# Patient Record
Sex: MALE | Race: WHITE | NOT HISPANIC OR LATINO | Employment: OTHER | URBAN - METROPOLITAN AREA
[De-identification: names, ages, dates, MRNs, and addresses within clinical notes are randomized per-mention and may not be internally consistent; named-entity substitution may affect disease eponyms.]

---

## 2017-07-05 ENCOUNTER — ALLSCRIPTS OFFICE VISIT (OUTPATIENT)
Dept: OTHER | Facility: OTHER | Age: 67
End: 2017-07-05

## 2017-07-10 ENCOUNTER — LAB CONVERSION - ENCOUNTER (OUTPATIENT)
Dept: OTHER | Facility: OTHER | Age: 67
End: 2017-07-10

## 2017-07-10 LAB — LYME IGG/IGM AB (HISTORICAL): <0.9 INDEX

## 2017-07-11 ENCOUNTER — GENERIC CONVERSION - ENCOUNTER (OUTPATIENT)
Dept: OTHER | Facility: OTHER | Age: 67
End: 2017-07-11

## 2018-01-12 NOTE — RESULT NOTES
Verified Results  (Q) LYME DISEASE AB, TOTAL W/REFL WB (IGG, IGM) [id number] 10:14AM Gina Lanier   REPORT COMMENT:  FASTING:NO     Test Name Result Flag Reference   LYME AB SCREEN <0 90 index     Index                Interpretation                     -----                --------------                     < 0 90               Negative                     0  90-1 09            Equivocal                     > 1 09               Positive      As recommended by the Food and Drug Administration   (FDA), all samples with positive or equivocal   results in a Borrelia burgdorferi antibody screen  will be tested using a blot method  Positive or   equivocal screening test results should not be   interpreted as truly positive until verified as such   using a supplemental assay (e g , B  burgdorferi blot)  The screening test and/or blot for B  burgdorferi   antibodies may be falsely negative in early stages  of Lyme disease, including the period when erythema   migrans is apparent

## 2018-01-13 VITALS
HEIGHT: 71 IN | HEART RATE: 70 BPM | DIASTOLIC BLOOD PRESSURE: 96 MMHG | SYSTOLIC BLOOD PRESSURE: 164 MMHG | BODY MASS INDEX: 34.44 KG/M2 | WEIGHT: 246 LBS | OXYGEN SATURATION: 96 %

## 2018-01-13 NOTE — RESULT NOTES
Message   Recorded as Task   Date: 07/11/2017 07:59 AM, Created By: Monica Nicolas   Task Name: Call Patient with results   Assigned To:  Monica Nicolas   Regarding Patient: Wing Mae, Status: Active   CommentThayne Art - 11 Jul 2017 7:59 AM     Patient Phone: [phone]    Lyme neg   Waqas J Carlosia - 11 Jul 2017 9:06 AM     TASK EDITED  Msg left C# of results        Signatures   Electronically signed by : Keri Cruz, ; Jul 11 2017  9:07AM EST                       (Author)

## 2018-04-05 ENCOUNTER — TELEPHONE (OUTPATIENT)
Dept: SCHEDULING | Facility: CLINIC | Age: 68
End: 2018-04-05

## 2018-04-05 RX ORDER — ATORVASTATIN CALCIUM 20 MG/1
20 TABLET, FILM COATED ORAL DAILY
Qty: 90 TABLET | Refills: 1 | Status: SHIPPED | OUTPATIENT
Start: 2018-04-05 | End: 2019-01-11 | Stop reason: SDUPTHER

## 2018-04-05 NOTE — TELEPHONE ENCOUNTER
Dr. García Pt   Pt would like to know if Dr. García can fill lipitor. Pt is in the middle of switching PCP. Please contact pt.

## 2018-07-24 RX ORDER — DOFETILIDE 0.5 MG/1
500 CAPSULE ORAL 2 TIMES DAILY
COMMUNITY
Start: 2018-01-25 | End: 2018-07-26 | Stop reason: SDUPTHER

## 2018-07-24 RX ORDER — METOPROLOL TARTRATE 25 MG/1
25 TABLET, FILM COATED ORAL EVERY 6 HOURS PRN
COMMUNITY
Start: 2017-08-10 | End: 2020-08-17 | Stop reason: SDUPTHER

## 2018-07-24 RX ORDER — LISINOPRIL 2.5 MG/1
2.5 TABLET ORAL DAILY
COMMUNITY
Start: 2018-01-25 | End: 2018-07-26

## 2018-07-24 RX ORDER — ASPIRIN 81 MG/1
81 TABLET ORAL DAILY
COMMUNITY
Start: 2015-05-08 | End: 2023-03-21

## 2018-07-24 RX ORDER — CHOLECALCIFEROL (VITAMIN D3) 50 MCG
TABLET ORAL DAILY
COMMUNITY
Start: 2015-05-08 | End: 2021-07-08

## 2018-07-25 PROBLEM — Z79.01 CHRONIC ANTICOAGULATION: Status: ACTIVE | Noted: 2018-07-25

## 2018-07-25 PROBLEM — I48.91 A-FIB (CMS/HCC): Status: ACTIVE | Noted: 2018-07-25

## 2018-07-25 ASSESSMENT — ENCOUNTER SYMPTOMS
CONSTITUTIONAL NEGATIVE: 1
NEUROLOGICAL NEGATIVE: 1
RESPIRATORY NEGATIVE: 1
ENDOCRINE NEGATIVE: 1
HEMATOLOGIC/LYMPHATIC NEGATIVE: 1
CARDIOVASCULAR NEGATIVE: 1
MUSCULOSKELETAL NEGATIVE: 1
PSYCHIATRIC NEGATIVE: 1
GASTROINTESTINAL NEGATIVE: 1
EYES NEGATIVE: 1

## 2018-07-25 NOTE — PROGRESS NOTES
Electrophysiology  Outpatient Progress Note       Reason for visit:   Chief Complaint   Patient presents with   • Atrial Fibrillation       HPI   Humberto Chung is a 67 y.o. male who is seen today for paroxysmal atrial fibrillation. His symptoms with the arrhythmia have included palpitations and ightheadedness. The patient is currently on Aspirin Low Dose 81 mg tablet,delay, Eliquis 5 mg tablet.  For rhythm control, he is on the following: Tikosyn 500 mcg capsule. He is currently in sinus rhythm. He has had three brief episodes of afib since last seen in July 2017. These last around 24 hours. He has taken Eliquis with these episodes. Overall, his AF burden has improved.     He has had no chest discomfort suggestive of ischemia.The patient denies orthopnea, PND, GTZ, or edema. Mr. CHUNG has not had palpitations, syncope or near syncope. He denies claudication. There is no discoloration or ulceration of the lower extremities. He has had no TIA or stroke-like symptoms. The patient has no symptoms attributable to valvular heart disease.     Past Medical History:   Diagnosis Date   • A-fib (CMS/HCC) (HCC)    • A-fib (CMS/HCC) (HCC) 7/25/2018   • Chronic anticoagulation 7/25/2018   • Lipid disorder      Past Surgical History:   Procedure Laterality Date   • ABLATION OF DYSRHYTHMIC FOCUS     • CARDIAC CATHETERIZATION         Social History   Substance Use Topics   • Smoking status: Current Some Day Smoker   • Smokeless tobacco: Never Used   • Alcohol use Yes     History reviewed. No pertinent family history.    ALLERGY:  Aspirin and Penicillins    Current Outpatient Prescriptions   Medication Sig Dispense Refill   • apixaban (ELIQUIS) 5 mg tablet Take 5 mg by mouth See admin instr. Use if you go into a-fib     • aspirin (ASPIRIN LOW DOSE) 81 mg enteric coated tablet Take 81 mg by mouth daily.     • atorvastatin (LIPITOR) 20 mg tablet Take 1 tablet (20 mg total) by mouth daily. 90 tablet 1   • cholecalciferol, vitamin  D3, (VITAMIN D3) 2,000 unit tablet Take by mouth daily.     • dofetilide (TIKOSYN) 500 mcg capsule Take 1 capsule (500 mcg total) by mouth 2 (two) times a day. 180 capsule 3   • lisinopril (PRINIVIL) 2.5 mg tablet Take 2 tablets (5 mg total) by mouth daily.     • metoprolol tartrate (LOPRESSOR) 25 mg tablet Take 25 mg by mouth every 6 (six) hours as needed.       No current facility-administered medications for this visit.      Review of Systems   Constitution: Negative.   HENT: Negative.    Eyes: Negative.    Cardiovascular: Negative.    Respiratory: Negative.    Endocrine: Negative.    Hematologic/Lymphatic: Negative.    Skin: Negative.    Musculoskeletal: Negative.    Gastrointestinal: Negative.    Genitourinary: Negative.    Neurological: Negative.    Psychiatric/Behavioral: Negative.      Objective   Vitals:    07/26/18 1136   BP: 140/86   Pulse: 83   SpO2: 96%       Physical Exam   Constitutional: He is oriented to person, place, and time. He appears well-developed and well-nourished.   HENT:   Head: Normocephalic.   Eyes: Conjunctivae and EOM are normal. Pupils are equal, round, and reactive to light.   Neck: Normal range of motion.   Cardiovascular: Normal rate, regular rhythm and normal heart sounds.  Exam reveals no gallop and no friction rub.    No murmur heard.  Pulmonary/Chest: Effort normal and breath sounds normal.   Abdominal: Soft. Bowel sounds are normal.   Musculoskeletal: Normal range of motion. He exhibits no edema.   Neurological: He is alert and oriented to person, place, and time.   Skin: Skin is warm and dry.   Psychiatric: He has a normal mood and affect. His behavior is normal. Judgment and thought content normal.       Lab Results   Component Value Date    WBC 5.5 02/12/2018    HGB 15.4 02/12/2018     02/12/2018    CHOL 143 02/12/2018    TRIG 45 02/12/2018    HDL 46 02/12/2018    ALT 25 02/12/2018    AST 22 02/12/2018     02/12/2018    K 4.5 02/12/2018    CREATININE 1.01  02/12/2018     Cardiovascular Procedures:  CATH LAB:  Cath (Normal Coronaries) - 2008  ELECTROPHYSIOLOGY:  EPS (PV ablation afib during study) - 12/11/2009  Holter (apc one 6 beat cst no aifb) - 5/13/2015  STRESS TESTS:  MPI (EF 0.60 (60%), lateral wal defect) - 6/9/2008  OTHER:  Other (Admitted for Tikosyn loading) - 6/10/2015     ECG   Sinus  Rhythm   Poor R wave progression      Assessment   Problem List Items Addressed This Visit        Circulatory    A-fib (CMS/HCC) ([facility]) - Primary     The patient is maintaining sinus rhythm.  He is on chronic medical therapy.  He is stable on Tikosyn and his electrocardiogram shows no evidence of prolonged QT or QTc.  I have made no changes to the present regimen         Relevant Medications    aspirin (ASPIRIN LOW DOSE) 81 mg enteric coated tablet    apixaban (ELIQUIS) 5 mg tablet    metoprolol tartrate (LOPRESSOR) 25 mg tablet    lisinopril (PRINIVIL) 2.5 mg tablet    dofetilide (TIKOSYN) 500 mcg capsule    Other Relevant Orders    ECG 12 lead (Completed)       Other    Chronic anticoagulation     He is anticoagulated on Eliquis with no adverse effects and no signs of outward bleeding.                    Sincere García MD  7/30/2018

## 2018-07-26 ENCOUNTER — OFFICE VISIT (OUTPATIENT)
Dept: CARDIOLOGY | Facility: CLINIC | Age: 68
End: 2018-07-26
Attending: INTERNAL MEDICINE
Payer: MEDICARE

## 2018-07-26 VITALS
HEART RATE: 83 BPM | SYSTOLIC BLOOD PRESSURE: 140 MMHG | BODY MASS INDEX: 34.44 KG/M2 | OXYGEN SATURATION: 96 % | HEIGHT: 71 IN | WEIGHT: 246 LBS | DIASTOLIC BLOOD PRESSURE: 86 MMHG

## 2018-07-26 DIAGNOSIS — I48.0 PAROXYSMAL ATRIAL FIBRILLATION (CMS/HCC): Primary | ICD-10-CM

## 2018-07-26 DIAGNOSIS — Z79.01 CHRONIC ANTICOAGULATION: ICD-10-CM

## 2018-07-26 PROCEDURE — 99214 OFFICE O/P EST MOD 30 MIN: CPT | Performed by: INTERNAL MEDICINE

## 2018-07-26 PROCEDURE — 93000 ELECTROCARDIOGRAM COMPLETE: CPT | Performed by: INTERNAL MEDICINE

## 2018-07-26 RX ORDER — LISINOPRIL 2.5 MG/1
5 TABLET ORAL DAILY
Start: 2018-07-26 | End: 2018-09-05 | Stop reason: SDUPTHER

## 2018-07-26 RX ORDER — DOFETILIDE 0.5 MG/1
500 CAPSULE ORAL 2 TIMES DAILY
Qty: 180 CAPSULE | Refills: 3 | Status: SHIPPED | OUTPATIENT
Start: 2018-07-26 | End: 2019-07-25 | Stop reason: SDUPTHER

## 2018-07-26 NOTE — ASSESSMENT & PLAN NOTE
The patient is maintaining sinus rhythm.  He is on chronic medical therapy.  He is stable on Tikosyn and his electrocardiogram shows no evidence of prolonged QT or QTc.  I have made no changes to the present regimen

## 2018-07-26 NOTE — LETTER
July 30, 2018     Balbir Tellez MD  [address] [address]    Patient: Humberto Costello   YOB: 1950   Date of Visit: 7/26/2018       Dear Dr. Tellez:    Thank you for referring Humberto Costello to me for evaluation. Below are my notes for this consultation.    If you have questions, please do not hesitate to call me. I look forward to following your patient along with you.         Sincerely,        Sincere García MD        CC: No Recipients  Sincere García MD  7/30/2018  9:26 AM  Signed       Electrophysiology  Outpatient Progress Note       Reason for visit:   Chief Complaint   Patient presents with   • Atrial Fibrillation       HPI   Humberto Costello is a 67 y.o. male who is seen today for paroxysmal atrial fibrillation. His symptoms with the arrhythmia have included palpitations and ightheadedness. The patient is currently on Aspirin Low Dose 81 mg tablet,delay, Eliquis 5 mg tablet.  For rhythm control, he is on the following: Tikosyn 500 mcg capsule. He is currently in sinus rhythm. He has had three brief episodes of afib since last seen in July 2017. These last around 24 hours. He has taken Eliquis with these episodes. Overall, his AF burden has improved.     He has had no chest discomfort suggestive of ischemia.The patient denies orthopnea, PND, GTZ, or edema. Mr. COSTELLO has not had palpitations, syncope or near syncope. He denies claudication. There is no discoloration or ulceration of the lower extremities. He has had no TIA or stroke-like symptoms. The patient has no symptoms attributable to valvular heart disease.     Past Medical History:   Diagnosis Date   • A-fib (CMS/HCC) (HCC)    • A-fib (CMS/HCC) (HCC) 7/25/2018   • Chronic anticoagulation 7/25/2018   • Lipid disorder      Past Surgical History:   Procedure Laterality Date   • ABLATION OF DYSRHYTHMIC FOCUS     • CARDIAC CATHETERIZATION         Social History   Substance Use Topics   • Smoking status:  Current Some Day Smoker   • Smokeless tobacco: Never Used   • Alcohol use Yes     History reviewed. No pertinent family history.    ALLERGY:  Aspirin and Penicillins    Current Outpatient Prescriptions   Medication Sig Dispense Refill   • apixaban (ELIQUIS) 5 mg tablet Take 5 mg by mouth See admin instr. Use if you go into a-fib     • aspirin (ASPIRIN LOW DOSE) 81 mg enteric coated tablet Take 81 mg by mouth daily.     • atorvastatin (LIPITOR) 20 mg tablet Take 1 tablet (20 mg total) by mouth daily. 90 tablet 1   • cholecalciferol, vitamin D3, (VITAMIN D3) 2,000 unit tablet Take by mouth daily.     • dofetilide (TIKOSYN) 500 mcg capsule Take 1 capsule (500 mcg total) by mouth 2 (two) times a day. 180 capsule 3   • lisinopril (PRINIVIL) 2.5 mg tablet Take 2 tablets (5 mg total) by mouth daily.     • metoprolol tartrate (LOPRESSOR) 25 mg tablet Take 25 mg by mouth every 6 (six) hours as needed.       No current facility-administered medications for this visit.      Review of Systems   Constitution: Negative.   HENT: Negative.    Eyes: Negative.    Cardiovascular: Negative.    Respiratory: Negative.    Endocrine: Negative.    Hematologic/Lymphatic: Negative.    Skin: Negative.    Musculoskeletal: Negative.    Gastrointestinal: Negative.    Genitourinary: Negative.    Neurological: Negative.    Psychiatric/Behavioral: Negative.      Objective   Vitals:    07/26/18 1136   BP: 140/86   Pulse: 83   SpO2: 96%       Physical Exam   Constitutional: He is oriented to person, place, and time. He appears well-developed and well-nourished.   HENT:   Head: Normocephalic.   Eyes: Conjunctivae and EOM are normal. Pupils are equal, round, and reactive to light.   Neck: Normal range of motion.   Cardiovascular: Normal rate, regular rhythm and normal heart sounds.  Exam reveals no gallop and no friction rub.    No murmur heard.  Pulmonary/Chest: Effort normal and breath sounds normal.   Abdominal: Soft. Bowel sounds are normal.    Musculoskeletal: Normal range of motion. He exhibits no edema.   Neurological: He is alert and oriented to person, place, and time.   Skin: Skin is warm and dry.   Psychiatric: He has a normal mood and affect. His behavior is normal. Judgment and thought content normal.       Lab Results   Component Value Date    WBC 5.5 02/12/2018    HGB 15.4 02/12/2018     02/12/2018    CHOL 143 02/12/2018    TRIG 45 02/12/2018    HDL 46 02/12/2018    ALT 25 02/12/2018    AST 22 02/12/2018     02/12/2018    K 4.5 02/12/2018    CREATININE 1.01 02/12/2018     Cardiovascular Procedures:  CATH LAB:  Cath (Normal Coronaries) - 2008  ELECTROPHYSIOLOGY:  EPS (PV ablation afib during study) - 12/11/2009  Holter (apc one 6 beat cst no aifb) - 5/13/2015  STRESS TESTS:  MPI (EF 0.60 (60%), lateral wal defect) - 6/9/2008  OTHER:  Other (Admitted for Tikosyn loading) - 6/10/2015     ECG   Sinus  Rhythm   Poor R wave progression      Assessment   Problem List Items Addressed This Visit        Circulatory    A-fib (CMS/HCC) (HCC) - Primary     The patient is maintaining sinus rhythm.  He is on chronic medical therapy.  He is stable on Tikosyn and his electrocardiogram shows no evidence of prolonged QT or QTc.  I have made no changes to the present regimen         Relevant Medications    aspirin (ASPIRIN LOW DOSE) 81 mg enteric coated tablet    apixaban (ELIQUIS) 5 mg tablet    metoprolol tartrate (LOPRESSOR) 25 mg tablet    lisinopril (PRINIVIL) 2.5 mg tablet    dofetilide (TIKOSYN) 500 mcg capsule    Other Relevant Orders    ECG 12 lead (Completed)       Other    Chronic anticoagulation     He is anticoagulated on Eliquis with no adverse effects and no signs of outward bleeding.                    Sincere García MD  7/30/2018

## 2018-08-29 ENCOUNTER — OFFICE VISIT (OUTPATIENT)
Dept: FAMILY MEDICINE CLINIC | Facility: CLINIC | Age: 68
End: 2018-08-29
Payer: MEDICARE

## 2018-08-29 VITALS
HEIGHT: 71 IN | DIASTOLIC BLOOD PRESSURE: 78 MMHG | HEART RATE: 78 BPM | BODY MASS INDEX: 33.88 KG/M2 | SYSTOLIC BLOOD PRESSURE: 122 MMHG | OXYGEN SATURATION: 96 % | WEIGHT: 242 LBS

## 2018-08-29 DIAGNOSIS — L25.9 CONTACT DERMATITIS, UNSPECIFIED CONTACT DERMATITIS TYPE, UNSPECIFIED TRIGGER: Primary | ICD-10-CM

## 2018-08-29 PROCEDURE — 99213 OFFICE O/P EST LOW 20 MIN: CPT | Performed by: FAMILY MEDICINE

## 2018-08-29 RX ORDER — DOFETILIDE 0.5 MG/1
CAPSULE ORAL
COMMUNITY
Start: 2017-07-05

## 2018-08-29 RX ORDER — PREDNISONE 20 MG/1
TABLET ORAL
Qty: 15 TABLET | Refills: 1 | Status: SHIPPED | OUTPATIENT
Start: 2018-08-29

## 2018-08-29 RX ORDER — ASPIRIN 81 MG/1
TABLET ORAL
COMMUNITY
Start: 2017-07-05

## 2018-08-29 RX ORDER — LISINOPRIL 5 MG/1
5 TABLET ORAL DAILY
COMMUNITY
End: 2019-03-11 | Stop reason: ALTCHOICE

## 2018-08-29 RX ORDER — ATORVASTATIN CALCIUM 20 MG/1
1 TABLET, FILM COATED ORAL DAILY
COMMUNITY
Start: 2017-07-05

## 2018-08-29 NOTE — PROGRESS NOTES
[address]         NAME: Walker Gomez is a 78 y o  male  : 1950    MRN: [id number]  DATE: 2018  TIME: 11:32 AM    Assessment and Plan   Contact dermatitis, unspecified contact dermatitis type, unspecified trigger [L25 9]  1  Contact dermatitis, unspecified contact dermatitis type, unspecified trigger  predniSONE 20 mg tablet         Patient Instructions     Patient Instructions   See meds          Chief Complaint     Chief Complaint   Patient presents with    Poison Ivy     1 day         History of Present Illness       C/o contact derm---poison ivy        Review of Systems   Review of Systems   Respiratory: Negative  Cardiovascular: Negative  Skin:        See HPI         Current Medications       Current Outpatient Prescriptions:     aspirin (ASPIR-LOW) 81 mg EC tablet, Take by mouth, Disp: , Rfl:     atorvastatin (LIPITOR) 20 mg tablet, Take 1 tablet by mouth daily, Disp: , Rfl:     dofetilide (TIKOSYN) 500 mcg capsule, Take by mouth, Disp: , Rfl:     lisinopril (ZESTRIL) 5 mg tablet, Take 5 mg by mouth daily, Disp: , Rfl:     predniSONE 20 mg tablet, TAKE 1 TABLET BID X 5 DAYS THEN TAKE 1 TABLET QD FOR 5 DAYS, Disp: 15 tablet, Rfl: 1    Current Allergies     Allergies as of 2018 - never reviewed   Allergen Reaction Noted    Penicillins  2017            The following portions of the patient's history were reviewed and updated as appropriate: allergies, current medications, past family history, past medical history, past social history, past surgical history and problem list      Past Medical History:   Diagnosis Date    Hypertension        History reviewed  No pertinent surgical history      Family History   Problem Relation Age of Onset    No Known Problems Mother     No Known Problems Father     Stroke Maternal Grandmother     Stroke Paternal Grandmother     Substance Abuse Son     Alcohol abuse Son     Mental illness Neg Hx Medications have been verified          Objective   /78   Pulse 78   Ht 5' 11" (1 803 m)   Wt 110 kg (242 lb)   SpO2 96%   BMI 33 75 kg/m²        Physical Exam     Physical Exam   Skin:   Lesions c/w poison ivy

## 2018-09-07 RX ORDER — LISINOPRIL 2.5 MG/1
5 TABLET ORAL DAILY
Qty: 180 TABLET | Refills: 1 | Status: SHIPPED | OUTPATIENT
Start: 2018-09-07 | End: 2019-01-24 | Stop reason: SDUPTHER

## 2018-09-20 ENCOUNTER — TELEPHONE (OUTPATIENT)
Dept: SCHEDULING | Facility: CLINIC | Age: 68
End: 2018-09-20

## 2018-09-20 ENCOUNTER — OFFICE VISIT (OUTPATIENT)
Dept: FAMILY MEDICINE CLINIC | Facility: CLINIC | Age: 68
End: 2018-09-20
Payer: MEDICARE

## 2018-09-20 VITALS
BODY MASS INDEX: 34.16 KG/M2 | WEIGHT: 244 LBS | DIASTOLIC BLOOD PRESSURE: 78 MMHG | SYSTOLIC BLOOD PRESSURE: 132 MMHG | HEIGHT: 71 IN | OXYGEN SATURATION: 96 % | HEART RATE: 64 BPM

## 2018-09-20 DIAGNOSIS — L03.115 CELLULITIS OF RIGHT LOWER EXTREMITY: Primary | ICD-10-CM

## 2018-09-20 PROCEDURE — 99213 OFFICE O/P EST LOW 20 MIN: CPT | Performed by: FAMILY MEDICINE

## 2018-09-20 RX ORDER — CEPHALEXIN 500 MG/1
500 CAPSULE ORAL EVERY 8 HOURS SCHEDULED
Qty: 30 CAPSULE | Refills: 0 | Status: SHIPPED | OUTPATIENT
Start: 2018-09-20 | End: 2018-09-30

## 2018-09-20 NOTE — PROGRESS NOTES
[address] [facility]        NAME: Marlys Means is a 78 y o  male  : 1950    MRN: [id number]  DATE: 2018  TIME: 10:11 AM    Assessment and Plan   Cellulitis of right lower extremity [L03 115]  1  Cellulitis of right lower extremity  cephalexin (KEFLEX) 500 mg capsule         Patient Instructions     Patient Instructions   See meds          Chief Complaint     Chief Complaint   Patient presents with    right leg infection         History of Present Illness       R leg cellulitis        Review of Systems   Review of Systems   Constitutional: Negative  Respiratory: Negative  Cardiovascular: Negative  Skin:        See HPI         Current Medications       Current Outpatient Prescriptions:     aspirin (ASPIR-LOW) 81 mg EC tablet, Take by mouth, Disp: , Rfl:     atorvastatin (LIPITOR) 20 mg tablet, Take 1 tablet by mouth daily, Disp: , Rfl:     dofetilide (TIKOSYN) 500 mcg capsule, Take by mouth, Disp: , Rfl:     lisinopril (ZESTRIL) 5 mg tablet, Take 5 mg by mouth daily, Disp: , Rfl:     cephalexin (KEFLEX) 500 mg capsule, Take 1 capsule (500 mg total) by mouth every 8 (eight) hours for 10 days, Disp: 30 capsule, Rfl: 0    predniSONE 20 mg tablet, TAKE 1 TABLET BID X 5 DAYS THEN TAKE 1 TABLET QD FOR 5 DAYS (Patient not taking: Reported on 2018 ), Disp: 15 tablet, Rfl: 1    Current Allergies     Allergies as of 2018 - Reviewed 2018   Allergen Reaction Noted    Penicillins  2017            The following portions of the patient's history were reviewed and updated as appropriate: allergies, current medications, past family history, past medical history, past social history, past surgical history and problem list      Past Medical History:   Diagnosis Date    Hypertension        No past surgical history on file      Family History   Problem Relation Age of Onset    No Known Problems Mother     No Known Problems Father     Stroke Maternal Grandmother     Stroke Paternal Grandmother     Substance Abuse Son     Alcohol abuse Son     Mental illness Neg Hx          Medications have been verified  Objective   /78   Pulse 64   Ht 5' 11" (1 803 m)   Wt 111 kg (244 lb)   SpO2 96%   BMI 34 03 kg/m²        Physical Exam     Physical Exam   Cardiovascular: Normal heart sounds      Pulmonary/Chest: Breath sounds normal    Skin:   R leg c/w cellulitis

## 2018-09-20 NOTE — TELEPHONE ENCOUNTER
Left message to return my call.  I need the name of the antibiotic in order to assess for any interaction with dofetilide.

## 2018-09-20 NOTE — TELEPHONE ENCOUNTER
Dr García pt call to confirm antibiotic  for infection, ok to take Tikosyn. Please call pt to discuss. P# [phone]

## 2019-01-11 RX ORDER — ATORVASTATIN CALCIUM 20 MG/1
20 TABLET, FILM COATED ORAL DAILY
Qty: 90 TABLET | Refills: 1 | Status: SHIPPED | OUTPATIENT
Start: 2019-01-11 | End: 2019-07-25 | Stop reason: SDUPTHER

## 2019-01-22 ASSESSMENT — ENCOUNTER SYMPTOMS
CONSTITUTIONAL NEGATIVE: 1
CARDIOVASCULAR NEGATIVE: 1
PSYCHIATRIC NEGATIVE: 1
EYES NEGATIVE: 1
MUSCULOSKELETAL NEGATIVE: 1
GASTROINTESTINAL NEGATIVE: 1
NEUROLOGICAL NEGATIVE: 1
RESPIRATORY NEGATIVE: 1
ENDOCRINE NEGATIVE: 1
HEMATOLOGIC/LYMPHATIC NEGATIVE: 1

## 2019-01-22 NOTE — PROGRESS NOTES
Electrophysiology  Outpatient Progress Note       Reason for visit:   Chief Complaint   Patient presents with   • Follow-up       HPI   Humberto Chung is a 68 y.o. male who is seen today for paroxysmal atrial fibrillation.  He reports episodes every week or two lasting longer than previous, but still only a few hours. His symptoms with the arrhythmia have included palpitations and lightheadedness. The patient is currently on  Tikosyn 500 mcg BID. He is currently in sinus rhythm. Overall, his AF burden has increased.      He has had no chest discomfort suggestive of ischemia.The patient denies orthopnea, PND, GTZ, or edema. Mr. CHUNG has not had palpitations, syncope or near syncope. He denies claudication. There is no discoloration or ulceration of the lower extremities. He has had no TIA or stroke-like symptoms. The patient has no symptoms attributable to valvular heart disease.     Past Medical History:   Diagnosis Date   • A-fib (CMS/HCC) (HCC)    • A-fib (CMS/HCC) (HCC) 7/25/2018   • Chronic anticoagulation 7/25/2018   • Lipid disorder      Past Surgical History:   Procedure Laterality Date   • ABLATION OF DYSRHYTHMIC FOCUS     • CARDIAC CATHETERIZATION         Social History   Substance Use Topics   • Smoking status: Current Some Day Smoker   • Smokeless tobacco: Never Used   • Alcohol use Yes     No family history on file.    ALLERGY:  Aspirin and Penicillins    Current Outpatient Prescriptions   Medication Sig Dispense Refill   • apixaban (ELIQUIS) 5 mg tablet Take 5 mg by mouth See admin instr. Use if you go into a-fib     • aspirin (ASPIRIN LOW DOSE) 81 mg enteric coated tablet Take 81 mg by mouth daily.     • atorvastatin (LIPITOR) 20 mg tablet Take 1 tablet (20 mg total) by mouth daily. 90 tablet 1   • cholecalciferol, vitamin D3, (VITAMIN D3) 2,000 unit tablet Take by mouth daily.     • dofetilide (TIKOSYN) 500 mcg capsule Take 1 capsule (500 mcg total) by mouth 2 (two) times a day. 180 capsule 3    • lisinopril (PRINIVIL) 2.5 mg tablet Take 2 tablets (5 mg total) by mouth daily. 180 tablet 3   • metoprolol tartrate (LOPRESSOR) 25 mg tablet Take 25 mg by mouth every 6 (six) hours as needed.       No current facility-administered medications for this visit.      Review of Systems   Constitution: Negative.   HENT: Negative.    Eyes: Negative.    Cardiovascular: Negative.    Respiratory: Negative.    Endocrine: Negative.    Hematologic/Lymphatic: Negative.    Skin: Negative.    Musculoskeletal: Negative.    Gastrointestinal: Negative.    Genitourinary: Negative.    Neurological: Negative.    Psychiatric/Behavioral: Negative.      Objective   Vitals:    01/24/19 1044   BP: 140/88   Pulse: 79   Resp: 18   SpO2: 96%       Physical Exam   Constitutional: He is oriented to person, place, and time. He appears well-developed and well-nourished.   HENT:   Head: Normocephalic.   Eyes: Conjunctivae and EOM are normal. Pupils are equal, round, and reactive to light.   Neck: Normal range of motion.   Cardiovascular: Normal rate, regular rhythm and normal heart sounds.  Exam reveals no gallop and no friction rub.    No murmur heard.  Pulmonary/Chest: Effort normal and breath sounds normal.   Abdominal: Soft. Bowel sounds are normal.   Musculoskeletal: Normal range of motion. He exhibits no edema.   Neurological: He is alert and oriented to person, place, and time.   Skin: Skin is warm and dry.   Psychiatric: He has a normal mood and affect. His behavior is normal. Judgment and thought content normal.       Lab Results   Component Value Date    WBC 5.5 02/12/2018    HGB 15.4 02/12/2018     02/12/2018    CHOL 143 02/12/2018    TRIG 45 02/12/2018    HDL 46 02/12/2018    ALT 25 02/12/2018    AST 22 02/12/2018     02/12/2018    K 4.5 02/12/2018    CREATININE 1.01 02/12/2018     Cardiovascular Procedures:    CATH LAB:  Cath (Normal Coronaries) - 2008    ELECTROPHYSIOLOGY:  EPS (PV ablation afib during study) -  12/11/2009  Holter (apc one 6 beat cst no aifb) - 5/13/2015    STRESS TESTS:  MPI (EF 0.60 (60%), lateral wal defect) - 6/9/2008    Other (Admitted for Tikosyn loading) - 6/10/2015     ECG Sinus  Rhythm   WITHIN NORMAL LIMITS    Assessment   Problem List Items Addressed This Visit        Circulatory    A-fib (CMS/HCC) (HCC) - Primary     He has had increasing episodes recently.  He uses Eliquis on an as needed basis, and is reluctant to take this daily. As his symptoms have increased a bit we did discuss catheter ablation and he will consider this for better control of his arrhythmias.  He will contact us if his burden warrants such before his next visit.         Relevant Medications    lisinopril (PRINIVIL) 2.5 mg tablet    Other Relevant Orders    ECG 12 LEAD-OFFICE PERFORMED (Completed)       Other    Chronic anticoagulation     He is a CHADS-Vasc 1 (age; on lisinopril for renal purposes). Discussed perhaps taking Eliquis daily, but he remains reluctant.                     Sincere García MD  2/3/2019

## 2019-01-24 ENCOUNTER — OFFICE VISIT (OUTPATIENT)
Dept: CARDIOLOGY | Facility: CLINIC | Age: 69
End: 2019-01-24
Payer: MEDICARE

## 2019-01-24 VITALS
HEIGHT: 71 IN | DIASTOLIC BLOOD PRESSURE: 88 MMHG | SYSTOLIC BLOOD PRESSURE: 140 MMHG | OXYGEN SATURATION: 96 % | RESPIRATION RATE: 18 BRPM | BODY MASS INDEX: 34.3 KG/M2 | HEART RATE: 79 BPM | WEIGHT: 245 LBS

## 2019-01-24 DIAGNOSIS — I48.91 ATRIAL FIBRILLATION, UNSPECIFIED TYPE (CMS/HCC): Primary | ICD-10-CM

## 2019-01-24 DIAGNOSIS — Z79.01 CHRONIC ANTICOAGULATION: ICD-10-CM

## 2019-01-24 PROCEDURE — 99214 OFFICE O/P EST MOD 30 MIN: CPT | Performed by: INTERNAL MEDICINE

## 2019-01-24 PROCEDURE — 93000 ELECTROCARDIOGRAM COMPLETE: CPT | Performed by: INTERNAL MEDICINE

## 2019-01-24 RX ORDER — LISINOPRIL 2.5 MG/1
5 TABLET ORAL DAILY
Qty: 180 TABLET | Refills: 3 | Status: SHIPPED | OUTPATIENT
Start: 2019-01-24 | End: 2019-07-25 | Stop reason: SDUPTHER

## 2019-01-24 ASSESSMENT — PAIN SCALES - GENERAL: PAINLEVEL: 0-NO PAIN

## 2019-01-24 NOTE — LETTER
February 3, 2019     Balbir Tellez MD  [address] [address]    Patient: Humberto Costello   YOB: 1950   Date of Visit: 1/24/2019       Dear Dr. Tellez:    Thank you for referring Humberto Costello to me for evaluation. Below are my notes for this consultation.    If you have questions, please do not hesitate to call me. I look forward to following your patient along with you.         Sincerely,        Sincere García MD        CC: No Recipients  Sincere García MD  2/3/2019  8:04 AM  Signed       Electrophysiology  Outpatient Progress Note       Reason for visit:   Chief Complaint   Patient presents with   • Follow-up       HPI   Humberto Costello is a 68 y.o. male who is seen today for paroxysmal atrial fibrillation.  He reports episodes every week or two lasting longer than previous, but still only a few hours. His symptoms with the arrhythmia have included palpitations and lightheadedness. The patient is currently on  Tikosyn 500 mcg BID. He is currently in sinus rhythm. Overall, his AF burden has increased.      He has had no chest discomfort suggestive of ischemia.The patient denies orthopnea, PND, GTZ, or edema. Mr. COSTELLO has not had palpitations, syncope or near syncope. He denies claudication. There is no discoloration or ulceration of the lower extremities. He has had no TIA or stroke-like symptoms. The patient has no symptoms attributable to valvular heart disease.     Past Medical History:   Diagnosis Date   • A-fib (CMS/HCC) (HCC)    • A-fib (CMS/HCC) (HCC) 7/25/2018   • Chronic anticoagulation 7/25/2018   • Lipid disorder      Past Surgical History:   Procedure Laterality Date   • ABLATION OF DYSRHYTHMIC FOCUS     • CARDIAC CATHETERIZATION         Social History   Substance Use Topics   • Smoking status: Current Some Day Smoker   • Smokeless tobacco: Never Used   • Alcohol use Yes     No family history on file.    ALLERGY:  Aspirin and Penicillins    Current  Outpatient Prescriptions   Medication Sig Dispense Refill   • apixaban (ELIQUIS) 5 mg tablet Take 5 mg by mouth See admin instr. Use if you go into a-fib     • aspirin (ASPIRIN LOW DOSE) 81 mg enteric coated tablet Take 81 mg by mouth daily.     • atorvastatin (LIPITOR) 20 mg tablet Take 1 tablet (20 mg total) by mouth daily. 90 tablet 1   • cholecalciferol, vitamin D3, (VITAMIN D3) 2,000 unit tablet Take by mouth daily.     • dofetilide (TIKOSYN) 500 mcg capsule Take 1 capsule (500 mcg total) by mouth 2 (two) times a day. 180 capsule 3   • lisinopril (PRINIVIL) 2.5 mg tablet Take 2 tablets (5 mg total) by mouth daily. 180 tablet 3   • metoprolol tartrate (LOPRESSOR) 25 mg tablet Take 25 mg by mouth every 6 (six) hours as needed.       No current facility-administered medications for this visit.      Review of Systems   Constitution: Negative.   HENT: Negative.    Eyes: Negative.    Cardiovascular: Negative.    Respiratory: Negative.    Endocrine: Negative.    Hematologic/Lymphatic: Negative.    Skin: Negative.    Musculoskeletal: Negative.    Gastrointestinal: Negative.    Genitourinary: Negative.    Neurological: Negative.    Psychiatric/Behavioral: Negative.      Objective   Vitals:    01/24/19 1044   BP: 140/88   Pulse: 79   Resp: 18   SpO2: 96%       Physical Exam   Constitutional: He is oriented to person, place, and time. He appears well-developed and well-nourished.   HENT:   Head: Normocephalic.   Eyes: Conjunctivae and EOM are normal. Pupils are equal, round, and reactive to light.   Neck: Normal range of motion.   Cardiovascular: Normal rate, regular rhythm and normal heart sounds.  Exam reveals no gallop and no friction rub.    No murmur heard.  Pulmonary/Chest: Effort normal and breath sounds normal.   Abdominal: Soft. Bowel sounds are normal.   Musculoskeletal: Normal range of motion. He exhibits no edema.   Neurological: He is alert and oriented to person, place, and time.   Skin: Skin is warm and  dry.   Psychiatric: He has a normal mood and affect. His behavior is normal. Judgment and thought content normal.       Lab Results   Component Value Date    WBC 5.5 02/12/2018    HGB 15.4 02/12/2018     02/12/2018    CHOL 143 02/12/2018    TRIG 45 02/12/2018    HDL 46 02/12/2018    ALT 25 02/12/2018    AST 22 02/12/2018     02/12/2018    K 4.5 02/12/2018    CREATININE 1.01 02/12/2018     Cardiovascular Procedures:    CATH LAB:  Cath (Normal Coronaries) - 2008    ELECTROPHYSIOLOGY:  EPS (PV ablation afib during study) - 12/11/2009  Holter (apc one 6 beat cst no aifb) - 5/13/2015    STRESS TESTS:  MPI (EF 0.60 (60%), lateral wal defect) - 6/9/2008    Other (Admitted for Tikosyn loading) - 6/10/2015     ECG Sinus  Rhythm   WITHIN NORMAL LIMITS    Assessment   Problem List Items Addressed This Visit        Circulatory    A-fib (CMS/HCC) (HCC) - Primary     He has had increasing episodes recently.  He uses Eliquis on an as needed basis, and is reluctant to take this daily. As his symptoms have increased a bit we did discuss catheter ablation and he will consider this for better control of his arrhythmias.  He will contact us if his burden warrants such before his next visit.         Relevant Medications    lisinopril (PRINIVIL) 2.5 mg tablet    Other Relevant Orders    ECG 12 LEAD-OFFICE PERFORMED (Completed)       Other    Chronic anticoagulation     He is a CHADS-Vasc 1 (age; on lisinopril for renal purposes). Discussed perhaps taking Eliquis daily, but he remains reluctant.                     Sincere García MD  2/3/2019

## 2019-02-26 DIAGNOSIS — Z12.5 SCREENING PSA (PROSTATE SPECIFIC ANTIGEN): ICD-10-CM

## 2019-02-26 DIAGNOSIS — Z00.00 WELLNESS EXAMINATION: Primary | ICD-10-CM

## 2019-03-06 LAB
ALBUMIN SERPL-MCNC: 4.3 G/DL (ref 3.6–5.1)
ALBUMIN/GLOB SERPL: 1.9 (CALC) (ref 1–2.5)
ALP SERPL-CCNC: 56 U/L (ref 40–115)
ALT SERPL-CCNC: 23 U/L (ref 9–46)
AST SERPL-CCNC: 22 U/L (ref 10–35)
BILIRUB SERPL-MCNC: 0.8 MG/DL (ref 0.2–1.2)
BUN SERPL-MCNC: 19 MG/DL (ref 7–25)
BUN/CREAT SERPL: NORMAL (CALC) (ref 6–22)
CALCIUM SERPL-MCNC: 9.2 MG/DL (ref 8.6–10.3)
CHLORIDE SERPL-SCNC: 105 MMOL/L (ref 98–110)
CHOLEST SERPL-MCNC: 145 MG/DL
CHOLEST/HDLC SERPL: 3.5 (CALC)
CO2 SERPL-SCNC: 27 MMOL/L (ref 20–32)
CREAT SERPL-MCNC: 0.91 MG/DL (ref 0.7–1.25)
GLOBULIN SER CALC-MCNC: 2.3 G/DL (CALC) (ref 1.9–3.7)
GLUCOSE SERPL-MCNC: 99 MG/DL (ref 65–99)
HDLC SERPL-MCNC: 41 MG/DL
LDLC SERPL CALC-MCNC: 90 MG/DL (CALC)
NONHDLC SERPL-MCNC: 104 MG/DL (CALC)
POTASSIUM SERPL-SCNC: 4.4 MMOL/L (ref 3.5–5.3)
PROT SERPL-MCNC: 6.6 G/DL (ref 6.1–8.1)
PSA SERPL-MCNC: 2.2 NG/ML
SL AMB EGFR AFRICAN AMERICAN: 101 ML/MIN/1.73M2
SL AMB EGFR NON AFRICAN AMERICAN: 87 ML/MIN/1.73M2
SODIUM SERPL-SCNC: 140 MMOL/L (ref 135–146)
TRIGL SERPL-MCNC: 59 MG/DL

## 2019-03-11 ENCOUNTER — OFFICE VISIT (OUTPATIENT)
Dept: FAMILY MEDICINE CLINIC | Facility: CLINIC | Age: 69
End: 2019-03-11
Payer: MEDICARE

## 2019-03-11 VITALS
BODY MASS INDEX: 33.6 KG/M2 | OXYGEN SATURATION: 97 % | SYSTOLIC BLOOD PRESSURE: 120 MMHG | WEIGHT: 240 LBS | DIASTOLIC BLOOD PRESSURE: 82 MMHG | HEART RATE: 68 BPM | HEIGHT: 71 IN | RESPIRATION RATE: 14 BRPM

## 2019-03-11 DIAGNOSIS — I48.91 ATRIAL FIBRILLATION, UNSPECIFIED TYPE (HCC): ICD-10-CM

## 2019-03-11 DIAGNOSIS — Z00.00 WELLNESS EXAMINATION: ICD-10-CM

## 2019-03-11 DIAGNOSIS — I10 ESSENTIAL HYPERTENSION: Primary | ICD-10-CM

## 2019-03-11 PROCEDURE — 99213 OFFICE O/P EST LOW 20 MIN: CPT | Performed by: FAMILY MEDICINE

## 2019-03-11 PROCEDURE — G0439 PPPS, SUBSEQ VISIT: HCPCS | Performed by: FAMILY MEDICINE

## 2019-03-11 RX ORDER — LISINOPRIL 2.5 MG/1
2.5 TABLET ORAL DAILY
COMMUNITY
Start: 2019-01-11

## 2019-03-11 NOTE — PROGRESS NOTES
[facility]        NAME: Rohan Casey is a 80 y o  male  : 1950    MRN: [id number]  DATE: 2019  TIME: 8:32 AM    Assessment and Plan   Essential hypertension [I10]  1  Essential hypertension     2  Atrial fibrillation, unspecified type ([facility] [address] )     3  Wellness examination           Patient Instructions     Patient Instructions   F/u cardiol for eval for ablation          Chief Complaint     Chief Complaint   Patient presents with    Follow-up     mm - review labs    Medicare Wellness Visit     annual wellness         History of Present Illness       F/u for assessed med cond---goes into Afib Q 2wks or so---has appt cardiol      Review of Systems   Review of Systems   Constitutional: Negative for fatigue, fever and unexpected weight change  HENT: Negative for congestion, sinus pain and sore throat  Eyes: Negative for visual disturbance  Respiratory: Negative for shortness of breath and wheezing  Cardiovascular: Negative for chest pain and palpitations  Gastrointestinal: Negative for abdominal pain, nausea and vomiting  Musculoskeletal: Negative  Negative for arthralgias and myalgias  Neurological: Negative for syncope, weakness and numbness  Psychiatric/Behavioral: Negative  Negative for confusion, dysphoric mood and suicidal ideas           Current Medications       Current Outpatient Medications:     aspirin (ASPIR-LOW) 81 mg EC tablet, Take by mouth, Disp: , Rfl:     atorvastatin (LIPITOR) 20 mg tablet, Take 1 tablet by mouth daily, Disp: , Rfl:     dofetilide (TIKOSYN) 500 mcg capsule, Take by mouth, Disp: , Rfl:     lisinopril (ZESTRIL) 2 5 mg tablet, Take 2 5 mg by mouth daily, Disp: , Rfl:     predniSONE 20 mg tablet, TAKE 1 TABLET BID X 5 DAYS THEN TAKE 1 TABLET QD FOR 5 DAYS (Patient not taking: Reported on 2018 ), Disp: 15 tablet, Rfl: 1    Current Allergies     Allergies as of 2019 - Reviewed 2019   Allergen Reaction Noted    Penicillins  07/05/2017            The following portions of the patient's history were reviewed and updated as appropriate: allergies, current medications, past family history, past medical history, past social history, past surgical history and problem list      Past Medical History:   Diagnosis Date    Hypertension        No past surgical history on file  Family History   Problem Relation Age of Onset    No Known Problems Mother     No Known Problems Father     Stroke Maternal Grandmother     Stroke Paternal Grandmother     Substance Abuse Son     Alcohol abuse Son     Mental illness Neg Hx          Medications have been verified  Objective   /82 (BP Location: Right arm, Patient Position: Sitting, Cuff Size: Large)   Pulse 68   Resp 14   Ht 5' 11" (1 803 m)   Wt 109 kg (240 lb)   SpO2 97%   BMI 33 47 kg/m²        Physical Exam     Physical Exam   Constitutional: He is oriented to person, place, and time  Vital signs are normal  He appears well-developed and well-nourished  HENT:   Right Ear: Ear canal normal  Tympanic membrane is not injected  Left Ear: Ear canal normal  Tympanic membrane is not injected  Nose: Nose normal    Mouth/Throat: Oropharynx is clear and moist    Eyes: Pupils are equal, round, and reactive to light  Conjunctivae and EOM are normal  Right eye exhibits no discharge  Left eye exhibits no discharge  Neck: Normal range of motion  Neck supple  No thyromegaly present  Cardiovascular: Normal rate, regular rhythm and normal heart sounds  No murmur heard  Pulmonary/Chest: Effort normal and breath sounds normal  No respiratory distress  He has no wheezes  Abdominal: Soft  Bowel sounds are normal  He exhibits no distension  There is no tenderness  Musculoskeletal: Normal range of motion  Lymphadenopathy:     He has no cervical adenopathy  Neurological: He is alert and oriented to person, place, and time   He has normal strength and normal reflexes  He is not disoriented  No sensory deficit  Gait normal    Skin: Skin is warm and dry  Psychiatric: He has a normal mood and affect   His speech is normal and behavior is normal  Judgment and thought content normal  Cognition and memory are normal

## 2019-03-11 NOTE — PROGRESS NOTES
Assessment and Plan:    Problem List Items Addressed This Visit     None      Visit Diagnoses     Essential hypertension    -  Primary    Relevant Medications    lisinopril (ZESTRIL) 2 5 mg tablet    Atrial fibrillation, unspecified type [facility])        Wellness examination            Health Maintenance Due   Topic Date Due    Hepatitis C Screening  1950    Medicare Annual Wellness Visit (AWV)  1950    CRC Screening: Colonoscopy  1950    BMI: Followup Plan  11/30/1969    DTaP,Tdap,and Td Vaccines (1 - Tdap) 11/30/1972    Fall Risk  11/30/2016    Pneumococcal PPSV23/PCV13 65+ Years / Low and Medium Risk (1 of 2 - PCV13) 11/30/2016    INFLUENZA VACCINE  07/01/2018         HPI:  Nena Borges is a 80 y o  male here for his Initial Wellness Visit  There is no problem list on file for this patient  Past Medical History:   Diagnosis Date    Hypertension      No past surgical history on file    Family History   Problem Relation Age of Onset    No Known Problems Mother     No Known Problems Father     Stroke Maternal Grandmother     Stroke Paternal Grandmother     Substance Abuse Son     Alcohol abuse Son     Mental illness Neg Hx      Social History     Tobacco Use   Smoking Status Current Some Day Smoker   Smokeless Tobacco Never Used     Social History     Substance and Sexual Activity   Alcohol Use Yes    Alcohol/week: 3 6 oz    Types: 6 Cans of beer per week      Social History     Substance and Sexual Activity   Drug Use No       Current Outpatient Medications   Medication Sig Dispense Refill    aspirin (ASPIR-LOW) 81 mg EC tablet Take by mouth      atorvastatin (LIPITOR) 20 mg tablet Take 1 tablet by mouth daily      dofetilide (TIKOSYN) 500 mcg capsule Take by mouth      lisinopril (ZESTRIL) 2 5 mg tablet Take 2 5 mg by mouth daily      predniSONE 20 mg tablet TAKE 1 TABLET BID X 5 DAYS THEN TAKE 1 TABLET QD FOR 5 DAYS (Patient not taking: Reported on 9/20/2018 ) 15 tablet 1 No current facility-administered medications for this visit  Allergies   Allergen Reactions    Penicillins        There is no immunization history on file for this patient  Patient Care Team:  Maverick Godinez MD as PCP - General    Medicare Screening Tests and Risk Assessments:  Edi Oro is here for his Initial Wellness visit  Health Risk Assessment:  Patient rates overall health as good  Patient feels that their physical health rating is Slightly worse  Eyesight was rated as Same  Hearing was rated as Same  Patient feels that their emotional and mental health rating is Same  Pain experienced by patient in the last 7 days has been Some  Patient's pain rating has been 9/10  Emotional/Mental Health:  Patient has been feeling nervous/anxious  PHQ-9 Depression Screening:    Frequency of the following problems over the past two weeks:      1  Little interest or pleasure in doing things: 0 - not at all      2  Feeling down, depressed, or hopeless: 0 - not at all  PHQ-2 Score: 0          Broken Bones/Falls: Fall Risk Assessment:    In the past year, patient has experienced: No history of falling in past year          Bladder/Bowel:  Patient has not leaked urine accidently in the last six months  Patient reports no loss of bowel control  Immunizations:  Patient has not had a flu vaccination within the last year  Patient has not received a pneumonia shot  Patient has not received a shingles shot  Patient has not received tetanus/diphtheria shot  Home Safety:  Patient does not have trouble with stairs inside or outside of their home  Patient currently reports that there are no safety hazards present in home, working smoke alarms, no working carbon monoxide detectors        Preventative Screenings:   prostate cancer screen performed, colon cancer screen completed, cholesterol screen completed, glaucoma eye exam completed,     Nutrition:  Current diet: Regular with servings of the following:    Medications:  Patient is not currently taking any over-the-counter supplements  Patient is able to manage medications  Activities of Daily Living:  Can get out of bed by his or her self, able to dress self, able to make own meals, able to do own shopping, able to bathe self, can do own laundry/housekeeping, can manage own money, pay bills and track expenses    Previous Hospitalizations:  Hospitalization or ED visit in past 12 months  Number of hospitalizations within the last year: 1-2        Advanced Directives:  Patient has decided on a power of   Patient has spoken to designated power of   Patient has completed advanced directive  Preventative Screening/Counseling:      Cardiovascular:      General: Screening Current          Diabetes:      General: Screening Current          Colorectal Cancer:      General: Screening Current          Prostate Cancer:      General: Screening Current          Osteoporosis:      General: Screening Not Indicated          AAA:      General: Screening Not Indicated          Glaucoma:      General: Screening Not Indicated          HIV:      General: Screening Not Indicated          Hepatitis C:      General: Screening Not Indicated        Advanced Directives:   Patient has living will for healthcare, does not have durable POA for healthcare, patient has an advanced directive  Information on ACP and/or AD not provided  No 5 wishes given  End of life assessment reviewed with patient  Provider agrees with end of life decisions        Immunizations:  Patient reviewed and up to date

## 2019-03-19 ENCOUNTER — TELEPHONE (OUTPATIENT)
Dept: SCHEDULING | Facility: CLINIC | Age: 69
End: 2019-03-19

## 2019-03-19 NOTE — TELEPHONE ENCOUNTER
Pt calling to schedule f/u appt. Per pt, he needs another ablation. Please contact pt to discuss.     Pt can be reached at [phone]    Pt currently has 7 month f/u appt scheduled for 7/25/19.

## 2019-03-23 ENCOUNTER — APPOINTMENT (EMERGENCY)
Dept: RADIOLOGY | Facility: HOSPITAL | Age: 69
End: 2019-03-23
Payer: MEDICARE

## 2019-03-23 ENCOUNTER — HOSPITAL ENCOUNTER (EMERGENCY)
Facility: HOSPITAL | Age: 69
End: 2019-03-23
Attending: EMERGENCY MEDICINE | Admitting: EMERGENCY MEDICINE
Payer: MEDICARE

## 2019-03-23 ENCOUNTER — HOSPITAL ENCOUNTER (EMERGENCY)
Facility: HOSPITAL | Age: 69
Discharge: HOME | End: 2019-03-23
Admitting: PLASTIC SURGERY
Payer: MEDICARE

## 2019-03-23 VITALS
HEART RATE: 67 BPM | SYSTOLIC BLOOD PRESSURE: 136 MMHG | TEMPERATURE: 96.9 F | DIASTOLIC BLOOD PRESSURE: 82 MMHG | RESPIRATION RATE: 20 BRPM | WEIGHT: 238 LBS | BODY MASS INDEX: 33.32 KG/M2 | HEIGHT: 71 IN | OXYGEN SATURATION: 100 %

## 2019-03-23 VITALS
TEMPERATURE: 98.4 F | HEART RATE: 75 BPM | OXYGEN SATURATION: 95 % | SYSTOLIC BLOOD PRESSURE: 142 MMHG | RESPIRATION RATE: 18 BRPM | DIASTOLIC BLOOD PRESSURE: 81 MMHG

## 2019-03-23 DIAGNOSIS — S61.215A LACERATION OF LEFT RING FINGER: ICD-10-CM

## 2019-03-23 DIAGNOSIS — S61.213A LACERATION OF LEFT MIDDLE FINGER WITH TENDON INVOLVEMENT: ICD-10-CM

## 2019-03-23 DIAGNOSIS — S62.621B: Primary | ICD-10-CM

## 2019-03-23 PROCEDURE — 96375 TX/PRO/DX INJ NEW DRUG ADDON: CPT

## 2019-03-23 PROCEDURE — 73130 X-RAY EXAM OF HAND: CPT

## 2019-03-23 PROCEDURE — 96365 THER/PROPH/DIAG IV INF INIT: CPT

## 2019-03-23 PROCEDURE — 96361 HYDRATE IV INFUSION ADD-ON: CPT

## 2019-03-23 PROCEDURE — 99284 EMERGENCY DEPT VISIT MOD MDM: CPT

## 2019-03-23 PROCEDURE — 90715 TDAP VACCINE 7 YRS/> IM: CPT | Performed by: PLASTIC SURGERY

## 2019-03-23 PROCEDURE — 90471 IMMUNIZATION ADMIN: CPT

## 2019-03-23 RX ORDER — CEFAZOLIN SODIUM 2 G/50ML
2000 SOLUTION INTRAVENOUS ONCE
Status: COMPLETED | OUTPATIENT
Start: 2019-03-23 | End: 2019-03-23

## 2019-03-23 RX ORDER — SODIUM CHLORIDE 9 MG/ML
125 INJECTION, SOLUTION INTRAVENOUS CONTINUOUS
Status: DISCONTINUED | OUTPATIENT
Start: 2019-03-23 | End: 2019-03-23 | Stop reason: HOSPADM

## 2019-03-23 RX ORDER — LIDOCAINE HYDROCHLORIDE 10 MG/ML
10 INJECTION, SOLUTION EPIDURAL; INFILTRATION; INTRACAUDAL; PERINEURAL ONCE
Status: COMPLETED | OUTPATIENT
Start: 2019-03-23 | End: 2019-03-23

## 2019-03-23 RX ORDER — MORPHINE SULFATE 4 MG/ML
4 INJECTION, SOLUTION INTRAMUSCULAR; INTRAVENOUS ONCE
Status: COMPLETED | OUTPATIENT
Start: 2019-03-23 | End: 2019-03-23

## 2019-03-23 RX ORDER — HYDROCODONE BITARTRATE AND ACETAMINOPHEN 5; 325 MG/1; MG/1
1 TABLET ORAL ONCE
Status: COMPLETED | OUTPATIENT
Start: 2019-03-23 | End: 2019-03-23

## 2019-03-23 RX ADMIN — LIDOCAINE HYDROCHLORIDE 10 ML: 10 INJECTION, SOLUTION EPIDURAL; INFILTRATION; INTRACAUDAL; PERINEURAL at 20:55

## 2019-03-23 RX ADMIN — HYDROCODONE BITARTRATE AND ACETAMINOPHEN 1 TABLET: 5; 325 TABLET ORAL at 15:22

## 2019-03-23 RX ADMIN — TETANUS TOXOID, REDUCED DIPHTHERIA TOXOID AND ACELLULAR PERTUSSIS VACCINE, ADSORBED 0.5 ML: 5; 2.5; 8; 8; 2.5 SUSPENSION INTRAMUSCULAR at 20:55

## 2019-03-23 RX ADMIN — CEFAZOLIN SODIUM 2000 MG: 2 SOLUTION INTRAVENOUS at 15:46

## 2019-03-23 RX ADMIN — MORPHINE SULFATE 4 MG: 4 INJECTION INTRAVENOUS at 16:51

## 2019-03-23 RX ADMIN — SODIUM CHLORIDE 125 ML/HR: 0.9 INJECTION, SOLUTION INTRAVENOUS at 16:15
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
## 2019-03-23 NOTE — ED NOTES
MARTY Qureshi at bedside explaining to patient and patient wife about transfer to [address] [place] [address] Silvio, RN  03/23/19 0715
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
## 2019-03-23 NOTE — ED NOTES
Patient wife at bedside asking PA if they is a hand surgeon at our hospital, the PA responded stating "Not at this hospital but there is one on call " Patient wife rolled her eyes and patient scoffed        Denice Coulter RN  03/23/19 7387

## 2019-03-23 NOTE — ED NOTES
Report given to [facility]  Patient leaving ALS w/ IV fluids running at 125ml/hr        Khurram De La O, JEREMIAS  03/23/19 6416

## 2019-03-23 NOTE — ED NOTES
Per [address]  @ [id number] to [address] [facility]     Yoel Davenport RN  03/23/19 6619

## 2019-03-23 NOTE — ED NOTES
Patient requesting to use bathroom, I gave patient bedside urinal  Patient arguing stating, "its not that big I don't have to brag I just need one hand " Patient reminded he has one hand that can not be used, and IV fluids running   Patient verbalized understanding and agreed to bedside urinal       Conor Pizarro RN  03/23/19 9178

## 2019-03-23 NOTE — ED NOTES
When patient was asked by PA if his tetanus was up to date, he states, "I'ts been within 10 years im good " PA asking patient if his tetanus was within five years, pt continues, "I don't need it, its fine"        Tenisha Regan, RN  03/23/19 2688

## 2019-03-23 NOTE — ED NOTES
Per PACS- [employer] will be here for pickup at [address] [place] [address], [address] [place] [address]  03/23/19 7948

## 2019-03-23 NOTE — ED NOTES
While explaining to patient that we needed to remove his shirt and put him in a gown Patient became irritated and angry stating, "I don't understand why I need my shirt off this is ridiculous" I explained to patient that in order to evaluate him, we would need to get him changed into a hospital gown  Patient agreed  I then explained we needed to start an IV, patient became even more angry, " I don't need an IV, what do I need that for  I explained to patient that his antibiotics are ordered IV, patient then agreed to change and allowed me to insert IV        Marla De Oliveira RN  03/23/19 9517

## 2019-03-24 NOTE — CONSULTS
Consultation - Plastic Surgery   Ugo Randle 80 y o  male MRN: [id number]  Unit/Bed#: ED 24 Encounter: [id number]      Assessment/Plan      Assessment:  Compound comminuted fracture left index finger PIP joint with soft tissue loss and soft tissue and tendon loss left middle finger from a table saw in  left hand dominant male  Plan:  Because a significant injury to the PIP joint with loss of bone tendon and nerve on the ulnar side and extensive soft tissue injury patient was advise amputation of the MP level but since the finger is still attached by 1 vessel it still viable and patient wants to think about it and make a decision in the near future; patient also needs a skin graft to the left middle finger    History of Present Illness   Physician Requesting Consult: No att  providers found  Reason for Consult / Principal Problem:  Saw injury left hand  Hx and PE limited by:   HPI: Ugo Randle is a 80y o  year old male who presents with mangled finger left index and middle with the bone joint and soft tissue loss in the index finger and soft tissue loss in the middle finger    Consults    Review of Systems    Historical Information   Past Medical History:   Diagnosis Date    [facility]fib [facility] [address] [facility]     Hyperlipidemia     Hypertension     Pyloric stenosis      Past Surgical History:   Procedure Laterality Date    AV NODE ABLATION      SHOULDER SURGERY       Social History   Social History     Substance and Sexual Activity   Alcohol Use Yes    Alcohol/week: 3 6 oz    Types: 6 Cans of beer per week     Social History     Substance and Sexual Activity   Drug Use No     Social History     Tobacco Use   Smoking Status Former Smoker    Types: Pipe   Smokeless Tobacco Never Used     Family History: non-contributory    Meds/Allergies   all current active meds have been reviewed    Allergies   Allergen Reactions    Penicillins        Objective   No intake or output data in the 24 hours ending 03/23/19 2111    Invasive Devices          None          Physical Exam under digital block the wound was thoroughly irrigated there was significant loss of skin bone soft tissue tendon and digital nerve on the ulnar side of the index finger with extensive soft tissue loss; there is also has skin missing over the middle finger with some laceration to the extensor tendon which was repaired with 4-0 Prolene but there is not enough skin to cover the tendon at this time; the left ring finger has a small laceration over the PIP joint but he has full function of the tendons; patient is left arm dominant    Lab Results:   No results found for: WBC, HGB, HCT, MCV, PLT   No results found for: TISSUECULT, WOUNDCULT  Imaging Studies:  X-ray was reviewed and shows comminuted fracture involving the PIP joint left index finger with loss of bone and joint and only soft tissue injury on the left middle finger  EKG, Pathology, and Other Studies:   No results found for: FINALDX  VTE Prophylaxis: Reason for no pharmacologic prophylaxis outpatient    Code Status: No Order  Advance Directive and Living Will:      Power of :    POLST:      Counseling / Coordination of Care  Total floor / unit time spent today 30 minutes  Greater than 50% of total time was spent with the patient and / or family counseling and / or coordination of care   A description of the counseling / coordination of care:

## 2019-03-27 ENCOUNTER — ANESTHESIA (OUTPATIENT)
Dept: PERIOP | Facility: HOSPITAL | Age: 69
End: 2019-03-27
Payer: MEDICARE

## 2019-03-27 ENCOUNTER — ANESTHESIA EVENT (OUTPATIENT)
Dept: PERIOP | Facility: HOSPITAL | Age: 69
End: 2019-03-27
Payer: MEDICARE

## 2019-03-27 ENCOUNTER — HOSPITAL ENCOUNTER (OUTPATIENT)
Facility: HOSPITAL | Age: 69
Setting detail: OUTPATIENT SURGERY
Discharge: HOME | End: 2019-03-27
Attending: PLASTIC SURGERY | Admitting: PLASTIC SURGERY
Payer: MEDICARE

## 2019-03-27 VITALS
SYSTOLIC BLOOD PRESSURE: 111 MMHG | WEIGHT: 238 LBS | OXYGEN SATURATION: 93 % | DIASTOLIC BLOOD PRESSURE: 65 MMHG | HEIGHT: 71 IN | HEART RATE: 85 BPM | TEMPERATURE: 97 F | BODY MASS INDEX: 33.32 KG/M2 | RESPIRATION RATE: 18 BRPM

## 2019-03-27 DIAGNOSIS — S62.90XB: ICD-10-CM

## 2019-03-27 LAB
ATRIAL RATE: 74 BPM
P AXIS: 75 DEGREES
PR INTERVAL: 176 MS
QRS AXIS: 15 DEGREES
QRSD INTERVAL: 86 MS
QT INTERVAL: 426 MS
QTC INTERVAL: 463 MS
T WAVE AXIS: 31 DEGREES
VENTRICULAR RATE: 71 BPM

## 2019-03-27 PROCEDURE — 88311 DECALCIFY TISSUE: CPT | Performed by: PATHOLOGY

## 2019-03-27 PROCEDURE — 93005 ELECTROCARDIOGRAM TRACING: CPT

## 2019-03-27 PROCEDURE — 88302 TISSUE EXAM BY PATHOLOGIST: CPT | Performed by: PATHOLOGY

## 2019-03-27 PROCEDURE — 93010 ELECTROCARDIOGRAM REPORT: CPT | Performed by: INTERNAL MEDICINE

## 2019-03-27 RX ORDER — DEXMEDETOMIDINE HYDROCHLORIDE 100 UG/ML
INJECTION, SOLUTION INTRAVENOUS AS NEEDED
Status: DISCONTINUED | OUTPATIENT
Start: 2019-03-27 | End: 2019-03-27 | Stop reason: SURG

## 2019-03-27 RX ORDER — METOCLOPRAMIDE HYDROCHLORIDE 5 MG/ML
10 INJECTION INTRAMUSCULAR; INTRAVENOUS ONCE AS NEEDED
Status: DISCONTINUED | OUTPATIENT
Start: 2019-03-27 | End: 2019-03-27 | Stop reason: HOSPADM

## 2019-03-27 RX ORDER — ONDANSETRON 2 MG/ML
4 INJECTION INTRAMUSCULAR; INTRAVENOUS ONCE AS NEEDED
Status: DISCONTINUED | OUTPATIENT
Start: 2019-03-27 | End: 2019-03-27 | Stop reason: HOSPADM

## 2019-03-27 RX ORDER — MAGNESIUM HYDROXIDE 1200 MG/15ML
LIQUID ORAL AS NEEDED
Status: DISCONTINUED | OUTPATIENT
Start: 2019-03-27 | End: 2019-03-27 | Stop reason: HOSPADM

## 2019-03-27 RX ORDER — FENTANYL CITRATE 50 UG/ML
INJECTION, SOLUTION INTRAMUSCULAR; INTRAVENOUS AS NEEDED
Status: DISCONTINUED | OUTPATIENT
Start: 2019-03-27 | End: 2019-03-27 | Stop reason: SURG

## 2019-03-27 RX ORDER — ONDANSETRON 2 MG/ML
INJECTION INTRAMUSCULAR; INTRAVENOUS AS NEEDED
Status: DISCONTINUED | OUTPATIENT
Start: 2019-03-27 | End: 2019-03-27 | Stop reason: SURG

## 2019-03-27 RX ORDER — ROPIVACAINE HYDROCHLORIDE 5 MG/ML
INJECTION, SOLUTION EPIDURAL; INFILTRATION; PERINEURAL
Status: COMPLETED | OUTPATIENT
Start: 2019-03-27 | End: 2019-03-27

## 2019-03-27 RX ORDER — EPHEDRINE SULFATE 50 MG/ML
INJECTION INTRAVENOUS AS NEEDED
Status: DISCONTINUED | OUTPATIENT
Start: 2019-03-27 | End: 2019-03-27 | Stop reason: SURG

## 2019-03-27 RX ORDER — MIDAZOLAM HYDROCHLORIDE 1 MG/ML
INJECTION INTRAMUSCULAR; INTRAVENOUS AS NEEDED
Status: DISCONTINUED | OUTPATIENT
Start: 2019-03-27 | End: 2019-03-27 | Stop reason: SURG

## 2019-03-27 RX ORDER — SODIUM CHLORIDE 9 MG/ML
INJECTION, SOLUTION INTRAVENOUS CONTINUOUS PRN
Status: DISCONTINUED | OUTPATIENT
Start: 2019-03-27 | End: 2019-03-27 | Stop reason: SURG

## 2019-03-27 RX ORDER — COVID-19 ANTIGEN TEST
220 KIT MISCELLANEOUS
COMMUNITY

## 2019-03-27 RX ORDER — CEFAZOLIN SODIUM 2 G/50ML
2000 SOLUTION INTRAVENOUS ONCE
Status: COMPLETED | OUTPATIENT
Start: 2019-03-27 | End: 2019-03-27

## 2019-03-27 RX ORDER — FENTANYL CITRATE/PF 50 MCG/ML
50 SYRINGE (ML) INJECTION
Status: DISCONTINUED | OUTPATIENT
Start: 2019-03-27 | End: 2019-03-27 | Stop reason: HOSPADM

## 2019-03-27 RX ADMIN — ROPIVACAINE HYDROCHLORIDE 30 ML: 5 INJECTION, SOLUTION EPIDURAL; INFILTRATION; PERINEURAL at 14:40

## 2019-03-27 RX ADMIN — EPHEDRINE SULFATE 15 MG: 50 INJECTION, SOLUTION INTRAVENOUS at 15:12

## 2019-03-27 RX ADMIN — SODIUM CHLORIDE: 0.9 INJECTION, SOLUTION INTRAVENOUS at 14:13

## 2019-03-27 RX ADMIN — FENTANYL CITRATE 100 MCG: 50 INJECTION INTRAMUSCULAR; INTRAVENOUS at 14:38

## 2019-03-27 RX ADMIN — ONDANSETRON 4 MG: 2 INJECTION INTRAMUSCULAR; INTRAVENOUS at 15:13

## 2019-03-27 RX ADMIN — DEXMEDETOMIDINE HYDROCHLORIDE 20 MCG: 100 INJECTION, SOLUTION INTRAVENOUS at 14:43

## 2019-03-27 RX ADMIN — CEFAZOLIN SODIUM 2000 MG: 2 SOLUTION INTRAVENOUS at 15:04

## 2019-03-27 RX ADMIN — MIDAZOLAM HYDROCHLORIDE 2 MG: 1 INJECTION, SOLUTION INTRAMUSCULAR; INTRAVENOUS at 14:38

## 2019-03-27 NOTE — ANESTHESIA PROCEDURE NOTES
Peripheral Block    Patient location during procedure: holding area  Start time: 3/27/2019 2:43 PM  Reason for block: at surgeon's request and post-op pain management  Staffing  Anesthesiologist: Arabella Dawn MD  Performed: anesthesiologist   Preanesthetic Checklist  Completed: patient identified, site marked, surgical consent, pre-op evaluation, timeout performed, IV checked, risks and benefits discussed and monitors and equipment checked  Peripheral Block  Patient position: supine  Prep: ChloraPrep  Patient monitoring: continuous pulse ox and frequent blood pressure checks  Block type: supraclavicular  Laterality: left  Injection technique: single-shot  Procedures: ultrasound guided  Ultrasound permanent image savedropivacaine (NAROPIN) 0 5 % perineural infiltration, 30 mL  Needle  Needle type: Stimuplex   Needle gauge: 22 G  Needle length: 5 cm  Needle localization: ultrasound guidance  Test dose: negative  Assessment  Injection assessment: incremental injection and local visualized surrounding nerve on ultrasound  Paresthesia pain: none  Heart rate change: no  Slow fractionated injection: yes  Post-procedure:  site cleaned  patient tolerated the procedure well with no immediate complications

## 2019-03-27 NOTE — ANESTHESIA POSTPROCEDURE EVALUATION
Post-Op Assessment Note    CV Status:  Stable  Pain Score: 0    Pain management: adequate     Mental Status:  Alert and awake   Hydration Status:  Euvolemic   PONV Controlled:  Controlled   Airway Patency:  Patent   Post Op Vitals Reviewed: Yes      Staff: CRNA           /66 (03/27/19 1623)    Temp 97 6 °F (36 4 °C) (03/27/19 1623)    Pulse 90 (03/27/19 1623)   Resp 17 (03/27/19 1623)    SpO2 95 % (03/27/19 1623)

## 2019-03-27 NOTE — ANESTHESIA PREPROCEDURE EVALUATION
Review of Systems/Medical History  Patient summary reviewed  Chart reviewed  No history of anesthetic complications     Cardiovascular  Hyperlipidemia, Hypertension , Dysrhythmias (ablation) , atrial fibrillation,    Pulmonary  Negative pulmonary ROS        GI/Hepatic  Negative GI/hepatic ROS               Endo/Other     GYN       Hematology  Negative hematology ROS      Musculoskeletal  Negative musculoskeletal ROS        Neurology   Psychology           Physical Exam    Airway    Mallampati score: II  TM Distance: >3 FB  Neck ROM: full     Dental       Cardiovascular      Pulmonary      Other Findings        Anesthesia Plan  ASA Score- 2     Anesthesia Type- general and regional with ASA Monitors  Additional Monitors:   Airway Plan: LMA  Comment: Supraclavicular Block for post op pain per surgeon request        Plan Factors-    Induction- intravenous  Postoperative Plan-     Informed Consent- Anesthetic plan and risks discussed with patient  I personally reviewed this patient with the CRNA  Discussed and agreed on the Anesthesia Plan with the CRNA  Zeyad Manzo

## 2019-04-09 ENCOUNTER — EVALUATION (OUTPATIENT)
Dept: OCCUPATIONAL THERAPY | Facility: CLINIC | Age: 69
End: 2019-04-09
Payer: MEDICARE

## 2019-04-09 DIAGNOSIS — S68.111S COMPLETE TRAUMATIC METACARPOPHALANGEAL AMPUTATION OF LEFT INDEX FINGER, SEQUELA (HCC): Primary | ICD-10-CM

## 2019-04-09 PROCEDURE — 97165 OT EVAL LOW COMPLEX 30 MIN: CPT | Performed by: OCCUPATIONAL THERAPIST

## 2019-04-09 PROCEDURE — 97140 MANUAL THERAPY 1/> REGIONS: CPT | Performed by: OCCUPATIONAL THERAPIST

## 2019-04-09 PROCEDURE — 97110 THERAPEUTIC EXERCISES: CPT | Performed by: OCCUPATIONAL THERAPIST

## 2019-04-11 ENCOUNTER — OFFICE VISIT (OUTPATIENT)
Dept: OCCUPATIONAL THERAPY | Facility: CLINIC | Age: 69
End: 2019-04-11
Payer: MEDICARE

## 2019-04-11 DIAGNOSIS — S68.111S COMPLETE TRAUMATIC METACARPOPHALANGEAL AMPUTATION OF LEFT INDEX FINGER, SEQUELA (HCC): Primary | ICD-10-CM

## 2019-04-11 PROCEDURE — 97110 THERAPEUTIC EXERCISES: CPT | Performed by: OCCUPATIONAL THERAPIST

## 2019-04-11 PROCEDURE — 97140 MANUAL THERAPY 1/> REGIONS: CPT | Performed by: OCCUPATIONAL THERAPIST

## 2019-04-16 ENCOUNTER — OFFICE VISIT (OUTPATIENT)
Dept: OCCUPATIONAL THERAPY | Facility: CLINIC | Age: 69
End: 2019-04-16
Payer: MEDICARE

## 2019-04-16 DIAGNOSIS — S68.111S COMPLETE TRAUMATIC METACARPOPHALANGEAL AMPUTATION OF LEFT INDEX FINGER, SEQUELA (HCC): Primary | ICD-10-CM

## 2019-04-16 PROCEDURE — 97140 MANUAL THERAPY 1/> REGIONS: CPT | Performed by: OCCUPATIONAL THERAPIST

## 2019-04-16 PROCEDURE — 97110 THERAPEUTIC EXERCISES: CPT | Performed by: OCCUPATIONAL THERAPIST

## 2019-04-18 ENCOUNTER — OFFICE VISIT (OUTPATIENT)
Dept: OCCUPATIONAL THERAPY | Facility: CLINIC | Age: 69
End: 2019-04-18
Payer: MEDICARE

## 2019-04-18 DIAGNOSIS — S68.111S COMPLETE TRAUMATIC METACARPOPHALANGEAL AMPUTATION OF LEFT INDEX FINGER, SEQUELA (HCC): Primary | ICD-10-CM

## 2019-04-18 PROCEDURE — 97110 THERAPEUTIC EXERCISES: CPT | Performed by: OCCUPATIONAL THERAPIST

## 2019-04-18 PROCEDURE — 97140 MANUAL THERAPY 1/> REGIONS: CPT | Performed by: OCCUPATIONAL THERAPIST

## 2019-04-23 ENCOUNTER — OFFICE VISIT (OUTPATIENT)
Dept: OCCUPATIONAL THERAPY | Facility: CLINIC | Age: 69
End: 2019-04-23
Payer: MEDICARE

## 2019-04-23 DIAGNOSIS — S68.111S COMPLETE TRAUMATIC METACARPOPHALANGEAL AMPUTATION OF LEFT INDEX FINGER, SEQUELA (HCC): Primary | ICD-10-CM

## 2019-04-23 PROCEDURE — 97140 MANUAL THERAPY 1/> REGIONS: CPT | Performed by: OCCUPATIONAL THERAPIST

## 2019-04-23 PROCEDURE — 97110 THERAPEUTIC EXERCISES: CPT | Performed by: OCCUPATIONAL THERAPIST

## 2019-04-25 ENCOUNTER — OFFICE VISIT (OUTPATIENT)
Dept: OCCUPATIONAL THERAPY | Facility: CLINIC | Age: 69
End: 2019-04-25
Payer: MEDICARE

## 2019-04-25 DIAGNOSIS — S68.111S COMPLETE TRAUMATIC METACARPOPHALANGEAL AMPUTATION OF LEFT INDEX FINGER, SEQUELA (HCC): Primary | ICD-10-CM

## 2019-04-25 PROCEDURE — 97140 MANUAL THERAPY 1/> REGIONS: CPT | Performed by: OCCUPATIONAL THERAPIST

## 2019-04-25 PROCEDURE — 97110 THERAPEUTIC EXERCISES: CPT | Performed by: OCCUPATIONAL THERAPIST

## 2019-04-30 ENCOUNTER — OFFICE VISIT (OUTPATIENT)
Dept: OCCUPATIONAL THERAPY | Facility: CLINIC | Age: 69
End: 2019-04-30
Payer: MEDICARE

## 2019-04-30 DIAGNOSIS — S68.111S COMPLETE TRAUMATIC METACARPOPHALANGEAL AMPUTATION OF LEFT INDEX FINGER, SEQUELA (HCC): Primary | ICD-10-CM

## 2019-04-30 PROCEDURE — 97110 THERAPEUTIC EXERCISES: CPT | Performed by: OCCUPATIONAL THERAPIST

## 2019-04-30 PROCEDURE — 97140 MANUAL THERAPY 1/> REGIONS: CPT | Performed by: OCCUPATIONAL THERAPIST

## 2019-05-02 ENCOUNTER — OFFICE VISIT (OUTPATIENT)
Dept: OCCUPATIONAL THERAPY | Facility: CLINIC | Age: 69
End: 2019-05-02
Payer: MEDICARE

## 2019-05-02 DIAGNOSIS — S68.111S COMPLETE TRAUMATIC METACARPOPHALANGEAL AMPUTATION OF LEFT INDEX FINGER, SEQUELA (HCC): Primary | ICD-10-CM

## 2019-05-02 PROCEDURE — 97110 THERAPEUTIC EXERCISES: CPT | Performed by: OCCUPATIONAL THERAPIST

## 2019-05-02 PROCEDURE — 97140 MANUAL THERAPY 1/> REGIONS: CPT | Performed by: OCCUPATIONAL THERAPIST

## 2019-05-07 ENCOUNTER — OFFICE VISIT (OUTPATIENT)
Dept: OCCUPATIONAL THERAPY | Facility: CLINIC | Age: 69
End: 2019-05-07
Payer: MEDICARE

## 2019-05-07 DIAGNOSIS — S68.111S COMPLETE TRAUMATIC METACARPOPHALANGEAL AMPUTATION OF LEFT INDEX FINGER, SEQUELA (HCC): Primary | ICD-10-CM

## 2019-05-07 PROCEDURE — 97140 MANUAL THERAPY 1/> REGIONS: CPT | Performed by: OCCUPATIONAL THERAPIST

## 2019-05-07 PROCEDURE — 97110 THERAPEUTIC EXERCISES: CPT | Performed by: OCCUPATIONAL THERAPIST

## 2019-05-09 ENCOUNTER — OFFICE VISIT (OUTPATIENT)
Dept: OCCUPATIONAL THERAPY | Facility: CLINIC | Age: 69
End: 2019-05-09
Payer: MEDICARE

## 2019-05-09 DIAGNOSIS — S68.111S COMPLETE TRAUMATIC METACARPOPHALANGEAL AMPUTATION OF LEFT INDEX FINGER, SEQUELA (HCC): Primary | ICD-10-CM

## 2019-05-09 PROCEDURE — 97110 THERAPEUTIC EXERCISES: CPT | Performed by: OCCUPATIONAL THERAPIST

## 2019-05-09 PROCEDURE — 97140 MANUAL THERAPY 1/> REGIONS: CPT | Performed by: OCCUPATIONAL THERAPIST

## 2019-05-14 ENCOUNTER — OFFICE VISIT (OUTPATIENT)
Dept: OCCUPATIONAL THERAPY | Facility: CLINIC | Age: 69
End: 2019-05-14
Payer: MEDICARE

## 2019-05-14 DIAGNOSIS — S68.111S COMPLETE TRAUMATIC METACARPOPHALANGEAL AMPUTATION OF LEFT INDEX FINGER, SEQUELA (HCC): Primary | ICD-10-CM

## 2019-05-14 PROCEDURE — 97110 THERAPEUTIC EXERCISES: CPT | Performed by: OCCUPATIONAL THERAPIST

## 2019-05-14 PROCEDURE — 97140 MANUAL THERAPY 1/> REGIONS: CPT | Performed by: OCCUPATIONAL THERAPIST

## 2019-05-14 PROCEDURE — 97022 WHIRLPOOL THERAPY: CPT | Performed by: OCCUPATIONAL THERAPIST

## 2019-05-16 ENCOUNTER — OFFICE VISIT (OUTPATIENT)
Dept: OCCUPATIONAL THERAPY | Facility: CLINIC | Age: 69
End: 2019-05-16
Payer: MEDICARE

## 2019-05-16 DIAGNOSIS — S68.111S COMPLETE TRAUMATIC METACARPOPHALANGEAL AMPUTATION OF LEFT INDEX FINGER, SEQUELA (HCC): Primary | ICD-10-CM

## 2019-05-16 PROCEDURE — 97022 WHIRLPOOL THERAPY: CPT | Performed by: OCCUPATIONAL THERAPIST

## 2019-05-16 PROCEDURE — 97140 MANUAL THERAPY 1/> REGIONS: CPT | Performed by: OCCUPATIONAL THERAPIST

## 2019-05-16 PROCEDURE — 97110 THERAPEUTIC EXERCISES: CPT | Performed by: OCCUPATIONAL THERAPIST

## 2019-05-21 ENCOUNTER — OFFICE VISIT (OUTPATIENT)
Dept: OCCUPATIONAL THERAPY | Facility: CLINIC | Age: 69
End: 2019-05-21
Payer: MEDICARE

## 2019-05-21 DIAGNOSIS — S68.111S COMPLETE TRAUMATIC METACARPOPHALANGEAL AMPUTATION OF LEFT INDEX FINGER, SEQUELA (HCC): Primary | ICD-10-CM

## 2019-05-21 PROCEDURE — 97022 WHIRLPOOL THERAPY: CPT | Performed by: OCCUPATIONAL THERAPIST

## 2019-05-21 PROCEDURE — 97140 MANUAL THERAPY 1/> REGIONS: CPT | Performed by: OCCUPATIONAL THERAPIST

## 2019-05-21 PROCEDURE — 97110 THERAPEUTIC EXERCISES: CPT | Performed by: OCCUPATIONAL THERAPIST

## 2019-05-21 PROCEDURE — 97035 APP MDLTY 1+ULTRASOUND EA 15: CPT | Performed by: OCCUPATIONAL THERAPIST

## 2019-05-22 ENCOUNTER — OFFICE VISIT (OUTPATIENT)
Dept: OCCUPATIONAL THERAPY | Facility: CLINIC | Age: 69
End: 2019-05-22
Payer: MEDICARE

## 2019-05-22 DIAGNOSIS — S68.111S COMPLETE TRAUMATIC METACARPOPHALANGEAL AMPUTATION OF LEFT INDEX FINGER, SEQUELA (HCC): Primary | ICD-10-CM

## 2019-05-22 PROCEDURE — 97022 WHIRLPOOL THERAPY: CPT | Performed by: OCCUPATIONAL THERAPIST

## 2019-05-22 PROCEDURE — 97110 THERAPEUTIC EXERCISES: CPT | Performed by: OCCUPATIONAL THERAPIST

## 2019-05-22 PROCEDURE — 97140 MANUAL THERAPY 1/> REGIONS: CPT | Performed by: OCCUPATIONAL THERAPIST

## 2019-05-23 ENCOUNTER — APPOINTMENT (OUTPATIENT)
Dept: OCCUPATIONAL THERAPY | Facility: CLINIC | Age: 69
End: 2019-05-23
Payer: MEDICARE

## 2019-05-28 ENCOUNTER — OFFICE VISIT (OUTPATIENT)
Dept: OCCUPATIONAL THERAPY | Facility: CLINIC | Age: 69
End: 2019-05-28
Payer: MEDICARE

## 2019-05-28 DIAGNOSIS — S68.111S COMPLETE TRAUMATIC METACARPOPHALANGEAL AMPUTATION OF LEFT INDEX FINGER, SEQUELA (HCC): Primary | ICD-10-CM

## 2019-05-28 PROCEDURE — 97022 WHIRLPOOL THERAPY: CPT | Performed by: OCCUPATIONAL THERAPIST

## 2019-05-28 PROCEDURE — 97110 THERAPEUTIC EXERCISES: CPT | Performed by: OCCUPATIONAL THERAPIST

## 2019-05-28 PROCEDURE — 97140 MANUAL THERAPY 1/> REGIONS: CPT | Performed by: OCCUPATIONAL THERAPIST

## 2019-05-30 ENCOUNTER — OFFICE VISIT (OUTPATIENT)
Dept: OCCUPATIONAL THERAPY | Facility: CLINIC | Age: 69
End: 2019-05-30
Payer: MEDICARE

## 2019-05-30 DIAGNOSIS — S68.111S COMPLETE TRAUMATIC METACARPOPHALANGEAL AMPUTATION OF LEFT INDEX FINGER, SEQUELA (HCC): Primary | ICD-10-CM

## 2019-05-30 PROCEDURE — 97140 MANUAL THERAPY 1/> REGIONS: CPT | Performed by: OCCUPATIONAL THERAPIST

## 2019-05-30 PROCEDURE — 97110 THERAPEUTIC EXERCISES: CPT | Performed by: OCCUPATIONAL THERAPIST

## 2019-05-30 PROCEDURE — 97022 WHIRLPOOL THERAPY: CPT | Performed by: OCCUPATIONAL THERAPIST

## 2019-06-04 ENCOUNTER — OFFICE VISIT (OUTPATIENT)
Dept: OCCUPATIONAL THERAPY | Facility: CLINIC | Age: 69
End: 2019-06-04
Payer: MEDICARE

## 2019-06-04 DIAGNOSIS — S68.111S COMPLETE TRAUMATIC METACARPOPHALANGEAL AMPUTATION OF LEFT INDEX FINGER, SEQUELA (HCC): Primary | ICD-10-CM

## 2019-06-04 PROCEDURE — 97022 WHIRLPOOL THERAPY: CPT | Performed by: OCCUPATIONAL THERAPIST

## 2019-06-04 PROCEDURE — 97140 MANUAL THERAPY 1/> REGIONS: CPT | Performed by: OCCUPATIONAL THERAPIST

## 2019-06-04 PROCEDURE — 97110 THERAPEUTIC EXERCISES: CPT | Performed by: OCCUPATIONAL THERAPIST

## 2019-06-06 ENCOUNTER — OFFICE VISIT (OUTPATIENT)
Dept: OCCUPATIONAL THERAPY | Facility: CLINIC | Age: 69
End: 2019-06-06
Payer: MEDICARE

## 2019-06-06 DIAGNOSIS — S68.111S COMPLETE TRAUMATIC METACARPOPHALANGEAL AMPUTATION OF LEFT INDEX FINGER, SEQUELA (HCC): Primary | ICD-10-CM

## 2019-06-06 PROCEDURE — 97140 MANUAL THERAPY 1/> REGIONS: CPT | Performed by: OCCUPATIONAL THERAPIST

## 2019-06-06 PROCEDURE — 97110 THERAPEUTIC EXERCISES: CPT | Performed by: OCCUPATIONAL THERAPIST

## 2019-06-06 PROCEDURE — 97022 WHIRLPOOL THERAPY: CPT | Performed by: OCCUPATIONAL THERAPIST

## 2019-06-10 ENCOUNTER — OFFICE VISIT (OUTPATIENT)
Dept: OCCUPATIONAL THERAPY | Facility: CLINIC | Age: 69
End: 2019-06-10
Payer: MEDICARE

## 2019-06-10 DIAGNOSIS — S68.111S COMPLETE TRAUMATIC METACARPOPHALANGEAL AMPUTATION OF LEFT INDEX FINGER, SEQUELA (HCC): Primary | ICD-10-CM

## 2019-06-10 PROCEDURE — 97110 THERAPEUTIC EXERCISES: CPT | Performed by: OCCUPATIONAL THERAPIST

## 2019-06-10 PROCEDURE — 97112 NEUROMUSCULAR REEDUCATION: CPT | Performed by: OCCUPATIONAL THERAPIST

## 2019-06-12 ENCOUNTER — OFFICE VISIT (OUTPATIENT)
Dept: OCCUPATIONAL THERAPY | Facility: CLINIC | Age: 69
End: 2019-06-12
Payer: MEDICARE

## 2019-06-12 ENCOUNTER — TRANSCRIBE ORDERS (OUTPATIENT)
Dept: PHYSICAL THERAPY | Facility: CLINIC | Age: 69
End: 2019-06-12

## 2019-06-12 DIAGNOSIS — S68.111S COMPLETE TRAUMATIC METACARPOPHALANGEAL AMPUTATION OF LEFT INDEX FINGER, SEQUELA (HCC): Primary | ICD-10-CM

## 2019-06-12 PROCEDURE — 97022 WHIRLPOOL THERAPY: CPT | Performed by: OCCUPATIONAL THERAPIST

## 2019-06-12 PROCEDURE — 97140 MANUAL THERAPY 1/> REGIONS: CPT | Performed by: OCCUPATIONAL THERAPIST

## 2019-06-12 PROCEDURE — 97110 THERAPEUTIC EXERCISES: CPT | Performed by: OCCUPATIONAL THERAPIST

## 2019-06-17 ENCOUNTER — APPOINTMENT (OUTPATIENT)
Dept: OCCUPATIONAL THERAPY | Facility: CLINIC | Age: 69
End: 2019-06-17
Payer: MEDICARE

## 2019-06-19 ENCOUNTER — APPOINTMENT (OUTPATIENT)
Dept: OCCUPATIONAL THERAPY | Facility: CLINIC | Age: 69
End: 2019-06-19
Payer: MEDICARE

## 2019-07-25 ENCOUNTER — OFFICE VISIT (OUTPATIENT)
Dept: CARDIOLOGY | Facility: CLINIC | Age: 69
End: 2019-07-25
Payer: MEDICARE

## 2019-07-25 VITALS
BODY MASS INDEX: 35 KG/M2 | OXYGEN SATURATION: 97 % | HEIGHT: 71 IN | WEIGHT: 250 LBS | HEART RATE: 59 BPM | SYSTOLIC BLOOD PRESSURE: 140 MMHG | DIASTOLIC BLOOD PRESSURE: 84 MMHG

## 2019-07-25 DIAGNOSIS — Z51.81 VISIT FOR MONITORING TIKOSYN THERAPY: ICD-10-CM

## 2019-07-25 DIAGNOSIS — I48.0 PAROXYSMAL ATRIAL FIBRILLATION (CMS/HCC): Primary | ICD-10-CM

## 2019-07-25 DIAGNOSIS — Z79.899 VISIT FOR MONITORING TIKOSYN THERAPY: ICD-10-CM

## 2019-07-25 DIAGNOSIS — Z79.01 CHRONIC ANTICOAGULATION: ICD-10-CM

## 2019-07-25 DIAGNOSIS — E78.2 MIXED HYPERLIPIDEMIA: ICD-10-CM

## 2019-07-25 PROCEDURE — 93000 ELECTROCARDIOGRAM COMPLETE: CPT | Performed by: INTERNAL MEDICINE

## 2019-07-25 PROCEDURE — 99214 OFFICE O/P EST MOD 30 MIN: CPT | Performed by: INTERNAL MEDICINE

## 2019-07-25 RX ORDER — LISINOPRIL 2.5 MG/1
2.5 TABLET ORAL 2 TIMES DAILY
Qty: 180 TABLET | Refills: 3 | Status: SHIPPED | OUTPATIENT
Start: 2019-07-25 | End: 2020-05-28

## 2019-07-25 RX ORDER — ATORVASTATIN CALCIUM 20 MG/1
20 TABLET, FILM COATED ORAL DAILY
Qty: 90 TABLET | Refills: 3 | Status: SHIPPED | OUTPATIENT
Start: 2019-07-25 | End: 2020-08-06 | Stop reason: SDUPTHER

## 2019-07-25 RX ORDER — DOFETILIDE 0.5 MG/1
500 CAPSULE ORAL 2 TIMES DAILY
Qty: 180 CAPSULE | Refills: 3 | Status: SHIPPED | OUTPATIENT
Start: 2019-07-25 | End: 2020-08-06 | Stop reason: SDUPTHER

## 2019-07-25 ASSESSMENT — ENCOUNTER SYMPTOMS
HEMATOLOGIC/LYMPHATIC NEGATIVE: 1
GASTROINTESTINAL NEGATIVE: 1
ENDOCRINE NEGATIVE: 1
CARDIOVASCULAR NEGATIVE: 1
NEUROLOGICAL NEGATIVE: 1
PSYCHIATRIC NEGATIVE: 1
RESPIRATORY NEGATIVE: 1
EYES NEGATIVE: 1
MUSCULOSKELETAL NEGATIVE: 1
CONSTITUTIONAL NEGATIVE: 1

## 2019-07-25 NOTE — ASSESSMENT & PLAN NOTE
Tikosyn 500 mcg twice daily.  His electrocardiogram in the office today is stable with no evidence of prolonged QT or QTc.

## 2019-07-25 NOTE — ASSESSMENT & PLAN NOTE
Unfortunately he had a accident in his workshop and lost finger.  Since that time much of his activity has been curtailed, likely the reason for only two episodes of atrial fibrillation.  Now that he is starting to resume most activities he knows to call the office should he have an increase in his atrial fibrillation burden.  He remains reluctant to take Eliquis daily but is aware to use it on a as needed basis for episodes lasting greater than 24 hours.

## 2019-07-25 NOTE — PROGRESS NOTES
Electrophysiology  Outpatient Progress Note       Reason for visit:   No chief complaint on file.      HPI   Humberto Chung is a 68 y.o. male who is seen today for paroxysmal atrial fibrillation. The patient is currently on  Tikosyn 500 mcg BID. He is currently in sinus rhythm. At his previous appointment we had discussed repeat catheter ablation as he was having increasing episodes of atrial fibrillation.  He actually called in March to move up his appointment to discuss undergoing catheter ablation.  In April however, he suffered an accident in his wood shop which also resulted in the loss of the finger.  He is recovered well from this, but interestingly has had only a rare episode of A. fib over the past few months.    He has had no chest discomfort suggestive of ischemia.The patient denies orthopnea, PND, GTZ, or edema. Mr. CHUNG has not had syncope or near syncope. He denies claudication. There is no discoloration or ulceration of the lower extremities. He has had no TIA or stroke-like symptoms. The patient has no symptoms attributable to valvular heart disease.     Past Medical History:   Diagnosis Date   • A-fib (CMS/HCC) (HCC)    • A-fib (CMS/HCC) (HCC) 7/25/2018   • Chronic anticoagulation 7/25/2018   • Lipid disorder    • Visit for monitoring Tikosyn therapy 7/25/2019     Past Surgical History:   Procedure Laterality Date   • ABLATION OF DYSRHYTHMIC FOCUS     • CARDIAC CATHETERIZATION         Social History   Substance Use Topics   • Smoking status: Current Some Day Smoker   • Smokeless tobacco: Never Used   • Alcohol use Yes     Family History   Problem Relation Age of Onset   • Adopted: Yes   • Family history unknown: Yes       ALLERGY:  Aspirin and Penicillins    Current Outpatient Prescriptions   Medication Sig Dispense Refill   • apixaban (ELIQUIS) 5 mg tablet Take 5 mg by mouth See admin instr. Use if you go into a-fib     • aspirin (ASPIRIN LOW DOSE) 81 mg enteric coated tablet Take 81 mg by  mouth daily.     • atorvastatin (LIPITOR) 20 mg tablet Take 1 tablet (20 mg total) by mouth daily. 90 tablet 3   • dofetilide (TIKOSYN) 500 mcg capsule Take 1 capsule (500 mcg total) by mouth 2 (two) times a day. 180 capsule 3   • lisinopril (PRINIVIL) 2.5 mg tablet Take 1 tablet (2.5 mg total) by mouth 2 (two) times a day. 180 tablet 3   • metoprolol tartrate (LOPRESSOR) 25 mg tablet Take 25 mg by mouth every 6 (six) hours as needed.     • cholecalciferol, vitamin D3, (VITAMIN D3) 2,000 unit tablet Take by mouth daily.       No current facility-administered medications for this visit.      Review of Systems   Constitution: Negative.   HENT: Negative.    Eyes: Negative.    Cardiovascular: Negative.    Respiratory: Negative.    Endocrine: Negative.    Hematologic/Lymphatic: Negative.    Skin: Negative.    Musculoskeletal: Negative.    Gastrointestinal: Negative.    Genitourinary: Negative.    Neurological: Negative.    Psychiatric/Behavioral: Negative.      Objective   Vitals:    07/25/19 1138   BP: 140/84   Pulse: (!) 59   SpO2: 97%       Physical Exam   Constitutional: He is oriented to person, place, and time. He appears well-developed and well-nourished.   HENT:   Head: Normocephalic.   Eyes: Pupils are equal, round, and reactive to light. Conjunctivae and EOM are normal.   Neck: Normal range of motion.   Cardiovascular: Normal rate, regular rhythm and normal heart sounds.  Exam reveals no gallop and no friction rub.    No murmur heard.  Pulmonary/Chest: Effort normal and breath sounds normal.   Abdominal: Soft. Bowel sounds are normal.   Musculoskeletal: Normal range of motion. He exhibits no edema.   Status post left index finger amputation.   Neurological: He is alert and oriented to person, place, and time.   Skin: Skin is warm and dry.   Psychiatric: He has a normal mood and affect. His behavior is normal. Judgment and thought content normal.       Lab Results   Component Value Date    WBC 5.5 02/12/2018     HGB 15.4 02/12/2018     02/12/2018    CHOL 143 02/12/2018    TRIG 45 02/12/2018    HDL 46 02/12/2018    ALT 25 02/12/2018    AST 22 02/12/2018     02/12/2018    K 4.5 02/12/2018    CREATININE 1.01 02/12/2018     Cardiovascular Procedures:    CATH LAB:  Cath (Normal Coronaries) - 2008    ELECTROPHYSIOLOGY:  EPS (PV ablation afib during study) - 12/11/2009  Holter (apc one 6 beat cst no aifb) - 5/13/2015    STRESS TESTS:  MPI (EF 0.60 (60%), lateral wal defect) - 6/9/2008    Other (Admitted for Tikosyn loading) - 6/10/2015     ECG Sinus  Rhythm   WITHIN NORMAL LIMITS    Assessment   Problem List Items Addressed This Visit        Circulatory    A-fib (CMS/HCC) (HCC) - Primary      Unfortunately he had a accident in his workshop and lost finger.  Since that time much of his activity has been curtailed, likely the reason for only two episodes of atrial fibrillation.  Now that he is starting to resume most activities he knows to call the office should he have an increase in his atrial fibrillation burden.  He remains reluctant to take Eliquis daily but is aware to use it on a as needed basis for episodes lasting greater than 24 hours.         Relevant Medications    dofetilide (TIKOSYN) 500 mcg capsule    lisinopril (PRINIVIL) 2.5 mg tablet    atorvastatin (LIPITOR) 20 mg tablet    Other Relevant Orders    ECG 12 lead (Completed)       Other    Chronic anticoagulation     He is a CHADS-Vasc 1 (age; on lisinopril for renal purposes). Discussed perhaps taking Eliquis daily, but he remains reluctant.          Visit for monitoring Tikosyn therapy     Tikosyn 500 mcg twice daily.  His electrocardiogram in the office today is stable with no evidence of prolonged QT or QTc.           Other Visit Diagnoses     Mixed hyperlipidemia        Relevant Medications    atorvastatin (LIPITOR) 20 mg tablet    Other Relevant Orders    Comprehensive metabolic panel    Lipid panel               Sincere García MD  7/25/2019

## 2019-07-25 NOTE — LETTER
July 25, 2019     Balbir Tellez MD  [address] [address]    Patient: Humberto Costello  YOB: 1950  Date of Visit: 7/25/2019      Dear Dr. Tellez:    Thank you for referring Humberto Costello to me for evaluation. Below are my notes for this consultation.    If you have questions, please do not hesitate to call me. I look forward to following your patient along with you.         Sincerely,        Sincere García MD        CC: No Recipients  Sincere García MD  7/25/2019  2:14 PM  Signed       Electrophysiology  Outpatient Progress Note       Reason for visit:   No chief complaint on file.      HPI   Humberto Costello is a 68 y.o. male who is seen today for paroxysmal atrial fibrillation. The patient is currently on  Tikosyn 500 mcg BID. He is currently in sinus rhythm. At his previous appointment we had discussed repeat catheter ablation as he was having increasing episodes of atrial fibrillation.  He actually called in March to move up his appointment to discuss undergoing catheter ablation.  In April however, he suffered an accident in his wood shop which also resulted in the loss of the finger.  He is recovered well from this, but interestingly has had only a rare episode of A. fib over the past few months.    He has had no chest discomfort suggestive of ischemia.The patient denies orthopnea, PND, GTZ, or edema. Mr. COSTELLO has not had syncope or near syncope. He denies claudication. There is no discoloration or ulceration of the lower extremities. He has had no TIA or stroke-like symptoms. The patient has no symptoms attributable to valvular heart disease.     Past Medical History:   Diagnosis Date   • A-fib (CMS/HCC) (HCC)    • A-fib (CMS/HCC) (HCC) 7/25/2018   • Chronic anticoagulation 7/25/2018   • Lipid disorder    • Visit for monitoring Tikosyn therapy 7/25/2019     Past Surgical History:   Procedure Laterality Date   • ABLATION OF DYSRHYTHMIC FOCUS     • CARDIAC  CATHETERIZATION         Social History   Substance Use Topics   • Smoking status: Current Some Day Smoker   • Smokeless tobacco: Never Used   • Alcohol use Yes     Family History   Problem Relation Age of Onset   • Adopted: Yes   • Family history unknown: Yes       ALLERGY:  Aspirin and Penicillins    Current Outpatient Prescriptions   Medication Sig Dispense Refill   • apixaban (ELIQUIS) 5 mg tablet Take 5 mg by mouth See admin instr. Use if you go into a-fib     • aspirin (ASPIRIN LOW DOSE) 81 mg enteric coated tablet Take 81 mg by mouth daily.     • atorvastatin (LIPITOR) 20 mg tablet Take 1 tablet (20 mg total) by mouth daily. 90 tablet 3   • dofetilide (TIKOSYN) 500 mcg capsule Take 1 capsule (500 mcg total) by mouth 2 (two) times a day. 180 capsule 3   • lisinopril (PRINIVIL) 2.5 mg tablet Take 1 tablet (2.5 mg total) by mouth 2 (two) times a day. 180 tablet 3   • metoprolol tartrate (LOPRESSOR) 25 mg tablet Take 25 mg by mouth every 6 (six) hours as needed.     • cholecalciferol, vitamin D3, (VITAMIN D3) 2,000 unit tablet Take by mouth daily.       No current facility-administered medications for this visit.      Review of Systems   Constitution: Negative.   HENT: Negative.    Eyes: Negative.    Cardiovascular: Negative.    Respiratory: Negative.    Endocrine: Negative.    Hematologic/Lymphatic: Negative.    Skin: Negative.    Musculoskeletal: Negative.    Gastrointestinal: Negative.    Genitourinary: Negative.    Neurological: Negative.    Psychiatric/Behavioral: Negative.      Objective   Vitals:    07/25/19 1138   BP: 140/84   Pulse: (!) 59   SpO2: 97%       Physical Exam   Constitutional: He is oriented to person, place, and time. He appears well-developed and well-nourished.   HENT:   Head: Normocephalic.   Eyes: Pupils are equal, round, and reactive to light. Conjunctivae and EOM are normal.   Neck: Normal range of motion.   Cardiovascular: Normal rate, regular rhythm and normal heart sounds.  Exam  reveals no gallop and no friction rub.    No murmur heard.  Pulmonary/Chest: Effort normal and breath sounds normal.   Abdominal: Soft. Bowel sounds are normal.   Musculoskeletal: Normal range of motion. He exhibits no edema.   Status post left index finger amputation.   Neurological: He is alert and oriented to person, place, and time.   Skin: Skin is warm and dry.   Psychiatric: He has a normal mood and affect. His behavior is normal. Judgment and thought content normal.       Lab Results   Component Value Date    WBC 5.5 02/12/2018    HGB 15.4 02/12/2018     02/12/2018    CHOL 143 02/12/2018    TRIG 45 02/12/2018    HDL 46 02/12/2018    ALT 25 02/12/2018    AST 22 02/12/2018     02/12/2018    K 4.5 02/12/2018    CREATININE 1.01 02/12/2018     Cardiovascular Procedures:    CATH LAB:  Cath (Normal Coronaries) - 2008    ELECTROPHYSIOLOGY:  EPS (PV ablation afib during study) - 12/11/2009  Holter (apc one 6 beat cst no aifb) - 5/13/2015    STRESS TESTS:  MPI (EF 0.60 (60%), lateral wal defect) - 6/9/2008    Other (Admitted for Tikosyn loading) - 6/10/2015     ECG Sinus  Rhythm   WITHIN NORMAL LIMITS    Assessment   Problem List Items Addressed This Visit        Circulatory    A-fib (CMS/HCC) ([facility]) - Primary      Unfortunately he had a accident in his workshop and lost finger.  Since that time much of his activity has been curtailed, likely the reason for only two episodes of atrial fibrillation.  Now that he is starting to resume most activities he knows to call the office should he have an increase in his atrial fibrillation burden.  He remains reluctant to take Eliquis daily but is aware to use it on a as needed basis for episodes lasting greater than 24 hours.         Relevant Medications    dofetilide (TIKOSYN) 500 mcg capsule    lisinopril (PRINIVIL) 2.5 mg tablet    atorvastatin (LIPITOR) 20 mg tablet    Other Relevant Orders    ECG 12 lead (Completed)       Other    Chronic anticoagulation     He  is a CHADS-Vasc 1 (age; on lisinopril for renal purposes). Discussed perhaps taking Eliquis daily, but he remains reluctant.          Visit for monitoring Tikosyn therapy     Tikosyn 500 mcg twice daily.  His electrocardiogram in the office today is stable with no evidence of prolonged QT or QTc.           Other Visit Diagnoses     Mixed hyperlipidemia        Relevant Medications    atorvastatin (LIPITOR) 20 mg tablet    Other Relevant Orders    Comprehensive metabolic panel    Lipid panel               Sincere García MD  7/25/2019

## 2019-07-25 NOTE — ASSESSMENT & PLAN NOTE
He is a CHADS-Vasc 1 (age; on lisinopril for renal purposes). Discussed perhaps taking Eliquis daily, but he remains reluctant.

## 2019-08-07 LAB
ALBUMIN SERPL-MCNC: 4.4 G/DL (ref 3.6–5.1)
ALBUMIN/GLOB SERPL: 2.1 (CALC) (ref 1–2.5)
ALP SERPL-CCNC: 57 U/L (ref 40–115)
ALT SERPL-CCNC: 25 U/L (ref 9–46)
AST SERPL-CCNC: 21 U/L (ref 10–35)
BILIRUB SERPL-MCNC: 0.6 MG/DL (ref 0.2–1.2)
BUN SERPL-MCNC: 23 MG/DL (ref 7–25)
BUN/CREAT SERPL: NORMAL (CALC) (ref 6–22)
CALCIUM SERPL-MCNC: 9.2 MG/DL (ref 8.6–10.3)
CHLORIDE SERPL-SCNC: 105 MMOL/L (ref 98–110)
CHOLEST SERPL-MCNC: 142 MG/DL
CHOLEST/HDLC SERPL: 3.4 (CALC)
CO2 SERPL-SCNC: 27 MMOL/L (ref 20–32)
CREAT SERPL-MCNC: 0.99 MG/DL (ref 0.7–1.25)
GLOBULIN SER CALC-MCNC: 2.1 G/DL (CALC) (ref 1.9–3.7)
GLUCOSE SERPL-MCNC: 94 MG/DL (ref 65–99)
HDLC SERPL-MCNC: 42 MG/DL
LDLC SERPL CALC-MCNC: 85 MG/DL (CALC)
NONHDLC SERPL-MCNC: 100 MG/DL (CALC)
POTASSIUM SERPL-SCNC: 4.3 MMOL/L (ref 3.5–5.3)
PROT SERPL-MCNC: 6.5 G/DL (ref 6.1–8.1)
QUEST EGFR NON-AFR. AMERICAN: 78 ML/MIN/1.73M2
SODIUM SERPL-SCNC: 139 MMOL/L (ref 135–146)
TRIGL SERPL-MCNC: 60 MG/DL

## 2019-12-31 ENCOUNTER — TELEPHONE (OUTPATIENT)
Dept: SCHEDULING | Facility: CLINIC | Age: 69
End: 2019-12-31

## 2019-12-31 NOTE — TELEPHONE ENCOUNTER
Pt has upcoming appt with Dr. García on 1/23, but now will be getting shoulder surgery on 1/22    Surgery: R shoulder  Surgeon: Elfego Lee  Surgery date: 1/22/2020  Tel:[phone]  Fax: [phone]    Pt wants to know if Dr. García will clear pt based off of last ov 7/19     Pt can be reached at [phone]

## 2020-01-13 ENCOUNTER — TELEPHONE (OUTPATIENT)
Dept: SCHEDULING | Facility: CLINIC | Age: 70
End: 2020-01-13

## 2020-01-13 NOTE — TELEPHONE ENCOUNTER
Pt states Dr grissom never rec CCV letter. Dr Grissom also requesting an EKG be faxed.  CV letter was e faxed, please send EKG.  Pt can be reached at [phone]        Dr Earl          [phone]          [phone]

## 2020-01-14 ENCOUNTER — TELEPHONE (OUTPATIENT)
Dept: SCHEDULING | Facility: CLINIC | Age: 70
End: 2020-01-14

## 2020-01-17 NOTE — TELEPHONE ENCOUNTER
Maria Teresa RN requesting call back in regards to previous messages. Maria Teresa wants to discuss Hx of A-fib and the type of A-fib patient was having during episode.     Maria Teresa can be reached at [phone]

## 2020-01-17 NOTE — TELEPHONE ENCOUNTER
Maria Teresa, physician's assistant states she received CC letter, but letter was not signed, and pt has hx of A-fib, and may need telemetry, but physician's care services does not offer that at their facility.    Maria Teresa requesting a call back to discuss further in depth, and she will be faxing over forms that need to get filled out.    Maria Teresa can be reached at [phone]

## 2020-03-16 ENCOUNTER — TRANSCRIBE ORDERS (OUTPATIENT)
Dept: PHYSICAL THERAPY | Facility: CLINIC | Age: 70
End: 2020-03-16

## 2020-03-16 ENCOUNTER — EVALUATION (OUTPATIENT)
Dept: PHYSICAL THERAPY | Facility: CLINIC | Age: 70
End: 2020-03-16
Payer: MEDICARE

## 2020-03-16 DIAGNOSIS — Z96.611 HISTORY OF ARTHROPLASTY OF RIGHT SHOULDER: Primary | ICD-10-CM

## 2020-03-16 PROCEDURE — 97161 PT EVAL LOW COMPLEX 20 MIN: CPT | Performed by: PHYSICAL THERAPIST

## 2020-03-16 NOTE — LETTER
2020    Radha Steiner MD  [address] [facility] [address]    Patient: Rohan Mark   YOB: 1950   Date of Visit: 3/16/2020     Encounter Diagnosis     ICD-10-CM    1  History of arthroplasty of right shoulder Z96 611        Dear Dr Armaan Hickman:    Thank you for your recent referral of Rohan Mark  Please review the attached evaluation summary from Chepe's recent visit  Please verify that you agree with the plan of care by signing the attached order  If you have any questions or concerns, please do not hesitate to call  I sincerely appreciate the opportunity to share in the care of one of your patients and hope to have another opportunity to work with you in the near future  Sincerely,    Haile Flores, PT      Referring Provider:      I certify that I have read the below Plan of Care and certify the need for these services furnished under this plan of treatment while under my care  Radha Steiner MD  [address] [facility] [address]  VIA Facsimile: [phone]          PT Evaluation     Today's date: 3/16/2020  Patient name: Rohan Mark  : 1950  MRN: [id number]  Referring provider: Laisha Mann MD  Dx:   Encounter Diagnosis     ICD-10-CM    1  History of arthroplasty of right shoulder Z96 611                   Assessment  Assessment details: Pt is a 77year old male presenting to outpatient Physical Therapy s/p R total shoulder arthroplasty on 2020  Pt presents with moderately to significantly decreased AROM, PROM, strength, and overall decreased functional mobility  These physical deficits are preventing the patient from participating in usual activities such as performing prolonged 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  Impairments: abnormal or restricted ROM, activity intolerance, impaired physical strength, lacks appropriate home exercise program, pain with function and poor posture   Barriers to therapy: None  Understanding of Dx/Px/POC: excellent  Goals  ST  The patient will be fully compliant with HEP within two weeks    2  Pt will report a decrease in pain by at least two points on VAS within two to three weeks  3  Pt will display an increase in R shoulder PROM into external rotation by at least 10 degrees within two to three weeks  4  Pt will display an increase in R shoulder abduction PROm by at least 10 degrees within two to three weeks    LT  The patient will increase FOTO score to 69 within six weeks  2  The patient will report full return to all work required typing without difficutly indicating return to functional baseline within six weeks  3  Pt will be able to sleep through the night without any disturbances from pain within six weeks  4  Pt will have no difficulty driving for up to one hour or more within six weeks    Plan  Patient would benefit from: skilled physical therapy  Planned modality interventions: thermotherapy: hydrocollator packs and cryotherapy  Planned therapy interventions: therapeutic activities, therapeutic exercise, home exercise program, manual therapy, joint mobilization, balance, patient education and neuromuscular re-education  Frequency: 2x week  Duration in weeks: 6  Plan of Care beginning date: 3/16/2020  Plan of Care expiration date: 2020  Treatment plan discussed with: patient        Subjective Evaluation    History of Present Illness  Date of surgery: 2020  Mechanism of injury: Pt underwent a total shoulder arthoplasty on 2020, with normal recovery, no complications  He wore a sling for about two weeks        Pain  Current pain ratin  At best pain ratin  At worst pain ratin  Quality: sharp (stabbing )  Relieving factors: rest  Aggravating factors: lifting (movements towards the ends of range of movement )  Progression: improved    Social Support    Employment status: working ([employer]-typing affected)  Hand dominance: left    Patient Goals  Patient goal: normal ROM without pain, to be able to type at work, to drive without difficulty, to be able to sleep on the right side, pain free        Objective     Postural Observations  Seated posture: poor  Standing posture: fair        Active Range of Motion   Left Shoulder   Flexion: 125 degrees   Extension: 58 degrees   Abduction: 125 degrees   External rotation BTH: C7   Internal rotation BTB: T12     Right Shoulder   Flexion: 75 degrees with pain  Extension: 41 degrees   Abduction: 49 degrees   External rotation BTH: C2 with pain  Internal rotation BTB: L5 with pain    Passive Range of Motion     Right Shoulder   Flexion: 134 degrees with pain  Abduction: 113 degrees with pain  External rotation 45°:  5 degrees with pain  Internal rotation 45°:  50 degrees     Strength/Myotome Testing     Left Shoulder     Planes of Motion   Flexion: 4   Extension: 4+   Abduction: 4   External rotation at 0°:  5   Internal rotation at 0°:  5     Right Shoulder     Planes of Motion   Flexion: 2-   Extension: 4   Abduction: 2-   External rotation at 0°:  4   Internal rotation at 0°:  4       Flowsheet Rows      Most Recent Value   PT/OT G-Codes   Current Score  48   Projected Score  69             Precautions: A-fib, high blood pressure, L shoulder OA    EPOC: 4/27/2020      Manual              R shoulder PROM: flexion and external rotation             R should IASTM                                                        Exercise Diary              Shoulder Pulley's: scaption             AAROM: shoulder extension with cane             AAROM: shoulder              Shoulder rows/extensions with TB             Shoulder IR with TB             Shoulder isometrics                          Supine shoulder flexion AAROM with cane             S/l sleeper stretch (shoulder IR)             Supine shoulder ER stretch with cane              Supine serratus punches                           HEP             Shoulder pulley's: flexion/abduction             Table slides: flexion/abduction             Pendulums: FWD/BW, lateral, cirlces               Cross body stretch             scap squeezes             Shoulder ER isometrics                                           Modalities              CP (post)

## 2020-03-16 NOTE — PROGRESS NOTES
PT Evaluation     Today's date: 3/16/2020  Patient name: Edwige Shipman  : 1950  MRN: [id number]  Referring provider: Daniel Tracey MD  Dx:   Encounter Diagnosis     ICD-10-CM    1  History of arthroplasty of right shoulder Z96 611                   Assessment  Assessment details: Pt is a 77year old male presenting to outpatient Physical Therapy s/p R total shoulder arthroplasty on 2020  Pt presents with moderately to significantly decreased AROM, PROM, strength, and overall decreased functional mobility  These physical deficits are preventing the patient from participating in usual activities such as performing prolonged 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  Impairments: abnormal or restricted ROM, activity intolerance, impaired physical strength, lacks appropriate home exercise program, pain with function and poor posture   Barriers to therapy: None  Understanding of Dx/Px/POC: excellent  Goals  ST  The patient will be fully compliant with HEP within two weeks    2  Pt will report a decrease in pain by at least two points on VAS within two to three weeks  3  Pt will display an increase in R shoulder PROM into external rotation by at least 10 degrees within two to three weeks  4  Pt will display an increase in R shoulder abduction PROm by at least 10 degrees within two to three weeks    LT  The patient will increase FOTO score to 69 within six weeks  2  The patient will report full return to all work required typing without difficutly indicating return to functional baseline within six weeks  3  Pt will be able to sleep through the night without any disturbances from pain within six weeks  4   Pt will have no difficulty driving for up to one hour or more within six weeks    Plan  Patient would benefit from: skilled physical therapy  Planned modality interventions: thermotherapy: hydrocollator packs and cryotherapy  Planned therapy interventions: therapeutic activities, therapeutic exercise, home exercise program, manual therapy, joint mobilization, balance, patient education and neuromuscular re-education  Frequency: 2x week  Duration in weeks: 6  Plan of Care beginning date: 3/16/2020  Plan of Care expiration date: 2020  Treatment plan discussed with: patient        Subjective Evaluation    History of Present Illness  Date of surgery: 2020  Mechanism of injury: Pt underwent a total shoulder arthoplasty on 2020, with normal recovery, no complications  He wore a sling for about two weeks        Pain  Current pain ratin  At best pain ratin  At worst pain ratin  Quality: sharp (stabbing )  Relieving factors: rest  Aggravating factors: lifting (movements towards the ends of range of movement )  Progression: improved    Social Support    Employment status: working ([employer]typing affected)  Hand dominance: left    Patient Goals  Patient goal: normal ROM without pain, to be able to type at work, to drive without difficulty, to be able to sleep on the right side, pain free        Objective     Postural Observations  Seated posture: poor  Standing posture: fair        Active Range of Motion   Left Shoulder   Flexion: 125 degrees   Extension: 58 degrees   Abduction: 125 degrees   External rotation BTH: C7   Internal rotation BTB: T12     Right Shoulder   Flexion: 75 degrees with pain  Extension: 41 degrees   Abduction: 49 degrees   External rotation BTH: C2 with pain  Internal rotation BTB: L5 with pain    Passive Range of Motion     Right Shoulder   Flexion: 134 degrees with pain  Abduction: 113 degrees with pain  External rotation 45°: 5 degrees with pain  Internal rotation 45°: 50 degrees     Strength/Myotome Testing     Left Shoulder     Planes of Motion   Flexion: 4   Extension: 4+   Abduction: 4   External rotation at 0°: 5   Internal rotation at 0°: 5     Right Shoulder     Planes of Motion   Flexion: 2-   Extension: 4   Abduction: 2-   External rotation at 0°: 4   Internal rotation at 0°: 4       Flowsheet Rows      Most Recent Value   PT/OT G-Codes   Current Score  48   Projected Score  69             Precautions: A-fib, high blood pressure, L shoulder OA    EPOC: 4/27/2020      Manual              R shoulder PROM: flexion and external rotation             R should IASTM                                                        Exercise Diary              Shoulder Pulley's: scaption             AAROM: shoulder extension with cane             AAROM: shoulder              Shoulder rows/extensions with TB             Shoulder IR with TB             Shoulder isometrics                          Supine shoulder flexion AAROM with cane             S/l sleeper stretch (shoulder IR)             Supine shoulder ER stretch with cane              Supine serratus punches                           HEP             Shoulder pulley's: flexion/abduction             Table slides: flexion/abduction             Pendulums: FWD/BW, lateral, cirlces               Cross body stretch             scap squeezes             Shoulder ER isometrics                                           Modalities              CP (post)

## 2020-03-18 ENCOUNTER — OFFICE VISIT (OUTPATIENT)
Dept: PHYSICAL THERAPY | Facility: CLINIC | Age: 70
End: 2020-03-18
Payer: MEDICARE

## 2020-03-18 DIAGNOSIS — Z96.611 HISTORY OF ARTHROPLASTY OF RIGHT SHOULDER: Primary | ICD-10-CM

## 2020-03-18 PROCEDURE — 97110 THERAPEUTIC EXERCISES: CPT | Performed by: PHYSICAL THERAPIST

## 2020-03-18 PROCEDURE — 97140 MANUAL THERAPY 1/> REGIONS: CPT | Performed by: PHYSICAL THERAPIST

## 2020-03-18 NOTE — PROGRESS NOTES
Daily Note     Today's date: 3/18/2020  Patient name: Nichole Fiore  : 1950  MRN: [id number]  Referring provider: Jasper Contreras MD  Dx:   Encounter Diagnosis     ICD-10-CM    1  History of arthroplasty of right shoulder Z96 611                   Subjective: Pt reports that shoulder abduction on the pulley's is his most uncomfortable direction to move the arm  Objective: See treatment diary below      Assessment: Tolerated treatment well  Patient  Tolerated introduction to gentle strengthening TE, with verbal and tactile cuing with half foam under the elbow to correctly perform shoulder IR  He showed good tolerance with good improvements in PROM during manual stretching, with abduction currently the most limited at approximately 150 degrees  He would benefit from continued skilled PT  Plan: Continue per plan of care  Progress treament per protocol        Precautions: A-fib, high blood pressure, L shoulder OA    EPOC: 2020      Manual  3/18            R shoulder PROM: flexion and external rotation 10'            R should IASTM                                                        Exercise Diary  3/18            Shoulder Pulley's: scaption flx/abd 2'/2'            AAROM: shoulder extension with cane 10 x10"            AAROM: shoulder              Shoulder rows/extensions with TB GTB  x30            Shoulder IR with TB GTB  x30            Shoulder isometrics                          Supine shoulder flexion AAROM with cane 2x10            S/l sleeper stretch (shoulder IR)             Supine shoulder ER stretch with cane  10 x10"            Supine serratus punches  2 x10                         HEP             Shoulder pulley's: flexion/abduction             Table slides: flexion/abduction             Pendulums: FWD/BW, lateral, cirlces               Cross body stretch             scap squeezes             Shoulder ER isometrics                                           Modalities CP (post)

## 2020-03-19 ENCOUNTER — TELEPHONE (OUTPATIENT)
Dept: CARDIOLOGY | Facility: CLINIC | Age: 70
End: 2020-03-19

## 2020-03-20 ENCOUNTER — OFFICE VISIT (OUTPATIENT)
Dept: PHYSICAL THERAPY | Facility: CLINIC | Age: 70
End: 2020-03-20
Payer: MEDICARE

## 2020-03-20 DIAGNOSIS — Z96.611 HISTORY OF ARTHROPLASTY OF RIGHT SHOULDER: Primary | ICD-10-CM

## 2020-03-20 PROCEDURE — 97110 THERAPEUTIC EXERCISES: CPT | Performed by: PHYSICAL THERAPIST

## 2020-03-20 PROCEDURE — 97140 MANUAL THERAPY 1/> REGIONS: CPT | Performed by: PHYSICAL THERAPIST

## 2020-03-20 NOTE — PROGRESS NOTES
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"  Objective: See treatment diary below      Assessment: Tolerated treatment well  Patient shows good tolerance with mild fatigue and difficulty performing shoulder rows/extensions and IR with TB, but moderate difficutly performing seated ER on the table, limited due to a combination of weakness, and also probably lack of available ROM  He responds well to manual therapy, and continues to display a firm end feel at approximately 75% of normal motion of flexion and abduction  He would benefit from continued skilled PT  Plan: Continue per plan of care  Progress treatment as tolerated         Precautions: A-fib, high blood pressure, L shoulder OA    EPOC: 2020      Manual  3/18 3/20           R shoulder PROM: flexion and external rotation 10' 8'           R should IASTM                                                        Exercise Diary  3/18 3/20           Shoulder Pulley's: scaption flx/abd 2'/2' scaption  3'           AAROM: shoulder extension with cane 10 x10" 10 x10"           AAROM: shoulder              Shoulder rows/extensions with TB GTB  x30 GTB  x30            Shoulder IR with TB GTB  x30 GTB  x30 BTB          Shoulder ER seated at table  3 x10                        Supine shoulder flexion AAROM with cane 2x10 x10           S/l sleeper stretch (shoulder IR)             Supine shoulder ER stretch with cane  10 x10" NV           Supine serratus punches  2 x10 x20                        HEP             Shoulder pulley's: flexion/abduction             Table slides: flexion/abduction             Pendulums: FWD/BW, lateral, cirlces               Cross body stretch             scap squeezes             Shoulder ER isometrics                                           Modalities              CP (post)

## 2020-03-24 ENCOUNTER — APPOINTMENT (OUTPATIENT)
Dept: PHYSICAL THERAPY | Facility: CLINIC | Age: 70
End: 2020-03-24
Payer: MEDICARE

## 2020-03-25 ENCOUNTER — OFFICE VISIT (OUTPATIENT)
Dept: PHYSICAL THERAPY | Facility: CLINIC | Age: 70
End: 2020-03-25
Payer: MEDICARE

## 2020-03-25 DIAGNOSIS — Z96.611 HISTORY OF ARTHROPLASTY OF RIGHT SHOULDER: Primary | ICD-10-CM

## 2020-03-25 PROCEDURE — 97110 THERAPEUTIC EXERCISES: CPT | Performed by: PHYSICAL THERAPIST

## 2020-03-25 PROCEDURE — 97140 MANUAL THERAPY 1/> REGIONS: CPT | Performed by: PHYSICAL THERAPIST

## 2020-03-25 NOTE — PROGRESS NOTES
Daily Note     Today's date: 3/25/2020  Patient name: Laurita Gudino  : 1950  MRN: [id number]  Referring provider: Shirlene Cortes MD  Dx:   Encounter Diagnosis     ICD-10-CM    1  History of arthroplasty of right shoulder Z96 611                   Subjective: Pt reports that he has a lot of pain and soreness in the front of his shoulder and he is unsure why  The only thing that he can think that would have caused it was perhaps sleeping on it in a strained position  Objective: See treatment diary below      Assessment: Tolerated treatment well  Patient moderate tightness and hypertonicity of the R pec major and minor, which improved moderately following IASTM, and greatly following PROM and stretching  He reported good improvement in pain  He would benefit from continued skilled PT to gently progress ROM and strength of the R shoulder per protocol  Plan: Continue per plan of care  Progress treatment as tolerated         Precautions: A-fib, high blood pressure, L shoulder OA    EPOC: 2020      Manual  3/18 3/20 3/25          R shoulder PROM: flexion and external rotation 10' 8' 13'          R should IASTM   10'                                       23'              Exercise Diary  3/18 3/20 3/25          Shoulder Pulley's: scaption flx/abd 2'/2' scaption  3' scaption  3'          Pec major stretch in doorway   3 x30"          Pec minor stretch in doorway   3 x30"          AAROM: shoulder extension with cane 10 x10" 10 x10" 10 x10"          AAROM: shoulder              Shoulder rows/extensions with TB GTB  x30 GTB  x30  GTB  x30           Shoulder IR with TB GTB  x30 GTB  x30 GTB  x30 BTB         Shoulder ER seated at table  3 x10                        Supine shoulder flexion AAROM with cane 2x10 x10           S/l sleeper stretch (shoulder IR)             Supine shoulder ER stretch with cane  10 x10" NV           Supine serratus punches  2 x10 x20                        HEP Shoulder pulley's: flexion/abduction             Table slides: flexion/abduction             Pendulums: FWD/BW, lateral, cirlces               Cross body stretch             scap squeezes             Shoulder ER isometrics             Wall walks   reviewed                           Modalities              CP (post)

## 2020-03-27 ENCOUNTER — OFFICE VISIT (OUTPATIENT)
Dept: PHYSICAL THERAPY | Facility: CLINIC | Age: 70
End: 2020-03-27
Payer: MEDICARE

## 2020-03-27 DIAGNOSIS — Z96.611 HISTORY OF ARTHROPLASTY OF RIGHT SHOULDER: Primary | ICD-10-CM

## 2020-03-27 PROCEDURE — 97110 THERAPEUTIC EXERCISES: CPT | Performed by: PHYSICAL THERAPIST

## 2020-03-27 PROCEDURE — 97140 MANUAL THERAPY 1/> REGIONS: CPT | Performed by: PHYSICAL THERAPIST

## 2020-03-27 NOTE — PROGRESS NOTES
Daily Note     Today's date: 3/27/2020  Patient name: Ameena Goddard  : 1950  MRN: [id number]  Referring provider: Mir Sanchez MD  Dx:   Encounter Diagnosis     ICD-10-CM    1  History of arthroplasty of right shoulder Z96 611                   Subjective: Pt reports that his shoulder is feeling much better than the start of therapy two days ago  He notes that he has been trying the wall walks and they are still difficult  Objective: See treatment diary below      Assessment: Tolerated treatment well  Patient continues to display moderate R shoulder ROM deficits, with ER the most impaired, followed by abduction, then flexion  He continues to make steady though small progress with his PROM with each visit  He would benefit from continued skilled PT  Plan: Continue per plan of care  Progress treatment as tolerated         Precautions: A-fib, high blood pressure, L shoulder OA    EPOC: 2020      Manual  3/18 3/20 3/25 3/27         R shoulder PROM: flexion and external rotation 10' 8' 13' 8'         R should IASTM   10' 8'                                      23' 16'             Exercise Diary  3/18 3/20 3/25 3/27         Shoulder Pulley's: scaption flx/abd 2'/2' scaption  3' scaption  3' scaption  3'         Pec major stretch in doorway   3 x30" HEP         Pec minor stretch in doorway   3 x30" HEP         AAROM: shoulder extension with cane 10 x10" 10 x10" 10 x10" 10 x10"         AAROM: shoulder              Shoulder rows/extensions with TB GTB  x30 GTB  x30  GTB  x30  BTB  x30         Shoulder IR with TB GTB  x30 GTB  x30 GTB  x30 BTB  x20         Shoulder ER seated at table  3 x10  1#  3 x10                      Supine shoulder flexion AAROM with cane 2x10 x10  10 x10"         Supine flexion (following manual therapy)    2 x10         S/l sleeper stretch (shoulder IR)             Supine ABD AAROM with therapist help             Supine shoulder ER stretch with cane  10 x10" NV  10 x10" Supine serratus punches  2 x10 x20                        HEP             Shoulder pulley's: flexion/abduction             Table slides: flexion/abduction             Pendulums: FWD/BW, lateral, cirlces               Cross body stretch             scap squeezes             Shoulder ER isometrics             Wall walks   reviewed                           Modalities              CP (post)

## 2020-04-01 ENCOUNTER — OFFICE VISIT (OUTPATIENT)
Dept: PHYSICAL THERAPY | Facility: CLINIC | Age: 70
End: 2020-04-01
Payer: MEDICARE

## 2020-04-01 DIAGNOSIS — Z96.611 HISTORY OF ARTHROPLASTY OF RIGHT SHOULDER: Primary | ICD-10-CM

## 2020-04-01 PROCEDURE — 97110 THERAPEUTIC EXERCISES: CPT | Performed by: PHYSICAL THERAPIST

## 2020-04-01 PROCEDURE — 97140 MANUAL THERAPY 1/> REGIONS: CPT | Performed by: PHYSICAL THERAPIST

## 2020-04-03 ENCOUNTER — OFFICE VISIT (OUTPATIENT)
Dept: PHYSICAL THERAPY | Facility: CLINIC | Age: 70
End: 2020-04-03
Payer: MEDICARE

## 2020-04-03 DIAGNOSIS — Z96.611 HISTORY OF ARTHROPLASTY OF RIGHT SHOULDER: Primary | ICD-10-CM

## 2020-04-03 PROCEDURE — 97140 MANUAL THERAPY 1/> REGIONS: CPT

## 2020-04-03 PROCEDURE — 97112 NEUROMUSCULAR REEDUCATION: CPT

## 2020-04-03 PROCEDURE — 97110 THERAPEUTIC EXERCISES: CPT

## 2020-04-08 ENCOUNTER — OFFICE VISIT (OUTPATIENT)
Dept: PHYSICAL THERAPY | Facility: CLINIC | Age: 70
End: 2020-04-08
Payer: MEDICARE

## 2020-04-08 DIAGNOSIS — Z96.611 HISTORY OF ARTHROPLASTY OF RIGHT SHOULDER: Primary | ICD-10-CM

## 2020-04-08 PROCEDURE — 97110 THERAPEUTIC EXERCISES: CPT | Performed by: PHYSICAL THERAPIST

## 2020-04-08 PROCEDURE — 97140 MANUAL THERAPY 1/> REGIONS: CPT | Performed by: PHYSICAL THERAPIST

## 2020-04-10 ENCOUNTER — OFFICE VISIT (OUTPATIENT)
Dept: PHYSICAL THERAPY | Facility: CLINIC | Age: 70
End: 2020-04-10
Payer: MEDICARE

## 2020-04-10 DIAGNOSIS — Z96.611 HISTORY OF ARTHROPLASTY OF RIGHT SHOULDER: Primary | ICD-10-CM

## 2020-04-10 PROCEDURE — 97110 THERAPEUTIC EXERCISES: CPT | Performed by: PHYSICAL THERAPIST

## 2020-04-10 PROCEDURE — 97140 MANUAL THERAPY 1/> REGIONS: CPT | Performed by: PHYSICAL THERAPIST

## 2020-04-15 ENCOUNTER — EVALUATION (OUTPATIENT)
Dept: PHYSICAL THERAPY | Facility: CLINIC | Age: 70
End: 2020-04-15
Payer: MEDICARE

## 2020-04-15 DIAGNOSIS — Z96.611 HISTORY OF ARTHROPLASTY OF RIGHT SHOULDER: Primary | ICD-10-CM

## 2020-04-15 PROCEDURE — 97110 THERAPEUTIC EXERCISES: CPT | Performed by: PHYSICAL THERAPIST

## 2020-04-15 PROCEDURE — 97140 MANUAL THERAPY 1/> REGIONS: CPT | Performed by: PHYSICAL THERAPIST

## 2020-04-17 ENCOUNTER — OFFICE VISIT (OUTPATIENT)
Dept: PHYSICAL THERAPY | Facility: CLINIC | Age: 70
End: 2020-04-17
Payer: MEDICARE

## 2020-04-17 DIAGNOSIS — Z96.611 HISTORY OF ARTHROPLASTY OF RIGHT SHOULDER: Primary | ICD-10-CM

## 2020-04-17 PROCEDURE — 97110 THERAPEUTIC EXERCISES: CPT | Performed by: PHYSICAL THERAPIST

## 2020-04-17 PROCEDURE — 97140 MANUAL THERAPY 1/> REGIONS: CPT | Performed by: PHYSICAL THERAPIST

## 2020-04-22 ENCOUNTER — OFFICE VISIT (OUTPATIENT)
Dept: PHYSICAL THERAPY | Facility: CLINIC | Age: 70
End: 2020-04-22
Payer: MEDICARE

## 2020-04-22 DIAGNOSIS — Z96.611 HISTORY OF ARTHROPLASTY OF RIGHT SHOULDER: Primary | ICD-10-CM

## 2020-04-22 PROCEDURE — 97112 NEUROMUSCULAR REEDUCATION: CPT | Performed by: PHYSICAL THERAPIST

## 2020-04-22 PROCEDURE — 97110 THERAPEUTIC EXERCISES: CPT | Performed by: PHYSICAL THERAPIST

## 2020-04-22 PROCEDURE — 97140 MANUAL THERAPY 1/> REGIONS: CPT | Performed by: PHYSICAL THERAPIST

## 2020-04-24 ENCOUNTER — OFFICE VISIT (OUTPATIENT)
Dept: PHYSICAL THERAPY | Facility: CLINIC | Age: 70
End: 2020-04-24
Payer: MEDICARE

## 2020-04-24 DIAGNOSIS — Z96.611 HISTORY OF ARTHROPLASTY OF RIGHT SHOULDER: Primary | ICD-10-CM

## 2020-04-24 PROCEDURE — 97140 MANUAL THERAPY 1/> REGIONS: CPT | Performed by: PHYSICAL THERAPIST

## 2020-04-24 PROCEDURE — 97110 THERAPEUTIC EXERCISES: CPT | Performed by: PHYSICAL THERAPIST

## 2020-04-29 ENCOUNTER — OFFICE VISIT (OUTPATIENT)
Dept: PHYSICAL THERAPY | Facility: CLINIC | Age: 70
End: 2020-04-29
Payer: MEDICARE

## 2020-04-29 DIAGNOSIS — Z96.611 HISTORY OF ARTHROPLASTY OF RIGHT SHOULDER: Primary | ICD-10-CM

## 2020-04-29 PROCEDURE — 97110 THERAPEUTIC EXERCISES: CPT | Performed by: PHYSICAL THERAPIST

## 2020-04-29 PROCEDURE — 97140 MANUAL THERAPY 1/> REGIONS: CPT | Performed by: PHYSICAL THERAPIST

## 2020-05-06 ENCOUNTER — OFFICE VISIT (OUTPATIENT)
Dept: PHYSICAL THERAPY | Facility: CLINIC | Age: 70
End: 2020-05-06
Payer: MEDICARE

## 2020-05-06 DIAGNOSIS — Z96.611 HISTORY OF ARTHROPLASTY OF RIGHT SHOULDER: Primary | ICD-10-CM

## 2020-05-06 PROCEDURE — 97140 MANUAL THERAPY 1/> REGIONS: CPT | Performed by: PHYSICAL THERAPIST

## 2020-05-06 PROCEDURE — 97110 THERAPEUTIC EXERCISES: CPT | Performed by: PHYSICAL THERAPIST

## 2020-05-12 ENCOUNTER — TELEPHONE (OUTPATIENT)
Dept: CARDIOLOGY | Facility: CLINIC | Age: 70
End: 2020-05-12

## 2020-05-13 ENCOUNTER — OFFICE VISIT (OUTPATIENT)
Dept: PHYSICAL THERAPY | Facility: CLINIC | Age: 70
End: 2020-05-13
Payer: MEDICARE

## 2020-05-13 DIAGNOSIS — Z96.611 HISTORY OF ARTHROPLASTY OF RIGHT SHOULDER: Primary | ICD-10-CM

## 2020-05-13 PROCEDURE — 97110 THERAPEUTIC EXERCISES: CPT | Performed by: PHYSICAL THERAPIST

## 2020-05-13 PROCEDURE — 97140 MANUAL THERAPY 1/> REGIONS: CPT | Performed by: PHYSICAL THERAPIST

## 2020-05-22 ASSESSMENT — ENCOUNTER SYMPTOMS
CARDIOVASCULAR NEGATIVE: 1
MUSCULOSKELETAL NEGATIVE: 1
RESPIRATORY NEGATIVE: 1
HEMATOLOGIC/LYMPHATIC NEGATIVE: 1
ENDOCRINE NEGATIVE: 1
PSYCHIATRIC NEGATIVE: 1
EYES NEGATIVE: 1
NEUROLOGICAL NEGATIVE: 1
CONSTITUTIONAL NEGATIVE: 1
GASTROINTESTINAL NEGATIVE: 1

## 2020-05-22 NOTE — PROGRESS NOTES
Electrophysiology  Outpatient Progress Note       Reason for visit:   Chief Complaint   Patient presents with   • Follow-up       HPI   Humberto Chung is a 69 y.o. male who is seen today for paroxysmal atrial fibrillation. The patient is currently on  Tikosyn 500 mcg BID. He is currently in sinus rhythm. He reports episodes of sudden onset of extreme fatigue and shortness of breath he feels is related to lisinopril. His AF episodes also appear to have increased; unclear if some symptoms may be due to AF.    He has had no chest discomfort suggestive of ischemia.The patient denies orthopnea, PND, GTZ, or edema. Mr. CHUNG has not had syncope or near syncope. He denies claudication. There is no discoloration or ulceration of the lower extremities. He has had no TIA or stroke-like symptoms. The patient has no symptoms attributable to valvular heart disease.     Past Medical History:   Diagnosis Date   • A-fib (CMS/[facility])    • A-fib (CMS/[facility]) 7/25/2018   • Chronic anticoagulation 7/25/2018   • Lipid disorder    • Visit for monitoring Tikosyn therapy 7/25/2019     Past Surgical History:   Procedure Laterality Date   • ABLATION OF DYSRHYTHMIC FOCUS     • CARDIAC CATHETERIZATION     • TOTAL SHOULDER REPLACEMENT Right        Social History     Tobacco Use   • Smoking status: Current Some Day Smoker   • Smokeless tobacco: Never Used   Substance Use Topics   • Alcohol use: Yes   • Drug use: Defer     Family History   Adopted: Yes   Family history unknown: Yes       ALLERGY:  Aspirin and Penicillins    Current Outpatient Medications   Medication Sig Dispense Refill   • apixaban (ELIQUIS) 5 mg tablet Take 5 mg by mouth See admin instr. Use if you go into a-fib     • aspirin (ASPIRIN LOW DOSE) 81 mg enteric coated tablet Take 81 mg by mouth daily.     • atorvastatin (LIPITOR) 20 mg tablet Take 1 tablet (20 mg total) by mouth daily. 90 tablet 3   • cholecalciferol, vitamin D3, (VITAMIN D3) 2,000 unit tablet Take by mouth daily.      • dofetilide (TIKOSYN) 500 mcg capsule Take 1 capsule (500 mcg total) by mouth 2 (two) times a day. 180 capsule 3   • metoprolol tartrate (LOPRESSOR) 25 mg tablet Take 25 mg by mouth every 6 (six) hours as needed.       No current facility-administered medications for this visit.      Review of Systems   Constitution: Negative.   HENT: Negative.    Eyes: Negative.    Cardiovascular: Negative.    Respiratory: Negative.    Endocrine: Negative.    Hematologic/Lymphatic: Negative.    Skin: Negative.    Musculoskeletal: Negative.    Gastrointestinal: Negative.    Genitourinary: Negative.    Neurological: Negative.    Psychiatric/Behavioral: Negative.      Objective   Vitals:    05/28/20 1249   BP: 140/90   Pulse: 73   SpO2: 96%       Physical Exam   Constitutional: He is oriented to person, place, and time. He appears well-developed and well-nourished.   HENT:   Head: Normocephalic.   Eyes: Pupils are equal, round, and reactive to light. Conjunctivae and EOM are normal.   Neck: Normal range of motion.   Cardiovascular: Normal rate, regular rhythm and normal heart sounds. Exam reveals no gallop and no friction rub.   No murmur heard.  Pulmonary/Chest: Effort normal and breath sounds normal.   Abdominal: Soft. Bowel sounds are normal.   Musculoskeletal: Normal range of motion. He exhibits no edema.   Status post left index finger amputation.   Neurological: He is alert and oriented to person, place, and time.   Skin: Skin is warm and dry.   Psychiatric: He has a normal mood and affect. His behavior is normal. Judgment and thought content normal.       Lab Results   Component Value Date    WBC 6.1 06/02/2020    HGB 15.9 06/02/2020     06/02/2020    CHOL 154 06/02/2020    TRIG 81 06/02/2020    HDL 43 06/02/2020    ALT 23 06/02/2020    AST 17 06/02/2020     06/02/2020    K 4.7 06/02/2020    CREATININE 0.92 06/02/2020    TSH 2.21 06/02/2020     Cardiovascular Procedures:    CATH LAB:  Cath (Normal Coronaries) -  2008    ELECTROPHYSIOLOGY:  EPS (PV ablation afib during study) - 12/11/2009  Holter (apc one 6 beat cst no aifb) - 5/13/2015    STRESS TESTS:    ECHO 5/28/2020  MPI (EF 0.60 (60%), lateral wal defect) - 6/9/2008  · Mean gradient = 5.00 mmHg. Peak velocity = 1.50 m/s. Calculated area = 3.15 cm2.  · Normal-sized LV. Normal LV wall thickness.  · Preserved LV systolic function. Estimated EF 65%.  · Normal-sized RV. Normal RV systolic function.  · Normal-sized LA.  · Normal-sized RA.  · Aortic root normal. Sinuses of Valsalva normal-sized. Ascending aorta normal-sized. Aortic arch normal-sized. Descending aorta normal-sized.  · Aortic valve structure is normal. Tricuspid aortic valve. No aortic valve regurgitation. No aortic valve stenosis.  · Normal leaflet structure and normal leaflet motion of the mitral valve.  · No significant mitral valve regurgitation.  · No significant mitral valve stenosis.  · Tricuspid valve structure is normal. Mild tricuspid valve regurgitation.  · No significant tricuspid valve stenosis.  · Normal pulmonic valve structure. No pulmonic valve regurgitation. No pulmonic valve stenosis.  · IVC not well visualized.  · Normal pericardial structure. No evidence of pericardial effusion. No cardiac tamponade.  · Normal 2D echocardiogram color-flow Doppler study       Other (Admitted for Tikosyn loading) - 6/10/2015     ECG Sinus  Rhythm      Assessment   Problem List Items Addressed This Visit        Respiratory    SOB (shortness of breath)     He reports episodes of sudden onset of shortness of breath followed by fatigue. He fells it is related to the lisinopril. He admits to stopping the lisinopril for three days and the symptoms resolved. As of today he will discontinue lisinopril at his request.     He underwent an echocardiogram today which revealed no changes from previous echo. Complete lab work up is ordered.             Circulatory    A-fib (CMS/HCC) - Primary     The patient is maintaining  sinus rhythm, although he is having an increase in breakthroughs.  He is on chronic medical therapy.  He is stable on Tikosyn and his electrocardiogram shows no evidence of prolonged QT or QTc.  I have made no changes to the present regimen. If episodes continue to increase, may consider AF ablation, but will hold for now given current limitations to elective procedures.         Relevant Orders    ECG 12 lead (Completed)    Transthoracic echo (TTE) complete (Completed)    Comprehensive metabolic panel (Completed)    TSH w reflex FT4 (Completed)       Other    Chronic anticoagulation     He is a CHADS-Vasc 1 (age; on lisinopril for renal purposes). Discussed perhaps taking Eliquis daily, but he remains reluctant. He prefers to use on a prn basis         Relevant Orders    CBC and Differential (Completed)    Visit for monitoring Tikosyn therapy     Tikosyn 500 mcg twice daily.  His electrocardiogram in the office today is stable with no evidence of prolonged QT or QTc.         Relevant Orders    Comprehensive metabolic panel (Completed)      Other Visit Diagnoses     Hyperlipidemia, unspecified hyperlipidemia type        Relevant Orders    Lipid panel (Completed)               Sincere García MD  6/4/2020

## 2020-05-27 ENCOUNTER — TELEPHONE (OUTPATIENT)
Dept: CARDIOLOGY | Facility: CLINIC | Age: 70
End: 2020-05-27

## 2020-05-28 ENCOUNTER — OFFICE VISIT (OUTPATIENT)
Dept: CARDIOLOGY | Facility: CLINIC | Age: 70
End: 2020-05-28
Payer: MEDICARE

## 2020-05-28 ENCOUNTER — HOSPITAL ENCOUNTER (OUTPATIENT)
Dept: CARDIOLOGY | Facility: CLINIC | Age: 70
Discharge: HOME | End: 2020-05-28
Payer: MEDICARE

## 2020-05-28 VITALS
WEIGHT: 254 LBS | BODY MASS INDEX: 35.56 KG/M2 | HEART RATE: 73 BPM | OXYGEN SATURATION: 96 % | DIASTOLIC BLOOD PRESSURE: 90 MMHG | HEIGHT: 71 IN | SYSTOLIC BLOOD PRESSURE: 140 MMHG

## 2020-05-28 VITALS
HEIGHT: 71 IN | SYSTOLIC BLOOD PRESSURE: 140 MMHG | BODY MASS INDEX: 35.56 KG/M2 | WEIGHT: 254 LBS | DIASTOLIC BLOOD PRESSURE: 90 MMHG

## 2020-05-28 DIAGNOSIS — Z79.899 VISIT FOR MONITORING TIKOSYN THERAPY: ICD-10-CM

## 2020-05-28 DIAGNOSIS — R06.02 SOB (SHORTNESS OF BREATH): ICD-10-CM

## 2020-05-28 DIAGNOSIS — I48.0 PAROXYSMAL ATRIAL FIBRILLATION (CMS/HCC): Primary | ICD-10-CM

## 2020-05-28 DIAGNOSIS — I48.0 PAROXYSMAL ATRIAL FIBRILLATION (CMS/HCC): ICD-10-CM

## 2020-05-28 DIAGNOSIS — Z51.81 VISIT FOR MONITORING TIKOSYN THERAPY: ICD-10-CM

## 2020-05-28 DIAGNOSIS — Z79.01 CHRONIC ANTICOAGULATION: ICD-10-CM

## 2020-05-28 DIAGNOSIS — E78.5 HYPERLIPIDEMIA, UNSPECIFIED HYPERLIPIDEMIA TYPE: ICD-10-CM

## 2020-05-28 LAB
AORTIC ROOT ANNULUS - M-MODE: 3.5 CM
AORTIC ROOT ANNULUS: 3.3 CM
AORTIC VALVE MEAN VELOCITY: 1.02 M/S
AORTIC VALVE VELOCITY TIME INTEGRAL: 31.6 CM
ASCENDING AORTA: 3.8 CM
AV MEAN GRADIENT: 5 MMHG
AV PEAK GRADIENT: 9 MMHG
AV PEAK VELOCITY-S: 1.5 M/S
AV VALVE AREA: 3.15 CM2
BSA FOR ECHO PROCEDURE: 2.4 M2
CUSP SEPARATION: 1.8 CM
DOP CALC LVOT STROKE VOLUME: 99.66 ML
E WAVE DECELERATION TIME: 144 MS
E/A RATIO: 0.9
E/E' RATIO: 6.3
E/LAT E' RATIO: 5
EDV (BP): 82.5 CM3
EF (A4C): 68.9 %
EF A2C: 66.2 %
EJECTION FRACTION: 67.2 %
ESV (BP): 27.1 CM3
FRACTIONAL SHORTENING: 36.6 %
INTERVENTRICULAR SEPTUM: 1.09 CM
LA ESV (BP): 33.2 CM3
LA ESV INDEX (A2C): 11 CM3/M2
LA ESV INDEX (BP): 13.83 CM3/M2
LA/AORTA RATIO: 1.14
LAAS-AP2: 13.7 CM2
LAAS-AP4: 15.3 CM2
LAD 2D - M-MODE: 4 CM
LAD 2D: 3.8 CM
LALD A4C: 5.03 CM
LALD A4C: 5.84 CM
LAV-S: 26.4 CM3
LEFT ATRIUM VOLUME INDEX: 15.21 CM3/M2
LEFT ATRIUM VOLUME: 36.5 CM3
LEFT INTERNAL DIMENSION IN SYSTOLE: 3.17 CM (ref 4.66–7.06)
LEFT VENTRICLE DIASTOLIC VOLUME INDEX: 34.96 CM3/M2
LEFT VENTRICLE DIASTOLIC VOLUME: 83.9 CM3
LEFT VENTRICLE SYSTOLIC VOLUME INDEX: 10.83 CM3/M2
LEFT VENTRICLE SYSTOLIC VOLUME: 26 CM3
LEFT VENTRICULAR INTERNAL DIMENSION IN DIASTOLE: 5 CM (ref 8.08–11.23)
LEFT VENTRICULAR POSTERIOR WALL IN END DIASTOLE: 0.99 CM (ref 0.93–1.74)
LV DIASTOLIC VOLUME: 79.4 CM3
LV ESV (APICAL 2 CHAMBER): 26.8 CM3
LVAD-AP2: 28.3 CM2
LVAD-AP4: 29.2 CM2
LVAS-AP2: 14.7 CM2
LVAS-AP4: 14.3 CM2
LVEDVI(A2C): 33.08 CM3/M2
LVEDVI(BP): 34.38 CM3/M2
LVESVI(A2C): 11.17 CM3/M2
LVESVI(BP): 11.29 CM3/M2
LVLD-AP2: 8.62 CM
LVLD-AP4: 8.39 CM
LVLS-AP2: 6.97 CM
LVLS-AP4: 6.61 CM
LVOT 2D: 2.3 CM
LVOT A: 4.15 CM2
LVOT MG: 3 MMHG
LVOT MV: 0.74 M/S
LVOT PEAK VELOCITY: 1.14 M/S
LVOT VTI: 24 CM
MV E'TISSUE VEL-LAT: 0.11 M/S
MV E'TISSUE VEL-MED: 0.09 M/S
MV PEAK A VEL: 0.61 M/S
MV PEAK E VEL: 0.56 M/S
POSTERIOR WALL: 0.99 CM
RVOT VMAX: 0.67 M/S
RVOT VTI: 12.2 CM
SEPTAL TISSUE DOPPLER FREE WALL LATE DIA VELOCITY (APICAL 4 CHAMBER VIEW): 0.16 M/S
TR MAX PG: 23 MMHG
TRICUSPID VALVE PEAK REGURGITATION VELOCITY: 2.42 M/S
Z-SCORE OF LEFT VENTRICULAR DIMENSION IN END DIASTOLE: -6.44
Z-SCORE OF LEFT VENTRICULAR DIMENSION IN END SYSTOLE: -4.68
Z-SCORE OF LEFT VENTRICULAR POSTERIOR WALL IN END DIASTOLE: -1.29

## 2020-05-28 PROCEDURE — 93306 TTE W/DOPPLER COMPLETE: CPT | Performed by: INTERNAL MEDICINE

## 2020-05-28 PROCEDURE — 99214 OFFICE O/P EST MOD 30 MIN: CPT | Performed by: INTERNAL MEDICINE

## 2020-05-28 PROCEDURE — 93000 ELECTROCARDIOGRAM COMPLETE: CPT | Performed by: INTERNAL MEDICINE

## 2020-05-28 NOTE — LETTER
June 4, 2020     Balbir Tellez MD  [address] [address]    Patient: Humberto Costello  YOB: 1950  Date of Visit: 5/28/2020      Dear Dr. Tellez:    Thank you for referring Humberto Costello to me for evaluation. Below are my notes for this consultation.    If you have questions, please do not hesitate to call me. I look forward to following your patient along with you.         Sincerely,        Sincere García MD        CC: No Recipients  Sincere García MD  6/4/2020  6:01 PM  Signed       Electrophysiology  Outpatient Progress Note       Reason for visit:   Chief Complaint   Patient presents with   • Follow-up       HPI   Humberto Costello is a 69 y.o. male who is seen today for paroxysmal atrial fibrillation. The patient is currently on  Tikosyn 500 mcg BID. He is currently in sinus rhythm. He reports episodes of sudden onset of extreme fatigue and shortness of breath he feels is related to lisinopril. His AF episodes also appear to have increased; unclear if some symptoms may be due to AF.    He has had no chest discomfort suggestive of ischemia.The patient denies orthopnea, PND, GTZ, or edema. Mr. COSTELLO has not had syncope or near syncope. He denies claudication. There is no discoloration or ulceration of the lower extremities. He has had no TIA or stroke-like symptoms. The patient has no symptoms attributable to valvular heart disease.     Past Medical History:   Diagnosis Date   • A-fib (CMS/[facility]CC)    • A-fib (CMS/HCC) 7/25/2018   • Chronic anticoagulation 7/25/2018   • Lipid disorder    • Visit for monitoring Tikosyn therapy 7/25/2019     Past Surgical History:   Procedure Laterality Date   • ABLATION OF DYSRHYTHMIC FOCUS     • CARDIAC CATHETERIZATION     • TOTAL SHOULDER REPLACEMENT Right        Social History     Tobacco Use   • Smoking status: Current Some Day Smoker   • Smokeless tobacco: Never Used   Substance Use Topics   • Alcohol use: Yes   • Drug use: Defer      Family History   Adopted: Yes   Family history unknown: Yes       ALLERGY:  Aspirin and Penicillins    Current Outpatient Medications   Medication Sig Dispense Refill   • apixaban (ELIQUIS) 5 mg tablet Take 5 mg by mouth See admin instr. Use if you go into a-fib     • aspirin (ASPIRIN LOW DOSE) 81 mg enteric coated tablet Take 81 mg by mouth daily.     • atorvastatin (LIPITOR) 20 mg tablet Take 1 tablet (20 mg total) by mouth daily. 90 tablet 3   • cholecalciferol, vitamin D3, (VITAMIN D3) 2,000 unit tablet Take by mouth daily.     • dofetilide (TIKOSYN) 500 mcg capsule Take 1 capsule (500 mcg total) by mouth 2 (two) times a day. 180 capsule 3   • metoprolol tartrate (LOPRESSOR) 25 mg tablet Take 25 mg by mouth every 6 (six) hours as needed.       No current facility-administered medications for this visit.      Review of Systems   Constitution: Negative.   HENT: Negative.    Eyes: Negative.    Cardiovascular: Negative.    Respiratory: Negative.    Endocrine: Negative.    Hematologic/Lymphatic: Negative.    Skin: Negative.    Musculoskeletal: Negative.    Gastrointestinal: Negative.    Genitourinary: Negative.    Neurological: Negative.    Psychiatric/Behavioral: Negative.      Objective   Vitals:    05/28/20 1249   BP: 140/90   Pulse: 73   SpO2: 96%       Physical Exam   Constitutional: He is oriented to person, place, and time. He appears well-developed and well-nourished.   HENT:   Head: Normocephalic.   Eyes: Pupils are equal, round, and reactive to light. Conjunctivae and EOM are normal.   Neck: Normal range of motion.   Cardiovascular: Normal rate, regular rhythm and normal heart sounds. Exam reveals no gallop and no friction rub.   No murmur heard.  Pulmonary/Chest: Effort normal and breath sounds normal.   Abdominal: Soft. Bowel sounds are normal.   Musculoskeletal: Normal range of motion. He exhibits no edema.   Status post left index finger amputation.   Neurological: He is alert and oriented to  person, place, and time.   Skin: Skin is warm and dry.   Psychiatric: He has a normal mood and affect. His behavior is normal. Judgment and thought content normal.       Lab Results   Component Value Date    WBC 6.1 06/02/2020    HGB 15.9 06/02/2020     06/02/2020    CHOL 154 06/02/2020    TRIG 81 06/02/2020    HDL 43 06/02/2020    ALT 23 06/02/2020    AST 17 06/02/2020     06/02/2020    K 4.7 06/02/2020    CREATININE 0.92 06/02/2020    TSH 2.21 06/02/2020     Cardiovascular Procedures:    CATH LAB:  Cath (Normal Coronaries) - 2008    ELECTROPHYSIOLOGY:  EPS (PV ablation afib during study) - 12/11/2009  Holter (apc one 6 beat cst no aifb) - 5/13/2015    STRESS TESTS:    ECHO 5/28/2020  MPI (EF 0.60 (60%), lateral wal defect) - 6/9/2008  · Mean gradient = 5.00 mmHg. Peak velocity = 1.50 m/s. Calculated area = 3.15 cm2.  · Normal-sized LV. Normal LV wall thickness.  · Preserved LV systolic function. Estimated EF 65%.  · Normal-sized RV. Normal RV systolic function.  · Normal-sized LA.  · Normal-sized RA.  · Aortic root normal. Sinuses of Valsalva normal-sized. Ascending aorta normal-sized. Aortic arch normal-sized. Descending aorta normal-sized.  · Aortic valve structure is normal. Tricuspid aortic valve. No aortic valve regurgitation. No aortic valve stenosis.  · Normal leaflet structure and normal leaflet motion of the mitral valve.  · No significant mitral valve regurgitation.  · No significant mitral valve stenosis.  · Tricuspid valve structure is normal. Mild tricuspid valve regurgitation.  · No significant tricuspid valve stenosis.  · Normal pulmonic valve structure. No pulmonic valve regurgitation. No pulmonic valve stenosis.  · IVC not well visualized.  · Normal pericardial structure. No evidence of pericardial effusion. No cardiac tamponade.  · Normal 2D echocardiogram color-flow Doppler study       Other (Admitted for Tikosyn loading) - 6/10/2015     ECG Sinus  Rhythm      Assessment   Problem  List Items Addressed This Visit        Respiratory    SOB (shortness of breath)     He reports episodes of sudden onset of shortness of breath followed by fatigue. He fells it is related to the lisinopril. He admits to stopping the lisinopril for three days and the symptoms resolved. As of today he will discontinue lisinopril at his request.     He underwent an echocardiogram today which revealed no changes from previous echo. Complete lab work up is ordered.             Circulatory    A-fib (CMS/HCC) - Primary     The patient is maintaining sinus rhythm, although he is having an increase in breakthroughs.  He is on chronic medical therapy.  He is stable on Tikosyn and his electrocardiogram shows no evidence of prolonged QT or QTc.  I have made no changes to the present regimen. If episodes continue to increase, may consider AF ablation, but will hold for now given current limitations to elective procedures.         Relevant Orders    ECG 12 lead (Completed)    Transthoracic echo (TTE) complete (Completed)    Comprehensive metabolic panel (Completed)    TSH w reflex FT4 (Completed)       Other    Chronic anticoagulation     He is a CHADS-Vasc 1 (age; on lisinopril for renal purposes). Discussed perhaps taking Eliquis daily, but he remains reluctant.  He prefers to use on a prn basis         Relevant Orders    CBC and Differential (Completed)    Visit for monitoring Tikosyn therapy     Tikosyn 500 mcg twice daily.  His electrocardiogram in the office today is stable with no evidence of prolonged QT or QTc.         Relevant Orders    Comprehensive metabolic panel (Completed)      Other Visit Diagnoses     Hyperlipidemia, unspecified hyperlipidemia type        Relevant Orders    Lipid panel (Completed)               Sincere García MD  6/4/2020

## 2020-06-03 LAB
ALBUMIN SERPL-MCNC: 4.2 G/DL (ref 3.6–5.1)
ALBUMIN/GLOB SERPL: 1.8 (CALC) (ref 1–2.5)
ALP SERPL-CCNC: 63 U/L (ref 35–144)
ALT SERPL-CCNC: 23 U/L (ref 9–46)
AST SERPL-CCNC: 17 U/L (ref 10–35)
BASOPHILS # BLD AUTO: 67 CELLS/UL (ref 0–200)
BASOPHILS NFR BLD AUTO: 1.1 %
BILIRUB SERPL-MCNC: 0.9 MG/DL (ref 0.2–1.2)
BUN SERPL-MCNC: 15 MG/DL (ref 7–25)
BUN/CREAT SERPL: NORMAL (CALC) (ref 6–22)
CALCIUM SERPL-MCNC: 9.5 MG/DL (ref 8.6–10.3)
CHLORIDE SERPL-SCNC: 104 MMOL/L (ref 98–110)
CHOLEST SERPL-MCNC: 154 MG/DL
CHOLEST/HDLC SERPL: 3.6 (CALC)
CO2 SERPL-SCNC: 29 MMOL/L (ref 20–32)
CREAT SERPL-MCNC: 0.92 MG/DL (ref 0.7–1.25)
EOSINOPHIL # BLD AUTO: 201 CELLS/UL (ref 15–500)
EOSINOPHIL NFR BLD AUTO: 3.3 %
ERYTHROCYTE [DISTWIDTH] IN BLOOD BY AUTOMATED COUNT: 13.3 % (ref 11–15)
GLOBULIN SER CALC-MCNC: 2.4 G/DL (CALC) (ref 1.9–3.7)
GLUCOSE SERPL-MCNC: 94 MG/DL (ref 65–99)
HCT VFR BLD AUTO: 49.1 % (ref 38.5–50)
HDLC SERPL-MCNC: 43 MG/DL
HGB BLD-MCNC: 15.9 G/DL (ref 13.2–17.1)
LDLC SERPL CALC-MCNC: 94 MG/DL (CALC)
LYMPHOCYTES # BLD AUTO: 2233 CELLS/UL (ref 850–3900)
LYMPHOCYTES NFR BLD AUTO: 36.6 %
MCH RBC QN AUTO: 29.3 PG (ref 27–33)
MCHC RBC AUTO-ENTMCNC: 32.4 G/DL (ref 32–36)
MCV RBC AUTO: 90.4 FL (ref 80–100)
MONOCYTES # BLD AUTO: 689 CELLS/UL (ref 200–950)
MONOCYTES NFR BLD AUTO: 11.3 %
NEUTROPHILS # BLD AUTO: 2910 CELLS/UL (ref 1500–7800)
NEUTROPHILS NFR BLD AUTO: 47.7 %
NONHDLC SERPL-MCNC: 111 MG/DL (CALC)
PLATELET # BLD AUTO: 205 THOUSAND/UL (ref 140–400)
PMV BLD REES-ECKER: 12.3 FL (ref 7.5–12.5)
POTASSIUM SERPL-SCNC: 4.7 MMOL/L (ref 3.5–5.3)
PROT SERPL-MCNC: 6.6 G/DL (ref 6.1–8.1)
QUEST EGFR NON-AFR. AMERICAN: 85 ML/MIN/1.73M2
RBC # BLD AUTO: 5.43 MILLION/UL (ref 4.2–5.8)
SODIUM SERPL-SCNC: 140 MMOL/L (ref 135–146)
TRIGL SERPL-MCNC: 81 MG/DL
TSH SERPL-ACNC: 2.21 MIU/L (ref 0.4–4.5)
WBC # BLD AUTO: 6.1 THOUSAND/UL (ref 3.8–10.8)

## 2020-06-04 PROBLEM — R06.02 SOB (SHORTNESS OF BREATH): Status: ACTIVE | Noted: 2020-06-04

## 2020-06-04 NOTE — ASSESSMENT & PLAN NOTE
The patient is maintaining sinus rhythm, although he is having an increase in breakthroughs.  He is on chronic medical therapy.  He is stable on Tikosyn and his electrocardiogram shows no evidence of prolonged QT or QTc.  I have made no changes to the present regimen. If episodes continue to increase, may consider AF ablation, but will hold for now given current limitations to elective procedures.

## 2020-06-04 NOTE — ASSESSMENT & PLAN NOTE
He is a CHADS-Vasc 1 (age; on lisinopril for renal purposes). Discussed perhaps taking Eliquis daily, but he remains reluctant. He prefers to use on a prn basis

## 2020-06-04 NOTE — ASSESSMENT & PLAN NOTE
He reports episodes of sudden onset of shortness of breath followed by fatigue. He fells it is related to the lisinopril. He admits to stopping the lisinopril for three days and the symptoms resolved. As of today he will discontinue lisinopril at his request.     He underwent an echocardiogram today which revealed no changes from previous echo. Complete lab work up is ordered.

## 2020-06-22 ENCOUNTER — TELEPHONE (OUTPATIENT)
Dept: CARDIOLOGY | Facility: CLINIC | Age: 70
End: 2020-06-22

## 2020-06-22 ASSESSMENT — ENCOUNTER SYMPTOMS
GASTROINTESTINAL NEGATIVE: 1
CONSTITUTIONAL NEGATIVE: 1
RESPIRATORY NEGATIVE: 1
PSYCHIATRIC NEGATIVE: 1
EYES NEGATIVE: 1
MUSCULOSKELETAL NEGATIVE: 1
CARDIOVASCULAR NEGATIVE: 1
NEUROLOGICAL NEGATIVE: 1
HEMATOLOGIC/LYMPHATIC NEGATIVE: 1
ENDOCRINE NEGATIVE: 1

## 2020-06-22 NOTE — PROGRESS NOTES
Electrophysiology  Outpatient Progress Note       Reason for visit:   Chief Complaint   Patient presents with   • Atrial Fibrillation       HPI   Humberto Chung is a 69 y.o. male who is seen today for paroxysmal atrial fibrillation. The patient is currently on  Tikosyn 500 mcg BID. He is currently in sinus rhythm. At his last appointment 5/28/2020 he reportsed episodes of sudden onset of extreme fatigue and shortness of breath he feels is related to lisinopril. He underwent an echocardiogram on 5/28/2020 which was unchanged from previous. Lisinopril was discontinued and he returns today for blood pressure and symptom evaluation     He has had no chest discomfort suggestive of ischemia.The patient denies orthopnea, PND, GTZ, or edema. Mr. CHUNG has not had syncope or near syncope. He denies claudication. There is no discoloration or ulceration of the lower extremities. He has had no TIA or stroke-like symptoms. The patient has no symptoms attributable to valvular heart disease.     Past Medical History:   Diagnosis Date   • A-fib (CMS/[facility])    • A-fib (CMS/[facility]) 7/25/2018   • Chronic anticoagulation 7/25/2018   • HTN (hypertension) 6/24/2020   • Lipid disorder    • Visit for monitoring Tikosyn therapy 7/25/2019     Past Surgical History:   Procedure Laterality Date   • ABLATION OF DYSRHYTHMIC FOCUS     • CARDIAC CATHETERIZATION     • TOTAL SHOULDER REPLACEMENT Right        Social History     Tobacco Use   • Smoking status: Current Some Day Smoker   • Smokeless tobacco: Never Used   Substance Use Topics   • Alcohol use: Yes   • Drug use: Defer     Family History   Adopted: Yes   Family history unknown: Yes       ALLERGY:  Aspirin and Penicillins    Current Outpatient Medications   Medication Sig Dispense Refill   • aspirin (ASPIRIN LOW DOSE) 81 mg enteric coated tablet Take 81 mg by mouth daily.     • atorvastatin (LIPITOR) 20 mg tablet Take 1 tablet (20 mg total) by mouth daily. 90 tablet 3   • dofetilide  (TIKOSYN) 500 mcg capsule Take 1 capsule (500 mcg total) by mouth 2 (two) times a day. 180 capsule 3   • metoprolol tartrate (LOPRESSOR) 25 mg tablet Take 25 mg by mouth every 6 (six) hours as needed.     • apixaban (ELIQUIS) 5 mg tablet Take 5 mg by mouth See admin instr. Use if you go into a-fib     • cholecalciferol, vitamin D3, (VITAMIN D3) 2,000 unit tablet Take by mouth daily.     • metoprolol tartrate (LOPRESSOR) 25 mg tablet Take 1 tablet (25 mg total) by mouth every 6 (six) hours as needed (palpitations). 60 tablet 3     No current facility-administered medications for this visit.      Review of Systems   Constitution: Negative.   HENT: Negative.    Eyes: Negative.    Cardiovascular: Negative.    Respiratory: Negative.    Endocrine: Negative.    Hematologic/Lymphatic: Negative.    Skin: Negative.    Musculoskeletal: Negative.    Gastrointestinal: Negative.    Genitourinary: Negative.    Neurological: Negative.    Psychiatric/Behavioral: Negative.      Objective   Vitals:    06/23/20 1236   BP: (!) 156/92   Pulse: 70   Resp: 20     BP Readings from Last 3 Encounters:   06/23/20 (!) 156/92   05/28/20 140/90   05/28/20 140/90       Physical Exam   Constitutional: He is oriented to person, place, and time. He appears well-developed and well-nourished.   HENT:   Head: Normocephalic.   Eyes: Pupils are equal, round, and reactive to light. Conjunctivae and EOM are normal.   Neck: Normal range of motion.   Cardiovascular: Normal rate, regular rhythm and normal heart sounds. Exam reveals no gallop and no friction rub.   No murmur heard.  Pulmonary/Chest: Effort normal and breath sounds normal.   Abdominal: Soft. Bowel sounds are normal.   Musculoskeletal: Normal range of motion. He exhibits no edema.   Status post left index finger amputation.   Neurological: He is alert and oriented to person, place, and time.   Skin: Skin is warm and dry.   Psychiatric: He has a normal mood and affect. His behavior is normal.  Judgment and thought content normal.       Lab Results   Component Value Date    WBC 6.1 06/02/2020    HGB 15.9 06/02/2020     06/02/2020    CHOL 154 06/02/2020    TRIG 81 06/02/2020    HDL 43 06/02/2020    ALT 23 06/02/2020    AST 17 06/02/2020     06/02/2020    K 4.7 06/02/2020    CREATININE 0.92 06/02/2020    TSH 2.21 06/02/2020     Cardiovascular Procedures:    CATH LAB:  Cath (Normal Coronaries) - 2008    ELECTROPHYSIOLOGY:  EPS (PV ablation afib during study) - 12/11/2009  Holter (apc one 6 beat cst no aifb) - 5/13/2015    STRESS TESTS:    ECHO 5/28/2020  MPI (EF 0.60 (60%), lateral wal defect) - 6/9/2008  · Mean gradient = 5.00 mmHg. Peak velocity = 1.50 m/s. Calculated area = 3.15 cm2.  · Normal-sized LV. Normal LV wall thickness.  · Preserved LV systolic function. Estimated EF 65%.  · Normal-sized RV. Normal RV systolic function.  · Normal-sized LA.  · Normal-sized RA.  · Aortic root normal. Sinuses of Valsalva normal-sized. Ascending aorta normal-sized. Aortic arch normal-sized. Descending aorta normal-sized.  · Aortic valve structure is normal. Tricuspid aortic valve. No aortic valve regurgitation. No aortic valve stenosis.  · Normal leaflet structure and normal leaflet motion of the mitral valve.  · No significant mitral valve regurgitation.  · No significant mitral valve stenosis.  · Tricuspid valve structure is normal. Mild tricuspid valve regurgitation.  · No significant tricuspid valve stenosis.  · Normal pulmonic valve structure. No pulmonic valve regurgitation. No pulmonic valve stenosis.  · IVC not well visualized.  · Normal pericardial structure. No evidence of pericardial effusion. No cardiac tamponade.  · Normal 2D echocardiogram color-flow Doppler study       Other (Admitted for Tikosyn loading) - 6/10/2015     ECG Sinus  Rhythm  - occasional PAC       Assessment   Problem List Items Addressed This Visit        Respiratory    SOB (shortness of breath)     AT the 5/2020 office  appointment he complained of episodes of sudden onset of shortness of breath followed by fatigue. He felt  it was related to the lisinopril which was then discontinued.      He underwent an echocardiogram which was normal, along with routine lb work which was also normal. Will plan Holter to better assess AF burden and see if any correlation with symptoms.                 Circulatory    A-fib (CMS/HCC) - Primary     The patient is maintaining sinus rhythm. He is on chronic medical therapy.  He is stable on Tikosyn and his electrocardiogram shows no evidence of prolonged QT or QTc.   If episodes increase, we may consider AF ablation.          Relevant Medications    metoprolol tartrate (LOPRESSOR) 25 mg tablet    Other Relevant Orders    ECG 12 lead (Completed)    [facility] holter monitor - 48 hour (Completed)    HTN (hypertension)     His blood pressure is elevated today after discontinuing the lisinopril last month. Today he will begin metoprolol.          Relevant Medications    metoprolol tartrate (LOPRESSOR) 25 mg tablet       Other    Chronic anticoagulation     He is a CHADS-Vasc 1 (age; on lisinopril for renal purposes). Discussed perhaps taking Eliquis daily, but he remains reluctant. He prefers to use on a prn basis         Relevant Orders    ECG 12 lead (Completed)    Visit for monitoring Tikosyn therapy     Tikosyn 500 mcg twice daily.  His electrocardiogram in the office today is stable with no evidence of prolonged QT or QTc.         Relevant Orders    ECG 12 lead (Completed)               Sincere García MD  6/30/2020

## 2020-06-22 NOTE — TELEPHONE ENCOUNTER
Tried to pre register patient for tomorrows appt with dr freeman, no answer.    Please confirm demographics, insurance, and complete travel screening when patient calls back.

## 2020-06-23 ENCOUNTER — APPOINTMENT (OUTPATIENT)
Dept: CARDIOLOGY | Facility: CLINIC | Age: 70
End: 2020-06-23
Payer: MEDICARE

## 2020-06-23 ENCOUNTER — OFFICE VISIT (OUTPATIENT)
Dept: CARDIOLOGY | Facility: CLINIC | Age: 70
End: 2020-06-23
Payer: MEDICARE

## 2020-06-23 VITALS
DIASTOLIC BLOOD PRESSURE: 92 MMHG | HEIGHT: 71 IN | BODY MASS INDEX: 35.14 KG/M2 | WEIGHT: 251 LBS | HEART RATE: 70 BPM | RESPIRATION RATE: 20 BRPM | SYSTOLIC BLOOD PRESSURE: 156 MMHG

## 2020-06-23 DIAGNOSIS — R06.02 SOB (SHORTNESS OF BREATH): ICD-10-CM

## 2020-06-23 DIAGNOSIS — I48.91 ATRIAL FIBRILLATION, UNSPECIFIED TYPE (CMS/HCC): ICD-10-CM

## 2020-06-23 DIAGNOSIS — Z51.81 VISIT FOR MONITORING TIKOSYN THERAPY: ICD-10-CM

## 2020-06-23 DIAGNOSIS — I10 HYPERTENSION, UNSPECIFIED TYPE: ICD-10-CM

## 2020-06-23 DIAGNOSIS — Z79.01 CHRONIC ANTICOAGULATION: ICD-10-CM

## 2020-06-23 DIAGNOSIS — Z79.899 VISIT FOR MONITORING TIKOSYN THERAPY: ICD-10-CM

## 2020-06-23 DIAGNOSIS — I48.91 ATRIAL FIBRILLATION, UNSPECIFIED TYPE (CMS/HCC): Primary | ICD-10-CM

## 2020-06-23 PROBLEM — Z82.49 FAMILY HISTORY OF CORONARY ARTERY DISEASE: Status: ACTIVE | Noted: 2020-06-23

## 2020-06-23 PROCEDURE — 93000 ELECTROCARDIOGRAM COMPLETE: CPT | Performed by: INTERNAL MEDICINE

## 2020-06-23 PROCEDURE — 99214 OFFICE O/P EST MOD 30 MIN: CPT | Performed by: INTERNAL MEDICINE

## 2020-06-23 RX ORDER — METOPROLOL TARTRATE 25 MG/1
25 TABLET, FILM COATED ORAL EVERY 6 HOURS PRN
Qty: 60 TABLET | Refills: 3 | Status: SHIPPED | OUTPATIENT
Start: 2020-06-23 | End: 2023-01-17 | Stop reason: SDUPTHER

## 2020-06-23 NOTE — LETTER
June 30, 2020     Balbir Tellez MD  [address] [address]    Patient: Humberto Costello  YOB: 1950  Date of Visit: 6/23/2020      Dear Dr. Tellez:    Thank you for referring Humberto Costello to me for evaluation. Below are my notes for this consultation.    If you have questions, please do not hesitate to call me. I look forward to following your patient along with you.         Sincerely,        Sincere García MD        CC: No Recipients  Sincere García MD  6/30/2020  3:26 PM  Signed       Electrophysiology  Outpatient Progress Note       Reason for visit:   Chief Complaint   Patient presents with   • Atrial Fibrillation       HPI   Humberto Costello is a 69 y.o. male who is seen today for paroxysmal atrial fibrillation. The patient is currently on  Tikosyn 500 mcg BID. He is currently in sinus rhythm. At his last appointment 5/28/2020 he reportsed episodes of sudden onset of extreme fatigue and shortness of breath he feels is related to lisinopril. He underwent an echocardiogram on 5/28/2020 which was unchanged from previous. Lisinopril was discontinued and he returns today for blood pressure and symptom evaluation     He has had no chest discomfort suggestive of ischemia.The patient denies orthopnea, PND, GTZ, or edema. Mr. COSTELLO has not had syncope or near syncope. He denies claudication. There is no discoloration or ulceration of the lower extremities. He has had no TIA or stroke-like symptoms. The patient has no symptoms attributable to valvular heart disease.     Past Medical History:   Diagnosis Date   • A-fib (CMS/HCC)    • A-fib (CMS/HCC) 7/25/2018   • Chronic anticoagulation 7/25/2018   • HTN (hypertension) 6/24/2020   • Lipid disorder    • Visit for monitoring Tikosyn therapy 7/25/2019     Past Surgical History:   Procedure Laterality Date   • ABLATION OF DYSRHYTHMIC FOCUS     • CARDIAC CATHETERIZATION     • TOTAL SHOULDER REPLACEMENT Right        Social History      Tobacco Use   • Smoking status: Current Some Day Smoker   • Smokeless tobacco: Never Used   Substance Use Topics   • Alcohol use: Yes   • Drug use: Defer     Family History   Adopted: Yes   Family history unknown: Yes       ALLERGY:  Aspirin and Penicillins    Current Outpatient Medications   Medication Sig Dispense Refill   • aspirin (ASPIRIN LOW DOSE) 81 mg enteric coated tablet Take 81 mg by mouth daily.     • atorvastatin (LIPITOR) 20 mg tablet Take 1 tablet (20 mg total) by mouth daily. 90 tablet 3   • dofetilide (TIKOSYN) 500 mcg capsule Take 1 capsule (500 mcg total) by mouth 2 (two) times a day. 180 capsule 3   • metoprolol tartrate (LOPRESSOR) 25 mg tablet Take 25 mg by mouth every 6 (six) hours as needed.     • apixaban (ELIQUIS) 5 mg tablet Take 5 mg by mouth See admin instr. Use if you go into a-fib     • cholecalciferol, vitamin D3, (VITAMIN D3) 2,000 unit tablet Take by mouth daily.     • metoprolol tartrate (LOPRESSOR) 25 mg tablet Take 1 tablet (25 mg total) by mouth every 6 (six) hours as needed (palpitations). 60 tablet 3     No current facility-administered medications for this visit.      Review of Systems   Constitution: Negative.   HENT: Negative.    Eyes: Negative.    Cardiovascular: Negative.    Respiratory: Negative.    Endocrine: Negative.    Hematologic/Lymphatic: Negative.    Skin: Negative.    Musculoskeletal: Negative.    Gastrointestinal: Negative.    Genitourinary: Negative.    Neurological: Negative.    Psychiatric/Behavioral: Negative.      Objective   Vitals:    06/23/20 1236   BP: (!) 156/92   Pulse: 70   Resp: 20     BP Readings from Last 3 Encounters:   06/23/20 (!) 156/92   05/28/20 140/90   05/28/20 140/90       Physical Exam   Constitutional: He is oriented to person, place, and time. He appears well-developed and well-nourished.   HENT:   Head: Normocephalic.   Eyes: Pupils are equal, round, and reactive to light. Conjunctivae and EOM are normal.   Neck: Normal range of  motion.   Cardiovascular: Normal rate, regular rhythm and normal heart sounds. Exam reveals no gallop and no friction rub.   No murmur heard.  Pulmonary/Chest: Effort normal and breath sounds normal.   Abdominal: Soft. Bowel sounds are normal.   Musculoskeletal: Normal range of motion. He exhibits no edema.   Status post left index finger amputation.   Neurological: He is alert and oriented to person, place, and time.   Skin: Skin is warm and dry.   Psychiatric: He has a normal mood and affect. His behavior is normal. Judgment and thought content normal.       Lab Results   Component Value Date    WBC 6.1 06/02/2020    HGB 15.9 06/02/2020     06/02/2020    CHOL 154 06/02/2020    TRIG 81 06/02/2020    HDL 43 06/02/2020    ALT 23 06/02/2020    AST 17 06/02/2020     06/02/2020    K 4.7 06/02/2020    CREATININE 0.92 06/02/2020    TSH 2.21 06/02/2020     Cardiovascular Procedures:    CATH LAB:  Cath (Normal Coronaries) - 2008    ELECTROPHYSIOLOGY:  EPS (PV ablation afib during study) - 12/11/2009  Holter (apc one 6 beat cst no aifb) - 5/13/2015    STRESS TESTS:    ECHO 5/28/2020  MPI (EF 0.60 (60%), lateral wal defect) - 6/9/2008  · Mean gradient = 5.00 mmHg. Peak velocity = 1.50 m/s. Calculated area = 3.15 cm2.  · Normal-sized LV. Normal LV wall thickness.  · Preserved LV systolic function. Estimated EF 65%.  · Normal-sized RV. Normal RV systolic function.  · Normal-sized LA.  · Normal-sized RA.  · Aortic root normal. Sinuses of Valsalva normal-sized. Ascending aorta normal-sized. Aortic arch normal-sized. Descending aorta normal-sized.  · Aortic valve structure is normal. Tricuspid aortic valve. No aortic valve regurgitation. No aortic valve stenosis.  · Normal leaflet structure and normal leaflet motion of the mitral valve.  · No significant mitral valve regurgitation.  · No significant mitral valve stenosis.  · Tricuspid valve structure is normal. Mild tricuspid valve regurgitation.  · No significant  tricuspid valve stenosis.  · Normal pulmonic valve structure. No pulmonic valve regurgitation. No pulmonic valve stenosis.  · IVC not well visualized.  · Normal pericardial structure. No evidence of pericardial effusion. No cardiac tamponade.  · Normal 2D echocardiogram color-flow Doppler study       Other (Admitted for Tikosyn loading) - 6/10/2015     ECG Sinus  Rhythm  - occasional PAC       Assessment   Problem List Items Addressed This Visit        Respiratory    SOB (shortness of breath)     AT the 5/2020 office appointment he complained of episodes of sudden onset of shortness of breath followed by fatigue. He felt  it was related to the lisinopril which was then discontinued.      He underwent an echocardiogram which was normal, along with routine lb work which was also normal. Will plan Holter to better assess AF burden and see if any correlation with symptoms.                 Circulatory    A-fib (CMS/HCC) - Primary     The patient is maintaining sinus rhythm. He is on chronic medical therapy.  He is stable on Tikosyn and his electrocardiogram shows no evidence of prolonged QT or QTc.   If episodes increase, we may consider AF ablation.          Relevant Medications    metoprolol tartrate (LOPRESSOR) 25 mg tablet    Other Relevant Orders    ECG 12 lead (Completed)    [facility] holter monitor - 48 hour (Completed)    HTN (hypertension)     His blood pressure is elevated today after discontinuing the lisinopril last month. Today he will begin metoprolol.          Relevant Medications    metoprolol tartrate (LOPRESSOR) 25 mg tablet       Other    Chronic anticoagulation     He is a CHADS-Vasc 1 (age; on lisinopril for renal purposes). Discussed perhaps taking Eliquis daily, but he remains reluctant. He prefers to use on a prn basis         Relevant Orders    ECG 12 lead (Completed)    Visit for monitoring Tikosyn therapy     Tikosyn 500 mcg twice daily.  His electrocardiogram in the office today is stable with no  evidence of prolonged QT or QTc.         Relevant Orders    ECG 12 lead (Completed)               Sincere García MD  6/30/2020

## 2020-06-24 PROBLEM — I10 HTN (HYPERTENSION): Status: ACTIVE | Noted: 2020-06-24

## 2020-06-24 NOTE — ASSESSMENT & PLAN NOTE
AT the 5/2020 office appointment he complained of episodes of sudden onset of shortness of breath followed by fatigue. He felt  it was related to the lisinopril which was then discontinued.      He underwent an echocardiogram which was normal, along with routine lb work which was also normal. Will plan Holter to better assess AF burden and see if any correlation with symptoms.

## 2020-06-24 NOTE — ASSESSMENT & PLAN NOTE
The patient is maintaining sinus rhythm. He is on chronic medical therapy.  He is stable on Tikosyn and his electrocardiogram shows no evidence of prolonged QT or QTc.   If episodes increase, we may consider AF ablation.

## 2020-06-24 NOTE — ASSESSMENT & PLAN NOTE
His blood pressure is elevated today after discontinuing the lisinopril last month. Today he will begin metoprolol.

## 2020-06-29 PROCEDURE — 93224 XTRNL ECG REC UP TO 48 HRS: CPT | Performed by: INTERNAL MEDICINE

## 2020-07-13 ENCOUNTER — TELEPHONE (OUTPATIENT)
Dept: SCHEDULING | Facility: CLINIC | Age: 70
End: 2020-07-13

## 2020-07-13 DIAGNOSIS — R06.02 SOB (SHORTNESS OF BREATH): Primary | ICD-10-CM

## 2020-07-13 DIAGNOSIS — I48.91 ATRIAL FIBRILLATION, UNSPECIFIED TYPE (CMS/HCC): ICD-10-CM

## 2020-07-13 NOTE — TELEPHONE ENCOUNTER
Dr García,   Please see the note below.    Would like to order a stress test on Mr. Figueroa?  I do not see anything mentioned in the office note.  Pl advise

## 2020-07-13 NOTE — TELEPHONE ENCOUNTER
Humberto is following up on a call he received from the , he states that  wanted him to have a stress test.   He would like if possible to have it done @ [place] meeting location however I dont see an order.   Please notify patient once order has been placed.

## 2020-07-14 NOTE — TELEPHONE ENCOUNTER
Layne,    I spoke to pt- He is scheduled for stress echo tomorrow 7/15/2020 @ [facility].    Thanks,    KL

## 2020-07-15 ENCOUNTER — HOSPITAL ENCOUNTER (OUTPATIENT)
Dept: CARDIOLOGY | Facility: CLINIC | Age: 70
Discharge: HOME | End: 2020-07-15
Attending: INTERNAL MEDICINE
Payer: MEDICARE

## 2020-07-15 VITALS
WEIGHT: 251 LBS | SYSTOLIC BLOOD PRESSURE: 150 MMHG | DIASTOLIC BLOOD PRESSURE: 90 MMHG | HEIGHT: 71 IN | BODY MASS INDEX: 35.14 KG/M2

## 2020-07-15 DIAGNOSIS — R06.02 SOB (SHORTNESS OF BREATH): ICD-10-CM

## 2020-07-15 DIAGNOSIS — I48.91 ATRIAL FIBRILLATION, UNSPECIFIED TYPE (CMS/HCC): ICD-10-CM

## 2020-07-15 LAB
AORTIC ROOT ANNULUS: 3.2 CM
AORTIC VALVE MEAN VELOCITY: 0.84 M/S
AORTIC VALVE VELOCITY TIME INTEGRAL: 26.8 CM
ASCENDING AORTA: 4 CM
AV MEAN GRADIENT: 3 MMHG
AV PEAK GRADIENT: 8 MMHG
AV PEAK VELOCITY-S: 1.42 M/S
AV VALVE AREA: 2.83 CM2
AV VALVE AREA: 3.32 CM2
BSA FOR ECHO PROCEDURE: 2.32 M2
DOP CALC LVOT STROKE VOLUME: 88.97 ML
E WAVE DECELERATION TIME: 331 MS
E/A RATIO: 1.1
E/E' RATIO: 8.4
E/LAT E' RATIO: 7.1
EDV (BP): 102 CM3
EF (A4C): 65.7 %
EF A2C: 70.3 %
EJECTION FRACTION: 67.8 %
ESV (BP): 32.8 CM3
FRACTIONAL SHORTENING: 36.3 %
INTERVENTRICULAR SEPTUM: 1.19 CM
LA ESV (BP): 78.1 CM3
LA ESV INDEX (A2C): 38.49 CM3/M2
LA ESV INDEX (BP): 33.66 CM3/M2
LA/AORTA RATIO: 1.31
LAAS-AP2: 27.5 CM2
LAAS-AP4: 23.9 CM2
LAD 2D: 4.2 CM
LALD A4C: 6.64 CM
LALD A4C: 6.79 CM
LAV-S: 89.3 CM3
LEFT ATRIUM VOLUME INDEX: 28.92 CM3/M2
LEFT ATRIUM VOLUME: 67.1 CM3
LEFT INTERNAL DIMENSION IN SYSTOLE: 3.21 CM (ref 4.55–6.9)
LEFT VENTRICLE DIASTOLIC VOLUME INDEX: 45.26 CM3/M2
LEFT VENTRICLE DIASTOLIC VOLUME: 105 CM3
LEFT VENTRICLE SYSTOLIC VOLUME INDEX: 15.52 CM3/M2
LEFT VENTRICLE SYSTOLIC VOLUME: 36 CM3
LEFT VENTRICULAR INTERNAL DIMENSION IN DIASTOLE: 5.04 CM (ref 7.89–10.97)
LEFT VENTRICULAR POSTERIOR WALL IN END DIASTOLE: 1.29 CM (ref 0.91–1.71)
LV DIASTOLIC VOLUME: 98.6 CM3
LV ESV (APICAL 2 CHAMBER): 29.3 CM3
LVAD-AP2: 32.7 CM2
LVAD-AP4: 33.8 CM2
LVAS-AP2: 15.4 CM2
LVAS-AP4: 17.4 CM2
LVEDVI(A2C): 42.5 CM3/M2
LVEDVI(BP): 43.97 CM3/M2
LVESVI(A2C): 12.63 CM3/M2
LVESVI(BP): 14.14 CM3/M2
LVLD-AP2: 8.98 CM
LVLD-AP4: 8.93 CM
LVLS-AP2: 6.91 CM
LVLS-AP4: 7.07 CM
LVOT 2D: 2.1 CM
LVOT A: 3.46 CM2
LVOT MG: 2 MMHG
LVOT MV: 0.7 M/S
LVOT PEAK VELOCITY: 1.16 M/S
LVOT VTI: 25.7 CM
MV E'TISSUE VEL-LAT: 0.09 M/S
MV E'TISSUE VEL-MED: 0.08 M/S
MV PEAK A VEL: 0.59 M/S
MV PEAK E VEL: 0.64 M/S
MV VALVE AREA P 1/2 METHOD: 2.27 CM2
POSTERIOR WALL: 1.29 CM
PV PEAK GRADIENT: 2 MMHG
PV PV: 0.7 M/S
STRESS TARGET HR: 129 BPM
TR MAX PG: 28 MMHG
TRICUSPID VALVE PEAK REGURGITATION VELOCITY: 2.63 M/S
TV REST PULMONARY ARTERY PRESSURE: 35 MMHG
Z-SCORE OF LEFT VENTRICULAR DIMENSION IN END DIASTOLE: -6.13
Z-SCORE OF LEFT VENTRICULAR DIMENSION IN END SYSTOLE: -4.41
Z-SCORE OF LEFT VENTRICULAR POSTERIOR WALL IN END DIASTOLE: 0.17

## 2020-07-15 PROCEDURE — 93351 STRESS TTE COMPLETE: CPT | Performed by: INTERNAL MEDICINE

## 2020-07-27 NOTE — TELEPHONE ENCOUNTER
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 Pat inquiring about results. Call [phone] to discuss. ty

## 2020-07-29 NOTE — TELEPHONE ENCOUNTER
Spoke to patient. Echo results normal.  Would refer to pulmonary associates at [phone]. May call for number. If can not get an appointment soon, will get PFTs to initiate work-up.

## 2020-08-06 ENCOUNTER — HOSPITAL ENCOUNTER (OUTPATIENT)
Dept: RADIOLOGY | Facility: HOSPITAL | Age: 70
Discharge: HOME | End: 2020-08-06
Attending: INTERNAL MEDICINE
Payer: MEDICARE

## 2020-08-06 ENCOUNTER — TRANSCRIBE ORDERS (OUTPATIENT)
Dept: PULMONOLOGY | Facility: HOSPITAL | Age: 70
End: 2020-08-06

## 2020-08-06 DIAGNOSIS — R06.00 DYSPNEA, UNSPECIFIED: Primary | ICD-10-CM

## 2020-08-06 DIAGNOSIS — R06.00 DYSPNEA, UNSPECIFIED: ICD-10-CM

## 2020-08-06 PROCEDURE — 71046 X-RAY EXAM CHEST 2 VIEWS: CPT

## 2020-08-06 RX ORDER — DOFETILIDE 0.5 MG/1
500 CAPSULE ORAL 2 TIMES DAILY
Qty: 180 CAPSULE | Refills: 1 | Status: SHIPPED | OUTPATIENT
Start: 2020-08-06 | End: 2020-12-11 | Stop reason: SDUPTHER

## 2020-08-06 RX ORDER — ATORVASTATIN CALCIUM 20 MG/1
20 TABLET, FILM COATED ORAL DAILY
Qty: 90 TABLET | Refills: 3 | Status: SHIPPED | OUTPATIENT
Start: 2020-08-06 | End: 2021-02-22 | Stop reason: SDUPTHER

## 2020-08-06 NOTE — TELEPHONE ENCOUNTER
Medicine Refill Request    atorvastatin (LIPITOR) 20 mg tablet   dofetilide (TIKOSYN) 500 mcg capsule     Last Office Visit: Visit date not found  Last Telemedicine Visit: Visit date not found    Next Office Visit: Visit date not found  Next Telemedicine Visit: Visit date not found         Current Outpatient Medications:   •  apixaban (ELIQUIS) 5 mg tablet, Take 5 mg by mouth See admin instr. Use if you go into a-[facility], Disp: , Rfl:   •  aspirin (ASPIRIN LOW DOSE) 81 mg enteric coated tablet, Take 81 mg by mouth daily., Disp: , Rfl:   •  atorvastatin (LIPITOR) 20 mg tablet, Take 1 tablet (20 mg total) by mouth daily., Disp: 90 tablet, Rfl: 3  •  cholecalciferol, vitamin D3, (VITAMIN D3) 2,000 unit tablet, Take by mouth daily., Disp: , Rfl:   •  dofetilide (TIKOSYN) 500 mcg capsule, Take 1 capsule (500 mcg total) by mouth 2 (two) times a day., Disp: 180 capsule, Rfl: 3  •  metoprolol tartrate (LOPRESSOR) 25 mg tablet, Take 25 mg by mouth every 6 (six) hours as needed., Disp: , Rfl:   •  metoprolol tartrate (LOPRESSOR) 25 mg tablet, Take 1 tablet (25 mg total) by mouth every 6 (six) hours as needed (palpitations)., Disp: 60 tablet, Rfl: 3      BP Readings from Last 3 Encounters:   07/15/20 (!) 150/90   06/23/20 (!) 156/92   05/28/20 140/90       Recent Lab results:  Lab Results   Component Value Date    CHOL 154 06/02/2020   ,   Lab Results   Component Value Date    HDL 43 06/02/2020   ,   Lab Results   Component Value Date    LDLCALC 94 06/02/2020   ,   Lab Results   Component Value Date    TRIG 81 06/02/2020        Lab Results   Component Value Date    GLUCOSE 94 06/02/2020   , No results found for: HGBA1C      Lab Results   Component Value Date    CREATININE 0.92 06/02/2020       Lab Results   Component Value Date    TSH 2.21 06/02/2020

## 2020-08-11 ENCOUNTER — TRANSCRIBE ORDERS (OUTPATIENT)
Dept: SCHEDULING | Age: 70
End: 2020-08-11

## 2020-08-11 DIAGNOSIS — J98.6 DISORDERS OF DIAPHRAGM: Primary | ICD-10-CM

## 2020-08-19 ENCOUNTER — HOSPITAL ENCOUNTER (OUTPATIENT)
Dept: RADIOLOGY | Facility: HOSPITAL | Age: 70
Discharge: HOME | End: 2020-08-19
Attending: INTERNAL MEDICINE
Payer: MEDICARE

## 2020-08-19 DIAGNOSIS — J98.6 DISORDERS OF DIAPHRAGM: ICD-10-CM

## 2020-08-19 PROCEDURE — 63600105 HC IODINE BASED CONTRAST: Performed by: INTERNAL MEDICINE

## 2020-08-19 PROCEDURE — 71260 CT THORAX DX C+: CPT

## 2020-08-19 PROCEDURE — 76000 FLUOROSCOPY <1 HR PHYS/QHP: CPT | Mod: 59

## 2020-08-19 RX ADMIN — IOHEXOL 100 ML: 350 INJECTION, SOLUTION INTRAVENOUS at 14:54

## 2020-09-16 ENCOUNTER — TRANSCRIBE ORDERS (OUTPATIENT)
Dept: SCHEDULING | Age: 70
End: 2020-09-16

## 2020-09-16 DIAGNOSIS — J98.6 DISORDERS OF DIAPHRAGM: Primary | ICD-10-CM

## 2020-09-17 ENCOUNTER — HOSPITAL ENCOUNTER (OUTPATIENT)
Dept: RADIOLOGY | Facility: HOSPITAL | Age: 70
Discharge: HOME | End: 2020-09-17
Attending: INTERNAL MEDICINE
Payer: MEDICARE

## 2020-09-17 DIAGNOSIS — J98.6 DISORDERS OF DIAPHRAGM: ICD-10-CM

## 2020-09-17 PROCEDURE — A9585 GADOBUTROL INJECTION: HCPCS | Performed by: INTERNAL MEDICINE

## 2020-09-17 PROCEDURE — 72142 MRI NECK SPINE W/DYE: CPT

## 2020-09-17 RX ORDER — GADOBUTROL 604.72 MG/ML
10 INJECTION INTRAVENOUS ONCE
Status: COMPLETED | OUTPATIENT
Start: 2020-09-17 | End: 2020-09-17

## 2020-09-17 RX ADMIN — GADOBUTROL 10 MMOL: 604.72 INJECTION INTRAVENOUS at 19:45

## 2020-09-24 ENCOUNTER — TRANSCRIBE ORDERS (OUTPATIENT)
Dept: SCHEDULING | Age: 70
End: 2020-09-24

## 2020-09-24 DIAGNOSIS — R06.00 DYSPNEA, UNSPECIFIED: Primary | ICD-10-CM

## 2020-09-29 ENCOUNTER — HOSPITAL ENCOUNTER (OUTPATIENT)
Dept: PULMONOLOGY | Facility: HOSPITAL | Age: 70
Discharge: HOME | End: 2020-09-29
Attending: INTERNAL MEDICINE
Payer: MEDICARE

## 2020-09-29 PROBLEM — R06.00 DYSPNEA: Status: ACTIVE | Noted: 2020-06-04

## 2020-09-29 PROCEDURE — 94726 PLETHYSMOGRAPHY LUNG VOLUMES: CPT

## 2020-09-29 PROCEDURE — 94729 DIFFUSING CAPACITY: CPT

## 2020-09-29 PROCEDURE — 94010 BREATHING CAPACITY TEST: CPT

## 2020-09-29 PROCEDURE — 94618 PULMONARY STRESS TESTING: CPT

## 2020-10-21 ENCOUNTER — OFFICE VISIT (OUTPATIENT)
Dept: FAMILY MEDICINE CLINIC | Facility: CLINIC | Age: 70
End: 2020-10-21
Payer: MEDICARE

## 2020-10-21 VITALS
BODY MASS INDEX: 36.12 KG/M2 | HEIGHT: 71 IN | SYSTOLIC BLOOD PRESSURE: 134 MMHG | HEART RATE: 75 BPM | TEMPERATURE: 96 F | WEIGHT: 258 LBS | DIASTOLIC BLOOD PRESSURE: 80 MMHG | OXYGEN SATURATION: 95 %

## 2020-10-21 DIAGNOSIS — F41.9 ANXIETY: ICD-10-CM

## 2020-10-21 DIAGNOSIS — Z12.12 SCREENING FOR RECTAL CANCER: ICD-10-CM

## 2020-10-21 DIAGNOSIS — Z12.11 ENCOUNTER FOR SCREENING FOR MALIGNANT NEOPLASM OF COLON: Primary | ICD-10-CM

## 2020-10-21 DIAGNOSIS — Z00.00 MEDICARE ANNUAL WELLNESS VISIT, SUBSEQUENT: Primary | ICD-10-CM

## 2020-10-21 PROCEDURE — G0438 PPPS, INITIAL VISIT: HCPCS | Performed by: FAMILY MEDICINE

## 2020-10-21 PROCEDURE — 99213 OFFICE O/P EST LOW 20 MIN: CPT | Performed by: FAMILY MEDICINE

## 2020-10-21 RX ORDER — FLUOXETINE 20 MG/1
20 TABLET, FILM COATED ORAL DAILY
Qty: 90 TABLET | Refills: 3 | Status: SHIPPED | OUTPATIENT
Start: 2020-10-21

## 2020-11-09 ENCOUNTER — TELEPHONE (OUTPATIENT)
Dept: FAMILY MEDICINE CLINIC | Facility: CLINIC | Age: 70
End: 2020-11-09

## 2020-12-11 ENCOUNTER — OFFICE VISIT (OUTPATIENT)
Dept: CARDIOLOGY | Facility: CLINIC | Age: 70
End: 2020-12-11
Payer: MEDICARE

## 2020-12-11 VITALS
DIASTOLIC BLOOD PRESSURE: 88 MMHG | WEIGHT: 253 LBS | HEART RATE: 81 BPM | SYSTOLIC BLOOD PRESSURE: 144 MMHG | OXYGEN SATURATION: 94 % | BODY MASS INDEX: 35.42 KG/M2 | HEIGHT: 71 IN

## 2020-12-11 DIAGNOSIS — R06.02 SHORTNESS OF BREATH: ICD-10-CM

## 2020-12-11 DIAGNOSIS — Z51.81 VISIT FOR MONITORING TIKOSYN THERAPY: ICD-10-CM

## 2020-12-11 DIAGNOSIS — Z79.899 VISIT FOR MONITORING TIKOSYN THERAPY: ICD-10-CM

## 2020-12-11 DIAGNOSIS — I10 HYPERTENSION, UNSPECIFIED TYPE: ICD-10-CM

## 2020-12-11 DIAGNOSIS — E78.5 HYPERLIPIDEMIA, UNSPECIFIED HYPERLIPIDEMIA TYPE: ICD-10-CM

## 2020-12-11 DIAGNOSIS — R06.02 SOB (SHORTNESS OF BREATH): ICD-10-CM

## 2020-12-11 DIAGNOSIS — I48.91 ATRIAL FIBRILLATION, UNSPECIFIED TYPE (CMS/HCC): Primary | ICD-10-CM

## 2020-12-11 DIAGNOSIS — Z79.01 CHRONIC ANTICOAGULATION: ICD-10-CM

## 2020-12-11 PROCEDURE — 99214 OFFICE O/P EST MOD 30 MIN: CPT | Performed by: INTERNAL MEDICINE

## 2020-12-11 PROCEDURE — 93000 ELECTROCARDIOGRAM COMPLETE: CPT | Performed by: INTERNAL MEDICINE

## 2020-12-11 RX ORDER — DOFETILIDE 0.5 MG/1
500 CAPSULE ORAL 2 TIMES DAILY
Qty: 180 CAPSULE | Refills: 3 | Status: SHIPPED | OUTPATIENT
Start: 2020-12-11 | End: 2021-02-10 | Stop reason: SDUPTHER

## 2020-12-11 RX ORDER — FLUOXETINE 20 MG/5ML
20 SOLUTION ORAL DAILY
COMMUNITY
End: 2021-07-08

## 2020-12-11 RX ORDER — LISINOPRIL 2.5 MG/1
2.5 TABLET ORAL DAILY
Qty: 90 TABLET | Refills: 3 | Status: SHIPPED | OUTPATIENT
Start: 2020-12-11 | End: 2021-04-13 | Stop reason: SDUPTHER

## 2020-12-31 ASSESSMENT — ENCOUNTER SYMPTOMS
CONSTITUTIONAL NEGATIVE: 1
GASTROINTESTINAL NEGATIVE: 1
PSYCHIATRIC NEGATIVE: 1
ENDOCRINE NEGATIVE: 1
HEMATOLOGIC/LYMPHATIC NEGATIVE: 1
NEUROLOGICAL NEGATIVE: 1
RESPIRATORY NEGATIVE: 1
MUSCULOSKELETAL NEGATIVE: 1
CARDIOVASCULAR NEGATIVE: 1
EYES NEGATIVE: 1

## 2020-12-31 NOTE — PROGRESS NOTES
Electrophysiology  Outpatient Progress Note       Reason for visit:   Chief Complaint   Patient presents with   • Follow-up     6 mnth       HPI   Humberto Chung is a 70 y.o. male who is seen today for paroxysmal atrial fibrillation and hypertension. He has been well with no hospitalizations or illnesses. His episodes of AF have remained infrequent.     The patient is currently on  Tikosyn 500 mcg BID. He is currently in sinus rhythm. At his last appointment 5/28/2020 he reportsed episodes of sudden onset of extreme fatigue and shortness of breath. He underwent an echocardiogram on 5/28/2020 which was unchanged from previous. A Holter monitor showed normal sinus rhythm with occasional ectopy, bug no AF and no arrhythmias associated with SOB. A stress echo showed no ischemia. Lisinopril was discontinued without effect. He is currently being seen by pulmonary and symptoms are probaly related in part to a paralyzed right hemidiaphragm and COPD.    He has had no chest discomfort suggestive of ischemia.The patient denies orthopnea, PND, GTZ, or edema. Mr. Chung has not had syncope or near syncope.      Past Medical History:   Diagnosis Date   • A-fib (CMS/[facility])    • A-fib (CMS/[facility]) 7/25/2018   • Chronic anticoagulation 7/25/2018   • HTN (hypertension) 6/24/2020   • Lipid disorder    • Visit for monitoring Tikosyn therapy 7/25/2019     Past Surgical History:   Procedure Laterality Date   • ABLATION OF DYSRHYTHMIC FOCUS     • CARDIAC CATHETERIZATION     • TOTAL SHOULDER REPLACEMENT Right        Social History     Tobacco Use   • Smoking status: Current Some Day Smoker   • Smokeless tobacco: Never Used   Substance Use Topics   • Alcohol use: Yes   • Drug use: Defer     Family History   Adopted: Yes   Family history unknown: Yes       ALLERGY:  Aspirin and Penicillins    Current Outpatient Medications   Medication Sig Dispense Refill   • apixaban (ELIQUIS) 5 mg tablet Take 5 mg by mouth See admin instr. Use if you go  into a-fib     • aspirin (ASPIRIN LOW DOSE) 81 mg enteric coated tablet Take 81 mg by mouth daily.     • atorvastatin (LIPITOR) 20 mg tablet Take 1 tablet (20 mg total) by mouth daily. 90 tablet 3   • cholecalciferol, vitamin D3, (VITAMIN D3) 2,000 unit tablet Take by mouth daily.     • dofetilide (TIKOSYN) 500 mcg capsule Take 1 capsule (500 mcg total) by mouth 2 (two) times a day. 180 capsule 3   • FLUoxetine (PROzac) 20 mg/5 mL (4 mg/mL) solution Take 20 mg by mouth daily.     • lisinopriL (PRINIVIL) 2.5 mg tablet Take 1 tablet (2.5 mg total) by mouth daily. 90 tablet 3   • metoprolol tartrate (LOPRESSOR) 25 mg tablet Take 1 tablet (25 mg total) by mouth every 6 (six) hours as needed (palpitations). 60 tablet 3     No current facility-administered medications for this visit.      Review of Systems   Constitution: Negative.   HENT: Negative.    Eyes: Negative.    Cardiovascular: Negative.    Respiratory: Negative.    Endocrine: Negative.    Hematologic/Lymphatic: Negative.    Skin: Negative.    Musculoskeletal: Negative.    Gastrointestinal: Negative.    Genitourinary: Negative.    Neurological: Negative.    Psychiatric/Behavioral: Negative.      Objective   Vitals:    12/11/20 1553   BP: (!) 144/88   Pulse:    SpO2:      BP Readings from Last 3 Encounters:   12/11/20 (!) 144/88   07/15/20 (!) 150/90   06/23/20 (!) 156/92       Physical Exam   Constitutional: He is oriented to person, place, and time. He appears well-developed and well-nourished.   HENT:   Head: Normocephalic.   Eyes: Pupils are equal, round, and reactive to light. Conjunctivae and EOM are normal.   Neck: Normal range of motion.   Cardiovascular: Normal rate, regular rhythm and normal heart sounds. Exam reveals no gallop and no friction rub.   No murmur heard.  Pulmonary/Chest: Effort normal and breath sounds normal.   Abdominal: Soft. Bowel sounds are normal.   Musculoskeletal: Normal range of motion.         General: No edema.      Comments:  Status post left index finger amputation.   Neurological: He is alert and oriented to person, place, and time.   Skin: Skin is warm and dry.   Psychiatric: He has a normal mood and affect. His behavior is normal. Judgment and thought content normal.       Lab Results   Component Value Date    WBC 6.1 06/02/2020    HGB 15.9 06/02/2020     06/02/2020    CHOL 154 06/02/2020    TRIG 81 06/02/2020    HDL 43 06/02/2020    ALT 23 06/02/2020    AST 17 06/02/2020     06/02/2020    K 4.7 06/02/2020    CREATININE 0.92 06/02/2020    TSH 2.21 06/02/2020     Cardiovascular Procedures:    CATH LAB:  Cath (Normal Coronaries) - 2008    ELECTROPHYSIOLOGY:  EPS (PV ablation afib during study) - 12/11/2009  Holter (apc one 6 beat cst no aifb) - 5/13/2015    STRESS TESTS:    ECHO 5/28/2020  MPI (EF 0.60 (60%), lateral wal defect) - 6/9/2008  · Mean gradient = 5.00 mmHg. Peak velocity = 1.50 m/s. Calculated area = 3.15 cm2.  · Normal-sized LV. Normal LV wall thickness.  · Preserved LV systolic function. Estimated EF 65%.  · Normal-sized RV. Normal RV systolic function.  · Normal-sized LA.  · Normal-sized RA.  · Aortic root normal. Sinuses of Valsalva normal-sized. Ascending aorta normal-sized. Aortic arch normal-sized. Descending aorta normal-sized.  · Aortic valve structure is normal. Tricuspid aortic valve. No aortic valve regurgitation. No aortic valve stenosis.  · Normal leaflet structure and normal leaflet motion of the mitral valve.  · No significant mitral valve regurgitation.  · No significant mitral valve stenosis.  · Tricuspid valve structure is normal. Mild tricuspid valve regurgitation.  · No significant tricuspid valve stenosis.  · Normal pulmonic valve structure. No pulmonic valve regurgitation. No pulmonic valve stenosis.  · IVC not well visualized.  · Normal pericardial structure. No evidence of pericardial effusion. No cardiac tamponade.  · Normal 2D echocardiogram color-flow Doppler study     Stress Echo  7/15/20  No evidence of ischemia with stress echocardiogram  The patient completed 7 mets of exercise achieved target heart rate  Baseline EKG normal  No changes diagnostic of ischemia with exercise frequent single VPCs noted with exercise  Baseline echocardiogram mildly dilated aortic root 4.0 cm  Mildly dilated left atrium  Mild left ventricular hypertrophy  No regional wall motion abnormality preserved ejection fraction 65%  No structural valvular disease  Trace mitral regurgitation  Mild tricuspid regurgitation estimated pulmonary systolic pressure 35 mmHg  All walls became hyperdynamic with exercise       Other (Admitted for Tikosyn loading) - 6/10/2015     ECG Sinus  Rhythm      Assessment   Problem List Items Addressed This Visit        Respiratory    Dyspnea     Cardiac work-up with negative Holter/Echo and stress. Seen by pulmonary and dyspnea proably in-part related to right diaphragmatic paralysis and COPD.            Circulatory    A-fib (CMS/HCC) - Primary     The patient is maintaining sinus rhythm with only occasional breakthrough.  He is stable on Tikosyn and his electrocardiogram shows no evidence of prolonged QT or QTc.  I have made no changes to the present regimen. He is stable on Eliquis with no adverse effects.          Relevant Medications    dofetilide (TIKOSYN) 500 mcg capsule    lisinopriL (PRINIVIL) 2.5 mg tablet    Other Relevant Orders    ECG 12 LEAD-OFFICE PERFORMED (Completed)    HTN (hypertension)     His blood pressure in left arm today is 144/88. This is somewhat improved since last visit. Continue current dose lisinopril and metoprolol.         Relevant Medications    lisinopriL (PRINIVIL) 2.5 mg tablet       Other    Visit for monitoring Tikosyn therapy     Tikosyn 500 mcg twice daily.  His electrocardiogram in the office today is stable with no evidence of prolonged QT or QTc.                    Sincere García MD  1/3/2021

## 2020-12-31 NOTE — ASSESSMENT & PLAN NOTE
His blood pressure in left arm today is 144/88. This is somewhat improved since last visit. Continue current dose lisinopril and metoprolol.

## 2020-12-31 NOTE — ASSESSMENT & PLAN NOTE
The patient is maintaining sinus rhythm with only occasional breakthrough.  He is stable on Tikosyn and his electrocardiogram shows no evidence of prolonged QT or QTc.  I have made no changes to the present regimen. He is stable on Eliquis with no adverse effects.

## 2021-01-03 PROBLEM — G47.33 OBSTRUCTIVE SLEEP APNEA SYNDROME: Status: ACTIVE | Noted: 2021-01-03

## 2021-01-03 PROBLEM — J98.6 DISORDER OF DIAPHRAGM: Status: ACTIVE | Noted: 2021-01-03

## 2021-01-03 NOTE — ASSESSMENT & PLAN NOTE
Cardiac work-up with negative Holter/Echo and stress. Seen by pulmonary and dyspnea proably in-part related to right diaphragmatic paralysis and COPD.

## 2021-02-10 RX ORDER — DOFETILIDE 0.5 MG/1
500 CAPSULE ORAL 2 TIMES DAILY
Qty: 180 CAPSULE | Refills: 1 | Status: SHIPPED | OUTPATIENT
Start: 2021-02-10 | End: 2021-07-08 | Stop reason: SDUPTHER

## 2021-02-22 RX ORDER — ATORVASTATIN CALCIUM 20 MG/1
20 TABLET, FILM COATED ORAL DAILY
Qty: 90 TABLET | Refills: 3 | Status: SHIPPED | OUTPATIENT
Start: 2021-02-22 | End: 2021-02-25 | Stop reason: SDUPTHER

## 2021-02-25 RX ORDER — ATORVASTATIN CALCIUM 20 MG/1
20 TABLET, FILM COATED ORAL DAILY
Qty: 90 TABLET | Refills: 3 | Status: SHIPPED | OUTPATIENT
Start: 2021-02-25 | End: 2022-04-18

## 2021-02-25 NOTE — TELEPHONE ENCOUNTER
Medicine Refill Request    Original request was sent to [facility] pharmacy    atorvastatin (LIPITOR) 20 mg tablet    Last Office Visit: Visit date not found  Last Telemedicine Visit: Visit date not found    Next Office Visit: Visit date not found  Next Telemedicine Visit: Visit date not found         Current Outpatient Medications:   •  apixaban (ELIQUIS) 5 mg tablet, Take 5 mg by mouth See admin instr. Use if you go into a-[facility], Disp: , Rfl:   •  aspirin (ASPIRIN LOW DOSE) 81 mg enteric coated tablet, Take 81 mg by mouth daily., Disp: , Rfl:   •  atorvastatin (LIPITOR) 20 mg tablet, Take 1 tablet (20 mg total) by mouth daily., Disp: 90 tablet, Rfl: 3  •  cholecalciferol, vitamin D3, (VITAMIN D3) 2,000 unit tablet, Take by mouth daily., Disp: , Rfl:   •  dofetilide (TIKOSYN) 500 mcg capsule, Take 1 capsule (500 mcg total) by mouth 2 (two) times a day., Disp: 180 capsule, Rfl: 1  •  FLUoxetine (PROzac) 20 mg/5 mL (4 mg/mL) solution, Take 20 mg by mouth daily., Disp: , Rfl:   •  lisinopriL (PRINIVIL) 2.5 mg tablet, Take 1 tablet (2.5 mg total) by mouth daily., Disp: 90 tablet, Rfl: 3  •  metoprolol tartrate (LOPRESSOR) 25 mg tablet, Take 1 tablet (25 mg total) by mouth every 6 (six) hours as needed (palpitations)., Disp: 60 tablet, Rfl: 3      BP Readings from Last 3 Encounters:   12/11/20 (!) 144/88   07/15/20 (!) 150/90   06/23/20 (!) 156/92       Recent Lab results:  Lab Results   Component Value Date    CHOL 154 06/02/2020   ,   Lab Results   Component Value Date    HDL 43 06/02/2020   ,   Lab Results   Component Value Date    LDLCALC 94 06/02/2020   ,   Lab Results   Component Value Date    TRIG 81 06/02/2020        Lab Results   Component Value Date    GLUCOSE 94 06/02/2020   , No results found for: HGBA1C      Lab Results   Component Value Date    CREATININE 0.92 06/02/2020       Lab Results   Component Value Date    TSH 2.21 06/02/2020

## 2021-04-13 RX ORDER — LISINOPRIL 2.5 MG/1
2.5 TABLET ORAL DAILY
Qty: 90 TABLET | Refills: 1 | Status: SHIPPED | OUTPATIENT
Start: 2021-04-13 | End: 2021-04-22 | Stop reason: SDUPTHER

## 2021-04-22 RX ORDER — LISINOPRIL 2.5 MG/1
2.5 TABLET ORAL DAILY
Qty: 30 TABLET | Refills: 1 | Status: SHIPPED | OUTPATIENT
Start: 2021-04-22 | End: 2021-07-08 | Stop reason: SDUPTHER

## 2021-04-22 NOTE — TELEPHONE ENCOUNTER
Medicine Refill Request    Last Office Visit: Visit date not found  Last Telemedicine Visit: Visit date not found    Next Office Visit: Visit date not found  Next Telemedicine Visit: Visit date not found      lisinopriL (PRINIVIL) 2.5 mg tablet    Pt states current script is delayed in the mail and asking for 30 day supply to local pharmacy.    Current Outpatient Medications:   •  apixaban (ELIQUIS) 5 mg tablet, Take 5 mg by mouth See admin instr. Use if you go into a-fib, Disp: , Rfl:   •  aspirin (ASPIRIN LOW DOSE) 81 mg enteric coated tablet, Take 81 mg by mouth daily., Disp: , Rfl:   •  atorvastatin (LIPITOR) 20 mg tablet, Take 1 tablet (20 mg total) by mouth daily., Disp: 90 tablet, Rfl: 3  •  cholecalciferol, vitamin D3, (VITAMIN D3) 2,000 unit tablet, Take by mouth daily., Disp: , Rfl:   •  dofetilide (TIKOSYN) 500 mcg capsule, Take 1 capsule (500 mcg total) by mouth 2 (two) times a day., Disp: 180 capsule, Rfl: 1  •  FLUoxetine (PROzac) 20 mg/5 mL (4 mg/mL) solution, Take 20 mg by mouth daily., Disp: , Rfl:   •  lisinopriL (PRINIVIL) 2.5 mg tablet, Take 1 tablet (2.5 mg total) by mouth daily., Disp: 90 tablet, Rfl: 1  •  metoprolol tartrate (LOPRESSOR) 25 mg tablet, Take 1 tablet (25 mg total) by mouth every 6 (six) hours as needed (palpitations)., Disp: 60 tablet, Rfl: 3      BP Readings from Last 3 Encounters:   12/11/20 (!) 144/88   07/15/20 (!) 150/90   06/23/20 (!) 156/92       Recent Lab results:  Lab Results   Component Value Date    CHOL 154 06/02/2020   ,   Lab Results   Component Value Date    HDL 43 06/02/2020   ,   Lab Results   Component Value Date    LDLCALC 94 06/02/2020   ,   Lab Results   Component Value Date    TRIG 81 06/02/2020        Lab Results   Component Value Date    GLUCOSE 94 06/02/2020   , No results found for: HGBA1C      Lab Results   Component Value Date    CREATININE 0.92 06/02/2020       Lab Results   Component Value Date    TSH 2.21 06/02/2020

## 2021-06-14 LAB
ALBUMIN SERPL-MCNC: 4.2 G/DL (ref 3.6–5.1)
ALBUMIN/GLOB SERPL: 1.8 (CALC) (ref 1–2.5)
ALP SERPL-CCNC: 70 U/L (ref 35–144)
ALT SERPL-CCNC: 21 U/L (ref 9–46)
AST SERPL-CCNC: 18 U/L (ref 10–35)
BASOPHILS # BLD AUTO: 78 CELLS/UL (ref 0–200)
BASOPHILS NFR BLD AUTO: 1.4 %
BILIRUB SERPL-MCNC: 0.5 MG/DL (ref 0.2–1.2)
BUN SERPL-MCNC: 16 MG/DL (ref 7–25)
BUN/CREAT SERPL: ABNORMAL (CALC) (ref 6–22)
CALCIUM SERPL-MCNC: 9.4 MG/DL (ref 8.6–10.3)
CHLORIDE SERPL-SCNC: 105 MMOL/L (ref 98–110)
CHOLEST SERPL-MCNC: 149 MG/DL
CHOLEST/HDLC SERPL: 3.4 (CALC)
CO2 SERPL-SCNC: 30 MMOL/L (ref 20–32)
CREAT SERPL-MCNC: 0.85 MG/DL (ref 0.7–1.25)
EOSINOPHIL # BLD AUTO: 168 CELLS/UL (ref 15–500)
EOSINOPHIL NFR BLD AUTO: 3 %
ERYTHROCYTE [DISTWIDTH] IN BLOOD BY AUTOMATED COUNT: 13.4 % (ref 11–15)
GLOBULIN SER CALC-MCNC: 2.4 G/DL (CALC) (ref 1.9–3.7)
GLUCOSE SERPL-MCNC: 101 MG/DL (ref 65–99)
HCT VFR BLD AUTO: 46.5 % (ref 38.5–50)
HDLC SERPL-MCNC: 44 MG/DL
HGB BLD-MCNC: 15.3 G/DL (ref 13.2–17.1)
LDLC SERPL CALC-MCNC: 92 MG/DL (CALC)
LYMPHOCYTES # BLD AUTO: 1943 CELLS/UL (ref 850–3900)
LYMPHOCYTES NFR BLD AUTO: 34.7 %
MCH RBC QN AUTO: 30 PG (ref 27–33)
MCHC RBC AUTO-ENTMCNC: 32.9 G/DL (ref 32–36)
MCV RBC AUTO: 91.2 FL (ref 80–100)
MONOCYTES # BLD AUTO: 582 CELLS/UL (ref 200–950)
MONOCYTES NFR BLD AUTO: 10.4 %
NEUTROPHILS # BLD AUTO: 2828 CELLS/UL (ref 1500–7800)
NEUTROPHILS NFR BLD AUTO: 50.5 %
NONHDLC SERPL-MCNC: 105 MG/DL (CALC)
PLATELET # BLD AUTO: 181 THOUSAND/UL (ref 140–400)
PMV BLD REES-ECKER: 12.6 FL (ref 7.5–12.5)
POTASSIUM SERPL-SCNC: 4.5 MMOL/L (ref 3.5–5.3)
PROT SERPL-MCNC: 6.6 G/DL (ref 6.1–8.1)
QUEST EGFR AFRICAN AMERICAN: 103 ML/MIN/1.73M2
QUEST EGFR NON-AFR. AMERICAN: 89 ML/MIN/1.73M2
RBC # BLD AUTO: 5.1 MILLION/UL (ref 4.2–5.8)
SODIUM SERPL-SCNC: 140 MMOL/L (ref 135–146)
TRIGL SERPL-MCNC: 51 MG/DL
TSH SERPL-ACNC: 1.89 MIU/L (ref 0.4–4.5)
WBC # BLD AUTO: 5.6 THOUSAND/UL (ref 3.8–10.8)

## 2021-07-08 ENCOUNTER — OFFICE VISIT (OUTPATIENT)
Dept: CARDIOLOGY | Facility: CLINIC | Age: 71
End: 2021-07-08
Payer: MEDICARE

## 2021-07-08 VITALS
BODY MASS INDEX: 32.64 KG/M2 | WEIGHT: 234 LBS | HEART RATE: 70 BPM | DIASTOLIC BLOOD PRESSURE: 90 MMHG | OXYGEN SATURATION: 99 % | SYSTOLIC BLOOD PRESSURE: 130 MMHG

## 2021-07-08 DIAGNOSIS — I48.91 ATRIAL FIBRILLATION, UNSPECIFIED TYPE (CMS/HCC): Primary | ICD-10-CM

## 2021-07-08 DIAGNOSIS — Z51.81 VISIT FOR MONITORING TIKOSYN THERAPY: ICD-10-CM

## 2021-07-08 DIAGNOSIS — Z79.899 VISIT FOR MONITORING TIKOSYN THERAPY: ICD-10-CM

## 2021-07-08 DIAGNOSIS — I48.0 PAROXYSMAL ATRIAL FIBRILLATION (CMS/HCC): ICD-10-CM

## 2021-07-08 DIAGNOSIS — I10 HYPERTENSION, UNSPECIFIED TYPE: ICD-10-CM

## 2021-07-08 DIAGNOSIS — Z79.01 CHRONIC ANTICOAGULATION: ICD-10-CM

## 2021-07-08 PROCEDURE — G8752 SYS BP LESS 140: HCPCS | Performed by: INTERNAL MEDICINE

## 2021-07-08 PROCEDURE — 99214 OFFICE O/P EST MOD 30 MIN: CPT | Performed by: INTERNAL MEDICINE

## 2021-07-08 PROCEDURE — 93000 ELECTROCARDIOGRAM COMPLETE: CPT | Performed by: INTERNAL MEDICINE

## 2021-07-08 PROCEDURE — G8755 DIAS BP > OR = 90: HCPCS | Performed by: INTERNAL MEDICINE

## 2021-07-08 RX ORDER — DOFETILIDE 0.5 MG/1
500 CAPSULE ORAL 2 TIMES DAILY
Qty: 180 CAPSULE | Refills: 1 | Status: SHIPPED | OUTPATIENT
Start: 2021-07-08 | End: 2022-07-28 | Stop reason: SDUPTHER

## 2021-07-08 RX ORDER — LISINOPRIL 5 MG/1
5 TABLET ORAL DAILY
Qty: 90 TABLET | Refills: 3 | Status: SHIPPED | OUTPATIENT
Start: 2021-07-08 | End: 2022-07-28 | Stop reason: SDUPTHER

## 2021-07-25 PROBLEM — I48.0 PAROXYSMAL ATRIAL FIBRILLATION (CMS/HCC): Status: ACTIVE | Noted: 2018-07-25

## 2021-07-25 ASSESSMENT — ENCOUNTER SYMPTOMS
ENDOCRINE NEGATIVE: 1
RESPIRATORY NEGATIVE: 1
PSYCHIATRIC NEGATIVE: 1
EYES NEGATIVE: 1
GASTROINTESTINAL NEGATIVE: 1
CARDIOVASCULAR NEGATIVE: 1
NEUROLOGICAL NEGATIVE: 1
MUSCULOSKELETAL NEGATIVE: 1
HEMATOLOGIC/LYMPHATIC NEGATIVE: 1
CONSTITUTIONAL NEGATIVE: 1

## 2021-07-25 NOTE — PROGRESS NOTES
Electrophysiology  Outpatient Progress Note       Reason for visit:   No chief complaint on file.      HPI   Humberto Chung is a 70 y.o. male who is seen today for paroxysmal atrial fibrillation and hypertension. He has been well with no hospitalizations or illnesses. He has been doing very well and his episodes of AF have remained infrequent. The patient is currently on Tikosyn 500 mcg BID.     Recent work-up included an echocardiogram on 5/28/2020 showing normal EF. A Holter monitor showed normal sinus rhythm with occasional ectopy, bug no AF and no arrhythmias associated with SOB. A stress echo showed no ischemia.    He has had no chest discomfort suggestive of ischemia.The patient denies orthopnea, PND, or edema. Mr. Chung has not had syncope or near syncope. He does have some symptoms probaly related in part to a paralyzed right hemidiaphragm and COPD.     Past Medical History:   Diagnosis Date   • A-fib (CMS/[facility])    • A-fib (CMS/[facility]) 7/25/2018   • Chronic anticoagulation 7/25/2018   • HTN (hypertension) 6/24/2020   • Lipid disorder    • Visit for monitoring Tikosyn therapy 7/25/2019     Past Surgical History:   Procedure Laterality Date   • ABLATION OF DYSRHYTHMIC FOCUS     • CARDIAC CATHETERIZATION     • TOTAL SHOULDER REPLACEMENT Right        Social History     Tobacco Use   • Smoking status: Current Some Day Smoker   • Smokeless tobacco: Never Used   Vaping Use   • Vaping Use: Never used   Substance Use Topics   • Alcohol use: Yes   • Drug use: Defer     Family History   Adopted: Yes   Family history unknown: Yes       ALLERGY:  Aspirin and Penicillins    Current Outpatient Medications   Medication Sig Dispense Refill   • apixaban (ELIQUIS) 5 mg tablet Take 5 mg by mouth See admin instr. Use if you go into a-fib     • aspirin (ASPIRIN LOW DOSE) 81 mg enteric coated tablet Take 81 mg by mouth daily.     • atorvastatin (LIPITOR) 20 mg tablet Take 1 tablet (20 mg total) by mouth daily. 90 tablet 3   •  dofetilide (TIKOSYN) 500 mcg capsule Take 1 capsule (500 mcg total) by mouth 2 (two) times a day. 180 capsule 1   • lisinopriL (PRINIVIL) 5 mg tablet Take 1 tablet (5 mg total) by mouth daily. 90 tablet 3   • metoprolol tartrate (LOPRESSOR) 25 mg tablet Take 1 tablet (25 mg total) by mouth every 6 (six) hours as needed (palpitations). 60 tablet 3     No current facility-administered medications for this visit.     Review of Systems   Constitutional: Negative.   HENT: Negative.    Eyes: Negative.    Cardiovascular: Negative.    Respiratory: Negative.    Endocrine: Negative.    Hematologic/Lymphatic: Negative.    Skin: Negative.    Musculoskeletal: Negative.    Gastrointestinal: Negative.    Genitourinary: Negative.    Neurological: Negative.    Psychiatric/Behavioral: Negative.      Objective   Vitals:    07/08/21 1556   BP: 130/90   Pulse: 70   SpO2: 99%     BP Readings from Last 3 Encounters:   07/08/21 130/90   12/11/20 (!) 144/88   07/15/20 (!) 150/90       Physical Exam  Constitutional:       Appearance: He is well-developed.   HENT:      Head: Normocephalic.   Eyes:      Conjunctiva/sclera: Conjunctivae normal.      Pupils: Pupils are equal, round, and reactive to light.   Cardiovascular:      Rate and Rhythm: Normal rate and regular rhythm.      Heart sounds: Normal heart sounds. No murmur heard.   No friction rub. No gallop.    Pulmonary:      Effort: Pulmonary effort is normal.      Breath sounds: Normal breath sounds.   Abdominal:      General: Bowel sounds are normal.      Palpations: Abdomen is soft.   Musculoskeletal:         General: Normal range of motion.      Cervical back: Normal range of motion.      Comments: Status post left index finger amputation.   Skin:     General: Skin is warm and dry.   Neurological:      Mental Status: He is alert and oriented to person, place, and time.   Psychiatric:         Behavior: Behavior normal.         Thought Content: Thought content normal.         Judgment:  Judgment normal.         Lab Results   Component Value Date    WBC 5.6 06/14/2021    HGB 15.3 06/14/2021     06/14/2021    CHOL 149 06/14/2021    TRIG 51 06/14/2021    HDL 44 06/14/2021    ALT 21 06/14/2021    AST 18 06/14/2021     06/14/2021    K 4.5 06/14/2021    CREATININE 0.85 06/14/2021    TSH 1.89 06/14/2021     Cardiovascular Procedures:    CATH LAB:  Cath (Normal Coronaries) - 2008    ELECTROPHYSIOLOGY:  EPS (PV ablation afib during study) - 12/11/2009  Holter (apc one 6 beat cst no aifb) - 5/13/2015    STRESS TESTS:    ECHO 5/28/2020  MPI (EF 0.60 (60%), lateral wal defect) - 6/9/2008  · Mean gradient = 5.00 mmHg. Peak velocity = 1.50 m/s. Calculated area = 3.15 cm2.  · Normal-sized LV. Normal LV wall thickness.  · Preserved LV systolic function. Estimated EF 65%.  · Normal-sized RV. Normal RV systolic function.  · Normal-sized LA.  · Normal-sized RA.  · Aortic root normal. Sinuses of Valsalva normal-sized. Ascending aorta normal-sized. Aortic arch normal-sized. Descending aorta normal-sized.  · Aortic valve structure is normal. Tricuspid aortic valve. No aortic valve regurgitation. No aortic valve stenosis.  · Normal leaflet structure and normal leaflet motion of the mitral valve.  · No significant mitral valve regurgitation.  · No significant mitral valve stenosis.  · Tricuspid valve structure is normal. Mild tricuspid valve regurgitation.  · No significant tricuspid valve stenosis.  · Normal pulmonic valve structure. No pulmonic valve regurgitation. No pulmonic valve stenosis.  · IVC not well visualized.  · Normal pericardial structure. No evidence of pericardial effusion. No cardiac tamponade.  · Normal 2D echocardiogram color-flow Doppler study     Stress Echo 7/15/20  No evidence of ischemia with stress echocardiogram  The patient completed 7 mets of exercise achieved target heart rate  Baseline EKG normal  No changes diagnostic of ischemia with exercise frequent single VPCs noted with  exercise  Baseline echocardiogram mildly dilated aortic root 4.0 cm  Mildly dilated left atrium  Mild left ventricular hypertrophy  No regional wall motion abnormality preserved ejection fraction 65%  No structural valvular disease  Trace mitral regurgitation  Mild tricuspid regurgitation estimated pulmonary systolic pressure 35 mmHg  All walls became hyperdynamic with exercise       Other (Admitted for Tikosyn loading) - 6/10/2015     ECG Sinus  Rhythm      Assessment   Problem List Items Addressed This Visit        Circulatory    Paroxysmal atrial fibrillation (CMS/HCC) - Primary     On Dofetilide with few episodes; continues to do well.         Relevant Medications    dofetilide (TIKOSYN) 500 mcg capsule    lisinopriL (PRINIVIL) 5 mg tablet    Other Relevant Orders    ECG 12 lead (Completed)    HTN (hypertension)     Will increase lisinopril to 5 mg daily.         Relevant Medications    lisinopriL (PRINIVIL) 5 mg tablet    Other Relevant Orders    ECG 12 lead (Completed)       Other    Chronic anticoagulation     Tolerating Eliquis.         Visit for monitoring Tikosyn therapy     No QT prolongation on ECG.                    Sincere García MD  7/25/2021

## 2022-01-06 ENCOUNTER — TELEPHONE (OUTPATIENT)
Dept: SCHEDULING | Facility: CLINIC | Age: 72
End: 2022-01-06
Payer: MEDICARE

## 2022-01-06 NOTE — TELEPHONE ENCOUNTER
Pt calling to see if blood work needs to be completed prior to appt on 1/14/22.    Pt requesting that orders be placed in chart if they are needed.    Pt would like a call back at [phone]

## 2022-01-06 NOTE — TELEPHONE ENCOUNTER
I spoke to patient. Labs done in June 2021 and looked good. No need for repeat labs at this time.

## 2022-01-14 ENCOUNTER — OFFICE VISIT (OUTPATIENT)
Dept: CARDIOLOGY | Facility: CLINIC | Age: 72
End: 2022-01-14
Payer: MEDICARE

## 2022-01-14 VITALS
HEIGHT: 71 IN | HEART RATE: 80 BPM | BODY MASS INDEX: 34.44 KG/M2 | DIASTOLIC BLOOD PRESSURE: 90 MMHG | OXYGEN SATURATION: 98 % | WEIGHT: 246 LBS | SYSTOLIC BLOOD PRESSURE: 130 MMHG

## 2022-01-14 DIAGNOSIS — I48.91 ATRIAL FIBRILLATION, UNSPECIFIED TYPE (CMS/HCC): Primary | ICD-10-CM

## 2022-01-14 DIAGNOSIS — I48.0 PAROXYSMAL ATRIAL FIBRILLATION (CMS/HCC): Primary | ICD-10-CM

## 2022-01-14 DIAGNOSIS — I48.0 PAROXYSMAL ATRIAL FIBRILLATION (CMS/HCC): ICD-10-CM

## 2022-01-14 DIAGNOSIS — I10 PRIMARY HYPERTENSION: ICD-10-CM

## 2022-01-14 PROCEDURE — 93000 ELECTROCARDIOGRAM COMPLETE: CPT | Performed by: INTERNAL MEDICINE

## 2022-01-14 PROCEDURE — G8755 DIAS BP > OR = 90: HCPCS | Performed by: INTERNAL MEDICINE

## 2022-01-14 PROCEDURE — G8752 SYS BP LESS 140: HCPCS | Performed by: INTERNAL MEDICINE

## 2022-01-14 PROCEDURE — 99213 OFFICE O/P EST LOW 20 MIN: CPT | Performed by: INTERNAL MEDICINE

## 2022-01-16 ASSESSMENT — ENCOUNTER SYMPTOMS
PSYCHIATRIC NEGATIVE: 1
NEUROLOGICAL NEGATIVE: 1
GASTROINTESTINAL NEGATIVE: 1
CONSTITUTIONAL NEGATIVE: 1
MUSCULOSKELETAL NEGATIVE: 1
RESPIRATORY NEGATIVE: 1
EYES NEGATIVE: 1
CARDIOVASCULAR NEGATIVE: 1
ENDOCRINE NEGATIVE: 1
HEMATOLOGIC/LYMPHATIC NEGATIVE: 1

## 2022-01-16 NOTE — PROGRESS NOTES
Electrophysiology  Outpatient Progress Note       Reason for visit:   Chief Complaint   Patient presents with   • Atrial Fibrillation       HPI   Humberto Chung is a 71 y.o. male who is seen today for paroxysmal atrial fibrillation and hypertension. He has been well with no hospitalizations or illnesses. He has been doing very well with two brief episodes of AF since his last visit. The patient is currently on Tikosyn 500 mcg BID. He has ordered his medication through [facility] (mail order of good RX) at a significant discount and has noted no change in efficacy.    He has had no chest discomfort suggestive of ischemia.The patient denies orthopnea, PND, or edema. Mr. Chung has not had syncope or near syncope.      Past Medical History:   Diagnosis Date   • A-fib (CMS/[facility])    • A-fib (CMS/[facility]) 7/25/2018   • Chronic anticoagulation 7/25/2018   • HTN (hypertension) 6/24/2020   • Lipid disorder    • Visit for monitoring Tikosyn therapy 7/25/2019     Past Surgical History:   Procedure Laterality Date   • ABLATION OF DYSRHYTHMIC FOCUS     • CARDIAC CATHETERIZATION     • TOTAL SHOULDER REPLACEMENT Right        Social History     Tobacco Use   • Smoking status: Current Some Day Smoker   • Smokeless tobacco: Never Used   Vaping Use   • Vaping Use: Never used   Substance Use Topics   • Alcohol use: Yes   • Drug use: Defer     Family History   Adopted: Yes   Family history unknown: Yes       ALLERGY:  Aspirin and Penicillins    Current Outpatient Medications   Medication Sig Dispense Refill   • apixaban (ELIQUIS) 5 mg tablet Take 5 mg by mouth See admin instr. Use if you go into a-fib     • aspirin (ASPIRIN LOW DOSE) 81 mg enteric coated tablet Take 81 mg by mouth daily.     • atorvastatin (LIPITOR) 20 mg tablet Take 1 tablet (20 mg total) by mouth daily. 90 tablet 3   • dofetilide (TIKOSYN) 500 mcg capsule Take 1 capsule (500 mcg total) by mouth 2 (two) times a day. 180 capsule 1   • lisinopriL (PRINIVIL) 5 mg tablet  Take 1 tablet (5 mg total) by mouth daily. 90 tablet 3   • metoprolol tartrate (LOPRESSOR) 25 mg tablet Take 1 tablet (25 mg total) by mouth every 6 (six) hours as needed (palpitations). 60 tablet 3     No current facility-administered medications for this visit.     Review of Systems   Constitutional: Negative.   HENT: Negative.    Eyes: Negative.    Cardiovascular: Negative.    Respiratory: Negative.    Endocrine: Negative.    Hematologic/Lymphatic: Negative.    Skin: Negative.    Musculoskeletal: Negative.    Gastrointestinal: Negative.    Genitourinary: Negative.    Neurological: Negative.    Psychiatric/Behavioral: Negative.      Objective   Vitals:    01/14/22 1517   BP: 130/90   Pulse: 80   SpO2: 98%     BP Readings from Last 3 Encounters:   01/14/22 130/90   07/08/21 130/90   12/11/20 (!) 144/88       Physical Exam  Constitutional:       Appearance: He is well-developed.   HENT:      Head: Normocephalic.   Eyes:      Conjunctiva/sclera: Conjunctivae normal.      Pupils: Pupils are equal, round, and reactive to light.   Cardiovascular:      Rate and Rhythm: Normal rate and regular rhythm.      Heart sounds: Normal heart sounds. No murmur heard.    No friction rub. No gallop.   Pulmonary:      Effort: Pulmonary effort is normal.      Breath sounds: Normal breath sounds.   Abdominal:      General: Bowel sounds are normal.      Palpations: Abdomen is soft.   Musculoskeletal:         General: Normal range of motion.      Cervical back: Normal range of motion.      Comments: Status post left index finger amputation.   Skin:     General: Skin is warm and dry.   Neurological:      Mental Status: He is alert and oriented to person, place, and time.   Psychiatric:         Behavior: Behavior normal.         Thought Content: Thought content normal.         Judgment: Judgment normal.         Lab Results   Component Value Date    WBC 5.6 06/14/2021    HGB 15.3 06/14/2021     06/14/2021    CHOL 149 06/14/2021     TRIG 51 06/14/2021    HDL 44 06/14/2021    ALT 21 06/14/2021    AST 18 06/14/2021     06/14/2021    K 4.5 06/14/2021    CREATININE 0.85 06/14/2021    TSH 1.89 06/14/2021     Cardiovascular Procedures:    CATH LAB:  Cath (Normal Coronaries) - 2008    ELECTROPHYSIOLOGY:  EPS (PV ablation afib during study) - 12/11/2009  Holter (apc one 6 beat cst no aifb) - 5/13/2015    STRESS TESTS:    ECHO 5/28/2020  MPI (EF 0.60 (60%), lateral wal defect) - 6/9/2008  · Mean gradient = 5.00 mmHg. Peak velocity = 1.50 m/s. Calculated area = 3.15 cm2.  · Normal-sized LV. Normal LV wall thickness.  · Preserved LV systolic function. Estimated EF 65%.  · Normal-sized RV. Normal RV systolic function.  · Normal-sized LA.  · Normal-sized RA.  · Aortic root normal. Sinuses of Valsalva normal-sized. Ascending aorta normal-sized. Aortic arch normal-sized. Descending aorta normal-sized.  · Aortic valve structure is normal. Tricuspid aortic valve. No aortic valve regurgitation. No aortic valve stenosis.  · Normal leaflet structure and normal leaflet motion of the mitral valve.  · No significant mitral valve regurgitation.  · No significant mitral valve stenosis.  · Tricuspid valve structure is normal. Mild tricuspid valve regurgitation.  · No significant tricuspid valve stenosis.  · Normal pulmonic valve structure. No pulmonic valve regurgitation. No pulmonic valve stenosis.  · IVC not well visualized.  · Normal pericardial structure. No evidence of pericardial effusion. No cardiac tamponade.  · Normal 2D echocardiogram color-flow Doppler study     Stress Echo 7/15/20  No evidence of ischemia with stress echocardiogram  The patient completed 7 mets of exercise achieved target heart rate  Baseline EKG normal  No changes diagnostic of ischemia with exercise frequent single VPCs noted with exercise  Baseline echocardiogram mildly dilated aortic root 4.0 cm  Mildly dilated left atrium  Mild left ventricular hypertrophy  No regional wall motion  abnormality preserved ejection fraction 65%  No structural valvular disease  Trace mitral regurgitation  Mild tricuspid regurgitation estimated pulmonary systolic pressure 35 mmHg  All walls became hyperdynamic with exercise       Other (Admitted for Tikosyn loading) - 6/10/2015     ECG Sinus  Rhythm      Assessment   Problem List Items Addressed This Visit        Circulatory    Paroxysmal atrial fibrillation (CMS/HCC)     He continues to do well with few episodes of atrial fibrillation.  He is tolerating Tikosyn with no evidence of QT prolongation, nor any change in efficacy since he is changed to a different mail-order drugstore.           HTN (hypertension)     Blood pressures well controlled on his current medications.           Other Visit Diagnoses     Atrial fibrillation, unspecified type (CMS/HCC)    -  Primary    Relevant Orders    ECG 12 LEAD-OFFICE PERFORMED (Completed)               Sincere García MD  1/16/2022

## 2022-01-16 NOTE — ASSESSMENT & PLAN NOTE
He continues to do well with few episodes of atrial fibrillation.  He is tolerating Tikosyn with no evidence of QT prolongation, nor any change in efficacy since he is changed to a different mail-order drugstore.

## 2022-04-18 RX ORDER — ATORVASTATIN CALCIUM 20 MG/1
TABLET, FILM COATED ORAL
Qty: 90 TABLET | Refills: 3 | Status: SHIPPED | OUTPATIENT
Start: 2022-04-18 | End: 2023-06-27 | Stop reason: SDUPTHER

## 2022-07-22 LAB
ALBUMIN SERPL-MCNC: 4.1 G/DL (ref 3.6–5.1)
ALBUMIN/GLOB SERPL: 1.7 (CALC) (ref 1–2.5)
ALP SERPL-CCNC: 58 U/L (ref 35–144)
ALT SERPL-CCNC: 19 U/L (ref 9–46)
AST SERPL-CCNC: 19 U/L (ref 10–35)
BASOPHILS # BLD AUTO: 50 CELLS/UL (ref 0–200)
BASOPHILS NFR BLD AUTO: 0.9 %
BILIRUB SERPL-MCNC: 0.9 MG/DL (ref 0.2–1.2)
BUN SERPL-MCNC: 14 MG/DL (ref 7–25)
BUN/CREAT SERPL: NORMAL (CALC) (ref 6–22)
CALCIUM SERPL-MCNC: 9.1 MG/DL (ref 8.6–10.3)
CHLORIDE SERPL-SCNC: 105 MMOL/L (ref 98–110)
CHOLEST SERPL-MCNC: 148 MG/DL
CHOLEST/HDLC SERPL: 3.4 (CALC)
CO2 SERPL-SCNC: 29 MMOL/L (ref 20–32)
CREAT SERPL-MCNC: 0.92 MG/DL (ref 0.7–1.28)
EGFRCR SERPLBLD CKD-EPI 2021: 89 ML/MIN/1.73M2
EOSINOPHIL # BLD AUTO: 171 CELLS/UL (ref 15–500)
EOSINOPHIL NFR BLD AUTO: 3.1 %
ERYTHROCYTE [DISTWIDTH] IN BLOOD BY AUTOMATED COUNT: 13.8 % (ref 11–15)
GLOBULIN SER CALC-MCNC: 2.4 G/DL (CALC) (ref 1.9–3.7)
GLUCOSE SERPL-MCNC: 90 MG/DL (ref 65–99)
HCT VFR BLD AUTO: 47.1 % (ref 38.5–50)
HDLC SERPL-MCNC: 43 MG/DL
HGB BLD-MCNC: 15.1 G/DL (ref 13.2–17.1)
LDLC SERPL CALC-MCNC: 89 MG/DL (CALC)
LYMPHOCYTES # BLD AUTO: 1865 CELLS/UL (ref 850–3900)
LYMPHOCYTES NFR BLD AUTO: 33.9 %
MAGNESIUM SERPL-MCNC: 1.8 MG/DL (ref 1.5–2.5)
MCH RBC QN AUTO: 30 PG (ref 27–33)
MCHC RBC AUTO-ENTMCNC: 32.1 G/DL (ref 32–36)
MCV RBC AUTO: 93.5 FL (ref 80–100)
MONOCYTES # BLD AUTO: 506 CELLS/UL (ref 200–950)
MONOCYTES NFR BLD AUTO: 9.2 %
NEUTROPHILS # BLD AUTO: 2910 CELLS/UL (ref 1500–7800)
NEUTROPHILS NFR BLD AUTO: 52.9 %
NONHDLC SERPL-MCNC: 105 MG/DL (CALC)
PLATELET # BLD AUTO: 176 THOUSAND/UL (ref 140–400)
PMV BLD REES-ECKER: 12.5 FL (ref 7.5–12.5)
POTASSIUM SERPL-SCNC: 4.3 MMOL/L (ref 3.5–5.3)
PROT SERPL-MCNC: 6.5 G/DL (ref 6.1–8.1)
RBC # BLD AUTO: 5.04 MILLION/UL (ref 4.2–5.8)
SODIUM SERPL-SCNC: 141 MMOL/L (ref 135–146)
TRIGL SERPL-MCNC: 73 MG/DL
WBC # BLD AUTO: 5.5 THOUSAND/UL (ref 3.8–10.8)

## 2022-07-28 ENCOUNTER — OFFICE VISIT (OUTPATIENT)
Dept: CARDIOLOGY | Facility: CLINIC | Age: 72
End: 2022-07-28
Payer: MEDICARE

## 2022-07-28 VITALS
BODY MASS INDEX: 36.54 KG/M2 | HEIGHT: 71 IN | OXYGEN SATURATION: 96 % | RESPIRATION RATE: 18 BRPM | SYSTOLIC BLOOD PRESSURE: 124 MMHG | WEIGHT: 261 LBS | HEART RATE: 51 BPM | DIASTOLIC BLOOD PRESSURE: 80 MMHG

## 2022-07-28 DIAGNOSIS — E78.9 LIPID DISORDER: ICD-10-CM

## 2022-07-28 DIAGNOSIS — I48.91 ATRIAL FIBRILLATION, UNSPECIFIED TYPE (CMS/HCC): Primary | ICD-10-CM

## 2022-07-28 DIAGNOSIS — I10 PRIMARY HYPERTENSION: ICD-10-CM

## 2022-07-28 PROCEDURE — G8754 DIAS BP LESS 90: HCPCS | Performed by: INTERNAL MEDICINE

## 2022-07-28 PROCEDURE — 99214 OFFICE O/P EST MOD 30 MIN: CPT | Performed by: INTERNAL MEDICINE

## 2022-07-28 PROCEDURE — 93000 ELECTROCARDIOGRAM COMPLETE: CPT | Performed by: INTERNAL MEDICINE

## 2022-07-28 PROCEDURE — G8752 SYS BP LESS 140: HCPCS | Performed by: INTERNAL MEDICINE

## 2022-07-28 RX ORDER — DOFETILIDE 0.5 MG/1
500 CAPSULE ORAL 2 TIMES DAILY
Qty: 180 CAPSULE | Refills: 1 | Status: SHIPPED | OUTPATIENT
Start: 2022-07-28 | End: 2023-01-18 | Stop reason: SDUPTHER

## 2022-07-28 RX ORDER — LISINOPRIL 5 MG/1
5 TABLET ORAL DAILY
Qty: 90 TABLET | Refills: 3 | Status: SHIPPED | OUTPATIENT
Start: 2022-07-28 | End: 2023-06-27 | Stop reason: SDUPTHER

## 2022-07-28 ASSESSMENT — ENCOUNTER SYMPTOMS
EYES NEGATIVE: 1
GASTROINTESTINAL NEGATIVE: 1
NEUROLOGICAL NEGATIVE: 1
PSYCHIATRIC NEGATIVE: 1
ENDOCRINE NEGATIVE: 1
RESPIRATORY NEGATIVE: 1
CONSTITUTIONAL NEGATIVE: 1
MUSCULOSKELETAL NEGATIVE: 1
HEMATOLOGIC/LYMPHATIC NEGATIVE: 1
CARDIOVASCULAR NEGATIVE: 1

## 2022-07-28 NOTE — LETTER
July 31, 2022     Balbir Tellez MD  [address] [place] [id number]    Patient: Humberto Chung  YOB: 1950  Date of Visit: 7/28/2022      Dear Dr. Tellez:    Thank you for referring Humberto Chung to me for evaluation. Below are my notes for this consultation.    If you have questions, please do not hesitate to call me. I look forward to following your patient along with you.         Sincerely,        Sincere García MD        CC: No Recipients  Sincere García MD  7/31/2022  1:34 PM  Signed       Electrophysiology  Outpatient Progress Note       Reason for visit:   Chief Complaint   Patient presents with   • Follow-up       HPI   Humberto Chung is a 71 y.o. male who is seen today for paroxysmal atrial fibrillation and hypertension. He has been well with no hospitalizations or illnesses. He has been doing very well with two brief episodes of AF since his last visit. The patient is currently on Tikosyn 500 mcg BID.  He is found to be in atrial fibrillation at today's visit.  Episodes approximately 1-2 times per month lasting 24 hours.  Symptoms include awareness of mild irregularity and difficulty sleeping.  It is difficult for him to tell if he has any increasing dyspnea on exertion or shortness of breath due to his underlying lung disease.  Unfortunately he does not take his prescribed anticoagulation regularly.    He has had no chest discomfort suggestive of ischemia. Mr. Chung has not had syncope or near syncope.      Past Medical History:   Diagnosis Date   • A-fib (CMS/HCC)    • A-fib (CMS/HCC) 7/25/2018   • Chronic anticoagulation 7/25/2018   • HTN (hypertension) 6/24/2020   • Lipid disorder    • Visit for monitoring Tikosyn therapy 7/25/2019     Past Surgical History:   Procedure Laterality Date   • ABLATION OF DYSRHYTHMIC FOCUS     • CARDIAC CATHETERIZATION     • TOTAL SHOULDER REPLACEMENT Right        Social History     Tobacco Use   • Smoking status: Current Some Day  Smoker   • Smokeless tobacco: Never Used   Vaping Use   • Vaping Use: Never used   Substance Use Topics   • Alcohol use: Yes   • Drug use: Defer     Family History   Adopted: Yes   Family history unknown: Yes       ALLERGY:  Aspirin and Penicillins    Current Outpatient Medications   Medication Sig Dispense Refill   • apixaban (ELIQUIS) 5 mg tablet Take 1 tablet (5 mg total) by mouth See admin instr. Use if you go into a-fib 180 tablet 3   • aspirin 81 mg enteric coated tablet Take 81 mg by mouth daily.     • atorvastatin (LIPITOR) 20 mg tablet TAKE ONE TABLET BY MOUTH DAILY 90 tablet 3   • dofetilide (TIKOSYN) 500 mcg capsule Take 1 capsule (500 mcg total) by mouth 2 (two) times a day. 180 capsule 1   • lisinopriL (PRINIVIL) 5 mg tablet Take 1 tablet (5 mg total) by mouth daily. 90 tablet 3   • metoprolol tartrate (LOPRESSOR) 25 mg tablet Take 1 tablet (25 mg total) by mouth every 6 (six) hours as needed (palpitations). 60 tablet 3     No current facility-administered medications for this visit.     Review of Systems   Constitutional: Negative.   HENT: Negative.    Eyes: Negative.    Cardiovascular: Negative.    Respiratory: Negative.    Endocrine: Negative.    Hematologic/Lymphatic: Negative.    Skin: Negative.    Musculoskeletal: Negative.    Gastrointestinal: Negative.    Genitourinary: Negative.    Neurological: Negative.    Psychiatric/Behavioral: Negative.      Objective   Vitals:    07/28/22 1514   BP: 124/80   Pulse: (!) 51   Resp: 18   SpO2: 96%     BP Readings from Last 3 Encounters:   07/28/22 124/80   01/14/22 130/90   07/08/21 130/90       Physical Exam  Constitutional:       Appearance: He is well-developed.   HENT:      Head: Normocephalic.   Eyes:      Conjunctiva/sclera: Conjunctivae normal.      Pupils: Pupils are equal, round, and reactive to light.   Cardiovascular:      Rate and Rhythm: Normal rate and regular rhythm.      Heart sounds: Normal heart sounds. No murmur heard.    No friction rub.  No gallop.   Pulmonary:      Effort: Pulmonary effort is normal.      Breath sounds: Normal breath sounds.   Abdominal:      General: Bowel sounds are normal.      Palpations: Abdomen is soft.   Musculoskeletal:         General: Normal range of motion.      Cervical back: Normal range of motion.      Comments: Status post left index finger amputation.   Skin:     General: Skin is warm and dry.   Neurological:      Mental Status: He is alert and oriented to person, place, and time.   Psychiatric:         Behavior: Behavior normal.         Thought Content: Thought content normal.         Judgment: Judgment normal.         Lab Results   Component Value Date    WBC 5.5 07/22/2022    HGB 15.1 07/22/2022     07/22/2022    CHOL 148 07/22/2022    TRIG 73 07/22/2022    HDL 43 07/22/2022    ALT 19 07/22/2022    AST 19 07/22/2022     07/22/2022    K 4.3 07/22/2022    CREATININE 0.92 07/22/2022    TSH 1.89 06/14/2021     Cardiovascular Procedures:    CATH LAB:  Cath (Normal Coronaries) - 2008    ELECTROPHYSIOLOGY:  EPS (PV ablation afib during study) - 12/11/2009  Holter (apc one 6 beat cst no aifb) - 5/13/2015    STRESS TESTS:    ECHO 5/28/2020  MPI (EF 0.60 (60%), lateral wal defect) - 6/9/2008  · Mean gradient = 5.00 mmHg. Peak velocity = 1.50 m/s. Calculated area = 3.15 cm2.  · Normal-sized LV. Normal LV wall thickness.  · Preserved LV systolic function. Estimated EF 65%.  · Normal-sized RV. Normal RV systolic function.  · Normal-sized LA.  · Normal-sized RA.  · Aortic root normal. Sinuses of Valsalva normal-sized. Ascending aorta normal-sized. Aortic arch normal-sized. Descending aorta normal-sized.  · Aortic valve structure is normal. Tricuspid aortic valve. No aortic valve regurgitation. No aortic valve stenosis.  · Normal leaflet structure and normal leaflet motion of the mitral valve.  · No significant mitral valve regurgitation.  · No significant mitral valve stenosis.  · Tricuspid valve structure is  normal. Mild tricuspid valve regurgitation.  · No significant tricuspid valve stenosis.  · Normal pulmonic valve structure. No pulmonic valve regurgitation. No pulmonic valve stenosis.  · IVC not well visualized.  · Normal pericardial structure. No evidence of pericardial effusion. No cardiac tamponade.  · Normal 2D echocardiogram color-flow Doppler study     Stress Echo 7/15/20  No evidence of ischemia with stress echocardiogram  The patient completed 7 mets of exercise achieved target heart rate  Baseline EKG normal  No changes diagnostic of ischemia with exercise frequent single VPCs noted with exercise  Baseline echocardiogram mildly dilated aortic root 4.0 cm  Mildly dilated left atrium  Mild left ventricular hypertrophy  No regional wall motion abnormality preserved ejection fraction 65%  No structural valvular disease  Trace mitral regurgitation  Mild tricuspid regurgitation estimated pulmonary systolic pressure 35 mmHg  All walls became hyperdynamic with exercise       Other (Admitted for Tikosyn loading) - 6/10/2015     ECG atrial fibrillation      Assessment   Problem List Items Addressed This Visit        Circulatory    Atrial fibrillation (CMS/HCC) - Primary     He continues to do well, bug not clear if his burden of AF is markedly increased. He does have some dyspnea that may in part be related to an increased burden. He is tolerating Tikosyn with no evidence of QT prolongation, nor any change in efficacy since he is changed to a different mail-order drugstore.      He is agreeable to resume oral anticoagulation. Will continue Tikosyn and consider monitoring for assessment of AF burden if symptoms change.           Relevant Medications    apixaban (ELIQUIS) 5 mg tablet    lisinopriL (PRINIVIL) 5 mg tablet    dofetilide (TIKOSYN) 500 mcg capsule    Other Relevant Orders    ECG 12 LEAD-OFFICE PERFORMED (Completed)    HTN (hypertension)     Well controlled on current medications.            Relevant  Medications    lisinopriL (PRINIVIL) 5 mg tablet    Other Relevant Orders    ECG 12 LEAD-OFFICE PERFORMED (Completed)       Endocrine/Metabolic    Lipid disorder     On atorvastatin; will check lipids.                      Sincere García MD  7/31/2022

## 2022-07-28 NOTE — PROGRESS NOTES
Electrophysiology  Outpatient Progress Note       Reason for visit:   Chief Complaint   Patient presents with   • Follow-up       HPI   Humberto Chung is a 71 y.o. male who is seen today for paroxysmal atrial fibrillation and hypertension. He has been well with no hospitalizations or illnesses. He has been doing very well with two brief episodes of AF since his last visit. The patient is currently on Tikosyn 500 mcg BID.  He is found to be in atrial fibrillation at today's visit.  Episodes approximately 1-2 times per month lasting 24 hours.  Symptoms include awareness of mild irregularity and difficulty sleeping.  It is difficult for him to tell if he has any increasing dyspnea on exertion or shortness of breath due to his underlying lung disease.  Unfortunately he does not take his prescribed anticoagulation regularly.    He has had no chest discomfort suggestive of ischemia. Mr. Chung has not had syncope or near syncope.      Past Medical History:   Diagnosis Date   • A-fib (CMS/HCC)    • A-fib (CMS/[facility]) 7/25/2018   • Chronic anticoagulation 7/25/2018   • HTN (hypertension) 6/24/2020   • Lipid disorder    • Visit for monitoring Tikosyn therapy 7/25/2019     Past Surgical History:   Procedure Laterality Date   • ABLATION OF DYSRHYTHMIC FOCUS     • CARDIAC CATHETERIZATION     • TOTAL SHOULDER REPLACEMENT Right        Social History     Tobacco Use   • Smoking status: Current Some Day Smoker   • Smokeless tobacco: Never Used   Vaping Use   • Vaping Use: Never used   Substance Use Topics   • Alcohol use: Yes   • Drug use: Defer     Family History   Adopted: Yes   Family history unknown: Yes       ALLERGY:  Aspirin and Penicillins    Current Outpatient Medications   Medication Sig Dispense Refill   • apixaban (ELIQUIS) 5 mg tablet Take 1 tablet (5 mg total) by mouth See admin instr. Use if you go into a-fib 180 tablet 3   • aspirin 81 mg enteric coated tablet Take 81 mg by mouth daily.     • atorvastatin (LIPITOR)  20 mg tablet TAKE ONE TABLET BY MOUTH DAILY 90 tablet 3   • dofetilide (TIKOSYN) 500 mcg capsule Take 1 capsule (500 mcg total) by mouth 2 (two) times a day. 180 capsule 1   • lisinopriL (PRINIVIL) 5 mg tablet Take 1 tablet (5 mg total) by mouth daily. 90 tablet 3   • metoprolol tartrate (LOPRESSOR) 25 mg tablet Take 1 tablet (25 mg total) by mouth every 6 (six) hours as needed (palpitations). 60 tablet 3     No current facility-administered medications for this visit.     Review of Systems   Constitutional: Negative.   HENT: Negative.    Eyes: Negative.    Cardiovascular: Negative.    Respiratory: Negative.    Endocrine: Negative.    Hematologic/Lymphatic: Negative.    Skin: Negative.    Musculoskeletal: Negative.    Gastrointestinal: Negative.    Genitourinary: Negative.    Neurological: Negative.    Psychiatric/Behavioral: Negative.      Objective   Vitals:    07/28/22 1514   BP: 124/80   Pulse: (!) 51   Resp: 18   SpO2: 96%     BP Readings from Last 3 Encounters:   07/28/22 124/80   01/14/22 130/90   07/08/21 130/90       Physical Exam  Constitutional:       Appearance: He is well-developed.   HENT:      Head: Normocephalic.   Eyes:      Conjunctiva/sclera: Conjunctivae normal.      Pupils: Pupils are equal, round, and reactive to light.   Cardiovascular:      Rate and Rhythm: Normal rate and regular rhythm.      Heart sounds: Normal heart sounds. No murmur heard.    No friction rub. No gallop.   Pulmonary:      Effort: Pulmonary effort is normal.      Breath sounds: Normal breath sounds.   Abdominal:      General: Bowel sounds are normal.      Palpations: Abdomen is soft.   Musculoskeletal:         General: Normal range of motion.      Cervical back: Normal range of motion.      Comments: Status post left index finger amputation.   Skin:     General: Skin is warm and dry.   Neurological:      Mental Status: He is alert and oriented to person, place, and time.   Psychiatric:         Behavior: Behavior normal.          Thought Content: Thought content normal.         Judgment: Judgment normal.         Lab Results   Component Value Date    WBC 5.5 07/22/2022    HGB 15.1 07/22/2022     07/22/2022    CHOL 148 07/22/2022    TRIG 73 07/22/2022    HDL 43 07/22/2022    ALT 19 07/22/2022    AST 19 07/22/2022     07/22/2022    K 4.3 07/22/2022    CREATININE 0.92 07/22/2022    TSH 1.89 06/14/2021     Cardiovascular Procedures:    CATH LAB:  Cath (Normal Coronaries) - 2008    ELECTROPHYSIOLOGY:  EPS (PV ablation afib during study) - 12/11/2009  Holter (apc one 6 beat cst no aifb) - 5/13/2015    STRESS TESTS:    ECHO 5/28/2020  MPI (EF 0.60 (60%), lateral wal defect) - 6/9/2008  · Mean gradient = 5.00 mmHg. Peak velocity = 1.50 m/s. Calculated area = 3.15 cm2.  · Normal-sized LV. Normal LV wall thickness.  · Preserved LV systolic function. Estimated EF 65%.  · Normal-sized RV. Normal RV systolic function.  · Normal-sized LA.  · Normal-sized RA.  · Aortic root normal. Sinuses of Valsalva normal-sized. Ascending aorta normal-sized. Aortic arch normal-sized. Descending aorta normal-sized.  · Aortic valve structure is normal. Tricuspid aortic valve. No aortic valve regurgitation. No aortic valve stenosis.  · Normal leaflet structure and normal leaflet motion of the mitral valve.  · No significant mitral valve regurgitation.  · No significant mitral valve stenosis.  · Tricuspid valve structure is normal. Mild tricuspid valve regurgitation.  · No significant tricuspid valve stenosis.  · Normal pulmonic valve structure. No pulmonic valve regurgitation. No pulmonic valve stenosis.  · IVC not well visualized.  · Normal pericardial structure. No evidence of pericardial effusion. No cardiac tamponade.  · Normal 2D echocardiogram color-flow Doppler study     Stress Echo 7/15/20  No evidence of ischemia with stress echocardiogram  The patient completed 7 mets of exercise achieved target heart rate  Baseline EKG normal  No changes  diagnostic of ischemia with exercise frequent single VPCs noted with exercise  Baseline echocardiogram mildly dilated aortic root 4.0 cm  Mildly dilated left atrium  Mild left ventricular hypertrophy  No regional wall motion abnormality preserved ejection fraction 65%  No structural valvular disease  Trace mitral regurgitation  Mild tricuspid regurgitation estimated pulmonary systolic pressure 35 mmHg  All walls became hyperdynamic with exercise       Other (Admitted for Tikosyn loading) - 6/10/2015     ECG atrial fibrillation      Assessment   Problem List Items Addressed This Visit        Circulatory    Atrial fibrillation (CMS/HCC) - Primary     He continues to do well, bug not clear if his burden of AF is markedly increased. He does have some dyspnea that may in part be related to an increased burden. He is tolerating Tikosyn with no evidence of QT prolongation, nor any change in efficacy since he is changed to a different mail-order drugstore.      He is agreeable to resume oral anticoagulation. Will continue Tikosyn and consider monitoring for assessment of AF burden if symptoms change.           Relevant Medications    apixaban (ELIQUIS) 5 mg tablet    lisinopriL (PRINIVIL) 5 mg tablet    dofetilide (TIKOSYN) 500 mcg capsule    Other Relevant Orders    ECG 12 LEAD-OFFICE PERFORMED (Completed)    HTN (hypertension)     Well controlled on current medications.            Relevant Medications    lisinopriL (PRINIVIL) 5 mg tablet    Other Relevant Orders    ECG 12 LEAD-OFFICE PERFORMED (Completed)       Endocrine/Metabolic    Lipid disorder     On atorvastatin; will check lipids.                      Sincere García MD  7/31/2022

## 2022-07-29 ENCOUNTER — TELEPHONE (OUTPATIENT)
Dept: SCHEDULING | Facility: CLINIC | Age: 72
End: 2022-07-29
Payer: MEDICARE

## 2022-07-29 NOTE — TELEPHONE ENCOUNTER
Ming from [facility] calling to speak with Dr. García regarding pt's tikosyn 500mcg medication.    Ming stated that the medication is primarily dispensed in an inpatient facility, and would like to know if pt has previously taken tikosyn.    Ming can be reached at [phone] opt. 2

## 2022-07-31 NOTE — ASSESSMENT & PLAN NOTE
He continues to do well, bug not clear if his burden of AF is markedly increased. He does have some dyspnea that may in part be related to an increased burden. He is tolerating Tikosyn with no evidence of QT prolongation, nor any change in efficacy since he is changed to a different mail-order drugstore.      He is agreeable to resume oral anticoagulation. Will continue Tikosyn and consider monitoring for assessment of AF burden if symptoms change.

## 2022-08-01 DIAGNOSIS — I48.91 ATRIAL FIBRILLATION, UNSPECIFIED TYPE (CMS/HCC): Primary | ICD-10-CM

## 2022-08-08 ENCOUNTER — TELEPHONE (OUTPATIENT)
Dept: CARDIOLOGY | Facility: CLINIC | Age: 72
End: 2022-08-08
Payer: MEDICARE

## 2022-08-08 NOTE — TELEPHONE ENCOUNTER
Received call back from [facility] w/ same question     C/B # is [phone]  Ref# [id number]

## 2022-08-08 NOTE — TELEPHONE ENCOUNTER
[facility], clarified that the patient will be beginning this medication on a daily basis due to increasing AF burden. Prescription for 5 mg BID confirmed. Thanks.

## 2022-08-08 NOTE — TELEPHONE ENCOUNTER
I received a call from [facility] regarding this patients Eliquis.     They want clarification on the directions.     Please confirm that pt should be taking Eliquis 1 tab and how often?  Is it one time only? Is it BID? Is it QD?     It looks like this is prn afib.      Please resend prescription with the correct instructions.

## 2022-12-17 ENCOUNTER — OFFICE VISIT (OUTPATIENT)
Dept: FAMILY MEDICINE CLINIC | Facility: CLINIC | Age: 72
End: 2022-12-17

## 2022-12-17 VITALS — HEIGHT: 71 IN | OXYGEN SATURATION: 93 % | WEIGHT: 264 LBS | BODY MASS INDEX: 36.96 KG/M2 | TEMPERATURE: 96.1 F

## 2022-12-17 DIAGNOSIS — Z00.00 MEDICARE ANNUAL WELLNESS VISIT, SUBSEQUENT: Primary | ICD-10-CM

## 2022-12-17 DIAGNOSIS — L25.9 CONTACT DERMATITIS, UNSPECIFIED CONTACT DERMATITIS TYPE, UNSPECIFIED TRIGGER: ICD-10-CM

## 2022-12-17 DIAGNOSIS — J00 ACUTE RHINITIS: ICD-10-CM

## 2022-12-17 DIAGNOSIS — I48.0 PAROXYSMAL ATRIAL FIBRILLATION (HCC): ICD-10-CM

## 2022-12-17 DIAGNOSIS — E78.2 MIXED HYPERLIPIDEMIA: ICD-10-CM

## 2022-12-17 DIAGNOSIS — Z20.822 SUSPECTED COVID-19 VIRUS INFECTION: ICD-10-CM

## 2022-12-17 DIAGNOSIS — E66.01 OBESITY, MORBID (HCC): ICD-10-CM

## 2022-12-17 DIAGNOSIS — J40 BRONCHITIS: ICD-10-CM

## 2022-12-17 RX ORDER — PREDNISONE 20 MG/1
TABLET ORAL
Qty: 15 TABLET | Refills: 1 | Status: SHIPPED | OUTPATIENT
Start: 2022-12-17

## 2022-12-17 RX ORDER — CEFUROXIME AXETIL 500 MG/1
500 TABLET ORAL EVERY 12 HOURS SCHEDULED
Qty: 14 TABLET | Refills: 0 | Status: SHIPPED | OUTPATIENT
Start: 2022-12-17 | End: 2022-12-24

## 2022-12-17 NOTE — PATIENT INSTRUCTIONS
Medicare Preventive Visit Patient Instructions  Thank you for completing your Welcome to Medicare Visit or Medicare Annual Wellness Visit today  Your next wellness visit will be due in one year (12/18/2023)  The screening/preventive services that you may require over the next 5-10 years are detailed below  Some tests may not apply to you based off risk factors and/or age  Screening tests ordered at today's visit but not completed yet may show as past due  Also, please note that scanned in results may not display below  Preventive Screenings:  Service Recommendations Previous Testing/Comments   Colorectal Cancer Screening  · Colonoscopy    · Fecal Occult Blood Test (FOBT)/Fecal Immunochemical Test (FIT)  · Fecal DNA/Cologuard Test  · Flexible Sigmoidoscopy Age: 39-70 years old   Colonoscopy: every 10 years (May be performed more frequently if at higher risk)  OR  FOBT/FIT: every 1 year  OR  Cologuard: every 3 years  OR  Sigmoidoscopy: every 5 years  Screening may be recommended earlier than age 39 if at higher risk for colorectal cancer  Also, an individualized decision between you and your healthcare provider will decide whether screening between the ages of 74-80 would be appropriate   Colonoscopy: Not on file  FOBT/FIT: Not on file  Cologuard: Not on file  Sigmoidoscopy: Not on file          Prostate Cancer Screening Individualized decision between patient and health care provider in men between ages of 53-78   Medicare will cover every 12 months beginning on the day after your 50th birthday PSA: 2 2 ng/mL           Hepatitis C Screening Once for adults born between 1945 and 1965  More frequently in patients at high risk for Hepatitis C Hep C Antibody: Not on file        Diabetes Screening 1-2 times per year if you're at risk for diabetes or have pre-diabetes Fasting glucose: No results in last 5 years (No results in last 5 years)  A1C: 5 9 % (7/11/2021)      Cholesterol Screening Once every 5 years if you don't have a lipid disorder  May order more often based on risk factors  Lipid panel: 03/05/2019         Other Preventive Screenings Covered by Medicare:  1  Abdominal Aortic Aneurysm (AAA) Screening: covered once if your at risk  You're considered to be at risk if you have a family history of AAA or a male between the age of 73-68 who smoking at least 100 cigarettes in your lifetime  2  Lung Cancer Screening: covers low dose CT scan once per year if you meet all of the following conditions: (1) Age 50-69; (2) No signs or symptoms of lung cancer; (3) Current smoker or have quit smoking within the last 15 years; (4) You have a tobacco smoking history of at least 20 pack years (packs per day x number of years you smoked); (5) You get a written order from a healthcare provider  3  Glaucoma Screening: covered annually if you're considered high risk: (1) You have diabetes OR (2) Family history of glaucoma OR (3)  aged 48 and older OR (3)  American aged 72 and older  3  Osteoporosis Screening: covered every 2 years if you meet one of the following conditions: (1) Have a vertebral abnormality; (2) On glucocorticoid therapy for more than 3 months; (3) Have primary hyperparathyroidism; (4) On osteoporosis medications and need to assess response to drug therapy  5  HIV Screening: covered annually if you're between the age of 12-76  Also covered annually if you are younger than 13 and older than 72 with risk factors for HIV infection  For pregnant patients, it is covered up to 3 times per pregnancy      Immunizations:  Immunization Recommendations   Influenza Vaccine Annual influenza vaccination during flu season is recommended for all persons aged >= 6 months who do not have contraindications   Pneumococcal Vaccine   * Pneumococcal conjugate vaccine = PCV13 (Prevnar 13), PCV15 (Vaxneuvance), PCV20 (Prevnar 20)  * Pneumococcal polysaccharide vaccine = PPSV23 (Pneumovax) Adults 25-60 years old: 1-3 doses may be recommended based on certain risk factors  Adults 72 years old: 1-2 doses may be recommended based off what pneumonia vaccine you previously received   Hepatitis B Vaccine 3 dose series if at intermediate or high risk (ex: diabetes, end stage renal disease, liver disease)   Tetanus (Td) Vaccine - COST NOT COVERED BY MEDICARE PART B Following completion of primary series, a booster dose should be given every 10 years to maintain immunity against tetanus  Td may also be given as tetanus wound prophylaxis  Tdap Vaccine - COST NOT COVERED BY MEDICARE PART B Recommended at least once for all adults  For pregnant patients, recommended with each pregnancy  Shingles Vaccine (Shingrix) - COST NOT COVERED BY MEDICARE PART B  2 shot series recommended in those aged 48 and above     Health Maintenance Due:      Topic Date Due   • Hepatitis C Screening  Never done   • Colorectal Cancer Screening  Never done     Immunizations Due:      Topic Date Due   • Hepatitis B Vaccine (1 of 3 - 3-dose series) Never done   • COVID-19 Vaccine (1) Never done   • Pneumococcal Vaccine: 65+ Years (1 - PCV) Never done   • Influenza Vaccine (1) Never done     Advance Directives   What are advance directives? Advance directives are legal documents that state your wishes and plans for medical care  These plans are made ahead of time in case you lose your ability to make decisions for yourself  Advance directives can apply to any medical decision, such as the treatments you want, and if you want to donate organs  What are the types of advance directives? There are many types of advance directives, and each state has rules about how to use them  You may choose a combination of any of the following:  · Living will: This is a written record of the treatment you want  You can also choose which treatments you do not want, which to limit, and which to stop at a certain time   This includes surgery, medicine, IV fluid, and tube feedings  · Durable power of  for healthcare [facility]): This is a written record that states who you want to make healthcare choices for you when you are unable to make them for yourself  This person, called a proxy, is usually a family member or a friend  You may choose more than 1 proxy  · Do not resuscitate (DNR) order:  A DNR order is used in case your heart stops beating or you stop breathing  It is a request not to have certain forms of treatment, such as CPR  A DNR order may be included in other types of advance directives  · Medical directive: This covers the care that you want if you are in a coma, near death, or unable to make decisions for yourself  You can list the treatments you want for each condition  Treatment may include pain medicine, surgery, blood transfusions, dialysis, IV or tube feedings, and a ventilator (breathing machine)  · Values history: This document has questions about your views, beliefs, and how you feel and think about life  This information can help others choose the care that you would choose  Why are advance directives important? An advance directive helps you control your care  Although spoken wishes may be used, it is better to have your wishes written down  Spoken wishes can be misunderstood, or not followed  Treatments may be given even if you do not want them  An advance directive may make it easier for your family to make difficult choices about your care  Weight Management   Why it is important to manage your weight:  Being overweight increases your risk of health conditions such as heart disease, high blood pressure, type 2 diabetes, and certain types of cancer  It can also increase your risk for osteoarthritis, sleep apnea, and other respiratory problems  Aim for a slow, steady weight loss  Even a small amount of weight loss can lower your risk of health problems    How to lose weight safely:  A safe and healthy way to lose weight is to eat fewer calories and get regular exercise  You can lose up about 1 pound a week by decreasing the number of calories you eat by 500 calories each day  Healthy meal plan for weight management:  A healthy meal plan includes a variety of foods, contains fewer calories, and helps you stay healthy  A healthy meal plan includes the following:  · Eat whole-grain foods more often  A healthy meal plan should contain fiber  Fiber is the part of grains, fruits, and vegetables that is not broken down by your body  Whole-grain foods are healthy and provide extra fiber in your diet  Some examples of whole-grain foods are whole-wheat breads and pastas, oatmeal, brown rice, and bulgur  · Eat a variety of vegetables every day  Include dark, leafy greens such as spinach, kale, everton greens, and mustard greens  Eat yellow and orange vegetables such as carrots, sweet potatoes, and winter squash  · Eat a variety of fruits every day  Choose fresh or canned fruit (canned in its own juice or light syrup) instead of juice  Fruit juice has very little or no fiber  · Eat low-fat dairy foods  Drink fat-free (skim) milk or 1% milk  Eat fat-free yogurt and low-fat cottage cheese  Try low-fat cheeses such as mozzarella and other reduced-fat cheeses  · Choose meat and other protein foods that are low in fat  Choose beans or other legumes such as split peas or lentils  Choose fish, skinless poultry (chicken or turkey), or lean cuts of red meat (beef or pork)  Before you cook meat or poultry, cut off any visible fat  · Use less fat and oil  Try baking foods instead of frying them  Add less fat, such as margarine, sour cream, regular salad dressing and mayonnaise to foods  Eat fewer high-fat foods  Some examples of high-fat foods include french fries, doughnuts, ice cream, and cakes  · Eat fewer sweets  Limit foods and drinks that are high in sugar  This includes candy, cookies, regular soda, and sweetened drinks    Exercise:  Exercise at least 30 minutes per day on most days of the week  Some examples of exercise include walking, biking, dancing, and swimming  You can also fit in more physical activity by taking the stairs instead of the elevator or parking farther away from stores  Ask your healthcare provider about the best exercise plan for you  © Copyright [employer] 2018 Information is for End User's use only and may not be sold, redistributed or otherwise used for commercial purposes   All illustrations and images included in CareNotes® are the copyrighted property of [employer]  or [address] [place] [address]

## 2022-12-17 NOTE — PROGRESS NOTES
Assessment and Plan:     Problem List Items Addressed This Visit        Cardiovascular and Mediastinum    A-fib ([address] )    Relevant Medications    apixaban (Eliquis) 5 mg       Other    Hyperlipidemia   Other Visit Diagnoses     Medicare annual wellness visit, subsequent    -  Primary    Acute rhinitis        Suspected COVID-19 virus infection            BMI Counseling: Body mass index is 36 82 kg/m²  The BMI is above normal  Nutrition recommendations include decreasing portion sizes  Exercise recommendations include moderate physical activity 150 minutes/week  No pharmacotherapy was ordered  Rationale for BMI follow-up plan is due to patient being overweight or obese  Depression Screening and Follow-up Plan: Patient was screened for depression during today's encounter  They screened negative with a PHQ-2 score of 0  Preventive health issues were discussed with patient, and age appropriate screening tests were ordered as noted in patient's After Visit Summary  Personalized health advice and appropriate referrals for health education or preventive services given if needed, as noted in patient's After Visit Summary  History of Present Illness:     Patient presents for a Medicare Wellness Visit    HPI   Patient Care Team:  Eliot Junior MD as PCP - General     Review of Systems:     Review of Systems   Constitutional: Negative for fatigue, fever and unexpected weight change  HENT: Negative for congestion, sinus pain and sore throat  Eyes: Negative for visual disturbance  Respiratory: Negative for shortness of breath and wheezing  Cardiovascular: Negative for chest pain and palpitations  Gastrointestinal: Negative for abdominal pain, nausea and vomiting  Musculoskeletal: Negative  Negative for arthralgias and myalgias  Neurological: Negative for syncope, weakness and numbness  Psychiatric/Behavioral: Negative  Negative for confusion, dysphoric mood and suicidal ideas          Problem List:     Patient Active Problem List   Diagnosis   • Hyperlipidemia   • A-fib [facility])      Past Medical and Surgical History:     Past Medical History:   Diagnosis Date   • A-fib ([address] )    • History of stomach ulcers    • Hyperlipidemia    • Hypertension    • Pyloric stenosis      Past Surgical History:   Procedure Laterality Date   • AV NODE ABLATION     • WV AMPUTATE METACARPAL+FINGER Left 3/27/2019    Procedure: INDEX FINGER AMPUTATION;  Surgeon: Kevin Hurtado MD;  [facility];  Service: Plastics   • SHOULDER SURGERY     • SPLIT THICKNESS SKIN GRAFT Left 3/27/2019    Procedure: MID AND RING FINGER SPLIT THICKNESS SKIN GRAFT;  Surgeon: Kevin Hurtado MD;  [facility];  Service: Plastics      Family History:     Family History   Problem Relation Age of Onset   • No Known Problems Mother    • No Known Problems Father    • Stroke Maternal Grandmother    • Stroke Paternal Grandmother    • Substance Abuse Son    • Alcohol abuse Son    • Mental illness Neg Hx       Social History:     Social History     Socioeconomic History   • Marital status: /Civil Union     Spouse name: None   • Number of children: None   • Years of education: None   • Highest education level: None   Occupational History   • None   Tobacco Use   • Smoking status: Former     Types: Pipe   • Smokeless tobacco: Never   Substance and Sexual Activity   • Alcohol use: Yes     Alcohol/week: 6 0 standard drinks     Types: 6 Cans of beer per week   • Drug use: No   • Sexual activity: None   Other Topics Concern   • None   Social History Narrative    Never a smoker - As per [employer]      Social Determinants of Health     Financial Resource Strain: Low Risk    • Difficulty of Paying Living Expenses: Not hard at all   Food Insecurity: Not on file   Transportation Needs: No Transportation Needs   • Lack of Transportation (Medical): No   • Lack of Transportation (Non-Medical):  No   Physical Activity: Not on file   Stress: Not on file   Social Connections: Not on file   Intimate Partner Violence: Not on file   Housing Stability: Not on file      Medications and Allergies:     Current Outpatient Medications   Medication Sig Dispense Refill   • apixaban (Eliquis) 5 mg Take 1 tablet (5 mg total) by mouth 2 (two) times a day 180 tablet 3   • aspirin (ECOTRIN LOW STRENGTH) 81 mg EC tablet Take by mouth     • atorvastatin (LIPITOR) 20 mg tablet Take 1 tablet by mouth daily     • dofetilide (TIKOSYN) 500 mcg capsule Take by mouth     • FLUoxetine (PROzac) 20 MG tablet Take 1 tablet (20 mg total) by mouth daily 90 tablet 3   • lisinopril (ZESTRIL) 2 5 mg tablet Take 2 5 mg by mouth daily     • Naproxen Sodium 220 MG CAPS Take 220 mg by mouth     • predniSONE 20 mg tablet TAKE 1 TABLET BID X 5 DAYS THEN TAKE 1 TABLET QD FOR 5 DAYS 15 tablet 1   • metoprolol tartrate (LOPRESSOR) 25 mg tablet Take 25 mg by mouth every 6 (six) hours as needed       No current facility-administered medications for this visit  Allergies   Allergen Reactions   • Penicillins       Immunizations:     Immunization History   Administered Date(s) Administered   • Tdap 03/23/2019      Health Maintenance:         Topic Date Due   • Hepatitis C Screening  Never done   • Colorectal Cancer Screening  Never done         Topic Date Due   • Hepatitis B Vaccine (1 of 3 - 3-dose series) Never done   • COVID-19 Vaccine (1) Never done   • Pneumococcal Vaccine: 65+ Years (1 - PCV) Never done   • Influenza Vaccine (1) Never done      Medicare Screening Tests and Risk Assessments:     Last Medicare Wellness visit information reviewed, patient interviewed and updates made to the record today  Health Risk Assessment:   Patient rates overall health as good  Patient feels that their physical health rating is same  Patient is satisfied with their life  Eyesight was rated as same  Hearing was rated as same  Patient feels that their emotional and mental health rating is same   Patients states they are sometimes angry  Patient states they are sometimes unusually tired/fatigued  Pain experienced in the last 7 days has been none  Patient states that he has experienced weight loss or gain in last 6 months  Depression Screening:   PHQ-2 Score: 0  PHQ-9 Score: 4      Fall Risk Screening: In the past year, patient has experienced: no history of falling in past year      Home Safety:  Patient does not have trouble with stairs inside or outside of their home  Patient has working smoke alarms and has working carbon monoxide detector  Home safety hazards include: none  Nutrition:   Current diet is Regular  Medications:   Patient is not currently taking any over-the-counter supplements  Patient is able to manage medications  Activities of Daily Living (ADLs)/Instrumental Activities of Daily Living (IADLs):   Walk and transfer into and out of bed and chair?: Yes  Dress and groom yourself?: Yes    Bathe or shower yourself?: Yes    Feed yourself? Yes  Do your laundry/housekeeping?: Yes  Manage your money, pay your bills and track your expenses?: Yes  Make your own meals?: Yes    Do your own shopping?: Yes    Previous Hospitalizations:   Any hospitalizations or ED visits within the last 12 months?: No      Advance Care Planning:   Living will: Yes    Durable POA for healthcare:  Yes    Advanced directive: Yes    Provider agrees with end of life decisions: No      Cognitive Screening:   Provider or family/friend/caregiver concerned regarding cognition?: No    PREVENTIVE SCREENINGS      Cardiovascular Screening:    General: Screening Current      Colorectal Cancer Screening:     General: Risks and Benefits Discussed      Osteoporosis Screening:    General: Risks and Benefits Discussed      Abdominal Aortic Aneurysm (AAA) Screening:    Risk factors include: age between 73-69 yo and tobacco use        Lung Cancer Screening:     General: Screening Not Indicated      Hepatitis C Screening:    General: Patient Declines    No results found  Physical Exam:     Temp (!) 96 1 °F (35 6 °C) (Tympanic)   Ht 5' 11" (1 803 m)   Wt 120 kg (264 lb)   SpO2 93%   BMI 36 82 kg/m²     Physical Exam  Vitals and nursing note reviewed  Constitutional:       General: He is not in acute distress  Appearance: He is well-developed  HENT:      Head: Normocephalic and atraumatic  Eyes:      Conjunctiva/sclera: Conjunctivae normal    Cardiovascular:      Rate and Rhythm: Normal rate and regular rhythm  Heart sounds: No murmur heard  Pulmonary:      Effort: Pulmonary effort is normal  No respiratory distress  Breath sounds: Normal breath sounds  Abdominal:      Palpations: Abdomen is soft  Tenderness: There is no abdominal tenderness  Musculoskeletal:         General: No swelling  Cervical back: Neck supple  Skin:     General: Skin is warm and dry  Capillary Refill: Capillary refill takes less than 2 seconds  Neurological:      Mental Status: He is alert     Psychiatric:         Mood and Affect: Mood normal           Lorie Randolph MD

## 2022-12-19 ENCOUNTER — TELEPHONE (OUTPATIENT)
Dept: FAMILY MEDICINE CLINIC | Facility: CLINIC | Age: 72
End: 2022-12-19

## 2022-12-19 LAB
FLUAV RNA RESP QL NAA+PROBE: NEGATIVE
FLUBV RNA RESP QL NAA+PROBE: NEGATIVE
SARS-COV-2 RNA RESP QL NAA+PROBE: NEGATIVE

## 2022-12-20 NOTE — TELEPHONE ENCOUNTER
----- Message from Dory Chew MD sent at 12/19/2022 12:39 PM EST -----  Neg covid  ----- Message -----  From: Lab, Background User  Sent: 12/19/2022  11:57 AM EST  To: Dory Chew MD

## 2022-12-22 ENCOUNTER — TELEPHONE (OUTPATIENT)
Dept: FAMILY MEDICINE CLINIC | Facility: CLINIC | Age: 72
End: 2022-12-22

## 2022-12-22 DIAGNOSIS — R05.9 COUGH, UNSPECIFIED TYPE: Primary | ICD-10-CM

## 2022-12-22 NOTE — TELEPHONE ENCOUNTER
Patient called in because he was in on 12/17/22 and he's not getting any better  Symptoms include a bad cough and still has congestion with wheezing  Patient states that Lee Jo mentioned an x-ray, but he isn't sure if he should get that first or have something prescribed  Patient has to sit to sleep because he can't lay down  [facility] [phone]

## 2022-12-22 NOTE — TELEPHONE ENCOUNTER
MD Ghazal Crowder MA  Caller: Unspecified (Today,  9:06 AM)  I am confused---did he start Ceftin? ?  If so what does he want Rxed? ?--TW will ck [facility]       I called and spoke to patient -- he just started the Ceftin on 12/20/22 and he only took a day or 2 of Prednisone     will restart the Prednisone and complete the abx  Per Dr Hedy Ross it no improvement with the abx & pred, to get CXR -- order placed on chart for same

## 2023-01-19 ENCOUNTER — TELEPHONE (OUTPATIENT)
Dept: SCHEDULING | Facility: CLINIC | Age: 73
End: 2023-01-19
Payer: MEDICARE

## 2023-01-19 NOTE — TELEPHONE ENCOUNTER
I spoke to patient. He reports that his atrial fibrillation has increased in frequency more recently. He is now having AF every other day, lasting about 12 hours in duration. Patient confirms that he is taking Tikosyn 500mcg bid and Eliquis. He recently ran out of Metoprolol, and I confirmed that a refill was sent to his pharmacy on 01/17/23. Patient will pick it up and resume. Patient is in NSR today. He is scheduled to see his PCP tomorrow for SOB unrelated to his atrial fibrillation.     Guerda-patient is requesting a sooner appointment with Dr. García (currently scheduled 02/23/23). Patient typically is seen in [place]. Can you check to see if there is any sooner appt options available? TY!

## 2023-01-19 NOTE — TELEPHONE ENCOUNTER
Patient Name: Humberto Chung    Relationship: self     Reason for call: pt says he has been going into Afib a lot lately and is very concerned.    Pt is requesting sooner appt     Callback number: [phone]

## 2023-01-20 ENCOUNTER — OFFICE VISIT (OUTPATIENT)
Dept: CARDIOLOGY | Facility: CLINIC | Age: 73
End: 2023-01-20
Payer: MEDICARE

## 2023-01-20 ENCOUNTER — OFFICE VISIT (OUTPATIENT)
Dept: FAMILY MEDICINE CLINIC | Facility: CLINIC | Age: 73
End: 2023-01-20

## 2023-01-20 ENCOUNTER — HOSPITAL ENCOUNTER (OUTPATIENT)
Dept: RADIOLOGY | Facility: HOSPITAL | Age: 73
End: 2023-01-20

## 2023-01-20 VITALS
BODY MASS INDEX: 36.12 KG/M2 | TEMPERATURE: 96.2 F | OXYGEN SATURATION: 90 % | HEART RATE: 72 BPM | WEIGHT: 259 LBS | SYSTOLIC BLOOD PRESSURE: 132 MMHG | DIASTOLIC BLOOD PRESSURE: 82 MMHG

## 2023-01-20 VITALS
WEIGHT: 260 LBS | HEIGHT: 71 IN | BODY MASS INDEX: 36.4 KG/M2 | DIASTOLIC BLOOD PRESSURE: 82 MMHG | SYSTOLIC BLOOD PRESSURE: 140 MMHG | HEART RATE: 70 BPM | RESPIRATION RATE: 16 BRPM

## 2023-01-20 DIAGNOSIS — I48.0 PAROXYSMAL ATRIAL FIBRILLATION (HCC): ICD-10-CM

## 2023-01-20 DIAGNOSIS — E66.01 OBESITY, MORBID (HCC): ICD-10-CM

## 2023-01-20 DIAGNOSIS — I48.91 ATRIAL FIBRILLATION, UNSPECIFIED TYPE (CMS/HCC): Primary | ICD-10-CM

## 2023-01-20 DIAGNOSIS — R06.02 SHORTNESS OF BREATH: Primary | ICD-10-CM

## 2023-01-20 DIAGNOSIS — R05.9 COUGH, UNSPECIFIED TYPE: ICD-10-CM

## 2023-01-20 PROCEDURE — G8754 DIAS BP LESS 90: HCPCS | Performed by: INTERNAL MEDICINE

## 2023-01-20 PROCEDURE — G8753 SYS BP > OR = 140: HCPCS | Performed by: INTERNAL MEDICINE

## 2023-01-20 PROCEDURE — 99215 OFFICE O/P EST HI 40 MIN: CPT | Performed by: INTERNAL MEDICINE

## 2023-01-20 PROCEDURE — 93000 ELECTROCARDIOGRAM COMPLETE: CPT | Performed by: INTERNAL MEDICINE

## 2023-01-20 RX ORDER — ALBUTEROL SULFATE 90 UG/1
2 AEROSOL, METERED RESPIRATORY (INHALATION) EVERY 6 HOURS PRN
Qty: 6.7 G | Refills: 5 | Status: SHIPPED | OUTPATIENT
Start: 2023-01-20

## 2023-01-20 ASSESSMENT — ENCOUNTER SYMPTOMS
CONSTITUTIONAL NEGATIVE: 1
RESPIRATORY NEGATIVE: 1
ENDOCRINE NEGATIVE: 1
EYES NEGATIVE: 1
PSYCHIATRIC NEGATIVE: 1
GASTROINTESTINAL NEGATIVE: 1
MUSCULOSKELETAL NEGATIVE: 1
NEUROLOGICAL NEGATIVE: 1
CARDIOVASCULAR NEGATIVE: 1
HEMATOLOGIC/LYMPHATIC NEGATIVE: 1

## 2023-01-20 NOTE — PROGRESS NOTES
[facility]        NAME: Gerard Ames is a 71 y o  male  : 1950    MRN: [id number]  DATE: 2023  TIME: 9:12 AM    Assessment and Plan   Shortness of breath [R06 02]  1  Shortness of breath  albuterol (Proventil HFA) 90 mcg/act inhaler      2  Paroxysmal atrial fibrillation (HCC)        3  Obesity, morbid [facility])              Patient Instructions     Patient Instructions   Chest Xray as previously ordered   Albuterol inhaler as ordered for shortness of breath  Go to ED if you have any respiratory distress  F/up with cardiologist as scheduled today  Call with problems/concerns          Chief Complaint     Chief Complaint   Patient presents with   • Shortness of Breath     Getting worse as days go by  • Cough   • runny nose         History of Present Illness       C/o increased shortness of breath getting since having a cold  Denies chest pain  Treated with prednisone and Ceftin a few weeks ago  Patient states cold symptoms have improved, has a mild cough  Hx of Afib-taking Eliquis  He states his pulse ox at home has been in upper 80s-usually in low 90s  Pulse ox in office today is 90-patient is not in any respiratory distress at present  BP is stable  He also has appointment with his cardiologist this afternoon  Review of Systems   Review of Systems   Constitutional: Positive for activity change  Negative for fever  HENT: Positive for rhinorrhea  Negative for congestion, ear pain, facial swelling, sinus pressure, sinus pain, sore throat and trouble swallowing  Respiratory: Positive for cough and shortness of breath  Negative for wheezing  Cardiovascular: Negative for chest pain and leg swelling  Hx of Afib   Genitourinary: Negative for difficulty urinating  Skin: Negative for color change and pallor  Neurological: Negative for dizziness, weakness and headaches  Psychiatric/Behavioral: Negative for dysphoric mood           Current Medications Current Outpatient Medications:   •  albuterol (Proventil HFA) 90 mcg/act inhaler, Inhale 2 puffs every 6 (six) hours as needed for wheezing, Disp: 6 7 g, Rfl: 5  •  apixaban (Eliquis) 5 mg, Take 1 tablet (5 mg total) by mouth 2 (two) times a day, Disp: 180 tablet, Rfl: 3  •  aspirin (ECOTRIN LOW STRENGTH) 81 mg EC tablet, Take by mouth, Disp: , Rfl:   •  atorvastatin (LIPITOR) 20 mg tablet, Take 1 tablet by mouth daily, Disp: , Rfl:   •  dofetilide (TIKOSYN) 500 mcg capsule, Take by mouth, Disp: , Rfl:   •  lisinopril (ZESTRIL) 2 5 mg tablet, Take 2 5 mg by mouth daily, Disp: , Rfl:   •  FLUoxetine (PROzac) 20 MG tablet, Take 1 tablet (20 mg total) by mouth daily (Patient not taking: Reported on 1/20/2023), Disp: 90 tablet, Rfl: 3  •  metoprolol tartrate (LOPRESSOR) 25 mg tablet, Take 25 mg by mouth every 6 (six) hours as needed, Disp: , Rfl:   •  Naproxen Sodium 220 MG CAPS, Take 220 mg by mouth (Patient not taking: Reported on 1/20/2023), Disp: , Rfl:   •  predniSONE 20 mg tablet, TAKE 1 TABLET BID X 5 DAYS THEN TAKE 1 TABLET QD FOR 5 DAYS (Patient not taking: Reported on 1/20/2023), Disp: 15 tablet, Rfl: 1    Current Allergies     Allergies as of 01/20/2023 - Reviewed 01/20/2023   Allergen Reaction Noted   • Penicillins  07/05/2017            The following portions of the patient's history were reviewed and updated as appropriate: allergies, current medications, past family history, past medical history, past social history, past surgical history and problem list      Past Medical History:   Diagnosis Date   • A-fib ([facility] [address] )    • History of stomach ulcers    • Hyperlipidemia    • Hypertension    • Pyloric stenosis        Past Surgical History:   Procedure Laterality Date   • AV NODE ABLATION     • OK AMP MTCRPL W/FINGER/THUMB W/WO INTEROSS TRANSFER Left 3/27/2019    Procedure: INDEX FINGER AMPUTATION;  Surgeon: Taisha London MD;  Location: [facility];  Service: Plastics   • SHOULDER SURGERY     • SPLIT THICKNESS SKIN GRAFT Left 3/27/2019    Procedure: MID AND RING FINGER SPLIT THICKNESS SKIN GRAFT;  Surgeon: Zoe Mesa MD;  Location: [facility];  Service: Plastics       Family History   Problem Relation Age of Onset   • No Known Problems Mother    • No Known Problems Father    • Stroke Maternal Grandmother    • Stroke Paternal Grandmother    • Substance Abuse Son    • Alcohol abuse Son    • Mental illness Neg Hx          Medications have been verified          Objective   /82   Pulse 72   Temp (!) 96 2 °F (35 7 °C) (Tympanic)   Wt 117 kg (259 lb)   SpO2 90%   BMI 36 12 kg/m²        Physical Exam     Physical Exam

## 2023-01-20 NOTE — PROGRESS NOTES
Electrophysiology  Outpatient Progress Note       Reason for visit:   Chief Complaint   Patient presents with   • Atrial fibrillation, unspecfied type        HPI   Humberto Chung is a 72 y.o. male who is seen today for paroxysmal atrial fibrillation and hypertension. He has been well with no hospitalizations or illnesses. He has been doing very well with two brief episodes of AF since his last visit. The patient is currently on Tikosyn 500 mcg BID.  He has noted increasing episodes of atrial fibrillation, now occurring a couple times per week.  He does note some increased stress which could be contributing.      He has had no chest discomfort suggestive of ischemia. Mr. Chung has not had syncope or near syncope.      Past Medical History:   Diagnosis Date   • A-fib (CMS/[facility])    • A-fib (CMS/[facility]) 7/25/2018   • Chronic anticoagulation 7/25/2018   • HTN (hypertension) 6/24/2020   • Lipid disorder    • Visit for monitoring Tikosyn therapy 7/25/2019     Past Surgical History:   Procedure Laterality Date   • ABLATION OF DYSRHYTHMIC FOCUS     • CARDIAC CATHETERIZATION     • TOTAL SHOULDER REPLACEMENT Right        Social History     Tobacco Use   • Smoking status: Some Days   • Smokeless tobacco: Never   Vaping Use   • Vaping Use: Never used   Substance Use Topics   • Alcohol use: Yes   • Drug use: Defer     Family History   Adopted: Yes   Family history unknown: Yes       ALLERGY:  Aspirin and Penicillins    Current Outpatient Medications   Medication Sig Dispense Refill   • apixaban (ELIQUIS) 5 mg tablet Take 1 tablet (5 mg total) by mouth See admin instr. Use if you go into a-fib (Patient taking differently: Take 5 mg by mouth 2 (two) times a day. Use if you go into a-fib) 180 tablet 1   • aspirin 81 mg enteric coated tablet Take 81 mg by mouth daily.     • atorvastatin (LIPITOR) 20 mg tablet TAKE ONE TABLET BY MOUTH DAILY 90 tablet 3   • dofetilide (TIKOSYN) 500 mcg capsule Take 1 capsule (500 mcg total) by mouth 2  (two) times a day. 180 capsule 1   • lisinopriL (PRINIVIL) 5 mg tablet Take 1 tablet (5 mg total) by mouth daily. 90 tablet 3   • metoprolol tartrate (LOPRESSOR) 25 mg tablet Take 1 tablet (25 mg total) by mouth every 6 (six) hours as needed (palpitations). (Patient taking differently: Take 25 mg by mouth once.) 60 tablet 3     No current facility-administered medications for this visit.     Review of Systems   Constitutional: Negative.   HENT: Negative.    Eyes: Negative.    Cardiovascular: Negative.    Respiratory: Negative.    Endocrine: Negative.    Hematologic/Lymphatic: Negative.    Skin: Negative.    Musculoskeletal: Negative.    Gastrointestinal: Negative.    Genitourinary: Negative.    Neurological: Negative.    Psychiatric/Behavioral: Negative.      Objective   Vitals:    01/20/23 1128   BP: 140/82   Pulse: 70   Resp: 16     BP Readings from Last 3 Encounters:   01/20/23 140/82   07/28/22 124/80   01/14/22 130/90       Physical Exam  Constitutional:       Appearance: He is well-developed.   HENT:      Head: Normocephalic.   Eyes:      Conjunctiva/sclera: Conjunctivae normal.      Pupils: Pupils are equal, round, and reactive to light.   Cardiovascular:      Rate and Rhythm: Normal rate and regular rhythm.      Heart sounds: Normal heart sounds. No murmur heard.    No friction rub. No gallop.   Pulmonary:      Effort: Pulmonary effort is normal.      Breath sounds: Normal breath sounds.   Abdominal:      General: Bowel sounds are normal.      Palpations: Abdomen is soft.   Musculoskeletal:         General: Normal range of motion.      Cervical back: Normal range of motion.      Comments: Status post left index finger amputation.   Skin:     General: Skin is warm and dry.   Neurological:      Mental Status: He is alert and oriented to person, place, and time.   Psychiatric:         Behavior: Behavior normal.         Thought Content: Thought content normal.         Judgment: Judgment normal.         Lab  Results   Component Value Date    WBC 5.5 07/22/2022    HGB 15.1 07/22/2022     07/22/2022    CHOL 148 07/22/2022    TRIG 73 07/22/2022    HDL 43 07/22/2022    ALT 19 07/22/2022    AST 19 07/22/2022     07/22/2022    K 4.3 07/22/2022    CREATININE 0.92 07/22/2022    TSH 1.89 06/14/2021     Cardiovascular Procedures:    CATH LAB:  Cath (Normal Coronaries) - 2008    ELECTROPHYSIOLOGY:  EPS (PV ablation afib during study) - 12/11/2009  Holter (apc one 6 beat cst no aifb) - 5/13/2015    STRESS TESTS:    ECHO 5/28/2020  MPI (EF 0.60 (60%), lateral wal defect) - 6/9/2008  · Mean gradient = 5.00 mmHg. Peak velocity = 1.50 m/s. Calculated area = 3.15 cm2.  · Normal-sized LV. Normal LV wall thickness.  · Preserved LV systolic function. Estimated EF 65%.  · Normal-sized RV. Normal RV systolic function.  · Normal-sized LA.  · Normal-sized RA.  · Aortic root normal. Sinuses of Valsalva normal-sized. Ascending aorta normal-sized. Aortic arch normal-sized. Descending aorta normal-sized.  · Aortic valve structure is normal. Tricuspid aortic valve. No aortic valve regurgitation. No aortic valve stenosis.  · Normal leaflet structure and normal leaflet motion of the mitral valve.  · No significant mitral valve regurgitation.  · No significant mitral valve stenosis.  · Tricuspid valve structure is normal. Mild tricuspid valve regurgitation.  · No significant tricuspid valve stenosis.  · Normal pulmonic valve structure. No pulmonic valve regurgitation. No pulmonic valve stenosis.  · IVC not well visualized.  · Normal pericardial structure. No evidence of pericardial effusion. No cardiac tamponade.  · Normal 2D echocardiogram color-flow Doppler study     Stress Echo 7/15/20  No evidence of ischemia with stress echocardiogram  The patient completed 7 mets of exercise achieved target heart rate  Baseline EKG normal  No changes diagnostic of ischemia with exercise frequent single VPCs noted with exercise  Baseline  echocardiogram mildly dilated aortic root 4.0 cm  Mildly dilated left atrium  Mild left ventricular hypertrophy  No regional wall motion abnormality preserved ejection fraction 65%  No structural valvular disease  Trace mitral regurgitation  Mild tricuspid regurgitation estimated pulmonary systolic pressure 35 mmHg  All walls became hyperdynamic with exercise       Other (Admitted for Tikosyn loading) - 6/10/2015     ECG sinus rhythm with occasional PVCs.     Assessment   Problem List Items Addressed This Visit        Circulatory    Atrial fibrillation (CMS/HCC) - Primary     The patient has been having increasing episodes of atrial fibrillation which now become much more symptomatic.  Options were discussed including change antiarrhythmics as well as repeat catheter ablation.  His only reasonable option for antiarrhythmic is probably amiodarone.    He is interested in considering repeat catheter ablation.  Discussed the procedure in detail and that its not clear how involved the procedure would be until we get see if there is recurrences at previous sites of ablation.  Although some of his increased burden can be attributed to increased stress recently, his overall burden of atrial fibrillation has been slowly increasing for quite some time.    Patient rested in proceeding with catheter ablation.  This will be arranged in near future.         Relevant Orders    ECG 12 LEAD-OFFICE PERFORMED (Completed)    Case Request Cath Lab: Ablation atrial fibrillation (Completed)    Comprehensive metabolic panel    CBC and Differential    SARS-CoV-2 (COVID-19), PCR            Sincere García MD  01/20/2023

## 2023-01-20 NOTE — PATIENT INSTRUCTIONS
Chest Xray as previously ordered   Albuterol inhaler as ordered for shortness of breath  Go to ED if you have any respiratory distress  F/up with cardiologist as scheduled today  Call with problems/concerns

## 2023-01-23 ENCOUNTER — TELEPHONE (OUTPATIENT)
Dept: FAMILY MEDICINE CLINIC | Facility: CLINIC | Age: 73
End: 2023-01-23

## 2023-01-23 NOTE — TELEPHONE ENCOUNTER
Pt aware of results    ----- Message from Joni Zabala MD sent at 1/21/2023  8:45 AM EST -----  CXR no acute dis  ----- Message -----  From: Interface, Radiology Results In  Sent: 1/20/2023   2:18 PM EST  To: Joni Zabala MD

## 2023-01-26 LAB
ALBUMIN SERPL-MCNC: 4.2 G/DL (ref 3.6–5.1)
ALBUMIN/GLOB SERPL: 1.8 (CALC) (ref 1–2.5)
ALP SERPL-CCNC: 58 U/L (ref 35–144)
ALT SERPL-CCNC: 21 U/L (ref 9–46)
AST SERPL-CCNC: 16 U/L (ref 10–35)
BASOPHILS # BLD AUTO: 73 CELLS/UL (ref 0–200)
BASOPHILS NFR BLD AUTO: 1 %
BILIRUB SERPL-MCNC: 1 MG/DL (ref 0.2–1.2)
BUN SERPL-MCNC: 16 MG/DL (ref 7–25)
BUN/CREAT SERPL: NORMAL (CALC) (ref 6–22)
CALCIUM SERPL-MCNC: 9.4 MG/DL (ref 8.6–10.3)
CHLORIDE SERPL-SCNC: 106 MMOL/L (ref 98–110)
CO2 SERPL-SCNC: 32 MMOL/L (ref 20–32)
CREAT SERPL-MCNC: 0.98 MG/DL (ref 0.7–1.28)
EGFRCR SERPLBLD CKD-EPI 2021: 82 ML/MIN/1.73M2
EOSINOPHIL # BLD AUTO: 139 CELLS/UL (ref 15–500)
EOSINOPHIL NFR BLD AUTO: 1.9 %
ERYTHROCYTE [DISTWIDTH] IN BLOOD BY AUTOMATED COUNT: 13.2 % (ref 11–15)
GLOBULIN SER CALC-MCNC: 2.4 G/DL (CALC) (ref 1.9–3.7)
GLUCOSE SERPL-MCNC: 97 MG/DL (ref 65–99)
HCT VFR BLD AUTO: 49.1 % (ref 38.5–50)
HGB BLD-MCNC: 16 G/DL (ref 13.2–17.1)
LYMPHOCYTES # BLD AUTO: 2183 CELLS/UL (ref 850–3900)
LYMPHOCYTES NFR BLD AUTO: 29.9 %
MCH RBC QN AUTO: 29.7 PG (ref 27–33)
MCHC RBC AUTO-ENTMCNC: 32.6 G/DL (ref 32–36)
MCV RBC AUTO: 91.3 FL (ref 80–100)
MONOCYTES # BLD AUTO: 898 CELLS/UL (ref 200–950)
MONOCYTES NFR BLD AUTO: 12.3 %
NEUTROPHILS # BLD AUTO: 4008 CELLS/UL (ref 1500–7800)
NEUTROPHILS NFR BLD AUTO: 54.9 %
PLATELET # BLD AUTO: 215 THOUSAND/UL (ref 140–400)
PMV BLD REES-ECKER: 12.5 FL (ref 7.5–12.5)
POTASSIUM SERPL-SCNC: 4.4 MMOL/L (ref 3.5–5.3)
PROT SERPL-MCNC: 6.6 G/DL (ref 6.1–8.1)
RBC # BLD AUTO: 5.38 MILLION/UL (ref 4.2–5.8)
SODIUM SERPL-SCNC: 144 MMOL/L (ref 135–146)
WBC # BLD AUTO: 7.3 THOUSAND/UL (ref 3.8–10.8)

## 2023-01-30 ENCOUNTER — HOSPITAL ENCOUNTER (OUTPATIENT)
Dept: SLEEP MEDICINE | Facility: HOSPITAL | Age: 73
Discharge: HOME | End: 2023-01-30
Attending: INTERNAL MEDICINE
Payer: MEDICARE

## 2023-01-30 DIAGNOSIS — G47.33 OSA (OBSTRUCTIVE SLEEP APNEA): ICD-10-CM

## 2023-01-30 PROCEDURE — G0399 HOME SLEEP TEST/TYPE 3 PORTA: HCPCS

## 2023-02-04 LAB — SARS-COV-2 RNA RESP QL NAA+PROBE: NOT DETECTED

## 2023-02-07 ENCOUNTER — ANESTHESIA EVENT (OUTPATIENT)
Dept: CARDIOLOGY | Facility: HOSPITAL | Age: 73
Setting detail: HOSPITAL OUTPATIENT SURGERY
DRG: 981 | End: 2023-02-07
Payer: MEDICARE

## 2023-02-07 ASSESSMENT — ENCOUNTER SYMPTOMS: DYSRHYTHMIAS: 1

## 2023-02-07 NOTE — ANESTHESIA PREPROCEDURE EVALUATION
Relevant Problems   CARDIOVASCULAR   (+) Atrial fibrillation (CMS/HCC)   (+) HTN (hypertension)      HEMATOLOGY   (+) Chronic anticoagulation      RESPIRATORY SYSTEM   (+) Dyspnea   (+) Obstructive sleep apnea syndrome       Stress echo 2020:  No evidence of ischemia with stress echocardiogram  The patient completed 7 mets of exercise achieved target heart rate  Baseline EKG normal  No changes diagnostic of ischemia with exercise frequent single VPCs noted with exercise  Baseline echocardiogram mildly dilated aortic root 4.0 cm  Mildly dilated left atrium  Mild left ventricular hypertrophy  No regional wall motion abnormality preserved ejection fraction 65%  No structural valvular disease  Trace mitral regurgitation  Mild tricuspid regurgitation estimated pulmonary systolic pressure 35 mmHg  All walls became hyperdynamic with exercise    TTE 2020:  • Mean gradient = 5.00 mmHg. Peak velocity = 1.50 m/s. Calculated area = 3.15 cm2.  • Normal-sized LV. Normal LV wall thickness.  • Preserved LV systolic function. Estimated EF 65%.  • Normal-sized RV. Normal RV systolic function.  • Normal-sized LA.  • Normal-sized RA.  • Aortic root normal. Sinuses of Valsalva normal-sized. Ascending aorta normal-sized. Aortic arch normal-sized. Descending aorta normal-sized.  • Aortic valve structure is normal. Tricuspid aortic valve. No aortic valve regurgitation. No aortic valve stenosis.  • Normal leaflet structure and normal leaflet motion of the mitral valve.  • No significant mitral valve regurgitation.  • No significant mitral valve stenosis.  • Tricuspid valve structure is normal. Mild tricuspid valve regurgitation.  • No significant tricuspid valve stenosis.  • Normal pulmonic valve structure. No pulmonic valve regurgitation. No pulmonic valve stenosis.  • IVC not well visualized.  • Normal pericardial structure. No evidence of pericardial effusion. No cardiac tamponade.  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-sided diaphragm paralysis  Hematological    anticoagulants  GI/Hepatic- neg  Musculoskeletal- neg  Renal Disease- neg  Endo/Other- neg  Body Habitus: Obese       Past Surgical History:   Procedure Laterality Date   • ABLATION OF DYSRHYTHMIC FOCUS     • CARDIAC CATHETERIZATION     • TOTAL SHOULDER REPLACEMENT Right        Physical Exam    Airway   Mallampati: III   TM distance: >3 FB   Neck ROM: full  Cardiovascular - normal   Rhythm: regular   Rate: normalPulmonary - normal   clear to auscultation  Other Findings   Pt reports right-sided diaphragm paralysis  Dental - normal        Anesthesia Plan    Plan: general    Technique: general endotracheal     Lines and Monitors: arterial line   ASA 3  Anesthetic plan and risks discussed with: patient  Comments:    Plan: Pt suspending DNR/DNI perioperatively

## 2023-02-08 ENCOUNTER — ANESTHESIA (OUTPATIENT)
Dept: CARDIOLOGY | Facility: HOSPITAL | Age: 73
Setting detail: HOSPITAL OUTPATIENT SURGERY
DRG: 981 | End: 2023-02-08
Payer: MEDICARE

## 2023-02-08 ENCOUNTER — APPOINTMENT (INPATIENT)
Dept: RADIOLOGY | Facility: HOSPITAL | Age: 73
DRG: 981 | End: 2023-02-08
Attending: STUDENT IN AN ORGANIZED HEALTH CARE EDUCATION/TRAINING PROGRAM
Payer: MEDICARE

## 2023-02-08 ENCOUNTER — APPOINTMENT (INPATIENT)
Dept: RADIOLOGY | Facility: HOSPITAL | Age: 73
DRG: 981 | End: 2023-02-08
Payer: MEDICARE

## 2023-02-08 ENCOUNTER — HOSPITAL ENCOUNTER (INPATIENT)
Facility: HOSPITAL | Age: 73
LOS: 6 days | Discharge: HOME | DRG: 981 | End: 2023-02-14
Attending: INTERNAL MEDICINE | Admitting: INTERNAL MEDICINE
Payer: MEDICARE

## 2023-02-08 DIAGNOSIS — I48.91 ATRIAL FIBRILLATION (CMS/HCC): ICD-10-CM

## 2023-02-08 DIAGNOSIS — I48.91 ATRIAL FIBRILLATION, UNSPECIFIED TYPE (CMS/HCC): Primary | ICD-10-CM

## 2023-02-08 LAB
ALBUMIN SERPL-MCNC: 3.3 G/DL (ref 3.4–5)
ALP SERPL-CCNC: 48 IU/L (ref 35–126)
ALT SERPL-CCNC: 22 IU/L (ref 16–63)
ANION GAP SERPL CALC-SCNC: 10 MEQ/L (ref 3–15)
AST SERPL-CCNC: 30 IU/L (ref 15–41)
BASE EXCESS BLDA CALC-SCNC: -3 MEQ/L
BASE EXCESS BLDA CALC-SCNC: -3.5 MEQ/L
BASE EXCESS BLDA CALC-SCNC: -4.8 MEQ/L
BILIRUB SERPL-MCNC: 1.6 MG/DL (ref 0.3–1.2)
BUN SERPL-MCNC: 14 MG/DL (ref 8–20)
CA-I BLD-SCNC: 1.03 MMOL/L (ref 1.15–1.27)
CA-I BLD-SCNC: 1.09 MMOL/L (ref 1.15–1.27)
CA-I BLD-SCNC: 1.1 MMOL/L (ref 1.15–1.27)
CALCIUM SERPL-MCNC: 8.4 MG/DL (ref 8.9–10.3)
CHLORIDE BLDA-SCNC: 106 MEQ/L (ref 98–109)
CHLORIDE BLDA-SCNC: 108 MEQ/L (ref 98–109)
CHLORIDE BLDA-SCNC: 110 MEQ/L (ref 98–109)
CHLORIDE SERPL-SCNC: 109 MEQ/L (ref 98–109)
CO2 BLDA-SCNC: 23 MEQ/L (ref 22–32)
CO2 BLDA-SCNC: 24 MEQ/L (ref 22–32)
CO2 BLDA-SCNC: 25.5 MEQ/L (ref 22–32)
CO2 SERPL-SCNC: 23 MEQ/L (ref 22–32)
COHGB MFR BLD: 1.9 %
CREAT SERPL-MCNC: 1.3 MG/DL (ref 0.8–1.3)
ERYTHROCYTE [DISTWIDTH] IN BLOOD BY AUTOMATED COUNT: 14.5 % (ref 11.6–14.4)
FIO2 ON VENT: 100 %
GFR SERPL CREATININE-BSD FRML MDRD: 54.4 ML/MIN/1.73M*2
GLUCOSE BLDA-MCNC: 121 MG/DL (ref 70–99)
GLUCOSE BLDA-MCNC: 127 MG/DL (ref 70–99)
GLUCOSE BLDA-MCNC: 151 MG/DL (ref 70–99)
GLUCOSE SERPL-MCNC: 156 MG/DL (ref 70–99)
HCO3 BLDA-SCNC: 21.2 MEQ/L (ref 21–28)
HCO3 BLDA-SCNC: 22 MEQ/L (ref 21–28)
HCO3 BLDA-SCNC: 23 MEQ/L (ref 21–28)
HCT VFR BLDCO AUTO: 44.3 % (ref 40.1–51)
HGB BLD-MCNC: 14.6 G/DL (ref 13.7–17.5)
HGB BLDA OXIMETRY-MCNC: 14.6 G/DL (ref 14–17.5)
HGB BLDA-MCNC: 14.2 G/DL (ref 14–17.5)
HGB BLDA-MCNC: 14.6 G/DL (ref 14–17.5)
INHALED O2 CONCENTRATION: ABNORMAL %
LACTATE BLDA-SCNC: 1 MMOL/L (ref 0.4–1.6)
LACTATE BLDA-SCNC: 1.4 MMOL/L (ref 0.4–1.6)
LACTATE BLDA-SCNC: 2.2 MMOL/L (ref 0.4–1.6)
MAGNESIUM SERPL-MCNC: 1.7 MG/DL (ref 1.8–2.5)
MCH RBC QN AUTO: 30.2 PG (ref 28–33.2)
MCHC RBC AUTO-ENTMCNC: 33 G/DL (ref 32.2–36.5)
MCV RBC AUTO: 91.5 FL (ref 83–98)
METHGB BLD-SCNC: 0.3 % (ref 0.4–1.5)
MRSA DNA SPEC QL NAA+PROBE: NEGATIVE
PCO2 BLDA: 41 MM HG (ref 35–48)
PCO2 BLDA: 42 MM HG (ref 35–48)
PCO2 BLDA: 58 MM HG (ref 35–48)
PDW BLD AUTO: 12.6 FL (ref 9.4–12.4)
PH BLDA: 7.22 [PH] (ref 7.35–7.45)
PH BLDA: 7.34 PH (ref 7.35–7.45)
PH BLDA: 7.34 PH (ref 7.35–7.45)
PLATELET # BLD AUTO: 172 K/UL (ref 150–350)
PO2 BLDA: 107 MM HG (ref 83–100)
PO2 BLDA: 117 MM HG (ref 83–100)
PO2 BLDA: 79 MM HG (ref 83–100)
POCT PATIENT TEMPERATURE: 98.6 °F (ref 97–99)
POCT PATIENT TEMPERATURE: 98.6 °F (ref 97–99)
POCT TEST (BLD GAS): ABNORMAL
POCT TEST (BLD GAS): ABNORMAL
POTASSIUM BLDA-SCNC: 3.9 MEQ/L (ref 3.4–4.5)
POTASSIUM BLDA-SCNC: 4.2 MEQ/L (ref 3.4–4.5)
POTASSIUM BLDA-SCNC: 4.6 MEQ/L (ref 3.4–4.5)
POTASSIUM SERPL-SCNC: 4.2 MEQ/L (ref 3.6–5.1)
PROT SERPL-MCNC: 5.7 G/DL (ref 6–8.2)
RBC # BLD AUTO: 4.84 M/UL (ref 4.5–5.8)
SAO2 % BLDA: 94 % (ref 93–98)
SAO2 % BLDA: 96 % (ref 93–98)
SAO2 % BLDA: 97 % (ref 93–98)
SODIUM BLDA-SCNC: 138 MEQ/L (ref 136–145)
SODIUM BLDA-SCNC: 139 MEQ/L (ref 136–145)
SODIUM BLDA-SCNC: 140 MEQ/L (ref 136–145)
SODIUM SERPL-SCNC: 142 MEQ/L (ref 136–144)
WBC # BLD AUTO: 16.45 K/UL (ref 3.8–10.5)

## 2023-02-08 PROCEDURE — 25800000 HC PHARMACY IV SOLUTIONS: Performed by: NURSE ANESTHETIST, CERTIFIED REGISTERED

## 2023-02-08 PROCEDURE — 63600000 HC DRUGS/DETAIL CODE: Performed by: NURSE ANESTHETIST, CERTIFIED REGISTERED

## 2023-02-08 PROCEDURE — C1730 CATH, EP, 19 OR FEW ELECT: HCPCS | Performed by: INTERNAL MEDICINE

## 2023-02-08 PROCEDURE — 36415 COLL VENOUS BLD VENIPUNCTURE: CPT | Performed by: PHYSICIAN ASSISTANT

## 2023-02-08 PROCEDURE — 37000001 HC ANESTHESIA GENERAL: Performed by: INTERNAL MEDICINE

## 2023-02-08 PROCEDURE — 87641 MR-STAPH DNA AMP PROBE: CPT | Performed by: INTERNAL MEDICINE

## 2023-02-08 PROCEDURE — 99223 1ST HOSP IP/OBS HIGH 75: CPT | Performed by: INTERNAL MEDICINE

## 2023-02-08 PROCEDURE — 25000000 HC PHARMACY GENERAL: Performed by: INTERNAL MEDICINE

## 2023-02-08 PROCEDURE — 200200 PR NO CHARGE: Performed by: INTERNAL MEDICINE

## 2023-02-08 PROCEDURE — C2630 CATH, EP, COOL-TIP: HCPCS | Performed by: INTERNAL MEDICINE

## 2023-02-08 PROCEDURE — 4A023FZ MEASUREMENT OF CARDIAC RHYTHM, PERCUTANEOUS APPROACH: ICD-10-PCS | Performed by: INTERNAL MEDICINE

## 2023-02-08 PROCEDURE — 25000000 HC PHARMACY GENERAL: Performed by: ANESTHESIOLOGY

## 2023-02-08 PROCEDURE — C1759 CATH, INTRA ECHOCARDIOGRAPHY: HCPCS | Performed by: INTERNAL MEDICINE

## 2023-02-08 PROCEDURE — C1769 GUIDE WIRE: HCPCS | Performed by: INTERNAL MEDICINE

## 2023-02-08 PROCEDURE — 4A0234Z MEASUREMENT OF CARDIAC ELECTRICAL ACTIVITY, PERCUTANEOUS APPROACH: ICD-10-PCS | Performed by: INTERNAL MEDICINE

## 2023-02-08 PROCEDURE — 63700000 HC SELF-ADMINISTRABLE DRUG: Performed by: STUDENT IN AN ORGANIZED HEALTH CARE EDUCATION/TRAINING PROGRAM

## 2023-02-08 PROCEDURE — 74018 RADEX ABDOMEN 1 VIEW: CPT

## 2023-02-08 PROCEDURE — 02K83ZZ MAP CONDUCTION MECHANISM, PERCUTANEOUS APPROACH: ICD-10-PCS | Performed by: INTERNAL MEDICINE

## 2023-02-08 PROCEDURE — 25000000 HC PHARMACY GENERAL: Performed by: PHYSICIAN ASSISTANT

## 2023-02-08 PROCEDURE — 5A1945Z RESPIRATORY VENTILATION, 24-96 CONSECUTIVE HOURS: ICD-10-PCS | Performed by: INTERNAL MEDICINE

## 2023-02-08 PROCEDURE — 93623 PRGRMD STIMJ&PACG IV RX NFS: CPT | Performed by: INTERNAL MEDICINE

## 2023-02-08 PROCEDURE — 25000000 HC PHARMACY GENERAL: Performed by: NURSE ANESTHETIST, CERTIFIED REGISTERED

## 2023-02-08 PROCEDURE — 85027 COMPLETE CBC AUTOMATED: CPT | Performed by: PHYSICIAN ASSISTANT

## 2023-02-08 PROCEDURE — 63600000 HC DRUGS/DETAIL CODE: Performed by: PHYSICIAN ASSISTANT

## 2023-02-08 PROCEDURE — 93656 COMPRE EP EVAL ABLTJ ATR FIB: CPT | Performed by: INTERNAL MEDICINE

## 2023-02-08 PROCEDURE — 93005 ELECTROCARDIOGRAM TRACING: CPT | Performed by: PHYSICIAN ASSISTANT

## 2023-02-08 PROCEDURE — 25800000 HC PHARMACY IV SOLUTIONS: Performed by: INTERNAL MEDICINE

## 2023-02-08 PROCEDURE — 63600000 HC DRUGS/DETAIL CODE

## 2023-02-08 PROCEDURE — C1893 INTRO/SHEATH, FIXED,NON-PEEL: HCPCS | Performed by: INTERNAL MEDICINE

## 2023-02-08 PROCEDURE — 25800000 HC PHARMACY IV SOLUTIONS: Performed by: PHYSICIAN ASSISTANT

## 2023-02-08 PROCEDURE — 63600000 HC DRUGS/DETAIL CODE: Performed by: INTERNAL MEDICINE

## 2023-02-08 PROCEDURE — 27200000 HC STERILE SUPPLY: Performed by: INTERNAL MEDICINE

## 2023-02-08 PROCEDURE — 94003 VENT MGMT INPAT SUBQ DAY: CPT

## 2023-02-08 PROCEDURE — 80053 COMPREHEN METABOLIC PANEL: CPT | Performed by: PHYSICIAN ASSISTANT

## 2023-02-08 PROCEDURE — 02HV33Z INSERTION OF INFUSION DEVICE INTO SUPERIOR VENA CAVA, PERCUTANEOUS APPROACH: ICD-10-PCS | Performed by: INTERNAL MEDICINE

## 2023-02-08 PROCEDURE — 20000000 HC ROOM AND CARE ICU

## 2023-02-08 PROCEDURE — 85347 COAGULATION TIME ACTIVATED: CPT | Performed by: INTERNAL MEDICINE

## 2023-02-08 PROCEDURE — 0BH17EZ INSERTION OF ENDOTRACHEAL AIRWAY INTO TRACHEA, VIA NATURAL OR ARTIFICIAL OPENING: ICD-10-PCS | Performed by: INTERNAL MEDICINE

## 2023-02-08 PROCEDURE — 83050 HGB METHEMOGLOBIN QUAN: CPT | Performed by: PHYSICIAN ASSISTANT

## 2023-02-08 PROCEDURE — 02583ZZ DESTRUCTION OF CONDUCTION MECHANISM, PERCUTANEOUS APPROACH: ICD-10-PCS | Performed by: INTERNAL MEDICINE

## 2023-02-08 PROCEDURE — C1732 CATH, EP, DIAG/ABL, 3D/VECT: HCPCS | Performed by: INTERNAL MEDICINE

## 2023-02-08 PROCEDURE — 82805 BLOOD GASES W/O2 SATURATION: CPT | Performed by: PHYSICIAN ASSISTANT

## 2023-02-08 PROCEDURE — 71045 X-RAY EXAM CHEST 1 VIEW: CPT

## 2023-02-08 PROCEDURE — 83735 ASSAY OF MAGNESIUM: CPT | Performed by: PHYSICIAN ASSISTANT

## 2023-02-08 PROCEDURE — C1894 INTRO/SHEATH, NON-LASER: HCPCS | Performed by: INTERNAL MEDICINE

## 2023-02-08 RX ORDER — DEXTROSE 50 % IN WATER (D50W) INTRAVENOUS SYRINGE
25 AS NEEDED
Status: CANCELLED | OUTPATIENT
Start: 2023-02-08

## 2023-02-08 RX ORDER — LIDOCAINE HYDROCHLORIDE 10 MG/ML
INJECTION, SOLUTION INFILTRATION; PERINEURAL
Status: DISCONTINUED | OUTPATIENT
Start: 2023-02-08 | End: 2023-02-08 | Stop reason: HOSPADM

## 2023-02-08 RX ORDER — DIPHENHYDRAMINE HCL 25 MG
50 CAPSULE ORAL EVERY 8 HOURS PRN
Status: DISCONTINUED | OUTPATIENT
Start: 2023-02-08 | End: 2023-02-14 | Stop reason: HOSPADM

## 2023-02-08 RX ORDER — METOPROLOL TARTRATE 25 MG/1
25 TABLET, FILM COATED ORAL EVERY 6 HOURS PRN
Status: DISCONTINUED | OUTPATIENT
Start: 2023-02-08 | End: 2023-02-14 | Stop reason: HOSPADM

## 2023-02-08 RX ORDER — DEXTROSE 40 %
15-30 GEL (GRAM) ORAL AS NEEDED
Status: DISCONTINUED | OUTPATIENT
Start: 2023-02-08 | End: 2023-02-08 | Stop reason: SDUPTHER

## 2023-02-08 RX ORDER — POLYETHYLENE GLYCOL 3350 17 G/17G
17 POWDER, FOR SOLUTION ORAL DAILY
Status: DISCONTINUED | OUTPATIENT
Start: 2023-02-08 | End: 2023-02-14 | Stop reason: HOSPADM

## 2023-02-08 RX ORDER — ASPIRIN 81 MG/1
81 TABLET ORAL DAILY
Status: DISCONTINUED | OUTPATIENT
Start: 2023-02-08 | End: 2023-02-14 | Stop reason: HOSPADM

## 2023-02-08 RX ORDER — PHENYLEPHRINE HCL IN 0.9% NACL 50MG/250ML
10-200 PLASTIC BAG, INJECTION (ML) INTRAVENOUS
Status: DISCONTINUED | OUTPATIENT
Start: 2023-02-08 | End: 2023-02-08

## 2023-02-08 RX ORDER — DEXTROSE 50 % IN WATER (D50W) INTRAVENOUS SYRINGE
25 AS NEEDED
Status: DISCONTINUED | OUTPATIENT
Start: 2023-02-08 | End: 2023-02-14 | Stop reason: HOSPADM

## 2023-02-08 RX ORDER — SODIUM CHLORIDE 9 MG/ML
INJECTION, SOLUTION INTRAVENOUS CONTINUOUS PRN
Status: DISCONTINUED | OUTPATIENT
Start: 2023-02-08 | End: 2023-02-08 | Stop reason: SURG

## 2023-02-08 RX ORDER — ACETAMINOPHEN 325 MG/1
975 TABLET ORAL EVERY 8 HOURS PRN
Status: DISCONTINUED | OUTPATIENT
Start: 2023-02-08 | End: 2023-02-14 | Stop reason: HOSPADM

## 2023-02-08 RX ORDER — IBUPROFEN 200 MG
16-32 TABLET ORAL AS NEEDED
Status: CANCELLED | OUTPATIENT
Start: 2023-02-08

## 2023-02-08 RX ORDER — HEPARIN SODIUM 1000 [USP'U]/ML
INJECTION, SOLUTION INTRAVENOUS; SUBCUTANEOUS AS NEEDED
Status: DISCONTINUED | OUTPATIENT
Start: 2023-02-08 | End: 2023-02-08

## 2023-02-08 RX ORDER — DEXTROSE 40 %
15-30 GEL (GRAM) ORAL AS NEEDED
Status: CANCELLED | OUTPATIENT
Start: 2023-02-08

## 2023-02-08 RX ORDER — PANTOPRAZOLE SODIUM 40 MG/10ML
40 INJECTION, POWDER, LYOPHILIZED, FOR SOLUTION INTRAVENOUS DAILY
Status: DISCONTINUED | OUTPATIENT
Start: 2023-02-08 | End: 2023-02-08

## 2023-02-08 RX ORDER — PANTOPRAZOLE SODIUM 40 MG/1
40 TABLET, DELAYED RELEASE ORAL DAILY
Status: DISCONTINUED | OUTPATIENT
Start: 2023-02-09 | End: 2023-02-14 | Stop reason: HOSPADM

## 2023-02-08 RX ORDER — PROPOFOL 10 MG/ML
INJECTION, EMULSION INTRAVENOUS AS NEEDED
Status: DISCONTINUED | OUTPATIENT
Start: 2023-02-08 | End: 2023-02-08 | Stop reason: SURG

## 2023-02-08 RX ORDER — HEPARIN SOD,PORCINE/0.9 % NACL 4K/1000 ML
INTRAVENOUS SOLUTION INTRAVENOUS ONCE
Status: DISCONTINUED | OUTPATIENT
Start: 2023-02-08 | End: 2023-02-08

## 2023-02-08 RX ORDER — PHENYLEPHRINE HYDROCHLORIDE 10 MG/ML
INJECTION INTRAVENOUS AS NEEDED
Status: DISCONTINUED | OUTPATIENT
Start: 2023-02-08 | End: 2023-02-08 | Stop reason: SURG

## 2023-02-08 RX ORDER — PANTOPRAZOLE SODIUM 40 MG/10ML
40 INJECTION, POWDER, LYOPHILIZED, FOR SOLUTION INTRAVENOUS DAILY
Status: DISCONTINUED | OUTPATIENT
Start: 2023-02-09 | End: 2023-02-14

## 2023-02-08 RX ORDER — DEXTROSE 50 % IN WATER (D50W) INTRAVENOUS SYRINGE
25 AS NEEDED
Status: DISCONTINUED | OUTPATIENT
Start: 2023-02-08 | End: 2023-02-08 | Stop reason: SDUPTHER

## 2023-02-08 RX ORDER — PHENYLEPHRINE HCL IN 0.9% NACL 50MG/250ML
10-400 PLASTIC BAG, INJECTION (ML) INTRAVENOUS
Status: DISCONTINUED | OUTPATIENT
Start: 2023-02-08 | End: 2023-02-08

## 2023-02-08 RX ORDER — DEXMEDETOMIDINE HYDROCHLORIDE 4 UG/ML
.2-1.5 INJECTION, SOLUTION INTRAVENOUS
Status: DISCONTINUED | OUTPATIENT
Start: 2023-02-08 | End: 2023-02-13

## 2023-02-08 RX ORDER — EPINEPHRINE 0.1 MG/ML
INJECTION INTRACARDIAC; INTRAVENOUS AS NEEDED
Status: DISCONTINUED | OUTPATIENT
Start: 2023-02-08 | End: 2023-02-08 | Stop reason: SURG

## 2023-02-08 RX ORDER — ADENOSINE 3 MG/ML
INJECTION, SOLUTION INTRAVENOUS
Status: DISCONTINUED | OUTPATIENT
Start: 2023-02-08 | End: 2023-02-08 | Stop reason: HOSPADM

## 2023-02-08 RX ORDER — ATORVASTATIN CALCIUM 20 MG/1
20 TABLET, FILM COATED ORAL EVERY EVENING
Status: DISCONTINUED | OUTPATIENT
Start: 2023-02-08 | End: 2023-02-14 | Stop reason: HOSPADM

## 2023-02-08 RX ORDER — PANTOPRAZOLE SODIUM 40 MG/1
40 TABLET, DELAYED RELEASE ORAL DAILY
Status: DISCONTINUED | OUTPATIENT
Start: 2023-02-08 | End: 2023-02-08

## 2023-02-08 RX ORDER — ACETAMINOPHEN 325 MG/1
650 TABLET ORAL EVERY 4 HOURS PRN
Status: DISCONTINUED | OUTPATIENT
Start: 2023-02-08 | End: 2023-02-08

## 2023-02-08 RX ORDER — FENTANYL CITRATE 50 UG/ML
INJECTION, SOLUTION INTRAMUSCULAR; INTRAVENOUS
Status: COMPLETED
Start: 2023-02-08 | End: 2023-02-08

## 2023-02-08 RX ORDER — LISINOPRIL 5 MG/1
5 TABLET ORAL DAILY
Status: DISCONTINUED | OUTPATIENT
Start: 2023-02-08 | End: 2023-02-14 | Stop reason: HOSPADM

## 2023-02-08 RX ORDER — ROCURONIUM BROMIDE 10 MG/ML
INJECTION, SOLUTION INTRAVENOUS AS NEEDED
Status: DISCONTINUED | OUTPATIENT
Start: 2023-02-08 | End: 2023-02-08 | Stop reason: SURG

## 2023-02-08 RX ORDER — DEXTROSE 40 %
15-30 GEL (GRAM) ORAL AS NEEDED
Status: DISCONTINUED | OUTPATIENT
Start: 2023-02-08 | End: 2023-02-14 | Stop reason: HOSPADM

## 2023-02-08 RX ORDER — ENOXAPARIN SODIUM 150 MG/ML
1 INJECTION SUBCUTANEOUS ONCE
Status: COMPLETED | OUTPATIENT
Start: 2023-02-08 | End: 2023-02-08

## 2023-02-08 RX ORDER — NOREPINEPHRINE BITARTRATE 0.02 MG/ML
.5-9 INJECTION, SOLUTION INTRAVENOUS
Status: DISCONTINUED | OUTPATIENT
Start: 2023-02-08 | End: 2023-02-12

## 2023-02-08 RX ORDER — FENTANYL CITRATE 50 UG/ML
INJECTION, SOLUTION INTRAMUSCULAR; INTRAVENOUS AS NEEDED
Status: DISCONTINUED | OUTPATIENT
Start: 2023-02-08 | End: 2023-02-08 | Stop reason: SURG

## 2023-02-08 RX ORDER — FENTANYL CITRATE 50 UG/ML
50 INJECTION, SOLUTION INTRAMUSCULAR; INTRAVENOUS
Status: DISCONTINUED | OUTPATIENT
Start: 2023-02-08 | End: 2023-02-14 | Stop reason: HOSPADM

## 2023-02-08 RX ORDER — IBUPROFEN 200 MG
16-32 TABLET ORAL AS NEEDED
Status: DISCONTINUED | OUTPATIENT
Start: 2023-02-08 | End: 2023-02-08 | Stop reason: SDUPTHER

## 2023-02-08 RX ORDER — ALUMINUM HYDROXIDE, MAGNESIUM HYDROXIDE, AND SIMETHICONE 1200; 120; 1200 MG/30ML; MG/30ML; MG/30ML
30 SUSPENSION ORAL EVERY 4 HOURS PRN
Status: DISCONTINUED | OUTPATIENT
Start: 2023-02-08 | End: 2023-02-14 | Stop reason: HOSPADM

## 2023-02-08 RX ORDER — IBUPROFEN 200 MG
16-32 TABLET ORAL AS NEEDED
Status: DISCONTINUED | OUTPATIENT
Start: 2023-02-08 | End: 2023-02-14 | Stop reason: HOSPADM

## 2023-02-08 RX ORDER — HEPARIN SODIUM 10000 [USP'U]/100ML
INJECTION, SOLUTION INTRAVENOUS CONTINUOUS PRN
Status: DISCONTINUED | OUTPATIENT
Start: 2023-02-08 | End: 2023-02-08

## 2023-02-08 RX ORDER — CHLORHEXIDINE GLUCONATE ORAL RINSE 1.2 MG/ML
15 SOLUTION DENTAL
Status: DISCONTINUED | OUTPATIENT
Start: 2023-02-08 | End: 2023-02-14 | Stop reason: HOSPADM

## 2023-02-08 RX ORDER — DOFETILIDE 0.5 MG/1
500 CAPSULE ORAL 2 TIMES DAILY
Status: DISCONTINUED | OUTPATIENT
Start: 2023-02-08 | End: 2023-02-11

## 2023-02-08 RX ORDER — SODIUM CHLORIDE, SODIUM GLUCONATE, SODIUM ACETATE, POTASSIUM CHLORIDE AND MAGNESIUM CHLORIDE 30; 37; 368; 526; 502 MG/100ML; MG/100ML; MG/100ML; MG/100ML; MG/100ML
INJECTION, SOLUTION INTRAVENOUS CONTINUOUS PRN
Status: DISCONTINUED | OUTPATIENT
Start: 2023-02-08 | End: 2023-02-08 | Stop reason: SURG

## 2023-02-08 RX ORDER — LIDOCAINE HYDROCHLORIDE 10 MG/ML
INJECTION, SOLUTION INFILTRATION; PERINEURAL AS NEEDED
Status: DISCONTINUED | OUTPATIENT
Start: 2023-02-08 | End: 2023-02-08 | Stop reason: SURG

## 2023-02-08 RX ORDER — BISACODYL 10 MG/1
10 SUPPOSITORY RECTAL DAILY PRN
Status: DISCONTINUED | OUTPATIENT
Start: 2023-02-08 | End: 2023-02-14 | Stop reason: HOSPADM

## 2023-02-08 RX ADMIN — Medication 2 MCG/MIN: at 15:40

## 2023-02-08 RX ADMIN — PHENYLEPHRINE HYDROCHLORIDE 100 MCG: 10 INJECTION INTRAVENOUS at 10:15

## 2023-02-08 RX ADMIN — Medication 0.1 MG: at 15:00

## 2023-02-08 RX ADMIN — ROCURONIUM BROMIDE 50 MG: 10 INJECTION, SOLUTION INTRAVENOUS at 15:33

## 2023-02-08 RX ADMIN — Medication 100 MCG/MIN: at 15:48

## 2023-02-08 RX ADMIN — HEPARIN SODIUM 5000 UNITS: 1000 INJECTION, SOLUTION INTRAVENOUS; SUBCUTANEOUS at 11:32

## 2023-02-08 RX ADMIN — FENTANYL CITRATE 25 MCG: 50 INJECTION, SOLUTION INTRAMUSCULAR; INTRAVENOUS at 16:16

## 2023-02-08 RX ADMIN — PHENYLEPHRINE HYDROCHLORIDE 50 MCG/MIN: 50 INJECTION INTRAVENOUS at 16:36

## 2023-02-08 RX ADMIN — PHENYLEPHRINE HYDROCHLORIDE 100 MCG: 10 INJECTION INTRAVENOUS at 11:17

## 2023-02-08 RX ADMIN — ISOPROTERENOL HYDROCHLORIDE 2 MCG/MIN: 0.2 INJECTION, SOLUTION INTRAMUSCULAR; INTRAVENOUS at 13:39

## 2023-02-08 RX ADMIN — Medication 50 MCG/MIN: at 10:02

## 2023-02-08 RX ADMIN — ROCURONIUM BROMIDE 50 MG: 10 INJECTION, SOLUTION INTRAVENOUS at 13:03

## 2023-02-08 RX ADMIN — DEXMEDETOMIDINE 1.5 MCG/KG/HR: 200 INJECTION, SOLUTION INTRAVENOUS at 23:30

## 2023-02-08 RX ADMIN — METHYLPREDNISOLONE SODIUM SUCCINATE 125 MG: 125 INJECTION, POWDER, FOR SOLUTION INTRAMUSCULAR; INTRAVENOUS at 18:12

## 2023-02-08 RX ADMIN — PHENYLEPHRINE HYDROCHLORIDE 200 MCG: 10 INJECTION INTRAVENOUS at 15:02

## 2023-02-08 RX ADMIN — Medication 0.1 MG: at 15:13

## 2023-02-08 RX ADMIN — LIDOCAINE HYDROCHLORIDE 5 ML: 10 INJECTION, SOLUTION INFILTRATION; PERINEURAL at 09:55

## 2023-02-08 RX ADMIN — FENTANYL CITRATE 50 MCG: 0.05 INJECTION, SOLUTION INTRAMUSCULAR; INTRAVENOUS at 20:15

## 2023-02-08 RX ADMIN — PROPOFOL INJECTABLE EMULSION 50 MG: 10 INJECTION, EMULSION INTRAVENOUS at 14:46

## 2023-02-08 RX ADMIN — PHENYLEPHRINE HYDROCHLORIDE 100 MCG: 10 INJECTION INTRAVENOUS at 10:02

## 2023-02-08 RX ADMIN — DEXMEDETOMIDINE HYDROCHLORIDE 0.3 MCG/KG/HR: 100 INJECTION, SOLUTION INTRAVENOUS at 15:45

## 2023-02-08 RX ADMIN — ROCURONIUM BROMIDE 50 MG: 10 INJECTION, SOLUTION INTRAVENOUS at 10:01

## 2023-02-08 RX ADMIN — ATORVASTATIN CALCIUM 20 MG: 20 TABLET, FILM COATED ORAL at 18:39

## 2023-02-08 RX ADMIN — FENTANYL CITRATE 50 MCG: 50 INJECTION, SOLUTION INTRAMUSCULAR; INTRAVENOUS at 15:22

## 2023-02-08 RX ADMIN — PHENYLEPHRINE HYDROCHLORIDE 200 MCG: 10 INJECTION INTRAVENOUS at 15:00

## 2023-02-08 RX ADMIN — SUGAMMADEX 200 MG: 100 INJECTION, SOLUTION INTRAVENOUS at 13:19

## 2023-02-08 RX ADMIN — HEPARIN SODIUM 2000 UNITS: 1000 INJECTION, SOLUTION INTRAVENOUS; SUBCUTANEOUS at 12:37

## 2023-02-08 RX ADMIN — ROCURONIUM BROMIDE 50 MG: 10 INJECTION, SOLUTION INTRAVENOUS at 11:53

## 2023-02-08 RX ADMIN — HEPARIN SODIUM 2000 UNITS: 1000 INJECTION, SOLUTION INTRAVENOUS; SUBCUTANEOUS at 12:11

## 2023-02-08 RX ADMIN — PROPOFOL INJECTABLE EMULSION 150 MG: 10 INJECTION, EMULSION INTRAVENOUS at 09:55

## 2023-02-08 RX ADMIN — SODIUM CHLORIDE: 9 INJECTION, SOLUTION INTRAVENOUS at 09:33

## 2023-02-08 RX ADMIN — PHENYLEPHRINE HYDROCHLORIDE 100 MCG: 10 INJECTION INTRAVENOUS at 10:10

## 2023-02-08 RX ADMIN — DEXMEDETOMIDINE 1.5 MCG/KG/HR: 200 INJECTION, SOLUTION INTRAVENOUS at 20:54

## 2023-02-08 RX ADMIN — PHENYLEPHRINE HYDROCHLORIDE 100 MCG: 10 INJECTION INTRAVENOUS at 11:27

## 2023-02-08 RX ADMIN — DEXMEDETOMIDINE 1.3 MCG/KG/HR: 200 INJECTION, SOLUTION INTRAVENOUS at 18:35

## 2023-02-08 RX ADMIN — SUCCINYLCHOLINE CHLORIDE 100 MG: 20 INJECTION, SOLUTION INTRAMUSCULAR; INTRAVENOUS; PARENTERAL at 14:46

## 2023-02-08 RX ADMIN — CHLORHEXIDINE GLUCONATE ORAL RINSE 15 ML: 1.2 SOLUTION DENTAL at 21:00

## 2023-02-08 RX ADMIN — Medication 0.1 MG: at 14:56

## 2023-02-08 RX ADMIN — SODIUM CHLORIDE, SODIUM GLUCONATE, SODIUM ACETATE, POTASSIUM CHLORIDE AND MAGNESIUM CHLORIDE: 526; 502; 368; 37; 30 INJECTION, SOLUTION INTRAVENOUS at 10:56

## 2023-02-08 RX ADMIN — PHENYLEPHRINE HYDROCHLORIDE 200 MCG: 10 INJECTION INTRAVENOUS at 09:55

## 2023-02-08 RX ADMIN — PHENYLEPHRINE HYDROCHLORIDE 100 MCG: 10 INJECTION INTRAVENOUS at 12:29

## 2023-02-08 RX ADMIN — FENTANYL CITRATE 50 MCG: 50 INJECTION, SOLUTION INTRAMUSCULAR; INTRAVENOUS at 20:15

## 2023-02-08 RX ADMIN — DEXMEDETOMIDINE 0.7 MCG/KG/HR: 200 INJECTION, SOLUTION INTRAVENOUS at 16:35

## 2023-02-08 RX ADMIN — HEPARIN SODIUM 6000 UNITS: 1000 INJECTION, SOLUTION INTRAVENOUS; SUBCUTANEOUS at 11:11

## 2023-02-08 RX ADMIN — ENOXAPARIN SODIUM 120 MG: 120 INJECTION SUBCUTANEOUS at 18:39

## 2023-02-08 RX ADMIN — PHENYLEPHRINE HYDROCHLORIDE 100 MCG: 10 INJECTION INTRAVENOUS at 15:15

## 2023-02-08 RX ADMIN — FENTANYL CITRATE 25 MCG: 50 INJECTION, SOLUTION INTRAMUSCULAR; INTRAVENOUS at 16:07

## 2023-02-08 RX ADMIN — HEPARIN SODIUM AND DEXTROSE 2200 UNITS/HR: 10000; 5 INJECTION INTRAVENOUS at 11:33

## 2023-02-08 RX ADMIN — ROCURONIUM BROMIDE 50 MG: 10 INJECTION, SOLUTION INTRAVENOUS at 09:55

## 2023-02-08 ASSESSMENT — ENCOUNTER SYMPTOMS: SHORTNESS OF BREATH: 1

## 2023-02-08 NOTE — H&P
Admitting Diagnosis: Atrial fibrillation, unspecified type (CMS/HCC) [I48.91]   HPI   Humberto Chung is a 72 y.o. male who presents with a PMH of paroxysmal atrial fibrillation, HTN, and hyperlididemia who is presenting to [facility] today for an atrial fibrillation ablation.  The patient has tried Tikosyn therapy but still has break through afib events.  He does note an increase in his atrial fibrillation episodes and at times is symptomatic with palpations sometimes waking him.  He denies any new CP, SOB, Abd. Pain, N/V, F/C/S.    He denies any recent hospitalizations, new illnesses, medication changes, or change in his general well being since his last office visit.  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            Past Medical History:   Diagnosis Date   • A-fib (CMS/HCC)    • A-fib (CMS/HCC) 7/25/2018   • Chronic anticoagulation 7/25/2018   • HTN (hypertension) 6/24/2020   • Lipid disorder    • Visit for monitoring Tikosyn therapy 7/25/2019       Past Surgical History:   Procedure Laterality Date   • ABLATION OF DYSRHYTHMIC FOCUS     • CARDIAC CATHETERIZATION     • TOTAL SHOULDER REPLACEMENT Right        Aspirin and Penicillins    Current Facility-Administered Medications   Medication Dose Route Frequency Provider Last Rate Last Admin   • heparin 1000 units/1000 mL NSS (EP Lab ablation)   Other Once Sincere García MD       • phenylephrine HCl in 0.9% NaCl (SHADY-SYNEPHRINE) 50 mg/250 mL (200 mcg/mL) infusion   mcg/min intravenous Titrated Allie Guallpa MD           Social History     Social History Narrative   • Not on file       Family History   Adopted: Yes   Family history unknown: Yes       ROS  Constitutional: negative for fevers  Eyes: negative for blurred vision and visual disturbance  Ears, nose, mouth, throat, and face: negative for ringing in the ears and hearing loss  Respiratory: negative for cough, shortness of breath and  "wheezing  Cardiovascular: negative for chest pain, irregular heart beat and syncope  Gastrointestinal: negative for nausea and vomiting  Hematologic/lymphatic: negative for easy bruising  Musculoskeletal:negative for joint pain and muscle weakness  Neurological: negative for confusion, vertigo and seizures  Behavioral/Psych: negative for depression and mood swings  Endocrine: negative for weight loss and weight gain  Allergic/Immunologic: negative for rash and hives    Vitals:    02/08/23 0835   Temp: 36.2 °C (97.2 °F)     Physical Exam  Visit Vitals  Temp 36.2 °C (97.2 °F)   Ht 1.803 m (5' 11\")   Wt 120 kg (264 lb)   BMI 36.82 kg/m²       General Appearance:    Alert, cooperative, no distress, appears stated age   Head:    Normocephalic, without obvious abnormality, atraumatic   Eyes:    PERRL, conjunctiva/corneas clear, EOM's intact, fundi     benign, both eyes        Ears:    Normal TM's and external ear canals, both ears   Nose:   Nares normal, septum midline, mucosa normal, no drainage    or sinus   tenderness   Throat:   Lips, mucosa, and tongue normal; teeth and gums normal   Neck:   Supple, symmetrical, trachea midline, no adenopathy;        thyroid:  No enlargement/tenderness/nodules; no carotid    bruit or JVD   Back:     Symmetric, no curvature, ROM normal, no CVA tenderness   Lungs:     Clear to auscultation bilaterally, respirations unlabored   Chest wall:    No tenderness or deformity   Heart:    Regular rate and rhythm, S1 and S2 normal, no murmur, rub   or gallop   Abdomen:     Soft, non-tender, bowel sounds active all four quadrants,     no masses, no organomegaly   Genitalia:    Normal male without lesion, discharge or tenderness   Rectal:    Normal tone, normal prostate, no masses or tenderness;    guaiac negative stool   Extremities:  Musculoskeletal:   Extremities normal, atraumatic, no cyanosis or edema    No injury or deformity, except s/p left index finger amputation   Pulses:   2+ and " symmetric all extremities   Skin:   Skin color, texture, turgor normal, no rashes or lesions   Lymph nodes:   Cervical, supraclavicular, and axillary nodes normal   Neurologic:    Behavior/  Emotional:   CNII-XII intact. Normal strength, sensation and reflexes       throughout    Appropriate, cooperative          Labs   I have reviewed the patient's pertinent labs.      Imaging  I have independently reviewed the patient's pertinent imaging for this hospital visit.   7/15/20: Echo:  Left Ventricle Normal ventricle size. Mild concentric left ventricular hypertrophy. Preserved systolic function. Estimated EF 65%.   Right Ventricle The right ventricle is normal. Preserved right ventricular systolic function.   Left Atrium Normal-sized left atrium. LA volume = 67.10 cm3.   Right Atrium Normal-sized right atrium.   Aortic Valve Tricuspid aortic valve. No evidence of aortic valve regurgitation. . No significant aortic valve stenosis. AV mean gradient = 3.00 mmHg.   Mitral Valve Normal mitral valve leaflet structure. Trace regurgitation. No stenosis.   Tricuspid Valve Tricuspid valve is normal. Mild tricuspid valve regurgitation. No evidence of tricuspid valve stenosis. Tricuspid valve rest pulmonary artery pressure = 35.00 mmHg.   Pulmonic Valve Grossly normal pulmonic valve. No significant pulmonic valve regurgitation. No significant pulmonic valve stenosis.   Pericardium Pericardium not assessed.         ECG/Telemetry  I have independently reviewed the telemetry. Pt currently is Afib with RVR.    I have independently reviewed the ECG.      Assessment     Paroxysmal Afib:  - Plan for atrial fibrillation ablation today with Dr. 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  The  fact that he is at increased risk for bleeding and bleeding complication was discussed.  Consent form was reviewed, patient executed document verbalizing understanding and wish to proceed.  - All labs, imagining, and cardiovascular studies were reviewed.          CHASITY Black  2/8/2023

## 2023-02-08 NOTE — Clinical Note
Sheath inserted in the right femoral vein. Number of sheaths inserted = 2. Guidewires x 2 removed intact

## 2023-02-08 NOTE — Clinical Note
Transseptal puncture completed with RF assisstance. Settings: adams for 1 seconds. Performed by: Sincere García MD.

## 2023-02-08 NOTE — Clinical Note
The bilateral groins was clipped and prepped with ChloraPrep. The patient was draped in a sterile fashion after allowing for the recommended dry time.

## 2023-02-08 NOTE — Clinical Note
Patient placed on procedure table in supine position with arms at side. Positioning devices: all pressure points padded, arm board under arms, heel pads in place, safety strap applied, wrist straps in place, donut foam under head and pillow under knees. Gel pad under head, defib pads applied Stable

## 2023-02-08 NOTE — ANESTHESIA POSTPROCEDURE EVALUATION
Patient: Humberto Chung    Procedure Summary     Date: 02/08/23 Room / Location: [facility] EP LAB 5 / [facility] CARDIAC CATH/EP    Anesthesia Start: 0933 Anesthesia Stop: 1631    Procedure: Ablation atrial fibrillation Diagnosis:       Atrial fibrillation, unspecified type (CMS/HCC)      (atrial fibrillation)    Providers: Sincere García MD Responsible Provider: Allie Guallpa MD    Anesthesia Type: general ASA Status: 3          Anesthesia Type: general  PACU Vitals    No data found in the last 10 encounters.           Anesthesia Post Evaluation    Pain management: satisfactory to patient  Mode of pain management: IV medication  Patient location during evaluation: ICU  Cardiovascular status: acceptable and hemodynamically stable  Airway Patency: adequate  Respiratory status: acceptable and ETT  Hydration status: stable  Anesthetic complications: no  Comments: Patient to ICU intubated/sedated. Placed on ventilator by respiratory team. Report given. Continued pressor support on phenylephrine.

## 2023-02-08 NOTE — H&P
Cardiology  History & Physical        CHIEF COMPLAINT   Failed extuabtion     HISTORY OF PRESENT ILLNESS      This is a 72 y.o. male with a past medical history of OHS, paralyzed right hemidiaphragm, atrial fibrillation, hypertension, dyslipidemia who presents as an outpatient for atrial fibrillation ablation.  His ablation procedure was unfortunately complicated by failed extubation requiring reintubation in the EP lab.    History obtained from EP team; patient was attempted to be extubated however, was still sedated post extubation on nasal cannula and due to concern for poor respiratory pattern, was reintubated. He was given precedex and pushes of fentanyl as well as one dose of 50 mg of propofol for reintubation. He required phenylephrine and norepi for BP support.     Regarding his pulmonary history, he follows with Dr. Damian and was last seen in the office in July 2022. He has history of EULALIA, CPAP intolerant. He also has a history of right hemidiaphragm paralysis.    At the time of my examination, patient intubated and sedated.  He is requiring 100 of phenylephrine and 2 of Levophed for blood pressure support. Labs notable for ABG 7.2 2/58/117 with lactate 2.2.  Repeat ABG, CMP, CBC pending.      Spoke to patient's wife at bedside. All questions answered.     PAST MEDICAL AND SURGICAL HISTORY      PMHx:  Past Medical History:   Diagnosis Date   • A-fib (CMS/HCC)    • A-fib (CMS/HCC) 7/25/2018   • Chronic anticoagulation 7/25/2018   • HTN (hypertension) 6/24/2020   • Lipid disorder    • Visit for monitoring Tikosyn therapy 7/25/2019       PSHx:  Past Surgical History:   Procedure Laterality Date   • ABLATION OF DYSRHYTHMIC FOCUS     • CARDIAC CATHETERIZATION     • TOTAL SHOULDER REPLACEMENT Right        PCP:   Balbir Tellez MD    MEDICATIONS      Prior to Admission medications    Medication Sig Start Date End Date Taking? Authorizing Provider   apixaban (ELIQUIS) 5 mg tablet Take 1 tablet (5 mg  total) by mouth 2 (two) times a day. 1/24/23  Yes Allie Payton CRNP   aspirin 81 mg enteric coated tablet Take 81 mg by mouth daily. 5/8/15  Yes Sincere García MD   atorvastatin (LIPITOR) 20 mg tablet TAKE ONE TABLET BY MOUTH DAILY 4/18/22  Yes Sincere García MD   dofetilide (TIKOSYN) 500 mcg capsule Take 1 capsule (500 mcg total) by mouth 2 (two) times a day. 1/18/23  Yes Sincere García MD   lisinopriL (PRINIVIL) 5 mg tablet Take 1 tablet (5 mg total) by mouth daily. 7/28/22  Yes Sincere García MD   metoprolol tartrate (LOPRESSOR) 25 mg tablet Take 1 tablet (25 mg total) by mouth every 6 (six) hours as needed (palpitations).  Patient taking differently: Take 25 mg by mouth once. 1/17/23  Yes Sincere García MD       Home medications were personally reviewed.    ALLERGIES      Aspirin and Penicillins    FAMILY HISTORY      Family History   Adopted: Yes   Family history unknown: Yes       SOCIAL HISTORY      Social History     Socioeconomic History   • Marital status:      Spouse name: None   • Number of children: None   • Years of education: None   • Highest education level: None   Tobacco Use   • Smoking status: Some Days   • Smokeless tobacco: Never   Vaping Use   • Vaping Use: Never used   Substance and Sexual Activity   • Alcohol use: Yes   • Drug use: Defer   • Sexual activity: Defer       REVIEW OF SYSTEMS      Review of Systems   Unable to perform ROS: Intubated       PHYSICAL EXAMINATION      Temp:  [36.2 °C (97.2 °F)] 36.2 °C (97.2 °F)  FiO2 (%) (Set):  [100 %] 100 %  Body mass index is 35.3 kg/m².    Physical Exam  Vitals and nursing note reviewed.   Constitutional:       General: He is not in acute distress.     Appearance: He is well-developed and well-nourished. He is not diaphoretic.   HENT:      Head: Normocephalic and atraumatic.      Mouth/Throat:      Pharynx: No oropharyngeal exudate.   Eyes:      General: No scleral icterus.        Right eye: No discharge.          Left eye: No discharge.      Extraocular Movements: EOM normal.      Conjunctiva/sclera: Conjunctivae normal.      Pupils: Pupils are equal, round, and reactive to light.   Neck:      Thyroid: No thyromegaly.      Comments: No significant JVD  Cardiovascular:      Rate and Rhythm: Normal rate and regular rhythm.      Heart sounds: Normal heart sounds.     No friction rub. No gallop.   Pulmonary:      Effort: Pulmonary effort is normal. No respiratory distress.      Breath sounds: Normal breath sounds. No wheezing.   Abdominal:      General: Bowel sounds are normal. There is distension.      Palpations: Abdomen is soft.      Tenderness: There is no abdominal tenderness.   Musculoskeletal:         General: No edema. Normal range of motion.      Cervical back: Neck supple.      Comments: Trace LOERNA  B/l groin sites without ecchymoses or evidence of hematoma; soft   Skin:     General: Skin is warm and dry.      Capillary Refill: Capillary refill takes less than 2 seconds.   Neurological:      Mental Status: He is alert and oriented to person, place, and time.   Psychiatric:         Mood and Affect: Mood and affect normal.         Behavior: Behavior normal.             LABS / IMAGING / EKG        Labs:    Labs      Results from last 7 days   Lab Units 02/08/23  1526   WBC K/uL 16.45*   HEMOGLOBIN g/dL 14.6   HEMATOCRIT % 44.3   PLATELETS K/uL 172    No results found for: INR  Lab Results   Component Value Date    CHOL 148 07/22/2022    TRIG 73 07/22/2022    HDL 43 07/22/2022    LDLCALC 89 07/22/2022     No results found for: HSTROPONINI      Lab Results   Component Value Date    LACTATEART 1.4 02/08/2023    LACTATEART 2.2 (H) 02/08/2023         Microbiology Data personally reviewed:  Microbiology Results     ** No results found for the last 720 hours. **          Imaging personally reviewed(does not include unread studies):  No results found.    ECG/Telemetry  I have independently reviewed the telemetry. Significant  findings include NSR at 61 bpm.    ASSESSMENT AND PLAN           #Acute on chronic respiratory failure requiring ventilation  Failed extubation in the setting of sedation, EULALIA, paralyzed right hemidiaphragm  Currently intubated and sedated with Precedex.    -Follow-up chest x-ray to confirm ET tube placement  -We will increase respiratory rate to help with acidosis.  -Daily chest x-ray, ABG  -Suspect that we can hopefully wean sedation and do spontaneous breathing trial either tomorrow morning. Would continue precedex for sedation and use pushes of fentanyl if needed for sedation overnight. Plan for early AM SBT if ventilation optimized  -May benefit from extubation to BIPAP   -Known to pulmonology, follows with Dr. Damian.  We will consult pulm team.    #Afib  S/p ablation today    -Restart dofetilide tomorrow AM  -One full dose enoxaparin for AC while intubated, then tomorrow, hopefully extubate and continue apixaban 5 mg BID  -Check ECG upon arrival to ICU, monitor qtc daily      #Dyslipidemia  -Continue statin    #HTN  -On lisinopril; hold iso pressor requirement given sedation at this point in time; restart as able     VTE Assessment: Padua    VTE Prophylaxis: Current anticoagulants:  [Provider Managed Hold] apixaban (ELIQUIS) tablet 5 mg, oral, BID  enoxaparin (LOVENOX) syringe 120 mg, subcutaneous, Once      Palliative Care Screen:    Code Status: Full Code  Estimated discharge date: 2/12/2023     ATTENDING DOCUMENTATION  ALSO SEE ATTENDING ATTESTATION SECTION OF NOTE

## 2023-02-08 NOTE — ANESTHESIOLOGIST PRE-PROCEDURE ATTESTATION
Pre-Procedure Patient Identification:  I am the Primary Anesthesiologist and have identified the patient on 02/08/23 at 8:19 AM.   I have confirmed the procedure(s) will be performed by the following surgeon/proceduralist Sincere García MD.

## 2023-02-08 NOTE — Clinical Note
Alexi Hernandez applied to patient's lower body @ a setting of medium - 38° C. Applied by: Matt Navarrete RN.

## 2023-02-08 NOTE — PROGRESS NOTES
Preoperative diagnosis/Indication: Paroxysmal atrial fibrillation  Post-operative diagnosis: Paroxysmal atrial fibrillation, acute respiratory failure      Procedure Summary and Findings:   -Acutely successful atrial fibrillation ablation with isolation of all pulmonary veins.  SVC isolation for additional ablation of atrial fibrillation  - Small pericardial effusion seen preoperatively, unchanged postoperatively  - No significant change in pulmonary vein flows post-operatively      Procedure Details:  - Anesthesia: General   - Lines/Catheters placed: US guided right radial arterial line, calvert catheter  - Access: Left femoral vein: 10 Austrian, 7 Austrian sheaths.  Right femoral vein: 8.5 Austrian sheaths x2     Access Sites:  Right Radial 3 Fr  RSFV 8 Fr - SL1 - RF Catheter  RSFV 8 Fr - SL1 - Lasso  LSFV 9 Fr - ICE (Zonair)  LSFV 7 Fr - CS Deca     Bilateral groin sites - figure-of-eight stitches and manual pressure for hemostasis  Right Radial aleida remains in place for ICU management     ECHO pre-and post performed:  Normal LV Function, Small pericardial effusion  All pV flows < .45 cm/sec      Exam in holding:  Visit Vitals  NSR 65  [place] 135/62  O2 Sat 100% on vent at 100%       Neuro appears intact but sedated and intubated on Precedex  Chest Coarse  Cor RRR  Abd Soft, NT  Bilateral groin puncture sites and R wrist clean and dry with aleida intact  DP 1+, L Radial 1 +     Immediate Post-procedure Summary and Management:  - Pt was reintubated in the EP lab due to acute respiratory failure and a known history of right diaphragm paralysis.  - Pt with hypotension after reintubation so was transferred to MICU with ETT, R Radial Aleida, on Levo and Otilio  - All groin sheaths removed. Hemostasis obtained with placement of figure-of-8 sutures. No evidence of hematoma or bruising bilaterally  - blood loss: minimal  - complications: none  - Net fluid balance: +1500 mL  - Pt was given Lasix 40 IV in the lab after reintubation and  calvert was placed     Plan:  - bedrest x 4 hours. May elevate HOB to 30 degrees  - frequent groin site monitoring  - obtain post-operative EKG  - discontinue calvert when OOB  - resume anticoagulation as ordered, which will be Lovenox 1 mg/kg this PM at 1900 and then will resume home PO Eliquis in AM if able  - remove sutures in AM  - walk again once extubated and stable  - monitor on telemetry x 24 hours in ICU overnight  - resume diet once exubtated  - solumedrol 125mg x 1 dose  - start Protonix tonight  - will resume Tikosyn in AM once taking PO meds and if able  - a 1 month follow up appointment will be made for this patient

## 2023-02-08 NOTE — DISCHARGE INSTRUCTIONS
Dear Mr. Chung,    You came to the hospital for an ablation procedure for your atrial fibrillation. You were then transferred to the intensive care unit (ICU) because you were found to have respiratory failure and low blood pressures after your ablation procedure for your atrial fibrillation .You did require intubation. You were found to have a possible pneumonia and were treated with antibiotics and volume was removed from your lungs with lasix. You were extubated 2/12 and tolerated being off of any oxygen.    In regards to your atrial fibrillation, the ablation procedure was unsuccessful and you required a cardioversion which was successful. You were started on amiodarone and your metoprolol was increased. You were also started on protonix 40mg once a day for 1 month to prevent any stomach ulcer after your ablation procedure.     Important Medication Changes:  -START taking amiodarone 200mg daily (for your atrial fibrillation), please start taking tomorrow 2/15/23  -START taking protonix 40mg daily for 30 days  -CHANGE metoprolol to 50mg daily (for you atrial fibrillation) instead of 25 mg daily  -STOP taking dofetilide     Please bring this discharge paperwork to all of your follow up appointments. If you experience recurrence of your symptoms, please do not hesitate to call your primary care doctor or present to the emergency department for evaluation. It was a pleasure taking care of you at [facility]! We wish you the best of health.     TO DO:  [ ] Please follow up with your primary care provider within 1 week  [ ] Please follow up with Dr. Gambino, your electrophysiologist. The number to make an appointment is [phone]      Post-ablation procedure instructions from your electrophysiologist:    Please take Protonix 40 mg ONCE DAILY for 1 month (this will help to protect the stomach and esophagus during the healing period after your procedure)     Groin Site Care:   A bruise or small lump under  the skin where the catheters were is normal. These usually go away within one week.     Please check your wound site daily to make sure there is no redness, bleeding, or swelling.     The groin dressing should be removed the day following the procedure. A Band-Aid can be used if needed.    Showering:   You may shower 24 hours after your procedure. Clean the area with mild soap and water. Do not rub the area. Pat the area dry with a clean towel.     Do not take a bath or submerge the area under water for three days.    If you have bleeding where your catheter was:  Lie down and hold direct pressure for 10 minutes. If bleeding does not stop in 10  minutes, or if there is a large amount of bleeding, call 911. If bleeding does stop, rest for at least 4 hours. Call your doctor.    Activity:   Do not bend over, strain, push, pull, run or lift anything over 10 pounds for 7 days.     You may exercise in 7 days.     You may return to work in 7 days.     You may walk and climb stairs when you return home.    Driving:   Do not drive a car or use any machinery for 2 days. Someone else must drive you home today.  Please avoid activities requiring balance today and any functions requiring critical decision making (no signing contracts or major purchases)    Diet:   You may eat your normal diet     Call your doctor if you have:   Increased redness, heat, pus, bruising, or bleeding at the site after 24 hours.     Swelling at the catheter site.     Increased pain at the catheter site.     Numbness, pain or coolness in the leg.     Temperature of 100.5 degrees Fahrenheit or greater.     Chest pain or shortness of breath.

## 2023-02-08 NOTE — ANESTHESIA PROCEDURE NOTES
Airway  Urgency: elective    Start Time: 2/8/2023 9:59 AM  Stop Time: 2/8/2023 9:59 AM      General Information and Staff    Patient location during procedure: OR    Indications and Patient Condition  Indications for airway management: anesthesia  Sedation level: general  Preoxygenated: yes  Patient position: sniffing  Mask difficulty assessment: 3 - difficult mask (inadequate, unstable or two providers) +/- NMBA    Final Airway Details  Final airway type: endotracheal airway      Successful airway: ETT     Successful intubation technique: video laryngoscopy  Blade: Tina  Blade size: #4  ETT size (mm): 7.5  Cormack-Lehane Classification: grade I - full view of glottis  Placement verified by: chest auscultation and capnometry   Measured from: lips  ETT to lips (cm): 24  Number of attempts at approach: 1  Number of other approaches attempted: 0  Atraumatic airway insertion

## 2023-02-08 NOTE — Clinical Note
Transseptal puncture completed with RF assisstance. Settings: adams for 1 seconds. Performed by: Sincere García MD. Required reposition and 2 RF to get across septum

## 2023-02-08 NOTE — Clinical Note
Closure device placed for the left femoral and right femoral vein. Closure device used: suture. Closure pressure manually applied.

## 2023-02-09 ENCOUNTER — APPOINTMENT (INPATIENT)
Dept: RADIOLOGY | Facility: HOSPITAL | Age: 73
DRG: 981 | End: 2023-02-09
Attending: STUDENT IN AN ORGANIZED HEALTH CARE EDUCATION/TRAINING PROGRAM
Payer: MEDICARE

## 2023-02-09 ENCOUNTER — APPOINTMENT (INPATIENT)
Dept: CARDIOLOGY | Facility: HOSPITAL | Age: 73
DRG: 981 | End: 2023-02-09
Attending: STUDENT IN AN ORGANIZED HEALTH CARE EDUCATION/TRAINING PROGRAM
Payer: MEDICARE

## 2023-02-09 PROBLEM — N17.9 ACUTE KIDNEY INJURY (CMS/HCC): Status: ACTIVE | Noted: 2023-02-09

## 2023-02-09 PROBLEM — J96.01 ACUTE HYPOXEMIC RESPIRATORY FAILURE (CMS/HCC): Status: ACTIVE | Noted: 2023-02-09

## 2023-02-09 LAB
ALBUMIN SERPL-MCNC: 3.4 G/DL (ref 3.4–5)
ALP SERPL-CCNC: 48 IU/L (ref 35–126)
ALT SERPL-CCNC: 23 IU/L (ref 16–63)
ANION GAP SERPL CALC-SCNC: 11 MEQ/L (ref 3–15)
ANION GAP SERPL CALC-SCNC: 11 MEQ/L (ref 3–15)
AORTIC ROOT ANNULUS - M-MODE: 3.4 CM
ASCENDING AORTA: 4 CM
AST SERPL-CCNC: 28 IU/L (ref 15–41)
ATRIAL RATE: 59
ATRIAL RATE: 69
BASE EXCESS BLDA CALC-SCNC: -0.6 MEQ/L
BASE EXCESS BLDA CALC-SCNC: -0.8 MEQ/L
BASE EXCESS BLDA CALC-SCNC: 1.5 MEQ/L
BASOPHILS # BLD: 0.01 K/UL (ref 0.01–0.1)
BASOPHILS # BLD: 0.05 K/UL (ref 0.01–0.1)
BASOPHILS NFR BLD: 0.1 %
BASOPHILS NFR BLD: 0.3 %
BILIRUB SERPL-MCNC: 1.6 MG/DL (ref 0.3–1.2)
BSA FOR ECHO PROCEDURE: 2.44 M2
BUN SERPL-MCNC: 22 MG/DL (ref 8–20)
BUN SERPL-MCNC: 35 MG/DL (ref 8–20)
CA-I BLD-SCNC: 1.14 MMOL/L (ref 1.15–1.27)
CA-I BLD-SCNC: 1.15 MMOL/L (ref 1.15–1.27)
CA-I BLD-SCNC: 1.18 MMOL/L (ref 1.15–1.27)
CALCIUM SERPL-MCNC: 8.7 MG/DL (ref 8.9–10.3)
CALCIUM SERPL-MCNC: 8.9 MG/DL (ref 8.9–10.3)
CHLORIDE BLDA-SCNC: 106 MEQ/L (ref 98–109)
CHLORIDE BLDA-SCNC: 106 MEQ/L (ref 98–109)
CHLORIDE BLDA-SCNC: 107 MEQ/L (ref 98–109)
CHLORIDE SERPL-SCNC: 107 MEQ/L (ref 98–109)
CHLORIDE SERPL-SCNC: 107 MEQ/L (ref 98–109)
CO2 BLDA-SCNC: 26 MEQ/L (ref 22–32)
CO2 BLDA-SCNC: 26 MEQ/L (ref 22–32)
CO2 BLDA-SCNC: 27 MEQ/L (ref 22–32)
CO2 SERPL-SCNC: 21 MEQ/L (ref 22–32)
CO2 SERPL-SCNC: 24 MEQ/L (ref 22–32)
CREAT SERPL-MCNC: 1.3 MG/DL (ref 0.8–1.3)
CREAT SERPL-MCNC: 1.5 MG/DL (ref 0.8–1.3)
CUSP SEPARATION: 2.4 CM
DIFFERENTIAL METHOD BLD: ABNORMAL
DIFFERENTIAL METHOD BLD: ABNORMAL
DOP CALC LVOT STROKE VOLUME: 68.28 CM3
E WAVE DECELERATION TIME: 221 MS
E/A RATIO: 1.6
E/E' RATIO: 7.5
E/LAT E' RATIO: 6.4
EDV (BP): 110 CM3
EF (A4C): 50.1 %
EF A2C: 59.1 %
EJECTION FRACTION: 53 %
EOSINOPHIL # BLD: 0 K/UL (ref 0.04–0.54)
EOSINOPHIL # BLD: 0.04 K/UL (ref 0.04–0.54)
EOSINOPHIL NFR BLD: 0 %
EOSINOPHIL NFR BLD: 0.3 %
ERYTHROCYTE [DISTWIDTH] IN BLOOD BY AUTOMATED COUNT: 14.2 % (ref 11.6–14.4)
ERYTHROCYTE [DISTWIDTH] IN BLOOD BY AUTOMATED COUNT: 14.3 % (ref 11.6–14.4)
ESV (BP): 51.7 CM3
FRACTIONAL SHORTENING: 33.57 %
GFR SERPL CREATININE-BSD FRML MDRD: 46.1 ML/MIN/1.73M*2
GFR SERPL CREATININE-BSD FRML MDRD: 54.4 ML/MIN/1.73M*2
GLUCOSE BLDA-MCNC: 137 MG/DL (ref 70–99)
GLUCOSE BLDA-MCNC: 159 MG/DL (ref 70–99)
GLUCOSE BLDA-MCNC: 166 MG/DL (ref 70–99)
GLUCOSE SERPL-MCNC: 143 MG/DL (ref 70–99)
GLUCOSE SERPL-MCNC: 160 MG/DL (ref 70–99)
GRAM STN SPEC: NORMAL
HCO3 BLDA-SCNC: 24 MEQ/L (ref 21–28)
HCO3 BLDA-SCNC: 24 MEQ/L (ref 21–28)
HCO3 BLDA-SCNC: 26 MEQ/L (ref 21–28)
HCT VFR BLDCO AUTO: 45.9 % (ref 40.1–51)
HCT VFR BLDCO AUTO: 47.4 % (ref 40.1–51)
HGB BLD-MCNC: 15 G/DL (ref 13.7–17.5)
HGB BLD-MCNC: 15.7 G/DL (ref 13.7–17.5)
HGB BLDA-MCNC: 14.8 G/DL (ref 14–17.5)
HGB BLDA-MCNC: 14.9 G/DL (ref 14–17.5)
HGB BLDA-MCNC: 15.3 G/DL (ref 14–17.5)
IMM GRANULOCYTES # BLD AUTO: 0.06 K/UL (ref 0–0.08)
IMM GRANULOCYTES # BLD AUTO: 0.11 K/UL (ref 0–0.08)
IMM GRANULOCYTES NFR BLD AUTO: 0.5 %
IMM GRANULOCYTES NFR BLD AUTO: 0.7 %
INTERVENTRICULAR SEPTUM: 0.86 CM
LA ESV (BP): 105 CM3
LA ESV INDEX (A2C): 43.44 CM3/M2
LA ESV INDEX (BP): 43.03 CM3/M2
LAAS-AP2: 31.2 CM2
LAAS-AP4: 31.6 CM2
LACTATE BLDA-SCNC: 1.4 MMOL/L (ref 0.4–1.6)
LACTATE BLDA-SCNC: 1.4 MMOL/L (ref 0.4–1.6)
LACTATE BLDA-SCNC: 1.5 MMOL/L (ref 0.4–1.6)
LALD A4C: 7.41 CM
LALD A4C: 7.6 CM
LAV-S: 106 CM3
LEFT ATRIUM VOLUME INDEX: 42.21 CM3/M2
LEFT ATRIUM VOLUME: 103 CM3
LEFT INTERNAL DIMENSION IN SYSTOLE: 3.82 CM (ref 5–7.57)
LEFT VENTRICLE DIASTOLIC VOLUME INDEX: 51.64 CM3/M2
LEFT VENTRICLE DIASTOLIC VOLUME: 126 CM3
LEFT VENTRICLE SYSTOLIC VOLUME INDEX: 25.82 CM3/M2
LEFT VENTRICLE SYSTOLIC VOLUME: 63 CM3
LEFT VENTRICULAR INTERNAL DIMENSION IN DIASTOLE: 5.75 CM (ref 8.69–12.08)
LEFT VENTRICULAR POSTERIOR WALL IN END DIASTOLE: 0.81 CM (ref 0.98–1.83)
LV DIASTOLIC VOLUME: 94.1 CM3
LV ESV (APICAL 2 CHAMBER): 38.5 CM3
LVAD-AP2: 31.3 CM2
LVAD-AP4: 36.9 CM2
LVAS-AP2: 18 CM2
LVAS-AP4: 24.6 CM2
LVEDVI(A2C): 38.57 CM3/M2
LVEDVI(BP): 45.08 CM3/M2
LVESVI(A2C): 15.78 CM3/M2
LVESVI(BP): 21.19 CM3/M2
LVLD-AP2: 8.72 CM
LVLD-AP4: 8.87 CM
LVLS-AP2: 6.88 CM
LVLS-AP4: 7.74 CM
LVOT 2D: 2.4 CM
LVOT A: 4.52 CM2
LVOT MG: 2 MMHG
LVOT MV: 0.62 M/S
LVOT PEAK VELOCITY: 0.8 M/S
LVOT PG: 3 MMHG
LVOT STROKE VOLUME INDEX: 27.98 ML/M2
LVOT VTI: 15.1 CM
LYMPHOCYTES # BLD: 0.77 K/UL (ref 1.2–3.5)
LYMPHOCYTES # BLD: 1.21 K/UL (ref 1.2–3.5)
LYMPHOCYTES NFR BLD: 6.6 %
LYMPHOCYTES NFR BLD: 7.6 %
MAGNESIUM SERPL-MCNC: 1.7 MG/DL (ref 1.8–2.5)
MCH RBC QN AUTO: 29.9 PG (ref 28–33.2)
MCH RBC QN AUTO: 30.1 PG (ref 28–33.2)
MCHC RBC AUTO-ENTMCNC: 32.7 G/DL (ref 32.2–36.5)
MCHC RBC AUTO-ENTMCNC: 33.1 G/DL (ref 32.2–36.5)
MCV RBC AUTO: 91 FL (ref 83–98)
MCV RBC AUTO: 91.4 FL (ref 83–98)
MONOCYTES # BLD: 0.3 K/UL (ref 0.3–1)
MONOCYTES # BLD: 1.01 K/UL (ref 0.3–1)
MONOCYTES NFR BLD: 2.6 %
MONOCYTES NFR BLD: 6.3 %
MV E'TISSUE VEL-LAT: 0.09 M/S
MV E'TISSUE VEL-MED: 0.08 M/S
MV MEAN GRADIENT: 1 MMHG
MV PEAK A VEL: 0.36 M/S
MV PEAK E VEL: 0.56 M/S
MV PEAK GRADIENT: 2 MMHG
MV VALVE AREA BY CONTINUITY EQUATION: 2.86 CM2
MV VTI: 23.9 CM
NEUTROPHILS # BLD: 10.53 K/UL (ref 1.7–7)
NEUTROPHILS # BLD: 13.57 K/UL (ref 1.7–7)
NEUTS SEG NFR BLD: 84.8 %
NEUTS SEG NFR BLD: 90.2 %
NRBC BLD-RTO: 0 %
NRBC BLD-RTO: 0 %
P AXIS: 23
P AXIS: 46
PCO2 BLDA: 39 MM HG (ref 35–48)
PCO2 BLDA: 41 MM HG (ref 35–48)
PCO2 BLDA: 43 MM HG (ref 35–48)
PDW BLD AUTO: 12.8 FL (ref 9.4–12.4)
PDW BLD AUTO: 12.9 FL (ref 9.4–12.4)
PH BLDA: 7.37 PH (ref 7.35–7.45)
PH BLDA: 7.38 PH (ref 7.35–7.45)
PH BLDA: 7.43 PH (ref 7.35–7.45)
PLATELET # BLD AUTO: 165 K/UL (ref 150–350)
PLATELET # BLD AUTO: 183 K/UL (ref 150–350)
PO2 BLDA: 76 MM HG (ref 83–100)
PO2 BLDA: 82 MM HG (ref 83–100)
PO2 BLDA: 90 MM HG (ref 83–100)
POCT PATIENT TEMPERATURE: 98.6 °F (ref 97–99)
POCT TEST (BLD GAS): ABNORMAL
POSTERIOR WALL: 0.81 CM
POTASSIUM BLDA-SCNC: 4.4 MEQ/L (ref 3.4–4.5)
POTASSIUM BLDA-SCNC: 4.6 MEQ/L (ref 3.4–4.5)
POTASSIUM BLDA-SCNC: 5.1 MEQ/L (ref 3.4–4.5)
POTASSIUM SERPL-SCNC: 4.5 MEQ/L (ref 3.6–5.1)
POTASSIUM SERPL-SCNC: 4.8 MEQ/L (ref 3.6–5.1)
PR INTERVAL: 166
PR INTERVAL: 178
PROT SERPL-MCNC: 5.3 G/DL (ref 6–8.2)
QRS DURATION: 88
QRS DURATION: 90
QT INTERVAL: 448
QT INTERVAL: 516
QTC CALCULATION(BAZETT): 480
QTC CALCULATION(BAZETT): 510
R AXIS: 14
R AXIS: 53
RBC # BLD AUTO: 5.02 M/UL (ref 4.5–5.8)
RBC # BLD AUTO: 5.21 M/UL (ref 4.5–5.8)
RVOT VMAX: 0.42 M/S
RVOT VTI: 8.46 CM
SAO2 % BLDA: 94 % (ref 93–98)
SAO2 % BLDA: 95 % (ref 93–98)
SAO2 % BLDA: 96 % (ref 93–98)
SARS-COV-2 RNA RESP QL NAA+PROBE: NEGATIVE
SODIUM BLDA-SCNC: 137 MEQ/L (ref 136–145)
SODIUM BLDA-SCNC: 139 MEQ/L (ref 136–145)
SODIUM BLDA-SCNC: 139 MEQ/L (ref 136–145)
SODIUM SERPL-SCNC: 139 MEQ/L (ref 136–144)
SODIUM SERPL-SCNC: 142 MEQ/L (ref 136–144)
T WAVE AXIS: 2
T WAVE AXIS: 31
TR MAX PG: 18.32 MMHG
TRICUSPID VALVE PEAK REGURGITATION VELOCITY: 2.14 M/S
VENTRICULAR RATE: 59
VENTRICULAR RATE: 69
WBC # BLD AUTO: 11.67 K/UL (ref 3.8–10.5)
WBC # BLD AUTO: 15.99 K/UL (ref 3.8–10.5)
Z-SCORE OF LEFT VENTRICULAR DIMENSION IN END DIASTOLE: -5.78
Z-SCORE OF LEFT VENTRICULAR DIMENSION IN END SYSTOLE: -3.76
Z-SCORE OF LEFT VENTRICULAR POSTERIOR WALL IN END DIASTOLE: -2.6

## 2023-02-09 PROCEDURE — 63600000 HC DRUGS/DETAIL CODE

## 2023-02-09 PROCEDURE — 93005 ELECTROCARDIOGRAM TRACING: CPT | Performed by: INTERNAL MEDICINE

## 2023-02-09 PROCEDURE — 93010 ELECTROCARDIOGRAM REPORT: CPT | Mod: 76 | Performed by: INTERNAL MEDICINE

## 2023-02-09 PROCEDURE — 93306 TTE W/DOPPLER COMPLETE: CPT | Mod: 26 | Performed by: INTERNAL MEDICINE

## 2023-02-09 PROCEDURE — 63700000 HC SELF-ADMINISTRABLE DRUG: Performed by: PHYSICIAN ASSISTANT

## 2023-02-09 PROCEDURE — 25800000 HC PHARMACY IV SOLUTIONS: Performed by: STUDENT IN AN ORGANIZED HEALTH CARE EDUCATION/TRAINING PROGRAM

## 2023-02-09 PROCEDURE — 80053 COMPREHEN METABOLIC PANEL: CPT | Performed by: STUDENT IN AN ORGANIZED HEALTH CARE EDUCATION/TRAINING PROGRAM

## 2023-02-09 PROCEDURE — 25000000 HC PHARMACY GENERAL: Performed by: INTERNAL MEDICINE

## 2023-02-09 PROCEDURE — 20000000 HC ROOM AND CARE ICU

## 2023-02-09 PROCEDURE — 94003 VENT MGMT INPAT SUBQ DAY: CPT

## 2023-02-09 PROCEDURE — 93010 ELECTROCARDIOGRAM REPORT: CPT | Performed by: INTERNAL MEDICINE

## 2023-02-09 PROCEDURE — 63600000 HC DRUGS/DETAIL CODE: Performed by: STUDENT IN AN ORGANIZED HEALTH CARE EDUCATION/TRAINING PROGRAM

## 2023-02-09 PROCEDURE — 85025 COMPLETE CBC W/AUTO DIFF WBC: CPT | Performed by: STUDENT IN AN ORGANIZED HEALTH CARE EDUCATION/TRAINING PROGRAM

## 2023-02-09 PROCEDURE — 71250 CT THORAX DX C-: CPT | Mod: MG

## 2023-02-09 PROCEDURE — 87070 CULTURE OTHR SPECIMN AEROBIC: CPT | Performed by: STUDENT IN AN ORGANIZED HEALTH CARE EDUCATION/TRAINING PROGRAM

## 2023-02-09 PROCEDURE — 25800000 HC PHARMACY IV SOLUTIONS: Performed by: PHYSICIAN ASSISTANT

## 2023-02-09 PROCEDURE — U0003 INFECTIOUS AGENT DETECTION BY NUCLEIC ACID (DNA OR RNA); SEVERE ACUTE RESPIRATORY SYNDROME CORONAVIRUS 2 (SARS-COV-2) (CORONAVIRUS DISEASE [COVID-19]), AMPLIFIED PROBE TECHNIQUE, MAKING USE OF HIGH THROUGHPUT TECHNOLOGIES AS DESCRIBED BY CMS-2020-01-R: HCPCS | Performed by: STUDENT IN AN ORGANIZED HEALTH CARE EDUCATION/TRAINING PROGRAM

## 2023-02-09 PROCEDURE — 87077 CULTURE AEROBIC IDENTIFY: CPT | Performed by: STUDENT IN AN ORGANIZED HEALTH CARE EDUCATION/TRAINING PROGRAM

## 2023-02-09 PROCEDURE — 25500000 HC DRUGS/INCIDENT RAD: Performed by: INTERNAL MEDICINE

## 2023-02-09 PROCEDURE — 99291 CRITICAL CARE FIRST HOUR: CPT | Performed by: INTERNAL MEDICINE

## 2023-02-09 PROCEDURE — 87205 SMEAR GRAM STAIN: CPT | Performed by: STUDENT IN AN ORGANIZED HEALTH CARE EDUCATION/TRAINING PROGRAM

## 2023-02-09 PROCEDURE — 63600000 HC DRUGS/DETAIL CODE: Mod: JW | Performed by: STUDENT IN AN ORGANIZED HEALTH CARE EDUCATION/TRAINING PROGRAM

## 2023-02-09 PROCEDURE — 25000000 HC PHARMACY GENERAL: Performed by: PHYSICIAN ASSISTANT

## 2023-02-09 PROCEDURE — 83735 ASSAY OF MAGNESIUM: CPT | Performed by: STUDENT IN AN ORGANIZED HEALTH CARE EDUCATION/TRAINING PROGRAM

## 2023-02-09 PROCEDURE — 93005 ELECTROCARDIOGRAM TRACING: CPT | Performed by: PHYSICIAN ASSISTANT

## 2023-02-09 PROCEDURE — 36415 COLL VENOUS BLD VENIPUNCTURE: CPT | Performed by: STUDENT IN AN ORGANIZED HEALTH CARE EDUCATION/TRAINING PROGRAM

## 2023-02-09 PROCEDURE — 71045 X-RAY EXAM CHEST 1 VIEW: CPT

## 2023-02-09 PROCEDURE — G1004 CDSM NDSC: HCPCS

## 2023-02-09 PROCEDURE — 93306 TTE W/DOPPLER COMPLETE: CPT

## 2023-02-09 PROCEDURE — 25000000 HC PHARMACY GENERAL: Performed by: STUDENT IN AN ORGANIZED HEALTH CARE EDUCATION/TRAINING PROGRAM

## 2023-02-09 PROCEDURE — 63700000 HC SELF-ADMINISTRABLE DRUG: Performed by: STUDENT IN AN ORGANIZED HEALTH CARE EDUCATION/TRAINING PROGRAM

## 2023-02-09 PROCEDURE — 99233 SBSQ HOSP IP/OBS HIGH 50: CPT | Mod: 24 | Performed by: INTERNAL MEDICINE

## 2023-02-09 PROCEDURE — 80048 BASIC METABOLIC PNL TOTAL CA: CPT | Performed by: STUDENT IN AN ORGANIZED HEALTH CARE EDUCATION/TRAINING PROGRAM

## 2023-02-09 RX ORDER — MIDAZOLAM HYDROCHLORIDE 2 MG/2ML
INJECTION, SOLUTION INTRAMUSCULAR; INTRAVENOUS
Status: DISPENSED
Start: 2023-02-09 | End: 2023-02-10

## 2023-02-09 RX ORDER — MIDAZOLAM HYDROCHLORIDE 2 MG/2ML
2 INJECTION, SOLUTION INTRAMUSCULAR; INTRAVENOUS ONCE
Status: COMPLETED | OUTPATIENT
Start: 2023-02-09 | End: 2023-02-09

## 2023-02-09 RX ORDER — FENTANYL CITRATE/PF 100MCG/2ML
0-200 SYRINGE (ML) INTRAVENOUS
Status: DISCONTINUED | OUTPATIENT
Start: 2023-02-09 | End: 2023-02-12

## 2023-02-09 RX ORDER — FUROSEMIDE 10 MG/ML
40 INJECTION INTRAMUSCULAR; INTRAVENOUS ONCE
Status: COMPLETED | OUTPATIENT
Start: 2023-02-09 | End: 2023-02-09

## 2023-02-09 RX ORDER — ADENOSINE 3 MG/ML
INJECTION, SOLUTION INTRAVENOUS
Status: COMPLETED
Start: 2023-02-09 | End: 2023-02-09

## 2023-02-09 RX ORDER — ENOXAPARIN SODIUM 150 MG/ML
1 INJECTION SUBCUTANEOUS
Status: DISCONTINUED | OUTPATIENT
Start: 2023-02-10 | End: 2023-02-13

## 2023-02-09 RX ORDER — MIDAZOLAM HYDROCHLORIDE 2 MG/2ML
1 INJECTION, SOLUTION INTRAMUSCULAR; INTRAVENOUS ONCE
Status: COMPLETED | OUTPATIENT
Start: 2023-02-09 | End: 2023-02-09

## 2023-02-09 RX ORDER — ENOXAPARIN SODIUM 150 MG/ML
1 INJECTION SUBCUTANEOUS ONCE
Status: COMPLETED | OUTPATIENT
Start: 2023-02-09 | End: 2023-02-09

## 2023-02-09 RX ORDER — ADENOSINE 3 MG/ML
6 INJECTION, SOLUTION INTRAVENOUS ONCE
Status: COMPLETED | OUTPATIENT
Start: 2023-02-09 | End: 2023-02-09

## 2023-02-09 RX ORDER — AMIODARONE HYDROCHLORIDE 150 MG/3ML
INJECTION, SOLUTION INTRAVENOUS
Status: COMPLETED
Start: 2023-02-09 | End: 2023-02-09

## 2023-02-09 RX ORDER — PHENYLEPHRINE HCL IN 0.9% NACL 50MG/250ML
10-400 PLASTIC BAG, INJECTION (ML) INTRAVENOUS
Status: DISCONTINUED | OUTPATIENT
Start: 2023-02-09 | End: 2023-02-13

## 2023-02-09 RX ORDER — MIDAZOLAM HYDROCHLORIDE 2 MG/2ML
INJECTION, SOLUTION INTRAMUSCULAR; INTRAVENOUS
Status: COMPLETED
Start: 2023-02-09 | End: 2023-02-09

## 2023-02-09 RX ADMIN — PANTOPRAZOLE SODIUM 40 MG: 40 INJECTION, POWDER, LYOPHILIZED, FOR SOLUTION INTRAVENOUS at 18:20

## 2023-02-09 RX ADMIN — FUROSEMIDE 40 MG: 10 INJECTION, SOLUTION INTRAMUSCULAR; INTRAVENOUS at 18:20

## 2023-02-09 RX ADMIN — PHENYLEPHRINE HYDROCHLORIDE 25 MCG/MIN: 50 INJECTION INTRAVENOUS at 22:40

## 2023-02-09 RX ADMIN — MAGNESIUM SULFATE HEPTAHYDRATE 2 G: 40 INJECTION, SOLUTION INTRAVENOUS at 08:40

## 2023-02-09 RX ADMIN — DEXMEDETOMIDINE 1.5 MCG/KG/HR: 200 INJECTION, SOLUTION INTRAVENOUS at 19:22

## 2023-02-09 RX ADMIN — ADENOSINE 6 MG: 3 INJECTION, SOLUTION INTRAVENOUS at 21:23

## 2023-02-09 RX ADMIN — CHLORHEXIDINE GLUCONATE ORAL RINSE 15 ML: 1.2 SOLUTION DENTAL at 11:48

## 2023-02-09 RX ADMIN — Medication 50 MCG/HR: at 22:40

## 2023-02-09 RX ADMIN — DEXMEDETOMIDINE 1.5 MCG/KG/HR: 200 INJECTION, SOLUTION INTRAVENOUS at 13:54

## 2023-02-09 RX ADMIN — DEXMEDETOMIDINE 1.5 MCG/KG/HR: 200 INJECTION, SOLUTION INTRAVENOUS at 06:01

## 2023-02-09 RX ADMIN — AMIODARONE HYDROCHLORIDE 1 MG/MIN: 50 INJECTION, SOLUTION INTRAVENOUS at 22:13

## 2023-02-09 RX ADMIN — AMIODARONE HYDROCHLORIDE 150 MG: 50 INJECTION, SOLUTION INTRAVENOUS at 21:56

## 2023-02-09 RX ADMIN — DEXMEDETOMIDINE 1.5 MCG/KG/HR: 200 INJECTION, SOLUTION INTRAVENOUS at 03:45

## 2023-02-09 RX ADMIN — ATORVASTATIN CALCIUM 20 MG: 20 TABLET, FILM COATED ORAL at 18:20

## 2023-02-09 RX ADMIN — FENTANYL CITRATE 50 MCG: 0.05 INJECTION, SOLUTION INTRAMUSCULAR; INTRAVENOUS at 19:42

## 2023-02-09 RX ADMIN — MIDAZOLAM HYDROCHLORIDE 1 MG: 1 INJECTION, SOLUTION INTRAMUSCULAR; INTRAVENOUS at 12:56

## 2023-02-09 RX ADMIN — FUROSEMIDE 40 MG: 10 INJECTION, SOLUTION INTRAMUSCULAR; INTRAVENOUS at 08:42

## 2023-02-09 RX ADMIN — Medication 4 MCG/MIN: at 01:30

## 2023-02-09 RX ADMIN — DEXMEDETOMIDINE 1.5 MCG/KG/HR: 200 INJECTION, SOLUTION INTRAVENOUS at 16:44

## 2023-02-09 RX ADMIN — DEXMEDETOMIDINE 1.5 MCG/KG/HR: 200 INJECTION, SOLUTION INTRAVENOUS at 01:29

## 2023-02-09 RX ADMIN — MIDAZOLAM HYDROCHLORIDE 2 MG: 2 INJECTION, SOLUTION INTRAMUSCULAR; INTRAVENOUS at 21:32

## 2023-02-09 RX ADMIN — ADENOSINE 6 MG: 3 INJECTION INTRAVENOUS at 21:23

## 2023-02-09 RX ADMIN — CHLORHEXIDINE GLUCONATE ORAL RINSE 15 ML: 1.2 SOLUTION DENTAL at 22:43

## 2023-02-09 RX ADMIN — PERFLUTREN: 6.52 INJECTION, SUSPENSION INTRAVENOUS at 10:37

## 2023-02-09 RX ADMIN — ENOXAPARIN SODIUM 120 MG: 120 INJECTION SUBCUTANEOUS at 08:41

## 2023-02-09 RX ADMIN — FENTANYL CITRATE 50 MCG: 0.05 INJECTION, SOLUTION INTRAMUSCULAR; INTRAVENOUS at 21:15

## 2023-02-09 RX ADMIN — POLYETHYLENE GLYCOL 3350 17 G: 17 POWDER, FOR SOLUTION ORAL at 08:42

## 2023-02-09 RX ADMIN — DEXMEDETOMIDINE 1.5 MCG/KG/HR: 200 INJECTION, SOLUTION INTRAVENOUS at 21:24

## 2023-02-09 RX ADMIN — ENOXAPARIN SODIUM 120 MG: 120 INJECTION SUBCUTANEOUS at 19:50

## 2023-02-09 RX ADMIN — MIDAZOLAM HYDROCHLORIDE 2 MG: 1 INJECTION, SOLUTION INTRAMUSCULAR; INTRAVENOUS at 21:25

## 2023-02-09 RX ADMIN — MIDAZOLAM HYDROCHLORIDE 2 MG: 1 INJECTION, SOLUTION INTRAMUSCULAR; INTRAVENOUS at 21:32

## 2023-02-09 NOTE — DISCHARGE INSTR - OTHER ORDERS
[facility] is offering Free smoking cessation classes virtually through [employer].  Please register by calling [phone].

## 2023-02-09 NOTE — ASSESSMENT & PLAN NOTE
Failed extubation in the setting of excessive anesthesia, EULALIA, paralyzed R hemidiaphragm. Currently intubated and sedated. Follows with Dr. Damian.  CT Chest 2/9: Small bilateral pleural effusions, consolidation in the adjacent lower lobes in part due to compressive atelectasis. Persistent elevation the right hemidiaphragm with associated compressive   atelectasis.   Sputum positive for e coli 2/9, however no wbc, no fever  Extubated 2/12, transitioned to BIPAP. Now nasal cannula.     -Pulm following  -Ceftriaxone for E coli. Day 4. Will d/c after dose today.

## 2023-02-09 NOTE — PLAN OF CARE
Intubated, TF recs provided    Problem: Adult Inpatient Plan of Care  Goal: Plan of Care Review  Outcome: Progressing

## 2023-02-09 NOTE — CONSULTS
"Nutrition Recommendations    1.  If unable to extubate by 2/9 1800, recommend TF w/ NPO:  - Replete 15 ml/hr increase by 15 ml q4h to goal 45 ml/hr  - 2 packs Prosource TID  - if euvolemic and not receiving IVF, flush 50 ml/hr water  - daily MVI w/ minerals w/ TF    2.  When extubated, keep NPO until SLP eval if intubated >/= 4 days; diet consistency per their recommendations, no other diet restrictions.       Nutrition Assessment  Clinical Course: Patient is a 72 y.o. male who was admitted on 2/8/2023 with a diagnosis of Atrial fibrillation, unspecified type (CMS/HCC) [I48.91]  Atrial fibrillation (CMS/HCC) [I48.91].   S/p Afib ablatio c/b failed extubation and hypotension  2/8 1300 intubated      Past Medical History:   Diagnosis Date   • A-fib (CMS/HCC)    • A-fib (CMS/HCC) 7/25/2018   • Chronic anticoagulation 7/25/2018   • HTN (hypertension) 6/24/2020   • Lipid disorder    • Visit for monitoring Tikosyn therapy 7/25/2019     Past Surgical History:   Procedure Laterality Date   • ABLATION OF DYSRHYTHMIC FOCUS     • CARDIAC CATHETERIZATION     • TOTAL SHOULDER REPLACEMENT Right        Reason for Assessment  Reason For Assessment: identified at risk by screening criteria     [facility] Nutrition Screen Tool  Has patient lost weight without trying?: 2-->Unsure (GAETANO)  Has patient been eating poorly due to decreased appetite?: 0-->No  [facility] Nutrition Screen Score: 2         [id number] Physical Appearance  Overall Physical Appearance: on ventilator support, obese  Tubes: Orogastric tube  Skin: intact, edema (1+ edema)     Nutrition Order  Nutrition Order: does not meet nutritional requirements  Nutrition Order Comments: NPO     Anthropometrics  Height: 180.3 cm (5' 11\")           Current Weight  Weight Method: Bed scale  Weight: 119 kg (263 lb)     Ideal Body Weight (IBW)  Ideal Body Weight (IBW) (kg): 79.27  % Ideal Body Weight: 150.49            Body Mass Index (BMI)  BMI (Calculated): 36.7         BMP Results       02/09/23 " 02/08/23 01/25/23     0315 1526 0921     142 144    K 4.8 4.2 4.4    Cl 107 109 106    CO2 21 23 32    Glucose 160 156 97    BUN 22 14 16    Creatinine 1.3 1.3 0.98    Calcium 8.7 8.4 9.4    Anion Gap 11 10 --    EGFR 54.4 54.4 82         Comment for Glucose at 0921 on 01/25/23:               Fasting reference interval         Comment for EGFR at 0921 on 01/25/23: The eGFR is based on the CKD-EPI 2021 equation. To calculate   the new eGFR from a previous Creatinine or Cystatin C  result, go to https://www.kidney.org/professionals/  kdoqi/[email or web address]        2/9 mag 1.7 (L)         Medications  Pertinent Medications Reviewed: reviewed, pertinent  - protonix, miralax, levophed 1 mcg/minute, precedex          Calorie Requirements  Estimated kCal Needs: Actual Body Weight  Estimated Calorie Need Method: kcal/kg  Calorie/kg Recommended: 11-14  Calorie Recommendations: 6903-5520       Protein Requirements  Recommended Dosing Weight (Estimated Protein Needs): Ideal Body Weight  Est Protein Requirement Amount (gms/kg):  (2g/kg)  Protein Recommendations: 155     Fluid requirements:  25-30 mlg/kg IBW = 6327-3724 ml       kg, IBW 78.2 kg  Intubated, MAP 77, PEEP 6, FIO2 60.  OGT  NFPE: unremarkable    TF goal Replete 45 ml/hr, 6 packs Prosource/day = 1440 kcals (12/kg ABW), 159g protein (2g/kg IBW), 915 ml water in TF.    Goals:  TF w/in 48h of intubation  Monitor: TF start/advance, bowel movements, volume status, skin status    Recommendations: See above     PES  Statement: PES Statement  Nutrition Diagnosis: Inadequate Energy Intake  Related To:: Decreased ability to consume sufficient energy  As Evidenced By:: NPO/vent  Nutritional Needs Met?: No                                   Date: 02/09/23  Signature: Cassandra Goncalves RD

## 2023-02-09 NOTE — CONSULTS
Pulmonary and Critical Care  Consult Note       Indication for Consultation:  [facility]    Consulting Physician:  Dr. Toussaint       HISTORY OF PRESENT ILLNESS     Humberto Chung is a 72 y.o. male with a history of atrial fibrillation, OHS, paralyzed right hemidiaphragm, and HTN/HLD who presented as an outpatient for elective atrial fibrillation ablation on 2/8. He underwent the procedure without and intraoperative complications. Unfortunately, post-op, extubation was attempted but his respiratory status worsened resulting in reintubation, for which he was subsequently transferred here to the CICU under the cardiology service. This morning patient's respiratory parameters did not improve, so pulmonary was consulted for additional recommendations.    Of note, patient sees Dr. Damian for his pulmonary care. He was last seen in 7/2022. Per office notes, his elevated R hemidiaphragm dates as far back as 2009. Interestingly, his brother also has a paralyzed hemidiaphragm. He also sees Dr. Damian for EULALIA, for which he had been intolerant of using CPAP in the past.       PAST MEDICAL/SURGICAL HISTORY     Medical History:   Past Medical History:   Diagnosis Date   • A-fib (CMS/HCC)    • A-fib (CMS/HCC) 7/25/2018   • Chronic anticoagulation 7/25/2018   • HTN (hypertension) 6/24/2020   • Lipid disorder    • Visit for monitoring Tikosyn therapy 7/25/2019       Surgical History:   Past Surgical History:   Procedure Laterality Date   • ABLATION OF DYSRHYTHMIC FOCUS     • CARDIAC CATHETERIZATION     • TOTAL SHOULDER REPLACEMENT Right           MEDICATIONS     Home Medications:  •  apixaban, Take 1 tablet (5 mg total) by mouth 2 (two) times a day.  •  aspirin, Take 81 mg by mouth daily.  •  atorvastatin, TAKE ONE TABLET BY MOUTH DAILY  •  dofetilide, Take 1 capsule (500 mcg total) by mouth 2 (two) times a day.  •  lisinopriL, Take 1 tablet (5 mg total) by mouth daily.  •  metoprolol tartrate, Take 1 tablet (25 mg total) by  "mouth every 6 (six) hours as needed (palpitations). (Patient taking differently: Take 25 mg by mouth once.)    Current Medications:  • [Provider Managed Hold] apixaban  5 mg oral BID   • [Provider Managed Hold] aspirin  81 mg oral Daily   • atorvastatin  20 mg oral q PM   • chlorhexidine  15 mL Mouth/Throat 2 times per day   • [Provider Managed Hold] dofetilide  500 mcg oral BID   • enoxaparin  1 mg/kg subcutaneous Once   • [Provider Managed Hold] lisinopriL  5 mg oral Daily   • magnesium sulfate  2 g intravenous Once   • pantoprazole  40 mg oral Daily    Or   • pantoprazole  40 mg intravenous Daily   • polyethylene glycol  17 g oral Daily          ALLERGIES     Aspirin and Penicillins       FAMILY HISTORY     Family History   Adopted: Yes   Family history unknown: Yes          SOCIAL HISTORY     Social History     Socioeconomic History   • Marital status:      Spouse name: None   • Number of children: None   • Years of education: None   • Highest education level: None   Tobacco Use   • Smoking status: Some Days   • Smokeless tobacco: Never   Vaping Use   • Vaping Use: Never used   Substance and Sexual Activity   • Alcohol use: Yes   • Drug use: Defer   • Sexual activity: Defer          REVIEW OF SYSTEMS     Unable to obtain due to patient being intubated/ventilated.       PHYSICAL EXAM     Vital Signs:   Temp (72hrs), Av.4 °C (97.5 °F), Min:35.9 °C (96.7 °F), Max:36.9 °C (98.4 °F)    Visit Vitals  /77   Pulse 65   Temp 36.9 °C (98.4 °F) (Temporal)   Resp 19   Ht 1.803 m (5' 11\")   Wt 119 kg (263 lb 0.1 oz)   SpO2 97%   BMI 36.68 kg/m²     Vitals:    23 0400 23 0500 23 0600 23 0700   BP:       Pulse: 67 68 66 65   Resp: 18 18 20 19   Temp: 36.9 °C (98.4 °F)      TempSrc: Temporal      SpO2: 97% 97% 97% 97%   Weight:       Height:           Physical Exam:   General: sedated, critically ill appearing   Head: normocephalic, atraumatic   Eyes: no scleral icterus  Neck: trachea " midline  Cardiovascular: S1, S2. Regular rate.   Lungs: Intubated, equal coarse breath sounds B/L  Abdomen: Soft, non-distended  Extremities: +1 pitting edema   Skin: Warm, dry, intact   Neuro: moves extremities spontaneously to tactile stimuli        I/O's:    Intake/Output Summary (Last 24 hours) at 2/9/2023 0819  Last data filed at 2/9/2023 0700  Gross per 24 hour   Intake 1952.36 ml   Output 2270 ml   Net -317.64 ml         Ventilator Data/Settings:  Ventilator Settings:  Vent Mode: Pressure regulated volume control  FiO2 (%) (Set):  [60 %-100 %] 60 %  S RR:  [18-20] 18  S VT:  [450 mL-500 mL] 450 mL  PEEP/CPAP (Set, cmH2O):  [6 cm H20] 6 cm H20  MAP (Observed, cmH2O):  [8-13] 10    Last 6 FIO2:  Lab Results   Component Value Date    FIO2 100 02/08/2023       Last 6 SPO2:  SpO2 Readings from Last 6 Encounters:   02/09/23 95%   07/28/22 96%   01/14/22 98%   07/08/21 99%   12/11/20 94%   05/28/20 96%          DIAGNOSTIC DATA     Labs:  I have personally reviewed all pertinent patient laboratory results. Labs of note discussed below:    CBC Results       02/09/23 02/08/23 01/25/23     0315 1526 0921    WBC 11.67 16.45 7.3    RBC 5.02 4.84 5.38    HGB 15.0 14.6 16.0    HCT 45.9 44.3 49.1    MCV 91.4 91.5 91.3    MCH 29.9 30.2 29.7    MCHC 32.7 33.0 32.6     172 215        BMP Results       02/09/23 02/08/23 01/25/23     0315 1526 0921     142 144    K 4.8 4.2 4.4    Cl 107 109 106    CO2 21 23 32    Glucose 160 156 97    BUN 22 14 16    Creatinine 1.3 1.3 0.98    Calcium 8.7 8.4 9.4    Anion Gap 11 10 --    EGFR 54.4 54.4 82         Comment for Glucose at 0921 on 01/25/23:               Fasting reference interval         Comment for EGFR at 0921 on 01/25/23: The eGFR is based on the CKD-EPI 2021 equation. To calculate   the new eGFR from a previous Creatinine or Cystatin C  result, go to https://www.kidney.org/professionals/  kdoqi/gfr%5Fcalculator          Microbiology Results     Procedure Component  Value Units Date/Time    SARS-CoV-2 (COVID-19), PCR Nasopharynx [[id number]]  (Normal) Collected: 02/09/23 0315    Specimen: Nasopharyngeal Swab from Nasopharynx Updated: 02/09/23 0522    Narrative:      The following orders were created for panel order SARS-CoV-2 (COVID-19), PCR Nasopharynx.  Procedure                               Abnormality         Status                     ---------                               -----------         ------                     SARS-CoV-2 (COVID-19), P...[[id number]]  Normal              Final result                 Please view results for these tests on the individual orders.    SARS-CoV-2 (COVID-19), PCR Nasopharynx [[id number]]  (Normal) Collected: 02/09/23 0315    Specimen: Nasopharyngeal Swab from Nasopharynx Updated: 02/09/23 0522     SARS-CoV-2 (COVID-19) Negative    Narrative:      Testing performed using real-time PCR for detection of COVID-19. EUA approved validation studies performed on site.     MRSA Screen, Nares Only Nose [[id number]]  (Normal) Collected: 02/08/23 1717    Specimen: Nasal Swab from Nose Updated: 02/08/23 2219     MRSA DNA, Nares Negative        ABG Results       02/09/23 02/09/23 02/08/23     0609 0319 1857    pH 7.37 7.38 7.34    pCO2 43 41 41    pO2 90 82 107    HCO3 24 24 22    Base Excess -0.6 -0.8 -3.5         Comment for pH at 0609 on 02/09/23: Temperature Corrected pH(T)    Comment for pH at 0319 on 02/09/23: Temperature Corrected pH(T)    Comment for pH at 1857 on 02/08/23: Temperature Corrected pH(T)    Comment for pCO2 at 0609 on 02/09/23: Temperature Corrected pCO2(T)    Comment for pCO2 at 0319 on 02/09/23: Temperature Corrected pCO2(T)    Comment for pCO2 at 1857 on 02/08/23: Temperature Corrected pCO2(T)    Comment for pO2 at 0609 on 02/09/23: Temperature Corrected pO2(T)    Comment for pO2 at 0319 on 02/09/23: Temperature Corrected pO2(T)    Comment for pO2 at 1857 on 02/08/23: Temperature Corrected pO2(T)            Imaging:  I have  reviewed all pertinent imaging which is discussed below:    X-RAY ABDOMEN 1 VIEW    Result Date: 2/8/2023  CLINICAL HISTORY: OG tube placement     IMPRESSION: Endotracheal and enteric tubes in satisfactory position, as below. No pneumothorax. The focal airspace opacities throughout both lungs which may reflect atelectasis, aspiration and/or pneumonia. Suggestion of a small left pleural effusion. Nonobstructive bowel gas pattern. COMMENT: Comparison: Chest x-ray 8/6/2020. Technique: Supine portable frontal AP projection of the chest and abdomen were obtained. FINDINGS: CHEST: The tip of endotracheal tube terminates 3.7 cm above the haley. The lungs are hypoexpanded with redemonstrated elevation of the right hemidiaphragm. There are airspace opacities seen within the bilateral mid to lower lungs. There is suggestion of a small left pleural effusion. There is no pneumothorax. There is limited assessment of the cardiac silhouette on this AP expiratory projection. The mediastinal contours are unremarkable. There is no acute osseous abnormality. There is advanced left glenohumeral joint osteoarthritis. ABDOMEN: The tip of the enteric tube terminates over the left upper quadrant of the abdomen. Air is noted within the normal caliber transverse and descending colon as well as within nondistended loops of small bowel within the right mid to upper abdomen. No distended air-filled loops of bowel are seen. There is no evidence of pneumoperitoneum on this limited single supine projection. The visualized osseous structures are unremarkable.     X-RAY CHEST 1 VIEW    Result Date: 2/8/2023  CLINICAL HISTORY: OG tube placement     IMPRESSION: Endotracheal and enteric tubes in satisfactory position, as below. No pneumothorax. The focal airspace opacities throughout both lungs which may reflect atelectasis, aspiration and/or pneumonia. Suggestion of a small left pleural effusion. Nonobstructive bowel gas pattern. COMMENT: Comparison:  Chest x-ray 8/6/2020. Technique: Supine portable frontal AP projection of the chest and abdomen were obtained. FINDINGS: CHEST: The tip of endotracheal tube terminates 3.7 cm above the haley. The lungs are hypoexpanded with redemonstrated elevation of the right hemidiaphragm. There are airspace opacities seen within the bilateral mid to lower lungs. There is suggestion of a small left pleural effusion. There is no pneumothorax. There is limited assessment of the cardiac silhouette on this AP expiratory projection. The mediastinal contours are unremarkable. There is no acute osseous abnormality. There is advanced left glenohumeral joint osteoarthritis. ABDOMEN: The tip of the enteric tube terminates over the left upper quadrant of the abdomen. Air is noted within the normal caliber transverse and descending colon as well as within nondistended loops of small bowel within the right mid to upper abdomen. No distended air-filled loops of bowel are seen. There is no evidence of pneumoperitoneum on this limited single supine projection. The visualized osseous structures are unremarkable.          ASSESSMENT AND PLAN     In brief, Humberto Chung is a 72 y.o. male who presented for elective atrial fibrillation ablation post-op complicated by respiratory distress post-extubation resulting in reintubation for which he is now in the unit for.    Impression:    1. VDRF with hypoxemia and hypercapnia - Suspect his reintubation was in the setting of respiratory distress/hypoventilation from procedural sedatives in setting of known paralyzed R hemidiaphragm. This morning remains rather hypoxemic, but improving.   - Imaging reviewed and findings concerning for pulmonary edema. Left diaphragm angle rather low concerning for deep sulcus sign, but bedside POCUS showed lung sliding throughout.     2. Atrial Fibrillation - Now s/p ablation currently at a sinus arrhythmia.       Recommendations:  - Please start aggressive diuresis as  tolerated  - Maintain PRVC 450/18/6/60%  - Start weaning trials tomorrow  - When ready for extubation, he will need to be extubated to BiPAP 16/8  - Maintain RASS of 0 to -1 on Precedex; fentanyl bolus for any additional needs  - Rest of management per primary team    Thank you for the consult and allowing us to be part of this patient's care. We will continue to follow.          Romeo Moreau DO  Pulmonary & Critical Care Medicine Fellow  PGY6  2/9/2023

## 2023-02-09 NOTE — PROGRESS NOTES
Internal Medicine  Transfer To/From Critical Care       SUMMARY OF HOSPITAL COURSE   72 y.o. male with PMH of paroxsymal atrial fibrillation (on dofetelide), HTN, HLD, EULALIA (CPAP intolerant), chronic R hemidiaphragm paralysis who [place] [facility] [place].     His ablation was complicated by failed extubation & acute hypoxic hypercapnic respiratory failure & hypotension requiring reintubation in the EP lab and levophed/phenylephrine. His initial labs were notable for ABG of 7.22/58/117, lactate 1.7.     #Acute Hypoxic Respiratory Failure  The patient was re-intubated in the EP lab, sedated with precedex. Requiring phenylephrine and levophed for blood pressure support. Subsequently transferred to the ICU. CT chest showed small bilateral pleural effusions, consolidation in the adjacent lower lobes in part due to compressive atelectasis. TTE with bubble study 2/9: normal LV size and thickness, low-normal systolic function EF 50-55%, moderately dilated LA, no visualized interatrial shunt. Pulmonology was consulted as hypoxia persisted.  He was diuresed with lasix due to pulmonary vascular congestion. Sputum cultures were positive for E coli & patient was treated with ceftriaxone. He was extubated 2/12 with transition to BIPAP. Now on 5L/min nasal cannula saturating appropriately.     #Atrial Fibrillation  Despite radiofrequency ablation performed 2/8, course was complicated by development of Afib. Required cardioversion 2/9 with 3 shocks due to development of RVR on 2/9 with patient briefly returning to normal sinus rhythm after transitioning from norepinephrine to phenylephrine, however has remained in Afib. He briefly required pressor supportHe was maintained of daily amiodarone, therapeutic heparin. Digoxin and metoprolol were initiated in the ICU. Currently on amiodorone 400 mg TID. Digoxin 125mcg, Metoprolol 37.5 qd. Will be transitioned back to Eliquis 5 BID tonight.     -Follow-up  with EP                  CODE STATUS      Their Code Status is Full Code      CURRENT MEDICATIONS     Current Facility-Administered Medications   Medication Dose Route Frequency    acetaminophen  975 mg oral q8h PRN    alum-mag hydroxide-simeth  30 mL oral q4h PRN    [Provider Managed Hold] apixaban  5 mg oral BID    [Provider Managed Hold] aspirin  81 mg oral Daily    atorvastatin  20 mg oral q PM    bisacodyL  10 mg rectal Daily PRN    chlorhexidine  15 mL Mouth/Throat 2 times per day    dexmedetomidine in 0.9 % NaCl  0.2-1.5 mcg/kg/hr intravenous Titrated    glucose  16-32 g of dextrose oral PRN    Or    dextrose  15-30 g of dextrose oral PRN    Or    glucagon  1 mg intramuscular PRN    Or    dextrose 50 % in water (D50)  25 mL intravenous PRN    diphenhydrAMINE  50 mg oral q8h PRN    [Provider Managed Hold] dofetilide  500 mcg oral BID    fentaNYL  50 mcg intravenous q1h PRN    [Provider Managed Hold] lisinopriL  5 mg oral Daily    [Provider Managed Hold] metoprolol tartrate  25 mg oral q6h PRN    norepinephrine  0.5-90 mcg/min intravenous Titrated    pantoprazole  40 mg oral Daily    Or    pantoprazole  40 mg intravenous Daily    polyethylene glycol  17 g oral Daily       All inpatient medications were personally reviewed.     PERTINENT PHYSICAL EXAMINATION      Vital signs in last 24 hours:  Temp:  [35.9 °C (96.7 °F)-36.9 °C (98.4 °F)] 36.6 °C (97.9 °F)  Heart Rate:  [54-68] 61  Resp:  [0-23] 19  BP: ()/(59-77) 122/77  FiO2 (%) (Set):  [50 %-100 %] 50 %  Temp (24hrs), Av.4 °C (97.6 °F), Min:35.9 °C (96.7 °F), Max:36.9 °C (98.4 °F)      Intake/Output Summary (Last 24 hours) at 2023 1500  Last data filed at 2023 1300  Gross per 24 hour   Intake 1711.92 ml   Output 3145 ml   Net -1433.08 ml       Physical Exam     SIGNIFICANT LABS / IMAGING      Labs  {LABS REVIEWED SCT:05054}    Imaging  {Imaging reviewed last 24H:70356}    ECG/Telemetry  {Findings; ecg normal:16626}    LAST ASSESSMENT AND PLAN       Acute kidney injury (CMS/HCC)  Assessment & Plan  Creatinine 1.3,     Acute hypoxemic respiratory failure (CMS/[facility])  Assessment & Plan  Failed extubation in the setting of sedation, EULALIA, paralyzed R hemidiaphragm. Currently intubated and sedated. Follows with Dr. Damian     -Daily CXR, ABG  -Can attempt to wean sedation, SBT  -F/u pulm consult    * Atrial fibrillation (CMS/[facility])  Assessment & Plan  History of A fib on dofelitide. 2/8 failed extubation due to hemodynamic compromise   Appears to be in sinus rhythm on tele     - Can restart dofelitide this AM for maintenance?  - Monitor on Tele, monitor Qt  - Given lovenox, can resume home apixaban         PCP/KEY FAMILY/EMERGENCY CONTACTS      PCP:  Balbir Tellez MD    Emergency Contact:  Extended Emergency Contact Information  Primary Emergency Contact: Tahmina Chung  Mobile Phone: [phone]  Relation: Spouse       IMMEDIATE NEEDS FOR FIRST DAY POST-TRANSFER      ***               VTE Assessment: Padua    Estimated discharge date: 2/12/2023     ATTENDING DOCUMENTATION  ALSO SEE ATTENDING ATTESTATION SECTION OF NOTE

## 2023-02-09 NOTE — ASSESSMENT & PLAN NOTE
History of A fib on dofelitide. 2/8 failed extubation due to hemodynamic & respiratory compromise most likely in the setting of anesthesia.  S/p successful radiofrequency ablation 2/8.   Appears to be in sinus rhythm on tele, rates in 60s  TTE with bubble study 2/9: normal LV size and thickness, low-normal systolic function EF 50-55%, moderately dilated LA, no visualized interatrial shunt.   2/9: Patient converted to Afib and RVR, has been in Afib since failing cardioversion    - Oral amiodarone 400mg tid, metoprolol 37.5 mg daily.   - Transition to 200 mg amiodarone daily on discharge from hospital   - Monitor on Tele, monitor Qt  - Continue Eliquis 5 BID

## 2023-02-09 NOTE — PLAN OF CARE
Problem: Adult Inpatient Plan of Care  Goal: Patient-Specific Goal (Individualized)  Outcome: Progressing  Goal: Absence of Hospital-Acquired Illness or Injury  Outcome: Progressing  Goal: Optimal Comfort and Wellbeing  Outcome: Progressing  Goal: Readiness for Transition of Care  Outcome: Progressing     Problem: Skin Injury Risk Increased  Goal: Skin Health and Integrity  Outcome: Progressing     Problem: Fall Injury Risk  Goal: Absence of Fall and Fall-Related Injury  Outcome: Progressing     Problem: Restraint, Nonbehavioral (Nonviolent)  Goal: Discontinuation Criteria Achieved  Outcome: Progressing   Plan of Care Review  Plan of Care Reviewed With: patient  Progress: improving  Outcome Summary: pt intubated/ sedated, on levo gtt 2-4 mcg/min, precedex 1.5mcg, for extubation in am , groin dsg intact , will monitor closely.

## 2023-02-09 NOTE — PROGRESS NOTES
EP FOLLOW-UP NOTE    Subjective   Interval History  Patient remains intubated this am after procedure requiring supplemental 60% O2 and PEEP of 6, improved mental status this am but still appropriately sedated     Hospital Medications  • [Provider Managed Hold] apixaban  5 mg oral BID   • [Provider Managed Hold] aspirin  81 mg oral Daily   • atorvastatin  20 mg oral q PM   • chlorhexidine  15 mL Mouth/Throat 2 times per day   • [Provider Managed Hold] dofetilide  500 mcg oral BID   • [Provider Managed Hold] lisinopriL  5 mg oral Daily   • pantoprazole  40 mg oral Daily    Or   • pantoprazole  40 mg intravenous Daily   • polyethylene glycol  17 g oral Daily       Objective     Physical Exam  Vitals:    02/09/23 1600   BP:    Pulse: 63   Resp: 20   Temp: 36.9 °C (98.4 °F)   SpO2: 92%      Wt Readings from Last 3 Encounters:   02/09/23 119 kg (263 lb)   01/20/23 118 kg (260 lb)   07/28/22 118 kg (261 lb)    Weight change:     Intake/Output Summary (Last 24 hours) at 2/9/2023 1612  Last data filed at 2/9/2023 1600  Gross per 24 hour   Intake 1618.92 ml   Output 3145 ml   Net -1526.08 ml     Weights (last 7 days)     Date/Time Weight    02/09/23 1037 119 kg (263 lb)    02/09/23 0342 119 kg (263 lb 0.1 oz)    02/08/23 1510 115 kg (253 lb 1.4 oz)    02/08/23 0900 120 kg (264 lb)         Wt Readings from Last 3 Encounters:   02/09/23 119 kg (263 lb)   01/20/23 118 kg (260 lb)   07/28/22 118 kg (261 lb)        General: Sedated, intubated   HEENT: NC, AT. Conjunctiva pink. Anicteric Sclera.   Neck:  Supple, Normal ROM, No adenopathy  CVS: Rhythm: RRR, HR: normal, Murmur: none, Extra sounds: None Pulses: JVP: not present  Extremity:  no edema present Extremities feel: warm and well-perfused  Resp: CTA b/l with normal respiratory effort. Crackles at bases  GI:  Soft, NT, ND.  No guarding or rigidity. Normoactive BS  MSK:  No clubbing or cyanosis.  Skin:  No rashes, lesions, or ulcers.  Neurologic:  AAOx3. Follows Commands.  PACO. EOMI. No facial asymmetry.     Relevant data reviewed:    Telemetry: NSR      Labs  Lab Results   Component Value Date    WBC 11.67 (H) 02/09/2023    HGB 15.0 02/09/2023     02/09/2023    CHOL 148 07/22/2022    TRIG 73 07/22/2022    HDL 43 07/22/2022    LDLCALC 89 07/22/2022    ALT 23 02/09/2023    AST 28 02/09/2023     02/09/2023    K 4.8 02/09/2023    CREATININE 1.3 02/09/2023    TSH 1.89 06/14/2021       Imaging Studies  Cardiac Imaging    TRANSTHORACIC ECHO (TTE) COMPLETE 02/09/2023    Interpretation Summary  Technically difficult study. Definity used for endocardial enhancement. Rhythm is sinus rhythm.    Left ventricle is normal in size with normal wall thickness. Low normal systolic function, estimated ejection fraction 50-55%. Wall motion difficult to assess even with echocontrast. Indeterminate diastolic function.  Aortic valve is tricuspid. Aortic valve and root sclerosis. No aortic stenosis. No aortic regurgitation.  Aortic root normal in size. Visualized portion of ascending aorta dilated up to 4.0 cm.  Mitral valve mildly thickened. Trace mitral regurgitation.  Left atrium is moderately dilated. Agitated saline injected with good opacification of the right heart chambers, no visualized interatrial shunt.  Right ventricle is normal in size with preserved systolic function.  Right atrium is normal in size.  Tricuspid valve with trace tricuspid regurgitation, estimated right ventricular systolic pressure 26 mmHg.  Pulmonic valve not well visualized.  Inferior vena cava <2.1cm with <50% respiratory variation, consistent with positive pressure mechanical ventilation.  Trace pericardial effusion.    Compared to previous echo on 5/28/2020, no significant change, ascending aorta is measured to be 4.0 cm.      Medications   Scheduled Meds:  • [Provider Managed Hold] apixaban  5 mg oral BID   • [Provider Managed Hold] aspirin  81 mg oral Daily   • atorvastatin  20 mg oral q PM   •  chlorhexidine  15 mL Mouth/Throat 2 times per day   • [Provider Managed Hold] dofetilide  500 mcg oral BID   • [Provider Managed Hold] lisinopriL  5 mg oral Daily   • pantoprazole  40 mg oral Daily    Or   • pantoprazole  40 mg intravenous Daily   • polyethylene glycol  17 g oral Daily     Continuous Infusions:  • dexmedetomidine in 0.9 % NaCl  0.2-1.5 mcg/kg/hr 1.5 mcg/kg/hr (02/09/23 1600)   • norepinephrine  0.5-90 mcg/min 2 mcg/min (02/09/23 1600)     PRN Meds:.•  acetaminophen  •  alum-mag hydroxide-simeth  •  bisacodyL  •  glucose **OR** dextrose **OR** glucagon **OR** dextrose 50 % in water (D50)  •  diphenhydrAMINE  •  fentaNYL  •  [Provider Managed Hold] metoprolol tartrate    Assessment & Plan     1. Atrial Fibrillation   Patient remains in normal sinus rhythm following pulmonary vein isolation procedure yesterday.  He remains anticoagulated appropriately, but he cannot receive any antiarrhythmic due to his lack of enteral access.  This will be reintroduced upon extubation.  • We will resume Tikosyn when enteral access is gained again; this cannot be crushed   • Please maintain on therapeutic Lovenox  • Vent weaning per primary team    2.  Respiratory failure  Remains intubated likely in the setting of paralyzed right hemidiaphragm      Cardiology will continue to follow  These recommendations are preliminary,  Please awaiting attending co-signature for finalization of recommendations  Balbir Roberts MD  2/9/2023  4:12 PM

## 2023-02-09 NOTE — PROGRESS NOTES
Internal Medicine  ICU/CCU Daily Progress Note        SUBJECTIVE   72 y.o. male with PMH of paroxsymal atrial fibrillation (on dofetelide), HTN, HLD, EULALIA (CPAP intolerant), chronic R hemidiaphragm paralysis who presents to [facility] after atrial fibrillation ablation. His ablation was complicated by failed extubation & acute hypoxic hypercapnic respiratory failure & hypotension requiring reintubation in the EP lab and levophed/phenylephrine.     Interval History:   No acute overnight events noted. Requiring increasing oxygen overnight (FiO2 70% now)     OBJECTIVE   Vital signs in last 24 hours:  Temp:  [35.9 °C (96.7 °F)-36.9 °C (98.4 °F)] 36.9 °C (98.4 °F)  Heart Rate:  [54-68] 65  Resp:  [0-23] 19  BP: ()/(59-77) 122/77  SpO2:  [93 %-99 %] 97 %  Oxygen Therapy: Supplemental oxygen  O2 Delivery Method: Endotracheal tube  Vent Mode: Pressure regulated volume control  FiO2 (%) (Set):  [80 %-100 %] 80 %  MAP: 90s      Weight & I/Os:  Weights (last 5 days)     Date/Time Weight    02/09/23 0342 119 kg (263 lb 0.1 oz)    02/08/23 1510 115 kg (253 lb 1.4 oz)    02/08/23 0900 120 kg (264 lb)          Intake/Output Summary (Last 24 hours) at 2/9/2023 0659  Last data filed at 2/9/2023 0600  24 Hour Net Input/Output from 7AM Yesterday   Intake 1899.61 ml   Output 2170 ml   Net -270.39 ml   I: 1899  O: 2170  Net: -270  Urine: 2145      Respiratory:  Vent Mode: Pressure regulated volume control  FiO2 (%) (Set):  [80 %-100 %] 80 %  S RR:  [18-20] 18  S VT:  [450 mL-500 mL] 450 mL  PEEP/CPAP (Set, cmH2O):  [6 cm H20] 6 cm H20  MAP (Observed, cmH2O):  [9-13] 9   Observed PIP 17, observed RR 22  Results from last 7 days   Lab Units 02/09/23  0609 02/09/23  0319   PH ART pH 7.37 7.38   PCO2 ART mm Hg 43 41   PO2 ART mm Hg 90 82*   HCO3 ART mEQ/L 24 24   BASE EXC ART mEQ/L -0.6 -0.8   On admission: 7.22/58/117/lactate 2.2    Telemetry/EKG:  No findings     PHYSICAL EXAMINATION   Physical Exam  Constitutional:       General: He  is not in acute distress.     Appearance: He is obese. He is ill-appearing.   HENT:      Mouth/Throat:      Comments: ETT  OG tube  Eyes:      Comments: Opens eyes temporarily   Neck:      Comments: No evidence of JVD, although large neck  Cardiovascular:      Rate and Rhythm: Normal rate and regular rhythm.   Pulmonary:      Breath sounds: Normal breath sounds.   Abdominal:      Palpations: Abdomen is soft.   Skin:     General: Skin is warm and dry.      Capillary Refill: Capillary refill takes less than 2 seconds.   Psychiatric:      Comments: Minimally sedated  Follows commands  Gestures with questions          LINES, CATHETERS, DRAINS, AIRWAYS, & WOUNDS   Lines, drains, airways, & wounds:  Peripheral IV (Adult) 23 Left Hand (Active)   Number of days: 1       Peripheral IV (Adult) 23 Left Antecubital (Active)   Number of days: 1       Peripheral IV (Adult) 23 Anterior;Left Hand (Active)   Number of days: 0       NG/OG Tube Center mouth (Active)   Number of days:        Urethral Catheter (Active)   Number of days: 1       ETT  (Active)   Number of days: 1       Arterial Line 23 Right Radial (Active)   Number of days: 1       Catheterization Site - Venous Left Femoral 7 Fr.;Other (Comment) (Active)   Number of days: 1       Catheterization Site - Venous Right Femoral 8 Fr. (Active)   Number of days: 1        LABS & IMAGING   Labs:  AB.38/41/82. lactate 1.5 (prev 2.2)      Results from last 7 days   Lab Units 23  0315 23  1526   WBC K/uL 11.67* 16.45*   HEMOGLOBIN g/dL 15.0 14.6   HEMATOCRIT % 45.9 44.3   PLATELETS K/uL 183 172       Results from last 7 days   Lab Units 23  0315 23  1526   SODIUM mEQ/L 139 142   POTASSIUM mEQ/L 4.8 4.2   CHLORIDE mEQ/L 107 109   CO2 mEQ/L 21* 23   BUN mg/dL 22* 14   CREATININE mg/dL 1.3 1.3   CALCIUM mg/dL 8.7* 8.4*   ALBUMIN g/dL 3.4 3.3*   BILIRUBIN TOTAL mg/dL 1.6* 1.6*   ALK PHOS IU/L 48 48   ALT IU/L 23 22   AST IU/L 28 30    GLUCOSE mg/dL 160* 156*     Lab Results   Component Value Date    MG 1.7 (L) 02/09/2023     No results found for: PT, INR, PTT    Microbiology Results (last 3 days)     Procedure Component Value - Date/Time    SARS-CoV-2 (COVID-19), PCR Nasopharynx [[id number]]  (Normal) Collected: 02/09/23 0315    Lab Status: Final result Specimen: Nasopharyngeal Swab from Nasopharynx Updated: 02/09/23 0522    Narrative:      The following orders were created for panel order SARS-CoV-2 (COVID-19), PCR Nasopharynx.  Procedure                               Abnormality         Status                     ---------                               -----------         ------                     SARS-CoV-2 (COVID-19), P...[[id number]]  Normal              Final result                 Please view results for these tests on the individual orders.    SARS-CoV-2 (COVID-19), PCR Nasopharynx [[id number]]  (Normal) Collected: 02/09/23 0315    Lab Status: Final result Specimen: Nasopharyngeal Swab from Nasopharynx Updated: 02/09/23 0522     SARS-CoV-2 (COVID-19) Negative    Narrative:      Testing performed using real-time PCR for detection of COVID-19. EUA approved validation studies performed on site.     MRSA Screen, Nares Only Nose [[id number]]  (Normal) Collected: 02/08/23 1717    Lab Status: Final result Specimen: Nasal Swab from Nose Updated: 02/08/23 2219     MRSA DNA, Nares Negative     Comment: No methicillin resistant Staphylococcus aureus (MRSA) detected.           CXR 2/9: focal airspace opacities throughout both lungs which may reflect  atelectasis, aspiration and/or pneumonia. Suggestion of a small left pleural  effusion    Imaging personally reviewed (does not include unread studies):  X-RAY ABDOMEN 1 VIEW    Result Date: 2/8/2023  CLINICAL HISTORY: OG tube placement     IMPRESSION: Endotracheal and enteric tubes in satisfactory position, as below. No pneumothorax. The focal airspace opacities throughout both lungs which may reflect  atelectasis, aspiration and/or pneumonia. Suggestion of a small left pleural effusion. Nonobstructive bowel gas pattern. COMMENT: Comparison: Chest x-ray 8/6/2020. Technique: Supine portable frontal AP projection of the chest and abdomen were obtained. FINDINGS: CHEST: The tip of endotracheal tube terminates 3.7 cm above the haley. The lungs are hypoexpanded with redemonstrated elevation of the right hemidiaphragm. There are airspace opacities seen within the bilateral mid to lower lungs. There is suggestion of a small left pleural effusion. There is no pneumothorax. There is limited assessment of the cardiac silhouette on this AP expiratory projection. The mediastinal contours are unremarkable. There is no acute osseous abnormality. There is advanced left glenohumeral joint osteoarthritis. ABDOMEN: The tip of the enteric tube terminates over the left upper quadrant of the abdomen. Air is noted within the normal caliber transverse and descending colon as well as within nondistended loops of small bowel within the right mid to upper abdomen. No distended air-filled loops of bowel are seen. There is no evidence of pneumoperitoneum on this limited single supine projection. The visualized osseous structures are unremarkable.     X-RAY CHEST 1 VIEW    Result Date: 2/8/2023  CLINICAL HISTORY: OG tube placement     IMPRESSION: Endotracheal and enteric tubes in satisfactory position, as below. No pneumothorax. The focal airspace opacities throughout both lungs which may reflect atelectasis, aspiration and/or pneumonia. Suggestion of a small left pleural effusion. Nonobstructive bowel gas pattern. COMMENT: Comparison: Chest x-ray 8/6/2020. Technique: Supine portable frontal AP projection of the chest and abdomen were obtained. FINDINGS: CHEST: The tip of endotracheal tube terminates 3.7 cm above the haley. The lungs are hypoexpanded with redemonstrated elevation of the right hemidiaphragm. There are airspace opacities seen  within the bilateral mid to lower lungs. There is suggestion of a small left pleural effusion. There is no pneumothorax. There is limited assessment of the cardiac silhouette on this AP expiratory projection. The mediastinal contours are unremarkable. There is no acute osseous abnormality. There is advanced left glenohumeral joint osteoarthritis. ABDOMEN: The tip of the enteric tube terminates over the left upper quadrant of the abdomen. Air is noted within the normal caliber transverse and descending colon as well as within nondistended loops of small bowel within the right mid to upper abdomen. No distended air-filled loops of bowel are seen. There is no evidence of pneumoperitoneum on this limited single supine projection. The visualized osseous structures are unremarkable.      MEDS/DRIPS   Scheduled Meds:  • [Provider Managed Hold] apixaban  5 mg oral BID   • [Provider Managed Hold] aspirin  81 mg oral Daily   • atorvastatin  20 mg oral q PM   • chlorhexidine  15 mL Mouth/Throat 2 times per day   • [Provider Managed Hold] dofetilide  500 mcg oral BID   • enoxaparin  1 mg/kg subcutaneous Once   • [Provider Managed Hold] lisinopriL  5 mg oral Daily   • magnesium sulfate  2 g intravenous Once   • pantoprazole  40 mg oral Daily    Or   • pantoprazole  40 mg intravenous Daily   • polyethylene glycol  17 g oral Daily     Continuous Infusions:  • dexmedetomidine in 0.9 % NaCl  0.2-1.5 mcg/kg/hr 1.5 mcg/kg/hr (02/09/23 0700)   • norepinephrine  0.5-90 mcg/min 2 mcg/min (02/09/23 0700)     PRN Meds:.•  acetaminophen  •  alum-mag hydroxide-simeth  •  bisacodyL  •  glucose **OR** dextrose **OR** glucagon **OR** dextrose 50 % in water (D50)  •  diphenhydrAMINE  •  fentaNYL  •  [Provider Managed Hold] metoprolol tartrate     ASSESSMENT & PLAN   Acute hypoxemic respiratory failure (CMS/HCC)  Assessment & Plan  Failed extubation in the setting of sedation, EULALIA, paralyzed R hemidiaphragm. Currently intubated and sedated.  Follows with Dr. Damian     -Daily CXR, ABG  -Can attempt to wean sedation, SBT  -F/u pulm consult    * Atrial fibrillation (CMS/HCC)  Assessment & Plan  History of A fib on dofelitide. 2/8 failed extubation due to hemodynamic compromise   Appears to be in sinus rhythm on tele     - Can restart dofelitide this AM for maintenance?  - Monitor on Tele, monitor Qt  - Given lovenox, can resume home apixaban       VTE Assessment: Padua    VTE Prophylaxis: Current anticoagulants:  [Provider Managed Hold] apixaban (ELIQUIS) tablet 5 mg, oral, BID  enoxaparin (LOVENOX) syringe 120 mg, subcutaneous, Once      Code Status: Full Code  Estimated discharge date: 2/12/2023     ICU CHECKLIST   Lines:   Peripheral IV (Adult) 02/08/23 Left Hand (Active)   Number of days: 1       Peripheral IV (Adult) 02/08/23 Left Antecubital (Active)   Number of days: 1       Peripheral IV (Adult) 02/09/23 Anterior;Left Hand (Active)   Number of days: 0       NG/OG Tube Center mouth (Active)   Number of days:        Urethral Catheter (Active)   Number of days: 1       ETT  (Active)   Number of days: 1       Arterial Line 02/08/23 Right Radial (Active)   Number of days: 1       Catheterization Site - Venous Left Femoral 7 Fr.;Other (Comment) (Active)   Number of days: 1       Catheterization Site - Venous Right Femoral 8 Fr. (Active)   Number of days: 1     Kurtz: Yes     Sedation Vacation: Yes     Physical Therapy: No     Nutrition: NPO    DVT Prophylaxis: yes    GI Prophylaxis: yes       ATTENDING DOCUMENTATION  ALSO SEE ATTENDING ATTESTATION SECTION OF NOTE

## 2023-02-10 ENCOUNTER — APPOINTMENT (INPATIENT)
Dept: RADIOLOGY | Facility: HOSPITAL | Age: 73
DRG: 981 | End: 2023-02-10
Attending: STUDENT IN AN ORGANIZED HEALTH CARE EDUCATION/TRAINING PROGRAM
Payer: MEDICARE

## 2023-02-10 LAB
ALBUMIN SERPL-MCNC: 2.9 G/DL (ref 3.4–5)
ALP SERPL-CCNC: 36 IU/L (ref 35–126)
ALT SERPL-CCNC: 17 IU/L (ref 16–63)
ANION GAP SERPL CALC-SCNC: 8 MEQ/L (ref 3–15)
AST SERPL-CCNC: 19 IU/L (ref 15–41)
ATRIAL RATE: 170
ATRIAL RATE: 192
ATRIAL RATE: 202
ATRIAL RATE: 220
ATRIAL RATE: 68
ATRIAL RATE: 69
ATRIAL RATE: 99
BASE EXCESS BLDA CALC-SCNC: 2.4 MEQ/L
BASOPHILS # BLD: 0.05 K/UL (ref 0.01–0.1)
BASOPHILS NFR BLD: 0.5 %
BILIRUB SERPL-MCNC: 1.7 MG/DL (ref 0.3–1.2)
BUN SERPL-MCNC: 35 MG/DL (ref 8–20)
CA-I BLD-SCNC: 1.15 MMOL/L (ref 1.15–1.27)
CALCIUM SERPL-MCNC: 7.7 MG/DL (ref 8.9–10.3)
CHLORIDE BLDA-SCNC: 105 MEQ/L (ref 98–109)
CHLORIDE SERPL-SCNC: 110 MEQ/L (ref 98–109)
CO2 BLDA-SCNC: 29 MEQ/L (ref 22–32)
CO2 SERPL-SCNC: 24 MEQ/L (ref 22–32)
CREAT SERPL-MCNC: 1.2 MG/DL (ref 0.8–1.3)
DIFFERENTIAL METHOD BLD: ABNORMAL
EOSINOPHIL # BLD: 0.05 K/UL (ref 0.04–0.54)
EOSINOPHIL NFR BLD: 0.5 %
ERYTHROCYTE [DISTWIDTH] IN BLOOD BY AUTOMATED COUNT: 14.3 % (ref 11.6–14.4)
GFR SERPL CREATININE-BSD FRML MDRD: 59.7 ML/MIN/1.73M*2
GLUCOSE BLDA-MCNC: 125 MG/DL (ref 70–99)
GLUCOSE SERPL-MCNC: 120 MG/DL (ref 70–99)
HCO3 BLDA-SCNC: 27 MEQ/L (ref 21–28)
HCT VFR BLDCO AUTO: 44.7 % (ref 40.1–51)
HGB BLD-MCNC: 14.6 G/DL (ref 13.7–17.5)
HGB BLDA-MCNC: 15.4 G/DL (ref 14–17.5)
IMM GRANULOCYTES # BLD AUTO: 0.02 K/UL (ref 0–0.08)
IMM GRANULOCYTES NFR BLD AUTO: 0.2 %
LACTATE BLDA-SCNC: 1 MMOL/L (ref 0.4–1.6)
LYMPHOCYTES # BLD: 0.9 K/UL (ref 1.2–3.5)
LYMPHOCYTES NFR BLD: 8.8 %
MAGNESIUM SERPL-MCNC: 1.9 MG/DL (ref 1.8–2.5)
MCH RBC QN AUTO: 29.6 PG (ref 28–33.2)
MCHC RBC AUTO-ENTMCNC: 32.7 G/DL (ref 32.2–36.5)
MCV RBC AUTO: 90.7 FL (ref 83–98)
MONOCYTES # BLD: 1.54 K/UL (ref 0.3–1)
MONOCYTES NFR BLD: 15 %
NEUTROPHILS # BLD: 7.68 K/UL (ref 1.7–7)
NEUTS SEG NFR BLD: 75 %
NRBC BLD-RTO: 0 %
P AXIS: 29
P AXIS: 31
PCO2 BLDA: 42 MM HG (ref 35–48)
PDW BLD AUTO: 13.2 FL (ref 9.4–12.4)
PH BLDA: 7.42 PH (ref 7.35–7.45)
PLATELET # BLD AUTO: 153 K/UL (ref 150–350)
PO2 BLDA: 71 MM HG (ref 83–100)
POCT PATIENT TEMPERATURE: 98.6 °F (ref 97–99)
POCT TEST (BLD GAS): ABNORMAL
POTASSIUM BLDA-SCNC: 4.4 MEQ/L (ref 3.4–4.5)
POTASSIUM SERPL-SCNC: 4.3 MEQ/L (ref 3.6–5.1)
PR INTERVAL: 176
PR INTERVAL: 182
PROT SERPL-MCNC: 4.9 G/DL (ref 6–8.2)
QRS DURATION: 84
QRS DURATION: 86
QRS DURATION: 86
QRS DURATION: 88
QRS DURATION: 90
QRS DURATION: 92
QRS DURATION: 92
QT INTERVAL: 238
QT INTERVAL: 250
QT INTERVAL: 282
QT INTERVAL: 304
QT INTERVAL: 334
QT INTERVAL: 390
QT INTERVAL: 414
QTC CALCULATION(BAZETT): 414
QTC CALCULATION(BAZETT): 424
QTC CALCULATION(BAZETT): 443
QTC CALCULATION(BAZETT): 448
QTC CALCULATION(BAZETT): 468
QTC CALCULATION(BAZETT): 492
QTC CALCULATION(BAZETT): 518
R AXIS: 15
R AXIS: 17
R AXIS: 28
R AXIS: 3
R AXIS: 31
R AXIS: 6
R AXIS: 7
RBC # BLD AUTO: 4.93 M/UL (ref 4.5–5.8)
SAO2 % BLDA: 92 % (ref 93–98)
SODIUM BLDA-SCNC: 139 MEQ/L (ref 136–145)
SODIUM SERPL-SCNC: 142 MEQ/L (ref 136–144)
T WAVE AXIS: -15
T WAVE AXIS: -23
T WAVE AXIS: -24
T WAVE AXIS: -30
T WAVE AXIS: -71
T WAVE AXIS: 3
T WAVE AXIS: 6
VENTRICULAR RATE: 145
VENTRICULAR RATE: 158
VENTRICULAR RATE: 166
VENTRICULAR RATE: 191
VENTRICULAR RATE: 193
VENTRICULAR RATE: 68
VENTRICULAR RATE: 69
WBC # BLD AUTO: 10.24 K/UL (ref 3.8–10.5)

## 2023-02-10 PROCEDURE — 63600000 HC DRUGS/DETAIL CODE

## 2023-02-10 PROCEDURE — 83735 ASSAY OF MAGNESIUM: CPT | Performed by: STUDENT IN AN ORGANIZED HEALTH CARE EDUCATION/TRAINING PROGRAM

## 2023-02-10 PROCEDURE — 85025 COMPLETE CBC W/AUTO DIFF WBC: CPT | Performed by: STUDENT IN AN ORGANIZED HEALTH CARE EDUCATION/TRAINING PROGRAM

## 2023-02-10 PROCEDURE — 94003 VENT MGMT INPAT SUBQ DAY: CPT

## 2023-02-10 PROCEDURE — 99291 CRITICAL CARE FIRST HOUR: CPT | Performed by: INTERNAL MEDICINE

## 2023-02-10 PROCEDURE — 20000000 HC ROOM AND CARE ICU

## 2023-02-10 PROCEDURE — 71045 X-RAY EXAM CHEST 1 VIEW: CPT

## 2023-02-10 PROCEDURE — 25800000 HC PHARMACY IV SOLUTIONS: Performed by: STUDENT IN AN ORGANIZED HEALTH CARE EDUCATION/TRAINING PROGRAM

## 2023-02-10 PROCEDURE — 25000000 HC PHARMACY GENERAL: Performed by: PHYSICIAN ASSISTANT

## 2023-02-10 PROCEDURE — 63600000 HC DRUGS/DETAIL CODE: Performed by: STUDENT IN AN ORGANIZED HEALTH CARE EDUCATION/TRAINING PROGRAM

## 2023-02-10 PROCEDURE — 99233 SBSQ HOSP IP/OBS HIGH 50: CPT | Mod: 24 | Performed by: INTERNAL MEDICINE

## 2023-02-10 PROCEDURE — 80053 COMPREHEN METABOLIC PANEL: CPT | Performed by: STUDENT IN AN ORGANIZED HEALTH CARE EDUCATION/TRAINING PROGRAM

## 2023-02-10 PROCEDURE — 63700000 HC SELF-ADMINISTRABLE DRUG: Performed by: PHYSICIAN ASSISTANT

## 2023-02-10 PROCEDURE — 93010 ELECTROCARDIOGRAM REPORT: CPT | Mod: 76 | Performed by: INTERNAL MEDICINE

## 2023-02-10 PROCEDURE — 25800000 HC PHARMACY IV SOLUTIONS: Performed by: PHYSICIAN ASSISTANT

## 2023-02-10 PROCEDURE — 25000000 HC PHARMACY GENERAL

## 2023-02-10 PROCEDURE — 63700000 HC SELF-ADMINISTRABLE DRUG: Performed by: STUDENT IN AN ORGANIZED HEALTH CARE EDUCATION/TRAINING PROGRAM

## 2023-02-10 PROCEDURE — 36415 COLL VENOUS BLD VENIPUNCTURE: CPT | Performed by: STUDENT IN AN ORGANIZED HEALTH CARE EDUCATION/TRAINING PROGRAM

## 2023-02-10 PROCEDURE — 25000000 HC PHARMACY GENERAL: Performed by: STUDENT IN AN ORGANIZED HEALTH CARE EDUCATION/TRAINING PROGRAM

## 2023-02-10 RX ORDER — METOPROLOL TARTRATE 1 MG/ML
5 INJECTION, SOLUTION INTRAVENOUS ONCE
Status: COMPLETED | OUTPATIENT
Start: 2023-02-10 | End: 2023-02-10

## 2023-02-10 RX ORDER — FUROSEMIDE 10 MG/ML
40 INJECTION INTRAMUSCULAR; INTRAVENOUS ONCE
Status: COMPLETED | OUTPATIENT
Start: 2023-02-10 | End: 2023-02-10

## 2023-02-10 RX ORDER — DIGOXIN 0.25 MG/ML
250 INJECTION INTRAMUSCULAR; INTRAVENOUS EVERY 6 HOURS
Status: DISCONTINUED | OUTPATIENT
Start: 2023-02-10 | End: 2023-02-10

## 2023-02-10 RX ORDER — MIDAZOLAM HYDROCHLORIDE 2 MG/2ML
4 INJECTION, SOLUTION INTRAMUSCULAR; INTRAVENOUS ONCE
Status: COMPLETED | OUTPATIENT
Start: 2023-02-10 | End: 2023-02-10

## 2023-02-10 RX ORDER — DIGOXIN 0.25 MG/ML
125 INJECTION INTRAMUSCULAR; INTRAVENOUS DAILY
Status: DISCONTINUED | OUTPATIENT
Start: 2023-02-11 | End: 2023-02-14 | Stop reason: HOSPADM

## 2023-02-10 RX ORDER — DIGOXIN 0.25 MG/ML
250 INJECTION INTRAMUSCULAR; INTRAVENOUS
Status: COMPLETED | OUTPATIENT
Start: 2023-02-10 | End: 2023-02-10

## 2023-02-10 RX ORDER — DIGOXIN 0.25 MG/ML
250 INJECTION INTRAMUSCULAR; INTRAVENOUS ONCE
Status: COMPLETED | OUTPATIENT
Start: 2023-02-10 | End: 2023-02-10

## 2023-02-10 RX ORDER — METOPROLOL TARTRATE 1 MG/ML
INJECTION, SOLUTION INTRAVENOUS
Status: COMPLETED
Start: 2023-02-10 | End: 2023-02-10

## 2023-02-10 RX ORDER — MIDAZOLAM HYDROCHLORIDE 2 MG/2ML
INJECTION, SOLUTION INTRAMUSCULAR; INTRAVENOUS
Status: COMPLETED
Start: 2023-02-10 | End: 2023-02-10

## 2023-02-10 RX ADMIN — FENTANYL CITRATE 50 MCG: 0.05 INJECTION, SOLUTION INTRAMUSCULAR; INTRAVENOUS at 08:30

## 2023-02-10 RX ADMIN — METOPROLOL TARTRATE 5 MG: 1 INJECTION, SOLUTION INTRAVENOUS at 09:52

## 2023-02-10 RX ADMIN — METOPROLOL TARTRATE 5 MG: 1 INJECTION, SOLUTION INTRAVENOUS at 13:29

## 2023-02-10 RX ADMIN — FENTANYL CITRATE 50 MCG: 0.05 INJECTION, SOLUTION INTRAMUSCULAR; INTRAVENOUS at 21:17

## 2023-02-10 RX ADMIN — FUROSEMIDE 40 MG: 10 INJECTION, SOLUTION INTRAMUSCULAR; INTRAVENOUS at 08:41

## 2023-02-10 RX ADMIN — CHLORHEXIDINE GLUCONATE ORAL RINSE 15 ML: 1.2 SOLUTION DENTAL at 21:58

## 2023-02-10 RX ADMIN — DIGOXIN 250 MCG: 0.25 INJECTION INTRAMUSCULAR; INTRAVENOUS at 21:58

## 2023-02-10 RX ADMIN — DIGOXIN 250 MCG: 0.25 INJECTION INTRAMUSCULAR; INTRAVENOUS at 18:14

## 2023-02-10 RX ADMIN — DEXMEDETOMIDINE 1.5 MCG/KG/HR: 200 INJECTION, SOLUTION INTRAVENOUS at 15:46

## 2023-02-10 RX ADMIN — FENTANYL CITRATE 50 MCG: 0.05 INJECTION, SOLUTION INTRAMUSCULAR; INTRAVENOUS at 09:56

## 2023-02-10 RX ADMIN — FENTANYL CITRATE 50 MCG: 0.05 INJECTION, SOLUTION INTRAMUSCULAR; INTRAVENOUS at 18:35

## 2023-02-10 RX ADMIN — CHLORHEXIDINE GLUCONATE ORAL RINSE 15 ML: 1.2 SOLUTION DENTAL at 08:42

## 2023-02-10 RX ADMIN — DEXMEDETOMIDINE 1.5 MCG/KG/HR: 200 INJECTION, SOLUTION INTRAVENOUS at 09:48

## 2023-02-10 RX ADMIN — DEXMEDETOMIDINE 1.5 MCG/KG/HR: 200 INJECTION, SOLUTION INTRAVENOUS at 20:25

## 2023-02-10 RX ADMIN — FUROSEMIDE 40 MG: 10 INJECTION, SOLUTION INTRAMUSCULAR; INTRAVENOUS at 14:20

## 2023-02-10 RX ADMIN — DEXMEDETOMIDINE 1.5 MCG/KG/HR: 200 INJECTION, SOLUTION INTRAVENOUS at 12:41

## 2023-02-10 RX ADMIN — DEXMEDETOMIDINE 1.5 MCG/KG/HR: 200 INJECTION, SOLUTION INTRAVENOUS at 00:06

## 2023-02-10 RX ADMIN — POLYETHYLENE GLYCOL 3350 17 G: 17 POWDER, FOR SOLUTION ORAL at 08:37

## 2023-02-10 RX ADMIN — CEFTRIAXONE SODIUM 1 G: 1 INJECTION, POWDER, FOR SOLUTION INTRAMUSCULAR; INTRAVENOUS at 21:58

## 2023-02-10 RX ADMIN — MIDAZOLAM 2 MG: 1 INJECTION INTRAMUSCULAR; INTRAVENOUS at 09:39

## 2023-02-10 RX ADMIN — MIDAZOLAM HYDROCHLORIDE 2 MG: 2 INJECTION, SOLUTION INTRAMUSCULAR; INTRAVENOUS at 09:39

## 2023-02-10 RX ADMIN — DEXMEDETOMIDINE 1.5 MCG/KG/HR: 200 INJECTION, SOLUTION INTRAVENOUS at 07:27

## 2023-02-10 RX ADMIN — PANTOPRAZOLE SODIUM 40 MG: 40 INJECTION, POWDER, LYOPHILIZED, FOR SOLUTION INTRAVENOUS at 08:42

## 2023-02-10 RX ADMIN — DEXMEDETOMIDINE 1.5 MCG/KG/HR: 200 INJECTION, SOLUTION INTRAVENOUS at 02:22

## 2023-02-10 RX ADMIN — DEXMEDETOMIDINE 1.5 MCG/KG/HR: 200 INJECTION, SOLUTION INTRAVENOUS at 05:07

## 2023-02-10 RX ADMIN — ATORVASTATIN CALCIUM 20 MG: 20 TABLET, FILM COATED ORAL at 18:10

## 2023-02-10 RX ADMIN — DEXMEDETOMIDINE 1.5 MCG/KG/HR: 200 INJECTION, SOLUTION INTRAVENOUS at 18:09

## 2023-02-10 RX ADMIN — FENTANYL CITRATE 50 MCG: 0.05 INJECTION, SOLUTION INTRAMUSCULAR; INTRAVENOUS at 16:39

## 2023-02-10 RX ADMIN — ENOXAPARIN SODIUM 120 MG: 120 INJECTION SUBCUTANEOUS at 05:08

## 2023-02-10 RX ADMIN — DIGOXIN 250 MCG: 0.25 INJECTION INTRAMUSCULAR; INTRAVENOUS at 13:28

## 2023-02-10 RX ADMIN — DIGOXIN 250 MCG: 0.25 INJECTION INTRAMUSCULAR; INTRAVENOUS at 10:47

## 2023-02-10 RX ADMIN — AMIODARONE HYDROCHLORIDE 1 MG/MIN: 50 INJECTION, SOLUTION INTRAVENOUS at 11:54

## 2023-02-10 RX ADMIN — FENTANYL CITRATE 50 MCG: 0.05 INJECTION, SOLUTION INTRAMUSCULAR; INTRAVENOUS at 07:28

## 2023-02-10 RX ADMIN — ENOXAPARIN SODIUM 120 MG: 120 INJECTION SUBCUTANEOUS at 18:10

## 2023-02-10 NOTE — SIGNIFICANT EVENT
Attempted to reach patient's spouse via phone to update him. No answer. Discussed with patient's bedside RN-patient's wife on way to hospital. Will speak with her when she arrives.     Update: Had family discussion at bedside with patient's wife and daughter and other family members via phone. Explained Afib RVR, DCCV, new antiarrythmics and need for additional diuresis prior to weaning from vent again. All questions answered.     Deb Jo MD  PGY4 Cardiology  Pager [phone]

## 2023-02-10 NOTE — ASSESSMENT & PLAN NOTE
He had to be reintubated following A-fib ablation on 2/8/2023 in the setting of chronically elevated hemidiaphragm.  He was extubated this morning.  Appreciate pulmonary input/recommendations.

## 2023-02-10 NOTE — PROGRESS NOTES
"     Pulmonary/Critical Care  Progress Note       SUBJECTIVE     Overnight, patient went into A-fib with RVR with a rate in the 170s.  Adenosine initially was given without response.  Maps remain in the 50s with increasing pressor requirements.  He subsequently was cardioverted 3 times with no response.  Amio bolus with drip was then started.  This morning, patient was slightly sedated.  An SBT was attempted though failed and required higher pressure support ventilation.     OBJECTIVE     Vital Signs:   Temp (24hrs), Av.8 °C (98.2 °F), Min:36.6 °C (97.9 °F), Max:36.9 °C (98.4 °F)      Visit Vitals  BP (!) 95/58   Pulse 62   Temp 36.9 °C (98.4 °F) (Temporal)   Resp 18   Ht 1.803 m (5' 11\")   Wt 119 kg (262 lb 5.6 oz)   SpO2 93%   BMI 36.59 kg/m²     Vitals:    02/10/23 0300 02/10/23 0323 02/10/23 0400 02/10/23 0514   BP: (!) 93/58 (!) 102/55 (!) 95/58    Pulse: 62 62 62    Resp: 15 18 18    Temp:       TempSrc:       SpO2: 92% 92% 93%    Weight:    119 kg (262 lb 5.6 oz)   Height:             Ventilator Data/Settings:  Ventilator Settings:  Vent Mode: Pressure regulated volume control  FiO2 (%) (Set):  [50 %-60 %] 50 %  S RR:  [18] 18  S VT:  [450 mL] 450 mL  PEEP/CPAP (Set, cmH2O):  [6 cm H20] 6 cm H20  MAP (Observed, cmH2O):  [7-10] 7    Last 6 FIO2:  Lab Results   Component Value Date    FIO2 100 2023       Last 6 SPO2:  SpO2 Readings from Last 6 Encounters:   02/10/23 93%   22 96%   22 98%   21 99%   20 94%   20 96%             I/O's:    Intake/Output Summary (Last 24 hours) at 2/10/2023 0704  Last data filed at 2/10/2023 0400  Gross per 24 hour   Intake 1432.97 ml   Output 2415 ml   Net -982.03 ml         Medications:    • amiodarone  150 mg intravenous Once   • [Provider Managed Hold] apixaban  5 mg oral BID   • [Provider Managed Hold] aspirin  81 mg oral Daily   • atorvastatin  20 mg oral q PM   • chlorhexidine  15 mL Mouth/Throat 2 times per day   • [Provider Managed " Hold] dofetilide  500 mcg oral BID   • enoxaparin  1 mg/kg subcutaneous q12h (6a, 6p)   • [Provider Managed Hold] lisinopriL  5 mg oral Daily   • midazolam       • pantoprazole  40 mg oral Daily    Or   • pantoprazole  40 mg intravenous Daily   • polyethylene glycol  17 g oral Daily            Physical Exam     General: sedated but easily arousable, critically ill appearing   Head: normocephalic, atraumatic   Eyes: no scleral icterus  Neck: trachea midline  Cardiovascular: S1, S2.  Irregularly irregular  Lungs: Intubated, equal coarse breath sounds B/L  Abdomen: Soft, non-distended  Extremities: Negative for edema  Skin: Warm, dry, intact   Neuro: Moving all extremities spontaneously     Diagnostic Data     Labs:  I have personally reviewed all pertinent patient laboratory results. Labs of note discussed below:    CBC Results       02/10/23 02/09/23 02/09/23     0445 2223 0315    WBC 10.24 15.99 11.67    RBC 4.93 5.21 5.02    HGB 14.6 15.7 15.0    HCT 44.7 47.4 45.9    MCV 90.7 91.0 91.4    MCH 29.6 30.1 29.9    MCHC 32.7 33.1 32.7     165 183          CMP Results       02/10/23 02/09/23 02/09/23     0444 2223 0315     142 139    K 4.3 4.5 4.8    Cl 110 107 107    CO2 24 24 21    Glucose 120 143 160    BUN 35 35 22    Creatinine 1.2 1.5 1.3    Calcium 7.7 8.9 8.7    Anion Gap 8 11 11    AST 19 -- 28    ALT 17 -- 23    Albumin 2.9 -- 3.4    EGFR 59.7 46.1 54.4         Comment for K at 2223 on 02/09/23: SLIGHT HEMOLYSIS, RESULT MAY BE INCREASED.          ABG Results       02/10/23 02/09/23 02/09/23     0448 1253 0609    pH 7.42 7.43 7.37    pCO2 42 39 43    pO2 71 76 90    HCO3 27 26 24    Base Excess 2.4 1.5 -0.6         Comment for pH at 0448 on 02/10/23: Temperature Corrected pH(T)    Comment for pH at 1253 on 02/09/23: Temperature Corrected pH(T)    Comment for pH at 0609 on 02/09/23: Temperature Corrected pH(T)    Comment for pCO2 at 0448 on 02/10/23: Temperature Corrected pCO2(T)    Comment for pCO2  at 1253 on 02/09/23: Temperature Corrected pCO2(T)    Comment for pCO2 at 0609 on 02/09/23: Temperature Corrected pCO2(T)    Comment for pO2 at 0448 on 02/10/23: Temperature Corrected pO2(T)    Comment for pO2 at 1253 on 02/09/23: Temperature Corrected pO2(T)    Comment for pO2 at 0609 on 02/09/23: Temperature Corrected pO2(T)          Microbiology Results     Procedure Component Value Units Date/Time    Sputum Gram Stain (Lab Only) Expectorated Sputum [[id number]] Collected: 02/09/23 1924    Specimen: Expectorated Sputum Updated: 02/09/23 2250     Gram Stain Result 4+ WBC      2+ Epithelial cells      1+ Gram negative bacilli      2+ Gram positive cocci in pairs      Gram positive bacilli    SARS-CoV-2 (COVID-19), PCR Nasopharynx [[id number]]  (Normal) Collected: 02/09/23 0315    Specimen: Nasopharyngeal Swab from Nasopharynx Updated: 02/09/23 0522    Narrative:      The following orders were created for panel order SARS-CoV-2 (COVID-19), PCR Nasopharynx.  Procedure                               Abnormality         Status                     ---------                               -----------         ------                     SARS-CoV-2 (COVID-19), P...[[id number]]  Normal              Final result                 Please view results for these tests on the individual orders.    SARS-CoV-2 (COVID-19), PCR Nasopharynx [[id number]]  (Normal) Collected: 02/09/23 0315    Specimen: Nasopharyngeal Swab from Nasopharynx Updated: 02/09/23 0522     SARS-CoV-2 (COVID-19) Negative    Narrative:      Testing performed using real-time PCR for detection of COVID-19. EUA approved validation studies performed on site.     MRSA Screen, Nares Only Nose [[id number]]  (Normal) Collected: 02/08/23 1717    Specimen: Nasal Swab from Nose Updated: 02/08/23 2219     MRSA DNA, Nares Negative          Imaging:  I have reviewed all pertinent imaging which is discussed below:    CT CHEST WITHOUT IV CONTRAST    Result Date: 2/9/2023  CLINICAL  HISTORY: Pneumonia, unresolved. CT of the chest was performed without intravenous contrast.  Sagittal and coronal reformatted images were obtained.  Automated exposure control was used. COMPARISON: Chest x-ray 2/9/2023. CTA chest 8/19/2020 COMMENT: Marked elevation of the right hemidiaphragm is unchanged. There is adjacent compressive atelectasis. There are small bilateral pleural effusions with consolidation in the adjacent lower lobes due in part to compressive atelectasis. Superimposed pneumonia and/or aspiration is not excluded. There are minimal secretions in the right mainstem bronchus. The endotracheal tube terminates 3.3 cm above the haley. The heart is enlarged. The ascending aorta is mildly aneurysmal and measures 4.2 x 4.2 cm in AP and transverse diameters. There are minimal atherosclerotic aortic calcifications. The main pulmonary artery is borderline enlarged raising the possibility of pulmonary arterial hypertension. There are no pathologically enlarged mediastinal, hilar or axillary lymph nodes by size criteria. The feeding tube terminates in the mid stomach. Limited evaluation of the upper abdomen demonstrates mild nonspecific stranding in the right retroperitoneum. This approaches the partially imaged pancreatic head. Pancreatitis is felt to be unlikely. Nevertheless, correlation with amylase and lipase can be made. There are Schmorl's nodes in the spine.     IMPRESSION: 1. Persistent elevation the right hemidiaphragm with associated compressive atelectasis. 2. Small bilateral pleural effusions. Consolidation in the adjacent lower lobes is in part due to compressive atelectasis. Superimposed pneumonia and/or aspiration is not excluded. 3. Minimal stranding in the right retroperitoneum, nonspecific. This approaches the partially imaged pancreatic head. Pancreatitis is felt be unlikely. Nevertheless, correlation with amylase and lipase should be made. 4. Other findings as detailed above.    X-RAY  CHEST 1 VIEW    Result Date: 2/9/2023  CLINICAL HISTORY: Intubated PRIOR STUDY: Chest x-ray dated 2/8/2023 TECHNIQUE: Frontal image of the chest COMMENT:  The tubes and lines are stable. There is stable cardiomegaly. There is elevation of the right hemidiaphragm with low lung volumes. There is a slight increase in pulmonary vascular and interstitial markings. No definite pneumothorax.     IMPRESSION: See above.     Transthoracic Echo (TTE) Complete    Result Date: 2/9/2023  Technically difficult study. Definity used for endocardial enhancement. Rhythm is sinus rhythm. Left ventricle is normal in size with normal wall thickness. Low normal systolic function, estimated ejection fraction 50-55%. Wall motion difficult to assess even with echocontrast. Indeterminate diastolic function. Aortic valve is tricuspid. Aortic valve and root sclerosis. No aortic stenosis. No aortic regurgitation. Aortic root normal in size. Visualized portion of ascending aorta dilated up to 4.0 cm. Mitral valve mildly thickened. Trace mitral regurgitation. Left atrium is moderately dilated. Agitated saline injected with good opacification of the right heart chambers, no visualized interatrial shunt. Right ventricle is normal in size with preserved systolic function. Right atrium is normal in size. Tricuspid valve with trace tricuspid regurgitation, estimated right ventricular systolic pressure 26 mmHg. Pulmonic valve not well visualized. Inferior vena cava <2.1cm with <50% respiratory variation, consistent with positive pressure mechanical ventilation. Trace pericardial effusion. Compared to previous echo on 5/28/2020, no significant change, ascending aorta is measured to be 4.0 cm.     CXR from this morning reviewed. Right hemidiaphragm remains elevated. No obvious infiltrates.     ASSESSMENT AND PLAN     In brief, Humberto Chung is a 72 y.o. male who presented for elective atrial fibrillation ablation post-op complicated by respiratory  distress post-extubation resulting in reintubation for which he is now in the unit for.     Impression:     1. VDRF with hypoxemia and hypercapnia - Suspect his reintubation was in the setting of respiratory distress/hypoventilation from procedural sedatives in setting of known paralyzed R hemidiaphragm. This morning remains rather hypoxemic, but improving.   - Imaging reviewed and findings concerning for pulmonary edema. Left diaphragm angle rather low concerning for deep sulcus sign, but bedside POCUS showed lung sliding throughout.      2. Atrial Fibrillation - Now s/p ablation . Complicated by RVR last night requiring cardioversion.         Recommendations:  - Continue aggressive diuresis as tolerated  - Maintain PRVC 450/18/6/50% as rest settings  - Can repeat SBT later and if with improvement, can consider extubation  - When ready for extubation, he will need to be extubated to BiPAP 16/8  - Maintain RASS of 0 to -1 on Precedex; fentanyl bolus for any additional needs but strongly consider prpofol to maintain sedation   - Rest of management per primary team. We will continue to follow during the weekend.         Romeo Moreau DO  Pulmonary & Critical Care Medicine Fellow  [id number]  Pager: [phone]  2/10/2023

## 2023-02-10 NOTE — PROGRESS NOTES
Electrophysiology Progress Note      Subjective   He remains intubated this morning.  He is on a weaning trial.  He had an episode of A-fib with RVR overnight and converted to sinus rhythm with IV amiodarone.    Inpatient medications:  •  acetaminophen, 975 mg, oral, q8h PRN  •  alum-mag hydroxide-simeth, 30 mL, oral, q4h PRN  •  [] amiodarone, 1 mg/min, intravenous, Continuous **FOLLOWED BY** amiodarone, 1 mg/min, intravenous, Continuous  •  [Provider Managed Hold] apixaban, 5 mg, oral, BID  •  [Provider Managed Hold] aspirin, 81 mg, oral, Daily  •  atorvastatin, 20 mg, oral, q PM  •  bisacodyL, 10 mg, rectal, Daily PRN  •  chlorhexidine, 15 mL, Mouth/Throat, 2 times per day  •  dexmedetomidine in 0.9 % NaCl, 0.2-1.5 mcg/kg/hr, intravenous, Titrated  •  glucose, 16-32 g of dextrose, oral, PRN **OR** dextrose, 15-30 g of dextrose, oral, PRN **OR** glucagon, 1 mg, intramuscular, PRN **OR** dextrose 50 % in water (D50), 25 mL, intravenous, PRN  •  digoxin, 250 mcg, intravenous, q4h INT  •  digoxin, 250 mcg, intravenous, Once  •  diphenhydrAMINE, 50 mg, oral, q8h PRN  •  [Provider Managed Hold] dofetilide, 500 mcg, oral, BID  •  enoxaparin, 1 mg/kg, subcutaneous, q12h (6a, 6p)  •  fentaNYL, 50 mcg, intravenous, q1h PRN  •  fentaNYL, 0-200 mcg/hr, intravenous, Titrated **AND** fentaNYL, 25 mcg, intravenous, q5 min PRN  •  [Provider Managed Hold] lisinopriL, 5 mg, oral, Daily  •  metoprolol, 5 mg, intravenous, Once  •  [Provider Managed Hold] metoprolol tartrate, 25 mg, oral, q6h PRN  •  norepinephrine, 0.5-90 mcg/min, intravenous, Titrated  •  pantoprazole, 40 mg, oral, Daily **OR** pantoprazole, 40 mg, intravenous, Daily  •  phenylephrine,  mcg/min, intravenous, Titrated  •  polyethylene glycol, 17 g, oral, Daily    Objective    Vitals:    02/10/23 1000   BP: 95/60   Pulse: (!) 135   Resp: 19   Temp:    SpO2: 94%     Physical Exam  General: No acute distress.  HEENT: Anicteric.  Moist mucous membranes.  ET  tube in place.  Neck: Supple, no JVD.  Lungs: Clear to auscultation bilaterally.  Cardiac: Regular.  No murmurs, rubs or gallops.  Abdomen: Obese, soft, nontender.  Extremities: No lower extremity edema.  Skin: Warm, dry.  Neurologic: Grossly intact.  Psychiatric: Behavior is appropriate and cooperative.       Laboratory results:  Results from last 7 days   Lab Units 02/10/23  0444   SODIUM mEQ/L 142   POTASSIUM mEQ/L 4.3   CHLORIDE mEQ/L 110*   CO2 mEQ/L 24   BUN mg/dL 35*   CREATININE mg/dL 1.2   CALCIUM mg/dL 7.7*   ALBUMIN g/dL 2.9*   BILIRUBIN TOTAL mg/dL 1.7*   ALK PHOS IU/L 36   ALT IU/L 17   AST IU/L 19   GLUCOSE mg/dL 120*     Results from last 7 days   Lab Units 02/10/23  0445   WBC K/uL 10.24   HEMOGLOBIN g/dL 14.6   HEMATOCRIT % 44.7   PLATELETS K/uL 153               Imaging  Cardiac Imaging    TRANSTHORACIC ECHO (TTE) COMPLETE 02/09/2023    Interpretation Summary  Technically difficult study. Definity used for endocardial enhancement. Rhythm is sinus rhythm.    Left ventricle is normal in size with normal wall thickness. Low normal systolic function, estimated ejection fraction 50-55%. Wall motion difficult to assess even with echocontrast. Indeterminate diastolic function.  Aortic valve is tricuspid. Aortic valve and root sclerosis. No aortic stenosis. No aortic regurgitation.  Aortic root normal in size. Visualized portion of ascending aorta dilated up to 4.0 cm.  Mitral valve mildly thickened. Trace mitral regurgitation.  Left atrium is moderately dilated. Agitated saline injected with good opacification of the right heart chambers, no visualized interatrial shunt.  Right ventricle is normal in size with preserved systolic function.  Right atrium is normal in size.  Tricuspid valve with trace tricuspid regurgitation, estimated right ventricular systolic pressure 26 mmHg.  Pulmonic valve not well visualized.  Inferior vena cava <2.1cm with <50% respiratory variation, consistent with positive pressure  mechanical ventilation.  Trace pericardial effusion.    Compared to previous echo on 5/28/2020, no significant change, ascending aorta is measured to be 4.0 cm.          Telemetry  A-fib with RVR overnight.  Sinus rhythm this morning     * Atrial fibrillation (CMS/HCC)  Assessment & Plan  He underwent PVI on 2/8/2023.  Post procedurally, he had to be reintubated due to hypoxia in the setting of chronically elevated right hemidiaphragm.  He is normally maintained on dofetilide and Eliquis which are on hold given his n.p.o. status.  He had an episode of A-fib with RVR overnight and converted to sinus rhythm with IV amiodarone.  Continue IV amiodarone while NPO.  He can be transitioned to oral amiodarone once he is extubated and will remain off dofetilide for now.  Continue therapeutic Lovenox until Eliquis can be resumed.  Decrease IV amiodarone to 0.5 mg/min today.  We will remove his groin sutures this afternoon if he is extubated.    Acute hypoxemic respiratory failure (CMS/HCC)  Assessment & Plan  He had to be reintubated following A-fib ablation on 2/8/2023 in the setting of chronically elevated hemidiaphragm.  He is on a weaning trial this morning.  Appreciate pulmonary input/recommendations.            Thank you for allowing me to participate in the care of your patient, please feel free to contact me with any questions or concerns.     Evelyn Barger MD  2/10/2023

## 2023-02-10 NOTE — PLAN OF CARE
Care Coordination Admission Assessment Note  Date: 2/10/2023   Time: 1:02 PM    Patient Name: Humberto Chung  Medical Record Number: [id number]  YOB: 1950  Room/BED: 4015    General Information  Patient-Specific Goals (Include Timeframe)     PCP: Balbir Tellez MD   Insurance Coverage: MEDICARE  Readmission Within the last 30 days  no previous admission in last 30 days    Advance Directives (for Healthcare)?  Does patient have advance directive?: No  Patient does not have Advance Directive: Unable to assess  Does patient have current OOH DNR form?: No  Patient does not have current OOH DNR form: Unable to assess  Does patient have current POLST?: No  Patient does not have current POLST: Unable to assess  Does patient have mental health advance directive?: No  Patient does not have Mental Health Advance Directive: Unable to assess        Information       Living Arrangements  Arrived From: home  Current Living Arrangements: home  People in Home: spouse  Living Arrangement Comments: ranch style home  Able to Return to Prior Arrangements: yes    Housing Stability and Financial Resources (SDOH)  In the last 12 months, was there a time when you were not able to pay the mortgage or rent on time?: No  In the last 12 months, was there a time when you did not have a steady place to sleep or slept in a shelter (including now)?: No  How hard is it for you to pay for the very basics like food, housing, medical care, and heating?: Not hard at all    Current Outpatient/Agency/Support Group       Type of Current Home Care Services       Assistive Device/Animal Currently Used at Home  none    Functional Status Comments  independent no issues, no AD    IADL Comments       Employment/ Status  Employment Status: retired    Concerns to be Addressed  no discharge needs identified    Current Discharge Risk  chronically ill    Anticipated Changes Related to Illness  none    Transportation  Concerns (SDOH)  Transportation Concerns: car, none  Transportation Anticipated: family or friend will provide  In the past 12 months, has lack of transportation kept you from medical appointments or from getting medications?: No  In the past 12 months, has lack of transportation kept you from meetings, work, or from getting things needed for daily living?: No    Concerns Comments       Anticipated Clinical Needs       Anticipated Discharge Plan   Anticipated Discharge Disposition: home with home health  Type of Home Care Services: nursing  Patient/Family Anticipates Transition to: home, home with family, home with help/services  Met with patient. Provided education and contact information for Care Coordination services.: yes  Screening complete: yes

## 2023-02-10 NOTE — NURSING NOTE
At 2109 on 2/9/23 patient went into undetermined tachycardic rhythm in the 170s. MD to bedside. EKG initally read undetermined rhythm and 6mg of adenosine given with no response. Rhythm determined to be rapid afib and patients blood pressure MAPs were in 50s with increasing vasopressor support. Per MD, decision was made to cardiovert. Patient sedated with fentanyl and versed. First shock of 120J delivered with no response. A second shock of 200J was delivered with no response. Fentanyl gtt initiated at this point for more controlled sedation. A third shock was delivered, again at 200J, with no response. MD at bedside during all 3 cardioversions. Patient still requiring frequent titrations of levophed. Amiodarone gtt started at 1mg/min with an inital bolus of 150mg. At 2301, patient converted from afib in 160s into NSR at 65 for about 15 second before returning to afib in 140-160s. Norepinephrine was switched to phenylephrine. At 2321 patient converted back into NSR in 60s and has remained since. Patient under close supervision and defib pads remain in place.

## 2023-02-10 NOTE — PROGRESS NOTES
Internal Medicine  ICU/CCU Daily Progress Note        SUBJECTIVE   This is a 72 y.o. year-old male admitted on 2/8/2023 with Atrial fibrillation, unspecified type (CMS/HCC) [I48.91]  Atrial fibrillation (CMS/HCC) [I48.91].    Interval History:   - Overnight Afib with RVR with rates of 170s. Adenosine 6mg given with no response. MAPs in the 50s requiring increasing pressor requirement. Cardioversion with 3 shocks, no response. Patient started on amio bolus + drip, brief conversion for 15 seconds. NE was switched to phenylephrine (50mcg last), patient converted back into NSR and has remained since.   - TTE with bubble, no evidence of shunt normal LV size and thickness, low-normal systolic function EF 50-55%, moderately dilated LA   - Lasix 40mg given this morning     OBJECTIVE   Vital signs in last 24 hours:  Temp:  [36.6 °C (97.9 °F)-36.9 °C (98.4 °F)] 36.9 °C (98.4 °F)  Heart Rate:  [] 63  Resp:  [9-25] 20  BP: ()/(50-72) 110/59  SpO2:  [90 %-95 %] 94 %  Oxygen Therapy: Supplemental oxygen  O2 Delivery Method: Endotracheal tube  Vent Mode: Pressure regulated volume control  FiO2 (%) (Set):  [50 %-60 %] 50 %  MAP: 76      Weight & I/Os:  Weights (last 5 days)     Date/Time Weight    02/10/23 0514 119 kg (262 lb 5.6 oz)    02/09/23 1037 119 kg (263 lb)    02/09/23 0342 119 kg (263 lb 0.1 oz)    02/08/23 1510 115 kg (253 lb 1.4 oz)    02/08/23 0900 120 kg (264 lb)          Intake/Output Summary (Last 24 hours) at 2/10/2023 0659  Last data filed at 2/10/2023 0400  24 Hour Net Input/Output from 7AM Yesterday   Intake 1485.72 ml   Output 2515 ml   Net -1029.28 ml         Respiratory:  Vent Mode: Pressure regulated volume control  FiO2 (%) (Set):  [50 %-60 %] 50 %  S RR:  [18] 18  S VT:  [450 mL] 450 mL  PEEP/CPAP (Set, cmH2O):  [6 cm H20] 6 cm H20  MAP (Observed, cmH2O):  [7-10] 7   Observed PIP 20s      Results from last 7 days   Lab Units 02/10/23  0448 02/09/23  1253   PH ART pH 7.42 7.43   PCO2 ART mm  Hg 42 39   PO2 ART mm Hg 71* 76*   HCO3 ART mEQ/L 27 26   BASE EXC ART mEQ/L 2.4 1.5       Telemetry/EKG:  Afib RVR     PHYSICAL EXAMINATION   Physical Exam  Constitutional:       General: He is not in acute distress.     Appearance: He is obese. He is not ill-appearing.   HENT:      Mouth/Throat:      Comments: ETT  Cardiovascular:      Rate and Rhythm: Normal rate.      Heart sounds: Normal heart sounds. No murmur heard.  Pulmonary:      Breath sounds: Normal breath sounds. No wheezing.   Skin:     Capillary Refill: Capillary refill takes less than 2 seconds.   Neurological:      Mental Status: He is oriented to person, place, and time.   Psychiatric:      Comments: Alert, follows commands          LINES, CATHETERS, DRAINS, AIRWAYS, & WOUNDS   Lines, drains, airways, & wounds:  Peripheral IV (Adult) 02/08/23 Left Hand (Active)   Number of days: 2       Peripheral IV (Adult) 02/08/23 Left Antecubital (Active)   Number of days: 2       Peripheral IV (Adult) 02/09/23 Anterior;Left Hand (Active)   Number of days: 1       Peripheral IV (Adult) 02/09/23 Posterior;Right Hand (Active)   Number of days: 1       NG/OG Tube Center mouth (Active)   Number of days:        Urethral Catheter (Active)   Number of days: 2       ETT  (Active)   Number of days: 2       Arterial Line 02/08/23 Right Radial (Active)   Number of days: 2       Catheterization Site - Venous Left Femoral 7 Fr.;Other (Comment) (Active)   Number of days: 2       Catheterization Site - Venous Right Femoral 8 Fr. (Active)   Number of days: 2        LABS & IMAGING   Labs:      Results from last 7 days   Lab Units 02/10/23  0445 02/09/23  2223 02/09/23  0315   WBC K/uL 10.24 15.99* 11.67*   HEMOGLOBIN g/dL 14.6 15.7 15.0   HEMATOCRIT % 44.7 47.4 45.9   PLATELETS K/uL 153 165 183       Results from last 7 days   Lab Units 02/10/23  0444 02/09/23  2223 02/09/23  0315 02/08/23  1526   SODIUM mEQ/L 142 142 139 142   POTASSIUM mEQ/L 4.3 4.5 4.8 4.2   CHLORIDE mEQ/L  110* 107 107 109   CO2 mEQ/L 24 24 21* 23   BUN mg/dL 35* 35* 22* 14   CREATININE mg/dL 1.2 1.5* 1.3 1.3   CALCIUM mg/dL 7.7* 8.9 8.7* 8.4*   ALBUMIN g/dL 2.9*  --  3.4 3.3*   BILIRUBIN TOTAL mg/dL 1.7*  --  1.6* 1.6*   ALK PHOS IU/L 36  --  48 48   ALT IU/L 17  --  23 22   AST IU/L 19  --  28 30   GLUCOSE mg/dL 120* 143* 160* 156*     Lab Results   Component Value Date    MG 1.9 02/10/2023     No results found for: PT, INR, PTT    Microbiology Results (last 3 days)     Procedure Component Value - Date/Time    Sputum Gram Stain (Lab Only) Expectorated Sputum [[id number]] Collected: 02/09/23 1924    Lab Status: Final result Specimen: Expectorated Sputum Updated: 02/09/23 2250     Gram Stain Result 4+ WBC      2+ Epithelial cells      1+ Gram negative bacilli      2+ Gram positive cocci in pairs      Gram positive bacilli    SARS-CoV-2 (COVID-19), PCR Nasopharynx [[id number]]  (Normal) Collected: 02/09/23 0315    Lab Status: Final result Specimen: Nasopharyngeal Swab from Nasopharynx Updated: 02/09/23 0522    Narrative:      The following orders were created for panel order SARS-CoV-2 (COVID-19), PCR Nasopharynx.  Procedure                               Abnormality         Status                     ---------                               -----------         ------                     SARS-CoV-2 (COVID-19), P...[[id number]]  Normal              Final result                 Please view results for these tests on the individual orders.    SARS-CoV-2 (COVID-19), PCR Nasopharynx [[id number]]  (Normal) Collected: 02/09/23 0315    Lab Status: Final result Specimen: Nasopharyngeal Swab from Nasopharynx Updated: 02/09/23 0522     SARS-CoV-2 (COVID-19) Negative    Narrative:      Testing performed using real-time PCR for detection of COVID-19. EUA approved validation studies performed on site.     MRSA Screen, Nares Only Nose [[id number]]  (Normal) Collected: 02/08/23 2327    Lab Status: Final result Specimen: Nasal Swab from Nose  Updated: 02/08/23 2219     MRSA DNA, Nares Negative     Comment: No methicillin resistant Staphylococcus aureus (MRSA) detected.             Imaging personally reviewed (does not include unread studies):  CT CHEST WITHOUT IV CONTRAST    Result Date: 2/9/2023  CLINICAL HISTORY: Pneumonia, unresolved. CT of the chest was performed without intravenous contrast.  Sagittal and coronal reformatted images were obtained.  Automated exposure control was used. COMPARISON: Chest x-ray 2/9/2023. CTA chest 8/19/2020 COMMENT: Marked elevation of the right hemidiaphragm is unchanged. There is adjacent compressive atelectasis. There are small bilateral pleural effusions with consolidation in the adjacent lower lobes due in part to compressive atelectasis. Superimposed pneumonia and/or aspiration is not excluded. There are minimal secretions in the right mainstem bronchus. The endotracheal tube terminates 3.3 cm above the haley. The heart is enlarged. The ascending aorta is mildly aneurysmal and measures 4.2 x 4.2 cm in AP and transverse diameters. There are minimal atherosclerotic aortic calcifications. The main pulmonary artery is borderline enlarged raising the possibility of pulmonary arterial hypertension. There are no pathologically enlarged mediastinal, hilar or axillary lymph nodes by size criteria. The feeding tube terminates in the mid stomach. Limited evaluation of the upper abdomen demonstrates mild nonspecific stranding in the right retroperitoneum. This approaches the partially imaged pancreatic head. Pancreatitis is felt to be unlikely. Nevertheless, correlation with amylase and lipase can be made. There are Schmorl's nodes in the spine.     IMPRESSION: 1. Persistent elevation the right hemidiaphragm with associated compressive atelectasis. 2. Small bilateral pleural effusions. Consolidation in the adjacent lower lobes is in part due to compressive atelectasis. Superimposed pneumonia and/or aspiration is not  excluded. 3. Minimal stranding in the right retroperitoneum, nonspecific. This approaches the partially imaged pancreatic head. Pancreatitis is felt be unlikely. Nevertheless, correlation with amylase and lipase should be made. 4. Other findings as detailed above.    X-RAY CHEST 1 VIEW    Result Date: 2/9/2023  CLINICAL HISTORY: Intubated PRIOR STUDY: Chest x-ray dated 2/8/2023 TECHNIQUE: Frontal image of the chest COMMENT:  The tubes and lines are stable. There is stable cardiomegaly. There is elevation of the right hemidiaphragm with low lung volumes. There is a slight increase in pulmonary vascular and interstitial markings. No definite pneumothorax.     IMPRESSION: See above.     Transthoracic Echo (TTE) Complete    Result Date: 2/9/2023  Technically difficult study. Definity used for endocardial enhancement. Rhythm is sinus rhythm. Left ventricle is normal in size with normal wall thickness. Low normal systolic function, estimated ejection fraction 50-55%. Wall motion difficult to assess even with echocontrast. Indeterminate diastolic function. Aortic valve is tricuspid. Aortic valve and root sclerosis. No aortic stenosis. No aortic regurgitation. Aortic root normal in size. Visualized portion of ascending aorta dilated up to 4.0 cm. Mitral valve mildly thickened. Trace mitral regurgitation. Left atrium is moderately dilated. Agitated saline injected with good opacification of the right heart chambers, no visualized interatrial shunt. Right ventricle is normal in size with preserved systolic function. Right atrium is normal in size. Tricuspid valve with trace tricuspid regurgitation, estimated right ventricular systolic pressure 26 mmHg. Pulmonic valve not well visualized. Inferior vena cava <2.1cm with <50% respiratory variation, consistent with positive pressure mechanical ventilation. Trace pericardial effusion. Compared to previous echo on 5/28/2020, no significant change, ascending aorta is measured to be  4.0 cm.      MEDS/DRIPS   Scheduled Meds:  • amiodarone  150 mg intravenous Once   • [Provider Managed Hold] apixaban  5 mg oral BID   • [Provider Managed Hold] aspirin  81 mg oral Daily   • atorvastatin  20 mg oral q PM   • chlorhexidine  15 mL Mouth/Throat 2 times per day   • [Provider Managed Hold] dofetilide  500 mcg oral BID   • enoxaparin  1 mg/kg subcutaneous q12h (6a, 6p)   • [Provider Managed Hold] lisinopriL  5 mg oral Daily   • midazolam       • pantoprazole  40 mg oral Daily    Or   • pantoprazole  40 mg intravenous Daily   • polyethylene glycol  17 g oral Daily     Continuous Infusions:  • amiodarone  0.5 mg/min 0.5 mg/min (02/10/23 0422)   • dexmedetomidine in 0.9 % NaCl  0.2-1.5 mcg/kg/hr 1.5 mcg/kg/hr (02/10/23 9627)   • fentaNYL  0-200 mcg/hr Stopped (02/10/23 0527)   • norepinephrine  0.5-90 mcg/min Stopped (02/09/23 2240)   • phenylephrine   mcg/min 50 mcg/min (02/10/23 0400)     PRN Meds:.•  acetaminophen  •  alum-mag hydroxide-simeth  •  bisacodyL  •  glucose **OR** dextrose **OR** glucagon **OR** dextrose 50 % in water (D50)  •  diphenhydrAMINE  •  fentaNYL  •  fentaNYL **AND** fentaNYL  •  [Provider Managed Hold] metoprolol tartrate     ASSESSMENT & PLAN   Acute kidney injury (CMS/HCC)  Assessment & Plan  Creatinine 1.3,     Acute hypoxemic respiratory failure (CMS/HCC)  Assessment & Plan  Failed extubation in the setting of excessive anesthesia, EULALIA, paralyzed R hemidiaphragm. Currently intubated and sedated. Follows with Dr. Damian.  CT Chest 2/9: Small bilateral pleural effusions, consolidation in the adjacent lower lobes in part due to compressive atelectasis. Persistent elevation the right hemidiaphragm with associated compressive   atelectasis.     -Daily CXR, ABG  -Can attempt to wean sedation, SBT  -Pulm following: when extubating, bipap to 16/8  -RASS sedation goal -0 to 1     * Atrial fibrillation (CMS/HCC)  Assessment & Plan  History of A fib on dofelitide. 2/8 failed  extubation due to hemodynamic & respiratory compromise most likely in the setting of anesthesia.  S/p successful radiofrequency ablation 2/8.   Appears to be in sinus rhythm on tele, rates in 60s  TTE with bubble study 2/9: normal LV size and thickness, low-normal systolic function EF 50-55%, moderately dilated LA, no visualized interatrial shunt.     - Can restart dofelitide this AM for maintenance  - Monitor on Tele, monitor Qt  - Has been given lovenox, can transition back to home apixaban         VTE Assessment: Padua    VTE Prophylaxis: Current anticoagulants:  [Provider Managed Hold] apixaban (ELIQUIS) tablet 5 mg, oral, BID  enoxaparin (LOVENOX) syringe 120 mg, subcutaneous, q12h (6a, 6p)      Code Status: Full Code  Estimated discharge date: 2/12/2023     ICU CHECKLIST   Lines:   Peripheral IV (Adult) 02/08/23 Left Hand (Active)   Number of days: 2       Peripheral IV (Adult) 02/08/23 Left Antecubital (Active)   Number of days: 2       Peripheral IV (Adult) 02/09/23 Anterior;Left Hand (Active)   Number of days: 1       Peripheral IV (Adult) 02/09/23 Posterior;Right Hand (Active)   Number of days: 1       NG/OG Tube Center mouth (Active)   Number of days:        Urethral Catheter (Active)   Number of days: 2       ETT  (Active)   Number of days: 2       Arterial Line 02/08/23 Right Radial (Active)   Number of days: 2       Catheterization Site - Venous Left Femoral 7 Fr.;Other (Comment) (Active)   Number of days: 2       Catheterization Site - Venous Right Femoral 8 Fr. (Active)   Number of days: 2     Kurtz: Yes      Sedation Vacation: Yes      Physical Therapy: No      Nutrition: NPO     DVT Prophylaxis: yes     GI Prophylaxis: yes       ATTENDING DOCUMENTATION  ALSO SEE ATTENDING ATTESTATION SECTION OF NOTE

## 2023-02-10 NOTE — ASSESSMENT & PLAN NOTE
He underwent PVI on 2/8/2023.  Post procedurally, he had to be reintubated due to hypoxia in the setting of chronically elevated right hemidiaphragm.  Preprocedurally he was on dofetilide and Eliquis which were on hold given intubation.  He has was initially in and out of atrial fibrillation but then persistent since 2/10/2023.  He was transitioned from IV amiodarone to oral amiodarone 400 mg 3 times daily.  Now back in sinus.     Continue loading dose of amodarone 400 mg TID - transition to 200 mg daily on discharge.  Lovenox which can be transitioned back to Eliquis.

## 2023-02-11 ENCOUNTER — APPOINTMENT (INPATIENT)
Dept: RADIOLOGY | Facility: HOSPITAL | Age: 73
DRG: 981 | End: 2023-02-11
Attending: STUDENT IN AN ORGANIZED HEALTH CARE EDUCATION/TRAINING PROGRAM
Payer: MEDICARE

## 2023-02-11 LAB
ALBUMIN SERPL-MCNC: 2.8 G/DL (ref 3.4–5)
ALP SERPL-CCNC: 34 IU/L (ref 35–126)
ALT SERPL-CCNC: 15 IU/L (ref 16–63)
ANION GAP SERPL CALC-SCNC: 9 MEQ/L (ref 3–15)
AST SERPL-CCNC: 15 IU/L (ref 15–41)
BASE EXCESS BLDA CALC-SCNC: 3.9 MEQ/L
BASOPHILS # BLD: 0.04 K/UL (ref 0.01–0.1)
BASOPHILS NFR BLD: 0.5 %
BILIRUB SERPL-MCNC: 2 MG/DL (ref 0.3–1.2)
BUN SERPL-MCNC: 33 MG/DL (ref 8–20)
CA-I BLD-SCNC: 1.12 MMOL/L (ref 1.15–1.27)
CALCIUM SERPL-MCNC: 8 MG/DL (ref 8.9–10.3)
CHLORIDE BLDA-SCNC: 108 MEQ/L (ref 98–109)
CHLORIDE SERPL-SCNC: 108 MEQ/L (ref 98–109)
CO2 BLDA-SCNC: 30 MEQ/L (ref 22–32)
CO2 SERPL-SCNC: 25 MEQ/L (ref 22–32)
CREAT SERPL-MCNC: 1.2 MG/DL (ref 0.8–1.3)
DIFFERENTIAL METHOD BLD: ABNORMAL
EOSINOPHIL # BLD: 0.01 K/UL (ref 0.04–0.54)
EOSINOPHIL NFR BLD: 0.1 %
ERYTHROCYTE [DISTWIDTH] IN BLOOD BY AUTOMATED COUNT: 14.1 % (ref 11.6–14.4)
GFR SERPL CREATININE-BSD FRML MDRD: 59.7 ML/MIN/1.73M*2
GLUCOSE BLDA-MCNC: 123 MG/DL (ref 70–99)
GLUCOSE SERPL-MCNC: 132 MG/DL (ref 70–99)
HCO3 BLDA-SCNC: 28 MEQ/L (ref 21–28)
HCT VFR BLDCO AUTO: 42.9 % (ref 40.1–51)
HGB BLD-MCNC: 13.9 G/DL (ref 13.7–17.5)
HGB BLDA-MCNC: 14.6 G/DL (ref 14–17.5)
IMM GRANULOCYTES # BLD AUTO: 0.03 K/UL (ref 0–0.08)
IMM GRANULOCYTES NFR BLD AUTO: 0.4 %
LACTATE BLDA-SCNC: 1 MMOL/L (ref 0.4–1.6)
LYMPHOCYTES # BLD: 1.26 K/UL (ref 1.2–3.5)
LYMPHOCYTES NFR BLD: 16.3 %
MAGNESIUM SERPL-MCNC: 2.1 MG/DL (ref 1.8–2.5)
MCH RBC QN AUTO: 29.4 PG (ref 28–33.2)
MCHC RBC AUTO-ENTMCNC: 32.4 G/DL (ref 32.2–36.5)
MCV RBC AUTO: 90.9 FL (ref 83–98)
MICROORGANISM SPEC CULT: ABNORMAL
MONOCYTES # BLD: 1.31 K/UL (ref 0.3–1)
MONOCYTES NFR BLD: 16.9 %
NEUTROPHILS # BLD: 5.08 K/UL (ref 1.7–7)
NEUTS SEG NFR BLD: 65.8 %
NRBC BLD-RTO: 0 %
PCO2 BLDA: 42 MM HG (ref 35–48)
PDW BLD AUTO: 13.3 FL (ref 9.4–12.4)
PH BLDA: 7.44 PH (ref 7.35–7.45)
PLATELET # BLD AUTO: 125 K/UL (ref 150–350)
PO2 BLDA: 70 MM HG (ref 83–100)
POCT PATIENT TEMPERATURE: 98.6 °F (ref 97–99)
POCT TEST (BLD GAS): ABNORMAL
POTASSIUM BLDA-SCNC: 3.8 MEQ/L (ref 3.4–4.5)
POTASSIUM SERPL-SCNC: 4.1 MEQ/L (ref 3.6–5.1)
PROT SERPL-MCNC: 4.9 G/DL (ref 6–8.2)
RBC # BLD AUTO: 4.72 M/UL (ref 4.5–5.8)
SAO2 % BLDA: 92 % (ref 93–98)
SODIUM BLDA-SCNC: 143 MEQ/L (ref 136–145)
SODIUM SERPL-SCNC: 142 MEQ/L (ref 136–144)
WBC # BLD AUTO: 7.73 K/UL (ref 3.8–10.5)

## 2023-02-11 PROCEDURE — 85025 COMPLETE CBC W/AUTO DIFF WBC: CPT | Performed by: STUDENT IN AN ORGANIZED HEALTH CARE EDUCATION/TRAINING PROGRAM

## 2023-02-11 PROCEDURE — 94003 VENT MGMT INPAT SUBQ DAY: CPT

## 2023-02-11 PROCEDURE — 63700000 HC SELF-ADMINISTRABLE DRUG

## 2023-02-11 PROCEDURE — 63600000 HC DRUGS/DETAIL CODE: Performed by: STUDENT IN AN ORGANIZED HEALTH CARE EDUCATION/TRAINING PROGRAM

## 2023-02-11 PROCEDURE — 99233 SBSQ HOSP IP/OBS HIGH 50: CPT | Performed by: INTERNAL MEDICINE

## 2023-02-11 PROCEDURE — 63700000 HC SELF-ADMINISTRABLE DRUG: Performed by: PHYSICIAN ASSISTANT

## 2023-02-11 PROCEDURE — 83735 ASSAY OF MAGNESIUM: CPT | Performed by: STUDENT IN AN ORGANIZED HEALTH CARE EDUCATION/TRAINING PROGRAM

## 2023-02-11 PROCEDURE — 71045 X-RAY EXAM CHEST 1 VIEW: CPT

## 2023-02-11 PROCEDURE — 63700000 HC SELF-ADMINISTRABLE DRUG: Performed by: STUDENT IN AN ORGANIZED HEALTH CARE EDUCATION/TRAINING PROGRAM

## 2023-02-11 PROCEDURE — 200200 PR NO CHARGE: Performed by: INTERNAL MEDICINE

## 2023-02-11 PROCEDURE — 80053 COMPREHEN METABOLIC PANEL: CPT | Performed by: STUDENT IN AN ORGANIZED HEALTH CARE EDUCATION/TRAINING PROGRAM

## 2023-02-11 PROCEDURE — 25000000 HC PHARMACY GENERAL: Performed by: PHYSICIAN ASSISTANT

## 2023-02-11 PROCEDURE — 25800000 HC PHARMACY IV SOLUTIONS: Performed by: PHYSICIAN ASSISTANT

## 2023-02-11 PROCEDURE — 20000000 HC ROOM AND CARE ICU

## 2023-02-11 PROCEDURE — 25800000 HC PHARMACY IV SOLUTIONS: Performed by: STUDENT IN AN ORGANIZED HEALTH CARE EDUCATION/TRAINING PROGRAM

## 2023-02-11 PROCEDURE — 36415 COLL VENOUS BLD VENIPUNCTURE: CPT | Performed by: STUDENT IN AN ORGANIZED HEALTH CARE EDUCATION/TRAINING PROGRAM

## 2023-02-11 PROCEDURE — 99291 CRITICAL CARE FIRST HOUR: CPT | Mod: 25 | Performed by: INTERNAL MEDICINE

## 2023-02-11 RX ORDER — AMIODARONE HYDROCHLORIDE 200 MG/1
400 TABLET ORAL 3 TIMES DAILY
Status: DISCONTINUED | OUTPATIENT
Start: 2023-02-11 | End: 2023-02-14

## 2023-02-11 RX ORDER — AMIODARONE HYDROCHLORIDE 200 MG/1
400 TABLET ORAL 2 TIMES DAILY
Status: DISCONTINUED | OUTPATIENT
Start: 2023-02-11 | End: 2023-02-11

## 2023-02-11 RX ORDER — FUROSEMIDE 10 MG/ML
60 INJECTION INTRAMUSCULAR; INTRAVENOUS ONCE
Status: COMPLETED | OUTPATIENT
Start: 2023-02-11 | End: 2023-02-11

## 2023-02-11 RX ORDER — FUROSEMIDE 10 MG/ML
60 INJECTION INTRAMUSCULAR; INTRAVENOUS ONCE
Status: DISCONTINUED | OUTPATIENT
Start: 2023-02-11 | End: 2023-02-11

## 2023-02-11 RX ORDER — FUROSEMIDE 10 MG/ML
40 INJECTION INTRAMUSCULAR; INTRAVENOUS ONCE
Status: COMPLETED | OUTPATIENT
Start: 2023-02-11 | End: 2023-02-11

## 2023-02-11 RX ORDER — FUROSEMIDE 10 MG/ML
40 INJECTION INTRAMUSCULAR; INTRAVENOUS ONCE
Status: DISCONTINUED | OUTPATIENT
Start: 2023-02-11 | End: 2023-02-11

## 2023-02-11 RX ADMIN — DEXMEDETOMIDINE 1.5 MCG/KG/HR: 200 INJECTION, SOLUTION INTRAVENOUS at 01:13

## 2023-02-11 RX ADMIN — CHLORHEXIDINE GLUCONATE ORAL RINSE 15 ML: 1.2 SOLUTION DENTAL at 10:02

## 2023-02-11 RX ADMIN — DIGOXIN 125 MCG: 0.25 INJECTION INTRAMUSCULAR; INTRAVENOUS at 08:35

## 2023-02-11 RX ADMIN — AMIODARONE HYDROCHLORIDE 400 MG: 200 TABLET ORAL at 20:39

## 2023-02-11 RX ADMIN — DEXMEDETOMIDINE 1.5 MCG/KG/HR: 200 INJECTION, SOLUTION INTRAVENOUS at 22:31

## 2023-02-11 RX ADMIN — FUROSEMIDE 60 MG: 10 INJECTION, SOLUTION INTRAMUSCULAR; INTRAVENOUS at 08:57

## 2023-02-11 RX ADMIN — FUROSEMIDE 40 MG: 10 INJECTION, SOLUTION INTRAMUSCULAR; INTRAVENOUS at 15:47

## 2023-02-11 RX ADMIN — AMIODARONE HYDROCHLORIDE 400 MG: 200 TABLET ORAL at 15:47

## 2023-02-11 RX ADMIN — FENTANYL CITRATE 50 MCG: 0.05 INJECTION, SOLUTION INTRAMUSCULAR; INTRAVENOUS at 03:13

## 2023-02-11 RX ADMIN — ENOXAPARIN SODIUM 120 MG: 120 INJECTION SUBCUTANEOUS at 17:28

## 2023-02-11 RX ADMIN — DEXMEDETOMIDINE 1.5 MCG/KG/HR: 200 INJECTION, SOLUTION INTRAVENOUS at 19:23

## 2023-02-11 RX ADMIN — POLYETHYLENE GLYCOL 3350 17 G: 17 POWDER, FOR SOLUTION ORAL at 08:35

## 2023-02-11 RX ADMIN — DEXMEDETOMIDINE 1.5 MCG/KG/HR: 200 INJECTION, SOLUTION INTRAVENOUS at 06:25

## 2023-02-11 RX ADMIN — DEXMEDETOMIDINE 1.5 MCG/KG/HR: 200 INJECTION, SOLUTION INTRAVENOUS at 16:32

## 2023-02-11 RX ADMIN — DEXMEDETOMIDINE 1.5 MCG/KG/HR: 200 INJECTION, SOLUTION INTRAVENOUS at 11:37

## 2023-02-11 RX ADMIN — DEXMEDETOMIDINE 1.5 MCG/KG/HR: 200 INJECTION, SOLUTION INTRAVENOUS at 08:56

## 2023-02-11 RX ADMIN — ATORVASTATIN CALCIUM 20 MG: 20 TABLET, FILM COATED ORAL at 17:28

## 2023-02-11 RX ADMIN — PANTOPRAZOLE SODIUM 40 MG: 40 INJECTION, POWDER, LYOPHILIZED, FOR SOLUTION INTRAVENOUS at 08:34

## 2023-02-11 RX ADMIN — CHLORHEXIDINE GLUCONATE ORAL RINSE 15 ML: 1.2 SOLUTION DENTAL at 22:31

## 2023-02-11 RX ADMIN — AMIODARONE HYDROCHLORIDE 1 MG/MIN: 50 INJECTION, SOLUTION INTRAVENOUS at 01:13

## 2023-02-11 RX ADMIN — ENOXAPARIN SODIUM 120 MG: 120 INJECTION SUBCUTANEOUS at 05:44

## 2023-02-11 RX ADMIN — METOPROLOL TARTRATE 12.5 MG: 25 TABLET, FILM COATED ORAL at 20:39

## 2023-02-11 NOTE — PROGRESS NOTES
Cardiology Progress Note   [facility]    [facility]  [facility] [address]  [address] [place] [address]    TEL  [phone]  [facility]       Patient: Humberto Chung  MRN: [id number]  : 1950  Admission Date: 2023   Consult Date: 23  Pt remains intubated and minimally sedated. There has been difficulty weaning fio2.      Scheduled Meds:  • amiodarone  400 mg oral TID   • [Provider Managed Hold] apixaban  5 mg oral BID   • [Provider Managed Hold] aspirin  81 mg oral Daily   • atorvastatin  20 mg oral q PM   • cefTRIAXone  1 g intravenous q24h   • chlorhexidine  15 mL Mouth/Throat 2 times per day   • digoxin  125 mcg intravenous Daily   • enoxaparin  1 mg/kg subcutaneous q12h (6a, 6p)   • furosemide  60 mg intravenous Once   • [Provider Managed Hold] lisinopriL  5 mg oral Daily   • metoprolol tartrate  12.5 mg oral BID   • pantoprazole  40 mg oral Daily    Or   • pantoprazole  40 mg intravenous Daily   • polyethylene glycol  17 g oral Daily     Continuous Infusions:  • dexmedetomidine in 0.9 % NaCl  0.2-1.5 mcg/kg/hr 1.5 mcg/kg/hr (23 1200)   • fentaNYL  0-200 mcg/hr Stopped (02/10/23 0527)   • norepinephrine  0.5-90 mcg/min Stopped (23 2240)   • phenylephrine   mcg/min Stopped (23 0402)     PRN Meds:.•  acetaminophen  •  alum-mag hydroxide-simeth  •  bisacodyL  •  glucose **OR** dextrose **OR** glucagon **OR** dextrose 50 % in water (D50)  •  diphenhydrAMINE  •  fentaNYL  •  fentaNYL **AND** fentaNYL  •  [Provider Managed Hold] metoprolol tartrate      Intake/Output Summary (Last 24 hours) at 2023 1500  Last data filed at 2023 1200  Gross per 24 hour   Intake 1868.75 ml   Output 4105 ml   Net -2236.25 ml        Weights (last 7 days)     Date/Time Weight    02/10/23 0514 119 kg (262 lb 5.6 oz)    23 1037 119 kg (263 lb)    23 0342 119 kg (263 lb 0.1 oz)    23 1510 115 kg  "(253 lb 1.4 oz)    02/08/23 0900 120 kg (264 lb)           Wt Readings from Last 3 Encounters:   02/10/23 119 kg (262 lb 5.6 oz)   01/20/23 118 kg (260 lb)   07/28/22 118 kg (261 lb)        REVIEW OF SYSTEMS:   10 ROS were reviewed and negative per patient except as per mentioned in the HPI.    PHYSICAL EXAMINATION:  Visit Vitals  BP (!) 98/54   Pulse 93   Temp 36.5 °C (97.7 °F) (Temporal)   Resp 19   Ht 1.803 m (5' 11\")   Wt 119 kg (262 lb 5.6 oz)   SpO2 94%   BMI 36.59 kg/m²     Body surface area is 2.44 meters squared.  Body mass index is 36.59 kg/m².  Gen: NAD. Well-developed, well-nourished, and appears stated age.  HEENT: NC/AT. EOM grossly intact.  CV: irregularly irregular rhythm, normal rate, trace b/l LE edema, + s1/2, no appreciable r/m/g. Neg. JVD  Lungs: mechanical breath sounds  Abdomen: +Bs, Soft, nontender, nondistended. No guarding or rebound tenderness.    Ext: No clubbing, cyanosis   Skin: Warm, dry, no exposed rashes or lesions.  Neuro: CN2-12 grossly intact. A&O  Psych: Appropriate affect.      Labs     Recent Results (from the past 24 hour(s))   Comprehensive metabolic panel    Collection Time: 02/11/23  4:50 AM   Result Value Ref Range    Sodium 142 136 - 144 mEQ/L    Potassium 4.1 3.6 - 5.1 mEQ/L    Chloride 108 98 - 109 mEQ/L    CO2 25 22 - 32 mEQ/L    BUN 33 (H) 8 - 20 mg/dL    Creatinine 1.2 0.8 - 1.3 mg/dL    Glucose 132 (H) 70 - 99 mg/dL    Calcium 8.0 (L) 8.9 - 10.3 mg/dL    AST (SGOT) 15 15 - 41 IU/L    ALT (SGPT) 15 (L) 16 - 63 IU/L    Alkaline Phosphatase 34 (L) 35 - 126 IU/L    Total Protein 4.9 (L) 6.0 - 8.2 g/dL    Albumin 2.8 (L) 3.4 - 5.0 g/dL    Bilirubin, Total 2.0 (H) 0.3 - 1.2 mg/dL    eGFR 59.7 (L) >=60.0 mL/min/1.73m*2    Anion Gap 9 3 - 15 mEQ/L   CBC and Differential    Collection Time: 02/11/23  4:50 AM   Result Value Ref Range    WBC 7.73 3.80 - 10.50 K/uL    RBC 4.72 4.50 - 5.80 M/uL    Hemoglobin 13.9 13.7 - 17.5 g/dL    Hematocrit 42.9 40.1 - 51.0 %    MCV 90.9 83.0 " - 98.0 fL    MCH 29.4 28.0 - 33.2 pg    MCHC 32.4 32.2 - 36.5 g/dL    RDW 14.1 11.6 - 14.4 %    Platelets 125 (L) 150 - 350 K/uL    MPV 13.3 (H) 9.4 - 12.4 fL    Differential Type Auto     nRBC 0.0 <=0.0 %    Immature Granulocytes 0.4 %    Neutrophils 65.8 %    Lymphocytes 16.3 %    Monocytes 16.9 %    Eosinophils 0.1 %    Basophils 0.5 %    Immature Granulocytes, Absolute 0.03 0.00 - 0.08 K/uL    Neutrophils, Absolute 5.08 1.70 - 7.00 K/uL    Lymphocytes, Absolute 1.26 1.20 - 3.50 K/uL    Monocytes, Absolute 1.31 (H) 0.30 - 1.00 K/uL    Eosinophils, Absolute 0.01 (L) 0.04 - 0.54 K/uL    Basophils, Absolute 0.04 0.01 - 0.10 K/uL   Magnesium    Collection Time: 02/11/23  4:50 AM   Result Value Ref Range    Magnesium 2.1 1.8 - 2.5 mg/dL   POCT Arterial Blood Gas    Collection Time: 02/11/23  4:54 AM   Result Value Ref Range    pH, Arterial 7.44 7.35 - 7.45 pH    pCO2, Arterial 42 35 - 48 mm Hg    pO2, Arterial 70 (L) 83 - 100 mm Hg    HCO3, Arterial 28 21 - 28 mEQ/L    Base Excess, Arterial 3.9 mEQ/L    O2 Sat, Arterial 92 (L) 93 - 98 %    TCO2, Arterial 30 22 - 32 mEQ/L    Lactate, Arterial 1.0 0.4 - 1.6 mmol/L    Glucose, Arterial 123 (H) 70 - 99 mg/dL    Sodium, Arterial 143 136 - 145 mEQ/L    Potassium, Arterial 3.8 3.4 - 4.5 mEQ/L    Chloride, Arterial 108 98 - 109 mEQ/L    Ionized Calcium, Arterial 1.12 (L) 1.15 - 1.27 mmol/L    Hemoglobin, Arterial 14.6 14.0 - 17.5 g/dL    Patient Temperature 98.6 97.0 - 99.0 °F    POC Test POC        Imaging    X-RAY CHEST 1 VIEW  Narrative: CLINICAL HISTORY:   Intubated    COMMENT:    Comparison:  Chest radiographs dating back to 2/10/2023.    Single frontal AP view of the chest was obtained.    Overall lung aeration is without significant interval change. However, there is  slight increase in bilateral perihilar airspace consolidation.. There is no  significant enlarging pleural effusion. There is no evidence of pneumothorax.  Endotracheal tube appears in stable  position..  Impression: IMPRESSION:  Slight increase in bilateral perihilar airspace consolidation..       Cardiac Studies  Cardiac Imaging    TRANSTHORACIC ECHO (TTE) COMPLETE 02/09/2023    Interpretation Summary  Technically difficult study. Definity used for endocardial enhancement. Rhythm is sinus rhythm.    Left ventricle is normal in size with normal wall thickness. Low normal systolic function, estimated ejection fraction 50-55%. Wall motion difficult to assess even with echocontrast. Indeterminate diastolic function.  Aortic valve is tricuspid. Aortic valve and root sclerosis. No aortic stenosis. No aortic regurgitation.  Aortic root normal in size. Visualized portion of ascending aorta dilated up to 4.0 cm.  Mitral valve mildly thickened. Trace mitral regurgitation.  Left atrium is moderately dilated. Agitated saline injected with good opacification of the right heart chambers, no visualized interatrial shunt.  Right ventricle is normal in size with preserved systolic function.  Right atrium is normal in size.  Tricuspid valve with trace tricuspid regurgitation, estimated right ventricular systolic pressure 26 mmHg.  Pulmonic valve not well visualized.  Inferior vena cava <2.1cm with <50% respiratory variation, consistent with positive pressure mechanical ventilation.  Trace pericardial effusion.    Compared to previous echo on 5/28/2020, no significant change, ascending aorta is measured to be 4.0 cm.      Telemetry  Rate controlled afib, occasionally up in low 100s    Assessment/Plan:  This is a 72 y.o. male  with past medical history significant for atrial fibrillation and EULALIA who is here s/p afib ablation and failed post procedural extubation.  His atrial fibrillation is rate controlled and he is on AC, however, there has been some difficult weaning sedation. Pt remains intubated and minimally sedated. There has been difficulty weaning fio2. He is otherwise fit for extubation (following complex  commands). He appears comfortable on current sedation, however, has expressed that he does not want to remain intubated much longer. CXR with possible mild congestion. It is possible he is mildly volume up (also has trace LE edema) and could benefit from some diuresis. However, he has normal LVEF, indeterminate diastolic function, and normal renal function. Hence, even if he received a lot of fluids in the hospital I would expect him to be able to compensate and self-diurese soon. It is possible there is another underlying pulmonary process contributing to hypoxia (atelectasis, airspace disease, or pneumonitis). I do not suspect PNA given lack of fever or leukocytosis (could also be seen w pneumonitis as well). Given his h/o paralyzed hemidiaphragm, he will need to be optimized in terms of his oxygenation pre-extubation to reduce the risk of repeat failed extubation. For now, would recommend FBG negative 1-1.5L and see if this helps with fio2 wean. We will reassess diuretic need tomorrow. In terms of his afib his amiodarone can be transitioned to oral and metoprolol can be resumed along with continuing digoxin as as outlined in Dr Barger's progress note from today. Please continue AC uninterrupted as outlined in her note as well.     I spent 10 minutes discussing the case with family at bedside and all questions were answered.     Preliminary recommendations discussed with residents from the primary team. Final recommendations are pending attending co-signature.   We will continue to follow. However, please do not hesitate to contact myself or covering cardiology fellow with questions at any time.      Franklyn Beth MD  2/11/2023

## 2023-02-11 NOTE — PROGRESS NOTES
"     Pulmonary/Critical Care  Progress Note       SUBJECTIVE       -Remains in atrial fibrillation with rates controlled in 90's with amiodarone at 1mg/hr   -Sedated with Precedex 1.5mcg  -He is tolerating PS 10/6 50%   -CXR with worsening edema and low lung volumes       OBJECTIVE     Vital Signs:   Temp (24hrs), Av.2 °C (97.2 °F), Min:36.1 °C (96.9 °F), Max:36.4 °C (97.6 °F)    Visit Vitals  BP (!) 98/54   Pulse 97   Temp 36.4 °C (97.6 °F) (Temporal)   Resp 18   Ht 1.803 m (5' 11\")   Wt 119 kg (262 lb 5.6 oz)   SpO2 93%   BMI 36.59 kg/m²     Vitals:    23 0400 23 0500 23 0600 23 0700   BP: (!) 98/54      Pulse: (!) 101 (!) 108 (!) 108 97   Resp: (!) 27 13 19 18   Temp:       TempSrc:       SpO2: 92% 93% 94% 93%   Weight:       Height:           Ventilator Data/Settings:  Ventilator Settings:  Vent Mode: Pressure support  FiO2 (%) (Set):  [50 %] 50 %  S RR:  [18] 18  S VT:  [450 mL] 450 mL  PEEP/CPAP (Set, cmH2O):  [6 cm H20] 6 cm H20  MAP (Observed, cmH2O):  [8-11] 11    Last 6 FIO2:  Lab Results   Component Value Date    FIO2 100 2023     Last 6 SPO2:  SpO2 Readings from Last 6 Encounters:   23 93%   22 96%   22 98%   21 99%   20 94%   20 96%     I/O's:    Intake/Output Summary (Last 24 hours) at 2023 0832  Last data filed at 2023 0700  Gross per 24 hour   Intake 2185.3 ml   Output 4180 ml   Net -1994.7 ml     Medications:    • [Provider Managed Hold] apixaban  5 mg oral BID   • [Provider Managed Hold] aspirin  81 mg oral Daily   • atorvastatin  20 mg oral q PM   • cefTRIAXone  1 g intravenous q24h   • chlorhexidine  15 mL Mouth/Throat 2 times per day   • digoxin  125 mcg intravenous Daily   • [Provider Managed Hold] dofetilide  500 mcg oral BID   • enoxaparin  1 mg/kg subcutaneous q12h (6a, 6p)   • furosemide  40 mg intravenous Once   • [Provider Managed Hold] lisinopriL  5 mg oral Daily   • pantoprazole  40 mg oral Daily    Or   • " pantoprazole  40 mg intravenous Daily   • polyethylene glycol  17 g oral Daily     Anti-infectives (From admission, onward)    Start     Dose/Rate Route Frequency Ordered Stop    02/10/23 2230  cefTRIAXone (ROCEPHIN) IVPB 1 g in 100 mL NSS vial in bag         1 g  200 mL/hr over 30 Minutes intravenous Every 24 hours 02/10/23 2139           Physical Exam     General: sedated but easily arousable, critically ill appearing   Head: normocephalic, atraumatic   Eyes: no scleral icterus  Neck: trachea midline  Cardiovascular: S1, S2.  Irregularly irregular  Lungs: Intubated, equal coarse breath sounds B/L  Abdomen: Soft, non-distended  Extremities: Negative for edema  Skin: Warm, dry, intact   Neuro: Moving all extremities spontaneously     Diagnostic Data     Labs:  I have personally reviewed all pertinent patient laboratory results. Labs of note discussed below:    CBC Results       02/10/23 02/09/23 02/09/23     0445 2223 0315    WBC 10.24 15.99 11.67    RBC 4.93 5.21 5.02    HGB 14.6 15.7 15.0    HCT 44.7 47.4 45.9    MCV 90.7 91.0 91.4    MCH 29.6 30.1 29.9    MCHC 32.7 33.1 32.7     165 183        CMP Results       02/10/23 02/09/23 02/09/23     0444 2223 0315     142 139    K 4.3 4.5 4.8    Cl 110 107 107    CO2 24 24 21    Glucose 120 143 160    BUN 35 35 22    Creatinine 1.2 1.5 1.3    Calcium 7.7 8.9 8.7    Anion Gap 8 11 11    AST 19 -- 28    ALT 17 -- 23    Albumin 2.9 -- 3.4    EGFR 59.7 46.1 54.4         Comment for K at 2223 on 02/09/23: SLIGHT HEMOLYSIS, RESULT MAY BE INCREASED.        ABG Results       02/10/23 02/09/23 02/09/23     0448 1253 0609    pH 7.42 7.43 7.37    pCO2 42 39 43    pO2 71 76 90    HCO3 27 26 24    Base Excess 2.4 1.5 -0.6         Comment for pH at 0448 on 02/10/23: Temperature Corrected pH(T)    Comment for pH at 1253 on 02/09/23: Temperature Corrected pH(T)    Comment for pH at 0609 on 02/09/23: Temperature Corrected pH(T)    Comment for pCO2 at 0448 on 02/10/23:  Temperature Corrected pCO2(T)    Comment for pCO2 at 1253 on 02/09/23: Temperature Corrected pCO2(T)    Comment for pCO2 at 0609 on 02/09/23: Temperature Corrected pCO2(T)    Comment for pO2 at 0448 on 02/10/23: Temperature Corrected pO2(T)    Comment for pO2 at 1253 on 02/09/23: Temperature Corrected pO2(T)    Comment for pO2 at 0609 on 02/09/23: Temperature Corrected pO2(T)        Microbiology Results     Procedure Component Value Units Date/Time    Sputum Gram Stain (Lab Only) Expectorated Sputum [[id number]] Collected: 02/09/23 1924    Specimen: Expectorated Sputum Updated: 02/09/23 2250     Gram Stain Result 4+ WBC      2+ Epithelial cells      1+ Gram negative bacilli      2+ Gram positive cocci in pairs      Gram positive bacilli    SARS-CoV-2 (COVID-19), PCR Nasopharynx [[id number]]  (Normal) Collected: 02/09/23 0315    Specimen: Nasopharyngeal Swab from Nasopharynx Updated: 02/09/23 0522    Narrative:      The following orders were created for panel order SARS-CoV-2 (COVID-19), PCR Nasopharynx.  Procedure                               Abnormality         Status                     ---------                               -----------         ------                     SARS-CoV-2 (COVID-19), P...[[id number]]  Normal              Final result                 Please view results for these tests on the individual orders.    SARS-CoV-2 (COVID-19), PCR Nasopharynx [[id number]]  (Normal) Collected: 02/09/23 0315    Specimen: Nasopharyngeal Swab from Nasopharynx Updated: 02/09/23 0522     SARS-CoV-2 (COVID-19) Negative    Narrative:      Testing performed using real-time PCR for detection of COVID-19. EUA approved validation studies performed on site.     MRSA Screen, Nares Only Nose [[id number]]  (Normal) Collected: 02/08/23 1717    Specimen: Nasal Swab from Nose Updated: 02/08/23 2219     MRSA DNA, Nares Negative        Imaging:  I have reviewed all pertinent imaging which is discussed below:    X-RAY CHEST 1  VIEW    Result Date: 2/10/2023  IMPRESSION: 1.  Low inspiratory volumes. Bibasilar opacities likely a combination of pleural effusions with underlying atelectasis or airspace disease. 2.  Vascular congestion and mild diffuse interstitial opacities suggestive of mild pulmonary edema. 3.  Support devices in place as described below COMMENT: Support lines, tubes, devices, and surgical hardware, material: NG/OG tube is seen coursing into the stomach. Monitoring leads/wires overly the patient. ET tube with tip approximately 4.2 cm above the haley. Lungs and Pleura: See Impression. No pneumothorax. Cardiovascular and Mediastinum: The cardiomediastinal silhouette is stable noting cardiomegaly and aortic atherosclerosis. Other: Osseous structures appear unchanged. CLINICAL HISTORY:   Intubation PROCEDURE: Single frontal view of the chest. COMPARISON:   2/9/2023     CXR from this morning reviewed. Right hemidiaphragm remains elevated. No obvious infiltrates.     ASSESSMENT AND PLAN     In brief, Humberto Chung is a 72 y.o. male who presented for elective atrial fibrillation ablation post-op complicated by respiratory distress post-extubation resulting in reintubation for which he is now in the unit for.     Impression:     1. VDRF with hypoxemia and hypercapnia - Suspect his reintubation was in the setting of respiratory distress/hypoventilation from procedural sedatives in setting of known paralyzed R hemidiaphragm. This morning remains rather hypoxemic, but improving.   - Imaging reviewed and findings concerning for pulmonary edema. Left diaphragm angle rather low concerning for deep sulcus sign, but bedside POCUS showed lung sliding throughout.      2. Atrial Fibrillation - Now s/p ablation . Complicated by RVR last night requiring cardioversion.      Recommendations:  - Continue aggressive diuresis as tolerated. He is not ready for extubation today given shallow lung volumes, worsening pulmonary edema.   - Maintain PRVC  450/18/6/50% as rest settings overnight, but tolerating PS 10/6 during day  - When ready for extubation, he will need to be extubated to BiPAP 16/8  - Maintain RASS of 0 to -1 on Precedex  - Rest of management per primary team. We will continue to follow during the weekend.    Please page [phone] with questions/concerns      Danette Carcamo DO  Pulmonary & Critical Care Medicine Fellow  PGY6  Pager: [phone]  2/11/2023

## 2023-02-11 NOTE — PROGRESS NOTES
Internal Medicine  ICU/CCU Daily Progress Note        SUBJECTIVE   This is a 72 y.o. year-old male admitted on 2/8/2023 with Atrial fibrillation, unspecified type (CMS/HCC) [I48.91]  Atrial fibrillation (CMS/HCC) [I48.91].    Interval History:   - Lasix 40mg x2 given yesterday  - Metoprolol 5mg x 2, digoxin 250 mcg x 3, 125 x1 given yesterday  - Sputum from 2/9 positive for E coli, started Ceftriaxone overnight  - In Afib all yesterday, maintained on amio drip  - Lasix 60mg given this morning     OBJECTIVE   Vital signs in last 24 hours:  Temp:  [36.1 °C (96.9 °F)-36.7 °C (98 °F)] 36.7 °C (98 °F)  Heart Rate:  [] 97  Resp:  [0-29] 17  BP: ()/(51-88) 98/54  SpO2:  [89 %-96 %] 95 %  O2 Delivery Method: Endotracheal tube  Vent Mode: Pressure support  FiO2 (%) (Set):  [50 %] 50 %  MAP: 64      Weight & I/Os:  Weights (last 5 days)     Date/Time Weight    02/10/23 0514 119 kg (262 lb 5.6 oz)    02/09/23 1037 119 kg (263 lb)    02/09/23 0342 119 kg (263 lb 0.1 oz)    02/08/23 1510 115 kg (253 lb 1.4 oz)    02/08/23 0900 120 kg (264 lb)          Intake/Output Summary (Last 24 hours) at 2/11/2023 0659  Last data filed at 2/11/2023 0600  24 Hour Net Input/Output from 7AM Yesterday   Intake 2260.3 ml   Output 4280 ml   Net -2019.7 ml   I: 2260  O: 4280  Urine: 4280  Net: -2019    Respiratory:  Vent Mode: Pressure support  FiO2 (%) (Set):  [50 %] 50 %  S RR:  [18] 18  S VT:  [450 mL] 450 mL  PEEP/CPAP (Set, cmH2O):  [6 cm H20] 6 cm H20  MAP (Observed, cmH2O):  [8-11] 11   Observed PIP 22    Results from last 7 days   Lab Units 02/11/23  0454 02/10/23  0448   PH ART pH 7.44 7.42   PCO2 ART mm Hg 42 42   PO2 ART mm Hg 70* 71*   HCO3 ART mEQ/L 28 27   BASE EXC ART mEQ/L 3.9 2.4       Telemetry/EKG:  afib     PHYSICAL EXAMINATION   Physical Exam  Constitutional:       Appearance: He is obese. He is not ill-appearing.   HENT:      Right Ear: External ear normal.      Left Ear: External ear normal.       Mouth/Throat:      Comments: ETT  Cardiovascular:      Rate and Rhythm: Tachycardia present. Rhythm irregular.      Heart sounds: Normal heart sounds.   Pulmonary:      Breath sounds: Normal breath sounds.   Skin:     General: Skin is warm.   Neurological:      Comments: Alert, following commands, anxious appearing   Psychiatric:      Comments: Anxious affect          LINES, CATHETERS, DRAINS, AIRWAYS, & WOUNDS   Lines, drains, airways, & wounds:  Peripheral IV (Adult) 02/08/23 Left Hand (Active)   Number of days: 3       Peripheral IV (Adult) 02/08/23 Left Antecubital (Active)   Number of days: 3       Peripheral IV (Adult) 02/09/23 Posterior;Right Hand (Active)   Number of days: 2       NG/OG Tube Center mouth (Active)   Number of days:        Urethral Catheter (Active)   Number of days: 3       ETT  (Active)   Number of days: 3       Arterial Line 02/08/23 Right Radial (Active)   Number of days: 3       Catheterization Site - Venous Left Femoral 7 Fr.;Other (Comment) (Active)   Number of days: 3       Catheterization Site - Venous Right Femoral 8 Fr. (Active)   Number of days: 3        LABS & IMAGING   Labs:  WBC 7.73, Tbilii 1.7    Results from last 7 days   Lab Units 02/11/23  0450 02/10/23  0445 02/09/23  2223   WBC K/uL 7.73 10.24 15.99*   HEMOGLOBIN g/dL 13.9 14.6 15.7   HEMATOCRIT % 42.9 44.7 47.4   PLATELETS K/uL 125* 153 165       Results from last 7 days   Lab Units 02/11/23  0450 02/10/23  0444 02/09/23  2223 02/09/23  0315   SODIUM mEQ/L 142 142 142 139   POTASSIUM mEQ/L 4.1 4.3 4.5 4.8   CHLORIDE mEQ/L 108 110* 107 107   CO2 mEQ/L 25 24 24 21*   BUN mg/dL 33* 35* 35* 22*   CREATININE mg/dL 1.2 1.2 1.5* 1.3   CALCIUM mg/dL 8.0* 7.7* 8.9 8.7*   ALBUMIN g/dL 2.8* 2.9*  --  3.4   BILIRUBIN TOTAL mg/dL 2.0* 1.7*  --  1.6*   ALK PHOS IU/L 34* 36  --  48   ALT IU/L 15* 17  --  23   AST IU/L 15 19  --  28   GLUCOSE mg/dL 132* 120* 143* 160*     Lab Results   Component Value Date    MG 2.1 02/11/2023     No  results found for: PT, INR, PTT    Microbiology Results (last 3 days)     Procedure Component Value - Date/Time    Sputum Gram Stain (Lab Only) Expectorated Sputum [[id number]] Collected: 02/09/23 1924    Lab Status: Final result Specimen: Expectorated Sputum Updated: 02/09/23 2250     Gram Stain Result 4+ WBC      2+ Epithelial cells      1+ Gram negative bacilli      2+ Gram positive cocci in pairs      Gram positive bacilli    Sputum Culture (Lab Only) Expectorated Sputum [[id number]]  (Abnormal) Collected: 02/09/23 1924    Lab Status: Preliminary result Specimen: Expectorated Sputum Updated: 02/10/23 1942     Culture **Positive Culture**      4+ Escherichia coli     Comment:   This is an updated result. Previous organism was Gram Negative Rods on 2/10/2023 at 1525 EST.       SARS-CoV-2 (COVID-19), PCR Nasopharynx [[id number]]  (Normal) Collected: 02/09/23 0315    Lab Status: Final result Specimen: Nasopharyngeal Swab from Nasopharynx Updated: 02/09/23 0522    Narrative:      The following orders were created for panel order SARS-CoV-2 (COVID-19), PCR Nasopharynx.  Procedure                               Abnormality         Status                     ---------                               -----------         ------                     SARS-CoV-2 (COVID-19), P...[[id number]]  Normal              Final result                 Please view results for these tests on the individual orders.    SARS-CoV-2 (COVID-19), PCR Nasopharynx [[id number]]  (Normal) Collected: 02/09/23 0315    Lab Status: Final result Specimen: Nasopharyngeal Swab from Nasopharynx Updated: 02/09/23 0522     SARS-CoV-2 (COVID-19) Negative    Narrative:      Testing performed using real-time PCR for detection of COVID-19. EUA approved validation studies performed on site.     MRSA Screen, Nares Only Nose [[id number]]  (Normal) Collected: 02/08/23 1717    Lab Status: Final result Specimen: Nasal Swab from Nose Updated: 02/08/23 2219     MRSA DNA, Nares  Negative     Comment: No methicillin resistant Staphylococcus aureus (MRSA) detected.           CXR 2/11: Slight increase in bilateral perihilar airspace consolidation..  Imaging personally reviewed (does not include unread studies):  X-RAY CHEST 1 VIEW    Result Date: 2/10/2023  IMPRESSION: 1.  Low inspiratory volumes. Bibasilar opacities likely a combination of pleural effusions with underlying atelectasis or airspace disease. 2.  Vascular congestion and mild diffuse interstitial opacities suggestive of mild pulmonary edema. 3.  Support devices in place as described below COMMENT: Support lines, tubes, devices, and surgical hardware, material: NG/OG tube is seen coursing into the stomach. Monitoring leads/wires overly the patient. ET tube with tip approximately 4.2 cm above the haley. Lungs and Pleura: See Impression. No pneumothorax. Cardiovascular and Mediastinum: The cardiomediastinal silhouette is stable noting cardiomegaly and aortic atherosclerosis. Other: Osseous structures appear unchanged. CLINICAL HISTORY:   Intubation PROCEDURE: Single frontal view of the chest. COMPARISON:   2/9/2023      MEDS/DRIPS   Scheduled Meds:  • [Provider Managed Hold] apixaban  5 mg oral BID   • [Provider Managed Hold] aspirin  81 mg oral Daily   • atorvastatin  20 mg oral q PM   • cefTRIAXone  1 g intravenous q24h   • chlorhexidine  15 mL Mouth/Throat 2 times per day   • digoxin  125 mcg intravenous Daily   • [Provider Managed Hold] dofetilide  500 mcg oral BID   • enoxaparin  1 mg/kg subcutaneous q12h (6a, 6p)   • furosemide  60 mg intravenous Once   • [Provider Managed Hold] lisinopriL  5 mg oral Daily   • pantoprazole  40 mg oral Daily    Or   • pantoprazole  40 mg intravenous Daily   • polyethylene glycol  17 g oral Daily     Continuous Infusions:  • amiodarone  1 mg/min 1 mg/min (02/11/23 0700)   • dexmedetomidine in 0.9 % NaCl  0.2-1.5 mcg/kg/hr 1.5 mcg/kg/hr (02/11/23 0700)   • fentaNYL  0-200 mcg/hr Stopped (02/10/23  0527)   • norepinephrine  0.5-90 mcg/min Stopped (02/09/23 2240)   • phenylephrine   mcg/min Stopped (02/11/23 9772)     PRN Meds:.•  acetaminophen  •  alum-mag hydroxide-simeth  •  bisacodyL  •  glucose **OR** dextrose **OR** glucagon **OR** dextrose 50 % in water (D50)  •  diphenhydrAMINE  •  fentaNYL  •  fentaNYL **AND** fentaNYL  •  [Provider Managed Hold] metoprolol tartrate     ASSESSMENT & PLAN   Acute kidney injury (CMS/HCC)  Assessment & Plan  Creatinine 1.3,     Acute hypoxemic respiratory failure (CMS/HCC)  Assessment & Plan  Failed extubation in the setting of excessive anesthesia, EULALIA, paralyzed R hemidiaphragm. Currently intubated and sedated. Follows with Dr. Damian.  CT Chest 2/9: Small bilateral pleural effusions, consolidation in the adjacent lower lobes in part due to compressive atelectasis. Persistent elevation the right hemidiaphragm with associated compressive   atelectasis.     -Daily CXR, ABG  -Can attempt to wean sedation, SBT  -Pulm following: when extubating, bipap to 16/8  -RASS sedation goal -0 to 1     * Atrial fibrillation (CMS/HCC)  Assessment & Plan  History of A fib on dofelitide. 2/8 failed extubation due to hemodynamic & respiratory compromise most likely in the setting of anesthesia.  S/p successful radiofrequency ablation 2/8.   Appears to be in sinus rhythm on tele, rates in 60s  TTE with bubble study 2/9: normal LV size and thickness, low-normal systolic function EF 50-55%, moderately dilated LA, no visualized interatrial shunt.   2/9: Patient converted to Afib and RVR, requiring amio bolus    - Can restart dofelitide this AM for maintenance  - Monitor on Tele, monitor Qt  - On therapeutic lovenox, can transition back to home apixaban       VTE Assessment: Padua    VTE Prophylaxis: Current anticoagulants:  [Provider Managed Hold] apixaban (ELIQUIS) tablet 5 mg, oral, BID  enoxaparin (LOVENOX) syringe 120 mg, subcutaneous, q12h (6a, 6p)      Code Status: Full  Code  Estimated discharge date: 2/12/2023     ICU CHECKLIST   Lines:   Peripheral IV (Adult) 02/08/23 Left Hand (Active)   Number of days: 3       Peripheral IV (Adult) 02/08/23 Left Antecubital (Active)   Number of days: 3       Peripheral IV (Adult) 02/09/23 Posterior;Right Hand (Active)   Number of days: 2       NG/OG Tube Center mouth (Active)   Number of days:        Urethral Catheter (Active)   Number of days: 3       ETT  (Active)   Number of days: 3       Arterial Line 02/08/23 Right Radial (Active)   Number of days: 3       Catheterization Site - Venous Left Femoral 7 Fr.;Other (Comment) (Active)   Number of days: 3       Catheterization Site - Venous Right Femoral 8 Fr. (Active)   Number of days: 3     Kurtz: Yes     Sedation Vacation: Yes     Physical Therapy: No     Nutrition: npo    DVT Prophylaxis: therapeutic heparin    GI Prophylaxis: yes       ATTENDING DOCUMENTATION  ALSO SEE ATTENDING ATTESTATION SECTION OF NOTE

## 2023-02-11 NOTE — PROGRESS NOTES
Discussed with patient's spouse and daughter outside of patient's room. Topics address include need for keeping patient on ventilator today, worsening CXR, additional diuresis to be given today, Afib medications. All questions answered at this time.    Eva Sharpe MD  Internal Medicine, PGY-1  [facility]  Pager #[phone]

## 2023-02-11 NOTE — PROGRESS NOTES
Electrophysiology Progress Note      Subjective   Following commands and anxious to be extubated.  Denies palpitations.    Inpatient medications:  •  acetaminophen, 975 mg, oral, q8h PRN  •  alum-mag hydroxide-simeth, 30 mL, oral, q4h PRN  •  amiodarone, 400 mg, oral, TID  •  [Provider Managed Hold] apixaban, 5 mg, oral, BID  •  [Provider Managed Hold] aspirin, 81 mg, oral, Daily  •  atorvastatin, 20 mg, oral, q PM  •  bisacodyL, 10 mg, rectal, Daily PRN  •  cefTRIAXone, 1 g, intravenous, q24h  •  chlorhexidine, 15 mL, Mouth/Throat, 2 times per day  •  dexmedetomidine in 0.9 % NaCl, 0.2-1.5 mcg/kg/hr, intravenous, Titrated  •  glucose, 16-32 g of dextrose, oral, PRN **OR** dextrose, 15-30 g of dextrose, oral, PRN **OR** glucagon, 1 mg, intramuscular, PRN **OR** dextrose 50 % in water (D50), 25 mL, intravenous, PRN  •  digoxin, 125 mcg, intravenous, Daily  •  diphenhydrAMINE, 50 mg, oral, q8h PRN  •  enoxaparin, 1 mg/kg, subcutaneous, q12h (6a, 6p)  •  fentaNYL, 50 mcg, intravenous, q1h PRN  •  fentaNYL, 0-200 mcg/hr, intravenous, Titrated **AND** fentaNYL, 25 mcg, intravenous, q5 min PRN  •  [Provider Managed Hold] lisinopriL, 5 mg, oral, Daily  •  [Provider Managed Hold] metoprolol tartrate, 25 mg, oral, q6h PRN  •  norepinephrine, 0.5-90 mcg/min, intravenous, Titrated  •  pantoprazole, 40 mg, oral, Daily **OR** pantoprazole, 40 mg, intravenous, Daily  •  phenylephrine,  mcg/min, intravenous, Titrated  •  polyethylene glycol, 17 g, oral, Daily    Objective    Vitals:    02/11/23 1200   BP:    Pulse: 93   Resp: 19   Temp: 36.5 °C (97.7 °F)   SpO2: 94%     Physical Exam  General: No acute distress.  HEENT: Anicteric.  Moist mucous membranes.  ET tube in place.  Neck: Supple, no JVD.  Lungs: Clear to auscultation bilaterally anteriorly.  Cardiac: Irregularly irregular.  No murmurs, rubs or gallops.  Abdomen: Soft, nontender.  Extremities: No lower extremity edema.  Femoral sutures removed.  No bleeding or  hematoma.  Skin: Warm, dry.  Neurologic: Grossly intact.  Psychiatric: Behavior is appropriate and cooperative.       Laboratory results:  Results from last 7 days   Lab Units 02/11/23  0450   SODIUM mEQ/L 142   POTASSIUM mEQ/L 4.1   CHLORIDE mEQ/L 108   CO2 mEQ/L 25   BUN mg/dL 33*   CREATININE mg/dL 1.2   CALCIUM mg/dL 8.0*   ALBUMIN g/dL 2.8*   BILIRUBIN TOTAL mg/dL 2.0*   ALK PHOS IU/L 34*   ALT IU/L 15*   AST IU/L 15   GLUCOSE mg/dL 132*     Results from last 7 days   Lab Units 02/11/23  0450   WBC K/uL 7.73   HEMOGLOBIN g/dL 13.9   HEMATOCRIT % 42.9   PLATELETS K/uL 125*               Imaging  Cardiac Imaging    TRANSTHORACIC ECHO (TTE) COMPLETE 02/09/2023    Interpretation Summary  Technically difficult study. Definity used for endocardial enhancement. Rhythm is sinus rhythm.    Left ventricle is normal in size with normal wall thickness. Low normal systolic function, estimated ejection fraction 50-55%. Wall motion difficult to assess even with echocontrast. Indeterminate diastolic function.  Aortic valve is tricuspid. Aortic valve and root sclerosis. No aortic stenosis. No aortic regurgitation.  Aortic root normal in size. Visualized portion of ascending aorta dilated up to 4.0 cm.  Mitral valve mildly thickened. Trace mitral regurgitation.  Left atrium is moderately dilated. Agitated saline injected with good opacification of the right heart chambers, no visualized interatrial shunt.  Right ventricle is normal in size with preserved systolic function.  Right atrium is normal in size.  Tricuspid valve with trace tricuspid regurgitation, estimated right ventricular systolic pressure 26 mmHg.  Pulmonic valve not well visualized.  Inferior vena cava <2.1cm with <50% respiratory variation, consistent with positive pressure mechanical ventilation.  Trace pericardial effusion.    Compared to previous echo on 5/28/2020, no significant change, ascending aorta is measured to be 4.0 cm.          Telemetry  Atrial  fibrillation, ventricular rates 90s.     * Atrial fibrillation (CMS/HCC)  Assessment & Plan  He underwent PVI on 2/8/2023.  Post procedurally, he had to be reintubated due to hypoxia in the setting of chronically elevated right hemidiaphragm.  Preprocedurally he was maintained on dofetilide and Eliquis which have been on hold given intubation.  He has been in and out of atrial fibrillation and is now on IV amiodarone.  He can be transitioned to oral amiodarone 400 mg 3 times daily to decrease volume intake.  Metoprolol can be resumed for rate control now that he is off pressor support.  Continue digoxin.  Continue therapeutic Lovenox until Eliquis can be resumed.      Acute hypoxemic respiratory failure (CMS/HCC)  Assessment & Plan  He had to be reintubated following A-fib ablation on 2/8/2023 in the setting of chronically elevated hemidiaphragm.  He is on a weaning trial this morning.  Appreciate pulmonary input/recommendations.            Thank you for allowing me to participate in the care of your patient, please feel free to contact me with any questions or concerns.     Evelyn Barger MD  2/11/2023

## 2023-02-12 ENCOUNTER — APPOINTMENT (INPATIENT)
Dept: RADIOLOGY | Facility: HOSPITAL | Age: 73
DRG: 981 | End: 2023-02-12
Attending: STUDENT IN AN ORGANIZED HEALTH CARE EDUCATION/TRAINING PROGRAM
Payer: MEDICARE

## 2023-02-12 LAB
ALBUMIN SERPL-MCNC: 2.9 G/DL (ref 3.4–5)
ALP SERPL-CCNC: 36 IU/L (ref 35–126)
ALT SERPL-CCNC: 16 IU/L (ref 16–63)
ANION GAP SERPL CALC-SCNC: 11 MEQ/L (ref 3–15)
AST SERPL-CCNC: 13 IU/L (ref 15–41)
BASE EXCESS BLDA CALC-SCNC: 6.6 MEQ/L
BASOPHILS # BLD: 0.05 K/UL (ref 0.01–0.1)
BASOPHILS NFR BLD: 0.6 %
BILIRUB SERPL-MCNC: 2 MG/DL (ref 0.3–1.2)
BUN SERPL-MCNC: 34 MG/DL (ref 8–20)
CA-I BLD-SCNC: 1.14 MMOL/L (ref 1.15–1.27)
CALCIUM SERPL-MCNC: 8.5 MG/DL (ref 8.9–10.3)
CHLORIDE BLDA-SCNC: 107 MEQ/L (ref 98–109)
CHLORIDE SERPL-SCNC: 108 MEQ/L (ref 98–109)
CO2 BLDA-SCNC: 32 MEQ/L (ref 22–32)
CO2 SERPL-SCNC: 25 MEQ/L (ref 22–32)
CREAT SERPL-MCNC: 1.1 MG/DL (ref 0.8–1.3)
DIFFERENTIAL METHOD BLD: ABNORMAL
EOSINOPHIL # BLD: 0.12 K/UL (ref 0.04–0.54)
EOSINOPHIL NFR BLD: 1.5 %
ERYTHROCYTE [DISTWIDTH] IN BLOOD BY AUTOMATED COUNT: 13.5 % (ref 11.6–14.4)
GFR SERPL CREATININE-BSD FRML MDRD: >60 ML/MIN/1.73M*2
GLUCOSE BLDA-MCNC: 119 MG/DL (ref 70–99)
GLUCOSE SERPL-MCNC: 125 MG/DL (ref 70–99)
HCO3 BLDA-SCNC: 30 MEQ/L (ref 21–28)
HCT VFR BLDCO AUTO: 46.9 % (ref 40.1–51)
HGB BLD-MCNC: 15.6 G/DL (ref 13.7–17.5)
HGB BLDA-MCNC: 15.4 G/DL (ref 14–17.5)
IMM GRANULOCYTES # BLD AUTO: 0.02 K/UL (ref 0–0.08)
IMM GRANULOCYTES NFR BLD AUTO: 0.2 %
LACTATE BLDA-SCNC: 1.1 MMOL/L (ref 0.4–1.6)
LYMPHOCYTES # BLD: 1.21 K/UL (ref 1.2–3.5)
LYMPHOCYTES NFR BLD: 15 %
MAGNESIUM SERPL-MCNC: 2.1 MG/DL (ref 1.8–2.5)
MCH RBC QN AUTO: 30.1 PG (ref 28–33.2)
MCHC RBC AUTO-ENTMCNC: 33.3 G/DL (ref 32.2–36.5)
MCV RBC AUTO: 90.4 FL (ref 83–98)
MONOCYTES # BLD: 1.1 K/UL (ref 0.3–1)
MONOCYTES NFR BLD: 13.6 %
NEUTROPHILS # BLD: 5.58 K/UL (ref 1.7–7)
NEUTS SEG NFR BLD: 69.1 %
NRBC BLD-RTO: 0 %
PCO2 BLDA: 40 MM HG (ref 35–48)
PDW BLD AUTO: 12.6 FL (ref 9.4–12.4)
PH BLDA: 7.49 PH (ref 7.35–7.45)
PLATELET # BLD AUTO: 153 K/UL (ref 150–350)
PO2 BLDA: 70 MM HG (ref 83–100)
POCT PATIENT TEMPERATURE: 98.6 °F (ref 97–99)
POCT TEST (BLD GAS): ABNORMAL
POTASSIUM BLDA-SCNC: 3.8 MEQ/L (ref 3.4–4.5)
POTASSIUM SERPL-SCNC: 3.9 MEQ/L (ref 3.6–5.1)
PROT SERPL-MCNC: 5.4 G/DL (ref 6–8.2)
RBC # BLD AUTO: 5.19 M/UL (ref 4.5–5.8)
SAO2 % BLDA: 93 % (ref 93–98)
SODIUM BLDA-SCNC: 142 MEQ/L (ref 136–145)
SODIUM SERPL-SCNC: 144 MEQ/L (ref 136–144)
WBC # BLD AUTO: 8.08 K/UL (ref 3.8–10.5)

## 2023-02-12 PROCEDURE — 63600000 HC DRUGS/DETAIL CODE: Performed by: STUDENT IN AN ORGANIZED HEALTH CARE EDUCATION/TRAINING PROGRAM

## 2023-02-12 PROCEDURE — 25000000 HC PHARMACY GENERAL: Performed by: PHYSICIAN ASSISTANT

## 2023-02-12 PROCEDURE — 99291 CRITICAL CARE FIRST HOUR: CPT | Mod: 25 | Performed by: INTERNAL MEDICINE

## 2023-02-12 PROCEDURE — 71045 X-RAY EXAM CHEST 1 VIEW: CPT

## 2023-02-12 PROCEDURE — 63700000 HC SELF-ADMINISTRABLE DRUG

## 2023-02-12 PROCEDURE — 63700000 HC SELF-ADMINISTRABLE DRUG: Performed by: STUDENT IN AN ORGANIZED HEALTH CARE EDUCATION/TRAINING PROGRAM

## 2023-02-12 PROCEDURE — 63700000 HC SELF-ADMINISTRABLE DRUG: Performed by: PHYSICIAN ASSISTANT

## 2023-02-12 PROCEDURE — 83735 ASSAY OF MAGNESIUM: CPT | Performed by: STUDENT IN AN ORGANIZED HEALTH CARE EDUCATION/TRAINING PROGRAM

## 2023-02-12 PROCEDURE — 94660 CPAP INITIATION&MGMT: CPT

## 2023-02-12 PROCEDURE — 94003 VENT MGMT INPAT SUBQ DAY: CPT

## 2023-02-12 PROCEDURE — 85025 COMPLETE CBC W/AUTO DIFF WBC: CPT | Performed by: STUDENT IN AN ORGANIZED HEALTH CARE EDUCATION/TRAINING PROGRAM

## 2023-02-12 PROCEDURE — 25800000 HC PHARMACY IV SOLUTIONS: Performed by: STUDENT IN AN ORGANIZED HEALTH CARE EDUCATION/TRAINING PROGRAM

## 2023-02-12 PROCEDURE — 99233 SBSQ HOSP IP/OBS HIGH 50: CPT | Mod: 24 | Performed by: INTERNAL MEDICINE

## 2023-02-12 PROCEDURE — 80053 COMPREHEN METABOLIC PANEL: CPT | Performed by: STUDENT IN AN ORGANIZED HEALTH CARE EDUCATION/TRAINING PROGRAM

## 2023-02-12 PROCEDURE — 25800000 HC PHARMACY IV SOLUTIONS: Performed by: PHYSICIAN ASSISTANT

## 2023-02-12 PROCEDURE — 36415 COLL VENOUS BLD VENIPUNCTURE: CPT | Performed by: STUDENT IN AN ORGANIZED HEALTH CARE EDUCATION/TRAINING PROGRAM

## 2023-02-12 PROCEDURE — 25000000 HC PHARMACY GENERAL: Performed by: STUDENT IN AN ORGANIZED HEALTH CARE EDUCATION/TRAINING PROGRAM

## 2023-02-12 PROCEDURE — 20000000 HC ROOM AND CARE ICU

## 2023-02-12 RX ORDER — POTASSIUM CHLORIDE 750 MG/1
10 TABLET, EXTENDED RELEASE ORAL ONCE
Status: COMPLETED | OUTPATIENT
Start: 2023-02-12 | End: 2023-02-12

## 2023-02-12 RX ADMIN — DEXMEDETOMIDINE 1.5 MCG/KG/HR: 200 INJECTION, SOLUTION INTRAVENOUS at 08:00

## 2023-02-12 RX ADMIN — CHLORHEXIDINE GLUCONATE ORAL RINSE 15 ML: 1.2 SOLUTION DENTAL at 10:00

## 2023-02-12 RX ADMIN — POTASSIUM CHLORIDE 10 MEQ: 750 TABLET, EXTENDED RELEASE ORAL at 08:18

## 2023-02-12 RX ADMIN — POLYETHYLENE GLYCOL 3350 17 G: 17 POWDER, FOR SOLUTION ORAL at 08:04

## 2023-02-12 RX ADMIN — ENOXAPARIN SODIUM 120 MG: 120 INJECTION SUBCUTANEOUS at 17:01

## 2023-02-12 RX ADMIN — AMIODARONE HYDROCHLORIDE 400 MG: 200 TABLET ORAL at 08:04

## 2023-02-12 RX ADMIN — DEXMEDETOMIDINE 1.5 MCG/KG/HR: 200 INJECTION, SOLUTION INTRAVENOUS at 05:28

## 2023-02-12 RX ADMIN — PHENYLEPHRINE HYDROCHLORIDE 50 MCG/MIN: 50 INJECTION INTRAVENOUS at 16:26

## 2023-02-12 RX ADMIN — ENOXAPARIN SODIUM 120 MG: 120 INJECTION SUBCUTANEOUS at 05:28

## 2023-02-12 RX ADMIN — DIGOXIN 125 MCG: 0.25 INJECTION INTRAMUSCULAR; INTRAVENOUS at 08:04

## 2023-02-12 RX ADMIN — AMIODARONE HYDROCHLORIDE 400 MG: 200 TABLET ORAL at 14:15

## 2023-02-12 RX ADMIN — PANTOPRAZOLE SODIUM 40 MG: 40 TABLET, DELAYED RELEASE ORAL at 08:04

## 2023-02-12 RX ADMIN — DEXMEDETOMIDINE 1.5 MCG/KG/HR: 200 INJECTION, SOLUTION INTRAVENOUS at 03:00

## 2023-02-12 RX ADMIN — METOPROLOL SUCCINATE 37.5 MG: 25 TABLET, EXTENDED RELEASE ORAL at 11:11

## 2023-02-12 RX ADMIN — METOPROLOL TARTRATE 12.5 MG: 25 TABLET, FILM COATED ORAL at 08:03

## 2023-02-12 RX ADMIN — CEFTRIAXONE SODIUM 1 G: 1 INJECTION, POWDER, FOR SOLUTION INTRAMUSCULAR; INTRAVENOUS at 08:04

## 2023-02-12 RX ADMIN — AMIODARONE HYDROCHLORIDE 400 MG: 200 TABLET ORAL at 19:40

## 2023-02-12 RX ADMIN — ATORVASTATIN CALCIUM 20 MG: 20 TABLET, FILM COATED ORAL at 17:01

## 2023-02-12 NOTE — PROGRESS NOTES
"     Pulmonary/Critical Care  Progress Note       SUBJECTIVE       -Remains in atrial fibrillation with rates controlled in 100-110's with amiodarone at 1mg/hr   -He is tolerating PS 8/6 40%   -CXR markedly improved       OBJECTIVE     Vital Signs:   Temp (24hrs), Av.5 °C (97.7 °F), Min:36.2 °C (97.2 °F), Max:36.7 °C (98 °F)    Visit Vitals  BP (!) 98/54   Pulse (!) 126   Temp 36.2 °C (97.2 °F) (Temporal)   Resp (!) 29   Ht 1.803 m (5' 11\")   Wt 109 kg (240 lb 4.8 oz)   SpO2 95%   BMI 33.52 kg/m²     Vitals:    23 0600 23 0700 23 0800 23 0900   BP:       Pulse: (!) 130 (!) 124 (!) 127 (!) 126   Resp: 12 (!) 21 (!) 23 (!) 29   Temp:   36.2 °C (97.2 °F)    TempSrc:   Temporal    SpO2: 95% 95% 93% 95%   Weight:       Height:           Ventilator Data/Settings:  Ventilator Settings:  Vent Mode: Pressure support  FiO2 (%) (Set):  [40 %-50 %] 40 %  S RR:  [18] 18  S VT:  [450 mL] 450 mL  PEEP/CPAP (Set, cmH2O):  [6 cm H20] 6 cm H20  MAP (Observed, cmH2O):  [9-10] 9  PIP (Set, cmH2O):  [0 cm H2O] 0 cm H2O    Last 6 FIO2:  Lab Results   Component Value Date    FIO2 100 2023     Last 6 SPO2:  SpO2 Readings from Last 6 Encounters:   23 95%   22 96%   22 98%   21 99%   20 94%   20 96%     I/O's:    Intake/Output Summary (Last 24 hours) at 2023 0920  Last data filed at 2023 0804  Gross per 24 hour   Intake 1194 ml   Output 4100 ml   Net -2906 ml     Medications:    • amiodarone  400 mg oral TID   • [Provider Managed Hold] apixaban  5 mg oral BID   • [Provider Managed Hold] aspirin  81 mg oral Daily   • atorvastatin  20 mg oral q PM   • cefTRIAXone  1 g intravenous q24h   • chlorhexidine  15 mL Mouth/Throat 2 times per day   • digoxin  125 mcg intravenous Daily   • enoxaparin  1 mg/kg subcutaneous q12h (6a, 6p)   • [Provider Managed Hold] lisinopriL  5 mg oral Daily   • metoprolol tartrate  12.5 mg oral BID   • pantoprazole  40 mg oral Daily    Or "   • pantoprazole  40 mg intravenous Daily   • polyethylene glycol  17 g oral Daily     Anti-infectives (From admission, onward)    Start     Dose/Rate Route Frequency Ordered Stop    02/10/23 2230  cefTRIAXone (ROCEPHIN) IVPB 1 g in 100 mL NSS vial in bag         1 g  200 mL/hr over 30 Minutes intravenous Every 24 hours 02/10/23 2139           Physical Exam     General: sedated but easily arousable, critically ill appearing   Head: normocephalic, atraumatic   Eyes: no scleral icterus  Neck: trachea midline  Cardiovascular: S1, S2.  Irregularly irregular  Lungs: Intubated, equal coarse breath sounds B/L  Abdomen: Soft, non-distended  Extremities: Negative for edema  Skin: Warm, dry, intact   Neuro: Moving all extremities spontaneously     Diagnostic Data     Labs:  I have personally reviewed all pertinent patient laboratory results. Labs of note discussed below:    CBC Results       02/10/23 02/09/23 02/09/23     0445 2223 0315    WBC 10.24 15.99 11.67    RBC 4.93 5.21 5.02    HGB 14.6 15.7 15.0    HCT 44.7 47.4 45.9    MCV 90.7 91.0 91.4    MCH 29.6 30.1 29.9    MCHC 32.7 33.1 32.7     165 183        CMP Results       02/10/23 02/09/23 02/09/23     0444 2223 0315     142 139    K 4.3 4.5 4.8    Cl 110 107 107    CO2 24 24 21    Glucose 120 143 160    BUN 35 35 22    Creatinine 1.2 1.5 1.3    Calcium 7.7 8.9 8.7    Anion Gap 8 11 11    AST 19 -- 28    ALT 17 -- 23    Albumin 2.9 -- 3.4    EGFR 59.7 46.1 54.4         Comment for K at 2223 on 02/09/23: SLIGHT HEMOLYSIS, RESULT MAY BE INCREASED.        ABG Results       02/10/23 02/09/23 02/09/23     0448 1253 0609    pH 7.42 7.43 7.37    pCO2 42 39 43    pO2 71 76 90    HCO3 27 26 24    Base Excess 2.4 1.5 -0.6         Comment for pH at 0448 on 02/10/23: Temperature Corrected pH(T)    Comment for pH at 1253 on 02/09/23: Temperature Corrected pH(T)    Comment for pH at 0609 on 02/09/23: Temperature Corrected pH(T)    Comment for pCO2 at 0448 on 02/10/23:  Temperature Corrected pCO2(T)    Comment for pCO2 at 1253 on 02/09/23: Temperature Corrected pCO2(T)    Comment for pCO2 at 0609 on 02/09/23: Temperature Corrected pCO2(T)    Comment for pO2 at 0448 on 02/10/23: Temperature Corrected pO2(T)    Comment for pO2 at 1253 on 02/09/23: Temperature Corrected pO2(T)    Comment for pO2 at 0609 on 02/09/23: Temperature Corrected pO2(T)        Microbiology Results     Procedure Component Value Units Date/Time    Sputum Gram Stain (Lab Only) Expectorated Sputum [[id number]] Collected: 02/09/23 1924    Specimen: Expectorated Sputum Updated: 02/09/23 2250     Gram Stain Result 4+ WBC      2+ Epithelial cells      1+ Gram negative bacilli      2+ Gram positive cocci in pairs      Gram positive bacilli    SARS-CoV-2 (COVID-19), PCR Nasopharynx [[id number]]  (Normal) Collected: 02/09/23 0315    Specimen: Nasopharyngeal Swab from Nasopharynx Updated: 02/09/23 0522    Narrative:      The following orders were created for panel order SARS-CoV-2 (COVID-19), PCR Nasopharynx.  Procedure                               Abnormality         Status                     ---------                               -----------         ------                     SARS-CoV-2 (COVID-19), P...[[id number]]  Normal              Final result                 Please view results for these tests on the individual orders.    SARS-CoV-2 (COVID-19), PCR Nasopharynx [[id number]]  (Normal) Collected: 02/09/23 0315    Specimen: Nasopharyngeal Swab from Nasopharynx Updated: 02/09/23 0522     SARS-CoV-2 (COVID-19) Negative    Narrative:      Testing performed using real-time PCR for detection of COVID-19. EUA approved validation studies performed on site.     MRSA Screen, Nares Only Nose [[id number]]  (Normal) Collected: 02/08/23 1717    Specimen: Nasal Swab from Nose Updated: 02/08/23 2219     MRSA DNA, Nares Negative        Imaging:  I have reviewed all pertinent imaging which is discussed below:    X-RAY CHEST 1  VIEW    Result Date: 2/11/2023  CLINICAL HISTORY:   Intubated COMMENT: Comparison:  Chest radiographs dating back to 2/10/2023. Single frontal AP view of the chest was obtained. Overall lung aeration is without significant interval change. However, there is slight increase in bilateral perihilar airspace consolidation.. There is no significant enlarging pleural effusion. There is no evidence of pneumothorax. Endotracheal tube appears in stable position..     IMPRESSION: Slight increase in bilateral perihilar airspace consolidation..     CXR from this morning reviewed. Right hemidiaphragm remains elevated. No obvious infiltrates.     ASSESSMENT AND PLAN     In brief, Humberto Chung is a 72 y.o. male who presented for elective atrial fibrillation ablation post-op complicated by respiratory distress post-extubation resulting in reintubation for which he is now in the unit for.     Impression:     1. VDRF with hypoxemia and hypercapnia - Suspect his reintubation was in the setting of respiratory distress/hypoventilation from procedural sedatives in setting of known paralyzed R hemidiaphragm. This morning weaning well on PS/CPAP.     - Imaging reviewed and findings with improved pulmonary edema       2. Atrial Fibrillation - Now s/p ablation . Complicated by RVR last night requiring cardioversion.      Recommendations:  - Tolerating PS weaning at 8/6 40%   - CXR dramatically improved post-diuresis   - Extubate to BiPAP 16/8 today   - DC precedex    We will sign off.     Please page [phone] with questions/concerns      Danette Carcamo DO  Pulmonary & Critical Care Medicine Fellow  [id number]  Pager: [phone]  2/12/2023

## 2023-02-12 NOTE — PROGRESS NOTES
Had a conversation with the patient's wife as well as patient's daughter regarding patient's CODE STATUS and advanced directive which they have brought in.  Discussed at length his previous advanced directive indicating that in the case of a lack of significant hope for meaningful recovery the patient would not want resuscitation or prolonged ventilation.  However they both agreed that at this time given that there is an expectation for significant recovery and improvement that they suspect at this time the patient would like to remain a FULL CODE.  They relayed that should the patient's chance for significant recovery change or become diminished that they would want to reconsider this however at this time the the patient should remain a FULL CODE which is accurately reflected in his current charts documentation.  They would like to be made aware of any changes or if further shocks are warranted.

## 2023-02-12 NOTE — PROGRESS NOTES
Internal Medicine  ICU/CCU Daily Progress Note        SUBJECTIVE   This is a 72 y.o. year-old male admitted on 2/8/2023 with Atrial fibrillation, unspecified type (CMS/HCC) [I48.91]  Atrial fibrillation (CMS/HCC) [I48.91].    Interval History:   - oral amiodarone, oral lopressor started yesterday  - CXR yesterday with worsening perihilar consolidation  - total of lasix 60mg given yesterday  - No acute overnight events     OBJECTIVE   Vital signs in last 24 hours:  Temp:  [36.2 °C (97.2 °F)-36.7 °C (98 °F)] 36.2 °C (97.2 °F)  Heart Rate:  [] 126  Resp:  [12-38] 29  SpO2:  [91 %-95 %] 95 %  O2 Delivery Method: Nasal cannula  Vent Mode: Pressure support  FiO2 (%) (Set):  [40 %-50 %] 40 %  MAP: 70-80s      Weight & I/Os:  Weights (last 5 days)     Date/Time Weight    02/12/23 0358 109 kg (240 lb 4.8 oz)    02/11/23 1700 110 kg (242 lb 1 oz)    02/10/23 0514 119 kg (262 lb 5.6 oz)    02/09/23 1037 119 kg (263 lb)    02/09/23 0342 119 kg (263 lb 0.1 oz)    02/08/23 1510 115 kg (253 lb 1.4 oz)    02/08/23 0900 120 kg (264 lb)          Intake/Output Summary (Last 24 hours) at 2/12/2023 0659  Last data filed at 2/12/2023 0600  24 Hour Net Input/Output from 7AM Yesterday   Intake 1304 ml   Output 4300 ml   Net -2996 ml     I: 1300  O: 4300  N: -2996  Urine: 4300    Respiratory:  Vent Mode: Pressure support  FiO2 (%) (Set):  [40 %-50 %] 40 %  S RR:  [18] 18  S VT:  [450 mL] 450 mL  PEEP/CPAP (Set, cmH2O):  [6 cm H20] 6 cm H20  MAP (Observed, cmH2O):  [9-10] 9  PIP (Set, cmH2O):  [0 cm H2O] 0 cm H2O  Results from last 7 days   Lab Units 02/12/23  0534 02/11/23  0454   PH ART pH 7.49* 7.44   PCO2 ART mm Hg 40 42   PO2 ART mm Hg 70* 70*   HCO3 ART mEQ/L 30* 28   BASE EXC ART mEQ/L 6.6 3.9       Telemetry/EKG:  afib     PHYSICAL EXAMINATION   Physical Exam  Constitutional:       Appearance: He is obese. He is not ill-appearing.   HENT:      Right Ear: External ear normal.      Left Ear: External ear normal.       Mouth/Throat:      Comments: ETT  Cardiovascular:      Rate and Rhythm: Tachycardia present. Rhythm irregular.      Heart sounds: Normal heart sounds.   Pulmonary:      Breath sounds: Normal breath sounds.   Skin:     General: Skin is warm.   Neurological:      Comments: Alert, following commands, anxious appearing   Psychiatric:      Comments: Anxious affect    LINES, CATHETERS, DRAINS, AIRWAYS, & WOUNDS   Lines, drains, airways, & wounds:  Peripheral IV (Adult) 02/08/23 Left Hand (Active)   Number of days: 4       Peripheral IV (Adult) 02/08/23 Left Antecubital (Active)   Number of days: 4       Peripheral IV (Adult) 02/09/23 Posterior;Right Hand (Active)   Number of days: 3       Peripheral IV (Adult) 02/11/23 Anterior;Proximal;Right Forearm (Active)   Number of days: 1       Peripheral IV (Adult) 02/11/23 Anterior;Right;Upper Arm (Active)   Number of days: 1       NG/OG Tube Center mouth (Active)   Number of days:        Urethral Catheter (Active)   Number of days: 4       Arterial Line 02/08/23 Right Radial (Active)   Number of days: 4       Catheterization Site - Venous Left Femoral 7 Fr.;Other (Comment) (Active)   Number of days: 4       Catheterization Site - Venous Right Femoral 8 Fr. (Active)   Number of days: 4        LABS & IMAGING   Labs:  tbili    Results from last 7 days   Lab Units 02/12/23  0532 02/11/23  0450 02/10/23  0445   WBC K/uL 8.08 7.73 10.24   HEMOGLOBIN g/dL 15.6 13.9 14.6   HEMATOCRIT % 46.9 42.9 44.7   PLATELETS K/uL 153 125* 153       Results from last 7 days   Lab Units 02/12/23  0532 02/11/23  0450 02/10/23  0444   SODIUM mEQ/L 144 142 142   POTASSIUM mEQ/L 3.9 4.1 4.3   CHLORIDE mEQ/L 108 108 110*   CO2 mEQ/L 25 25 24   BUN mg/dL 34* 33* 35*   CREATININE mg/dL 1.1 1.2 1.2   CALCIUM mg/dL 8.5* 8.0* 7.7*   ALBUMIN g/dL 2.9* 2.8* 2.9*   BILIRUBIN TOTAL mg/dL 2.0* 2.0* 1.7*   ALK PHOS IU/L 36 34* 36   ALT IU/L 16 15* 17   AST IU/L 13* 15 19   GLUCOSE mg/dL 125* 132* 120*     Lab  Results   Component Value Date    MG 2.1 02/12/2023     No results found for: PT, INR, PTT    Microbiology Results (last 3 days)     Procedure Component Value - Date/Time    Sputum culture / smear Expectorated Sputum [[id number]]  (Abnormal) Collected: 02/09/23 1924    Lab Status: Final result Specimen: Expectorated Sputum Updated: 02/11/23 1101    Narrative:      The following orders were created for panel order Sputum culture / smear Expectorated Sputum.  Procedure                               Abnormality         Status                     ---------                               -----------         ------                     Sputum Gram Stain (Lab O...[[id number]]                      Final result               Sputum Culture (Lab Only...[[id number]]  Abnormal            Final result                 Please view results for these tests on the individual orders.    Sputum Gram Stain (Lab Only) Expectorated Sputum [[id number]] Collected: 02/09/23 1924    Lab Status: Final result Specimen: Expectorated Sputum Updated: 02/09/23 2250     Gram Stain Result 4+ WBC      2+ Epithelial cells      1+ Gram negative bacilli      2+ Gram positive cocci in pairs      Gram positive bacilli    Sputum Culture (Lab Only) Expectorated Sputum [[id number]]  (Abnormal)  (Susceptibility) Collected: 02/09/23 1924    Lab Status: Final result Specimen: Expectorated Sputum Updated: 02/11/23 1101     Culture **Positive Culture**      4+ Escherichia coli     Comment:   This is an updated result. Previous organism was Gram Negative Rods on 2/10/2023 at 1525 EST.         Normal Eunice Also Recovered    Susceptibility      Escherichia coli      AUTOMATED ARIEL SUSCEPTIBILITY     $ Amikacin <=8 ug/ml Susceptible     $ Ampicillin >16 ug/ml Resistant    $ Ampicillin + Sulbactam >16 ug/ml Resistant    $ Cefazolin 16 ug/ml Resistant    $ Ceftazidime <=2 ug/ml Susceptible     $ Ceftriaxone <=1 ug/ml Susceptible     $ Cefuroxime 8 ug/ml Intermediate     $  Ciprofloxacin <=0.25 ug/ml Susceptible     $ Gentamicin <=2 ug/ml Susceptible     $ Levofloxacin <=0.25 ug/ml Susceptible     $ Tetracycline <=2 ug/ml Susceptible     $ Tobramycin <=2 ug/ml Susceptible     $ Trimethoprim + Sulfamethoxazole <=0.5 ug/ml Susceptible                               cxr 2/12: per my read decreased congestion    Imaging personally reviewed (does not include unread studies):  X-RAY CHEST 1 VIEW    Result Date: 2/11/2023  CLINICAL HISTORY:   Intubated COMMENT: Comparison:  Chest radiographs dating back to 2/10/2023. Single frontal AP view of the chest was obtained. Overall lung aeration is without significant interval change. However, there is slight increase in bilateral perihilar airspace consolidation.. There is no significant enlarging pleural effusion. There is no evidence of pneumothorax. Endotracheal tube appears in stable position..     IMPRESSION: Slight increase in bilateral perihilar airspace consolidation..      MEDS/DRIPS   Scheduled Meds:  • amiodarone  400 mg oral TID   • [Provider Managed Hold] apixaban  5 mg oral BID   • [Provider Managed Hold] aspirin  81 mg oral Daily   • atorvastatin  20 mg oral q PM   • cefTRIAXone  1 g intravenous q24h   • chlorhexidine  15 mL Mouth/Throat 2 times per day   • digoxin  125 mcg intravenous Daily   • enoxaparin  1 mg/kg subcutaneous q12h (6a, 6p)   • [Provider Managed Hold] lisinopriL  5 mg oral Daily   • metoprolol tartrate  12.5 mg oral BID   • pantoprazole  40 mg oral Daily    Or   • pantoprazole  40 mg intravenous Daily   • polyethylene glycol  17 g oral Daily     Continuous Infusions:  • dexmedetomidine in 0.9 % NaCl  0.2-1.5 mcg/kg/hr 1 mcg/kg/hr (02/12/23 0900)   • fentaNYL  0-200 mcg/hr Stopped (02/10/23 0527)   • norepinephrine  0.5-90 mcg/min Stopped (02/09/23 2240)   • phenylephrine   mcg/min Stopped (02/11/23 0402)     PRN Meds:.•  acetaminophen  •  alum-mag hydroxide-simeth  •  bisacodyL  •  glucose **OR** dextrose **OR**  glucagon **OR** dextrose 50 % in water (D50)  •  diphenhydrAMINE  •  fentaNYL  •  fentaNYL **AND** fentaNYL  •  [Provider Managed Hold] metoprolol tartrate     ASSESSMENT & PLAN   Acute kidney injury (CMS/HCC)  Assessment & Plan  Creatinine 1.3,     Acute hypoxemic respiratory failure (CMS/HCC)  Assessment & Plan  Failed extubation in the setting of excessive anesthesia, EULALIA, paralyzed R hemidiaphragm. Currently intubated and sedated. Follows with Dr. Damian.  CT Chest 2/9: Small bilateral pleural effusions, consolidation in the adjacent lower lobes in part due to compressive atelectasis. Persistent elevation the right hemidiaphragm with associated compressive   atelectasis.   Sputum positive for e coli 2/9, however no wbc, no fever  Extubated 2/12, have transitioned to BIPAP    -Daily ABG  -Pulm following  -Wean BIPAP as able    -Ceftriaxone for E coli    * Atrial fibrillation (CMS/HCC)  Assessment & Plan  History of A fib on dofelitide. 2/8 failed extubation due to hemodynamic & respiratory compromise most likely in the setting of anesthesia.  S/p successful radiofrequency ablation 2/8.   Appears to be in sinus rhythm on tele, rates in 60s  TTE with bubble study 2/9: normal LV size and thickness, low-normal systolic function EF 50-55%, moderately dilated LA, no visualized interatrial shunt.   2/9: Patient converted to Afib and RVR, has been in Afib since failing cardioversion    - Oral amiodarone 400mg tid, digoxin 125mcg , metoprolol 12.5mg bid  - Monitor on Tele, monitor Qt  - On therapeutic lovenox       VTE Assessment: Padua    VTE Prophylaxis: Current anticoagulants:  [Provider Managed Hold] apixaban (ELIQUIS) tablet 5 mg, oral, BID  enoxaparin (LOVENOX) syringe 120 mg, subcutaneous, q12h (6a, 6p)      Code Status: Full Code  Estimated discharge date: 2/12/2023     ICU CHECKLIST   Lines:   Peripheral IV (Adult) 02/08/23 Left Hand (Active)   Number of days: 4       Peripheral IV (Adult) 02/08/23 Left  Antecubital (Active)   Number of days: 4       Peripheral IV (Adult) 02/09/23 Posterior;Right Hand (Active)   Number of days: 3       Peripheral IV (Adult) 02/11/23 Anterior;Proximal;Right Forearm (Active)   Number of days: 1       Peripheral IV (Adult) 02/11/23 Anterior;Right;Upper Arm (Active)   Number of days: 1       NG/OG Tube Center mouth (Active)   Number of days:        Urethral Catheter (Active)   Number of days: 4       Arterial Line 02/08/23 Right Radial (Active)   Number of days: 4       Catheterization Site - Venous Left Femoral 7 Fr.;Other (Comment) (Active)   Number of days: 4       Catheterization Site - Venous Right Femoral 8 Fr. (Active)   Number of days: 4     Kurtz: Yes      Sedation Vacation: Yes      Physical Therapy: No      Nutrition: npo     DVT Prophylaxis: therapeutic heparin     GI Prophylaxis: yes         ATTENDING DOCUMENTATION  ALSO SEE ATTENDING ATTESTATION SECTION OF NOTE

## 2023-02-12 NOTE — PROGRESS NOTES
Internal Medicine  Daily Progress Note       SUBJECTIVE   This is a 72 y.o. year-old male admitted on 2/8/2023 with Atrial fibrillation, unspecified type (CMS/HCC) [I48.91]  Atrial fibrillation (CMS/HCC) [I48.91].    Interval History:   - oral amiodarone, oral lopressor started yesterday  - CXR yesterday with worsening perihilar consolidation  - total of lasix 60mg given yesterday  - No acute overnight events     OBJECTIVE   Vital signs in last 24 hours:  Temp:  [36.5 °C (97.7 °F)-36.7 °C (98 °F)] 36.7 °C (98 °F)  Heart Rate:  [] 124  Resp:  [12-38] 21  SpO2:  [91 %-95 %] 95 %  O2 Delivery Method: Endotracheal tube  Vent Mode: Pressure regulated volume control  FiO2 (%) (Set):  [50 %] 50 %    Weight & I/Os:  Weights (last 5 days)     Date/Time Weight    02/12/23 0358 109 kg (240 lb 4.8 oz)    02/11/23 1700 110 kg (242 lb 1 oz)    02/10/23 0514 119 kg (262 lb 5.6 oz)    02/09/23 1037 119 kg (263 lb)    02/09/23 0342 119 kg (263 lb 0.1 oz)    02/08/23 1510 115 kg (253 lb 1.4 oz)    02/08/23 0900 120 kg (264 lb)          Intake/Output Summary (Last 24 hours) at 2/12/2023 0659  Last data filed at 2/12/2023 0600  24 Hour Net Input/Output from 7AM Yesterday   Intake 1304 ml   Output 4300 ml   Net -2996 ml   I: 1300  O: 4300  N: -2996  Urine: 4300     PHYSICAL EXAMINATION   Physical Exam  Constitutional:       Appearance: He is obese. He is not ill-appearing.   HENT:      Right Ear: External ear normal.      Left Ear: External ear normal.      Mouth/Throat:      Comments: ETT  Cardiovascular:      Rate and Rhythm: Tachycardia present. Rhythm irregular.      Heart sounds: Normal heart sounds.   Pulmonary:      Breath sounds: Normal breath sounds.   Skin:     General: Skin is warm.   Neurological:      Comments: Alert, following commands, anxious appearing   Psychiatric:      Comments: Anxious affect      LINES, CATHETERS, DRAINS, AIRWAYS, & WOUNDS   Lines, drains, airways, & wounds:  Peripheral IV (Adult) 02/08/23  Left Hand (Active)   Number of days: 4       Peripheral IV (Adult) 02/08/23 Left Antecubital (Active)   Number of days: 4       Peripheral IV (Adult) 02/09/23 Posterior;Right Hand (Active)   Number of days: 3       Peripheral IV (Adult) 02/11/23 Anterior;Proximal;Right Forearm (Active)   Number of days: 1       Peripheral IV (Adult) 02/11/23 Anterior;Right;Upper Arm (Active)   Number of days: 1       NG/OG Tube Center mouth (Active)   Number of days:        Urethral Catheter (Active)   Number of days: 4       ETT  (Active)   Number of days: 4       Arterial Line 02/08/23 Right Radial (Active)   Number of days: 4       Catheterization Site - Venous Left Femoral 7 Fr.;Other (Comment) (Active)   Number of days: 4       Catheterization Site - Venous Right Femoral 8 Fr. (Active)   Number of days: 4        LABS, IMAGING, & TELE   Labs:  TBili 2.0    Results from last 7 days   Lab Units 02/12/23  0532 02/11/23  0450 02/10/23  0445   WBC K/uL 8.08 7.73 10.24   HEMOGLOBIN g/dL 15.6 13.9 14.6   HEMATOCRIT % 46.9 42.9 44.7   PLATELETS K/uL 153 125* 153       Results from last 7 days   Lab Units 02/12/23  0532 02/11/23  0450 02/10/23  0444   SODIUM mEQ/L 144 142 142   POTASSIUM mEQ/L 3.9 4.1 4.3   CHLORIDE mEQ/L 108 108 110*   CO2 mEQ/L 25 25 24   BUN mg/dL 34* 33* 35*   CREATININE mg/dL 1.1 1.2 1.2   CALCIUM mg/dL 8.5* 8.0* 7.7*   ALBUMIN g/dL 2.9* 2.8* 2.9*   BILIRUBIN TOTAL mg/dL 2.0* 2.0* 1.7*   ALK PHOS IU/L 36 34* 36   ALT IU/L 16 15* 17   AST IU/L 13* 15 19   GLUCOSE mg/dL 125* 132* 120*     CXR 2/12 per my read appearing similar to prior      Imaging personally reviewed (does not include unread studies):  X-RAY CHEST 1 VIEW    Result Date: 2/11/2023  CLINICAL HISTORY:   Intubated COMMENT: Comparison:  Chest radiographs dating back to 2/10/2023. Single frontal AP view of the chest was obtained. Overall lung aeration is without significant interval change. However, there is slight increase in bilateral perihilar airspace  consolidation.. There is no significant enlarging pleural effusion. There is no evidence of pneumothorax. Endotracheal tube appears in stable position..     IMPRESSION: Slight increase in bilateral perihilar airspace consolidation..     Telemetry/EKG:  Afib      ASSESSMENT & PLAN   Acute kidney injury (CMS/HCC)  Assessment & Plan  Creatinine 1.3,     Acute hypoxemic respiratory failure (CMS/[facility])  Assessment & Plan  Failed extubation in the setting of excessive anesthesia, EULALIA, paralyzed R hemidiaphragm. Currently intubated and sedated. Follows with Dr. Damian.  CT Chest 2/9: Small bilateral pleural effusions, consolidation in the adjacent lower lobes in part due to compressive atelectasis. Persistent elevation the right hemidiaphragm with associated compressive   atelectasis.   Sputum positive for e coli 2/9, however no wbc, no fever    -Daily CXR, ABG  -Continue diuresing due to worsening vascular edema  -Can attempt to wean sedation, SBT  -Pulm following: when extubating, bipap to 16/8  -RASS sedation goal -0 to 1   -Ceftriaxone for E coli    * Atrial fibrillation (CMS/[facility])  Assessment & Plan  History of A fib on dofelitide. 2/8 failed extubation due to hemodynamic & respiratory compromise most likely in the setting of anesthesia.  S/p successful radiofrequency ablation 2/8.   Appears to be in sinus rhythm on tele, rates in 60s  TTE with bubble study 2/9: normal LV size and thickness, low-normal systolic function EF 50-55%, moderately dilated LA, no visualized interatrial shunt.   2/9: Patient converted to Afib and RVR, has been in Afib since failing cardioversion    - Oral amiodarone 400mg tid, digoxin 125mcg , metoprolol 12.5mg bid  - Monitor on Tele, monitor Qt  - On therapeutic lovenox       VTE Assessment: Padua    VTE Prophylaxis: Current anticoagulants:  [Provider Managed Hold] apixaban (ELIQUIS) tablet 5 mg, oral, BID  enoxaparin (LOVENOX) syringe 120 mg, subcutaneous, q12h (6a, 6p)      Code Status:  Full Code  Estimated discharge date: 2/12/2023     ATTENDING DOCUMENTATION  ALSO SEE ATTENDING ATTESTATION SECTION OF NOTE

## 2023-02-12 NOTE — PROGRESS NOTES
Electrophysiology Progress Note      Subjective   Extubated.  He is very frustrated and wants to go home.  He denies chest pain, shortness of breath or palpitations.    Inpatient medications:  •  acetaminophen, 975 mg, oral, q8h PRN  •  alum-mag hydroxide-simeth, 30 mL, oral, q4h PRN  •  amiodarone, 400 mg, oral, TID  •  [Provider Managed Hold] apixaban, 5 mg, oral, BID  •  [Provider Managed Hold] aspirin, 81 mg, oral, Daily  •  atorvastatin, 20 mg, oral, q PM  •  bisacodyL, 10 mg, rectal, Daily PRN  •  cefTRIAXone, 1 g, intravenous, q24h  •  chlorhexidine, 15 mL, Mouth/Throat, 2 times per day  •  dexmedetomidine in 0.9 % NaCl, 0.2-1.5 mcg/kg/hr, intravenous, Titrated  •  glucose, 16-32 g of dextrose, oral, PRN **OR** dextrose, 15-30 g of dextrose, oral, PRN **OR** glucagon, 1 mg, intramuscular, PRN **OR** dextrose 50 % in water (D50), 25 mL, intravenous, PRN  •  digoxin, 125 mcg, intravenous, Daily  •  diphenhydrAMINE, 50 mg, oral, q8h PRN  •  enoxaparin, 1 mg/kg, subcutaneous, q12h (6a, 6p)  •  fentaNYL, 50 mcg, intravenous, q1h PRN  •  [Provider Managed Hold] lisinopriL, 5 mg, oral, Daily  •  metoprolol succinate XL, 37.5 mg, oral, Daily  •  [Provider Managed Hold] metoprolol tartrate, 25 mg, oral, q6h PRN  •  pantoprazole, 40 mg, oral, Daily **OR** pantoprazole, 40 mg, intravenous, Daily  •  phenylephrine,  mcg/min, intravenous, Titrated  •  polyethylene glycol, 17 g, oral, Daily    Objective    Vitals:    02/12/23 1000   BP: 90/68   Pulse: (!) 118   Resp: (!) 25   Temp:    SpO2: 97%     Physical Exam  General: No acute distress.  HEENT: Anicteric.  Moist mucous membranes.  Neck: Supple, no JVD.  Lungs: Clear to auscultation bilaterally.  Cardiac: Irregularly irregular, tachycardic.  No murmurs, rubs or gallops.  Abdomen: Soft, nontender.  Extremities: No lower extremity edema.  Skin: Warm, dry.  Neurologic: Grossly intact.  Psychiatric: Behavior is appropriate and cooperative.       Laboratory  results:  Results from last 7 days   Lab Units 02/12/23  0532   SODIUM mEQ/L 144   POTASSIUM mEQ/L 3.9   CHLORIDE mEQ/L 108   CO2 mEQ/L 25   BUN mg/dL 34*   CREATININE mg/dL 1.1   CALCIUM mg/dL 8.5*   ALBUMIN g/dL 2.9*   BILIRUBIN TOTAL mg/dL 2.0*   ALK PHOS IU/L 36   ALT IU/L 16   AST IU/L 13*   GLUCOSE mg/dL 125*     Results from last 7 days   Lab Units 02/12/23  0532   WBC K/uL 8.08   HEMOGLOBIN g/dL 15.6   HEMATOCRIT % 46.9   PLATELETS K/uL 153               Imaging  Cardiac Imaging    TRANSTHORACIC ECHO (TTE) COMPLETE 02/09/2023    Interpretation Summary  Technically difficult study. Definity used for endocardial enhancement. Rhythm is sinus rhythm.    Left ventricle is normal in size with normal wall thickness. Low normal systolic function, estimated ejection fraction 50-55%. Wall motion difficult to assess even with echocontrast. Indeterminate diastolic function.  Aortic valve is tricuspid. Aortic valve and root sclerosis. No aortic stenosis. No aortic regurgitation.  Aortic root normal in size. Visualized portion of ascending aorta dilated up to 4.0 cm.  Mitral valve mildly thickened. Trace mitral regurgitation.  Left atrium is moderately dilated. Agitated saline injected with good opacification of the right heart chambers, no visualized interatrial shunt.  Right ventricle is normal in size with preserved systolic function.  Right atrium is normal in size.  Tricuspid valve with trace tricuspid regurgitation, estimated right ventricular systolic pressure 26 mmHg.  Pulmonic valve not well visualized.  Inferior vena cava <2.1cm with <50% respiratory variation, consistent with positive pressure mechanical ventilation.  Trace pericardial effusion.    Compared to previous echo on 5/28/2020, no significant change, ascending aorta is measured to be 4.0 cm.      Telemetry  Atrial fibrillation, ventricular rates  bpm.     * Atrial fibrillation (CMS/HCC)  Assessment & Plan  He underwent PVI on 2/8/2023.  Post  procedurally, he had to be reintubated due to hypoxia in the setting of chronically elevated right hemidiaphragm.  Preprocedurally he was maintained on dofetilide and Eliquis which were on hold given intubation.  He has was initially in and out of atrial fibrillation but has been persistent since 2/10/2023.  He was transitioned from IV amiodarone to oral amiodarone 400 mg 3 times daily.  Continue loading dose for now.  Uptitrate metoprolol for improved rate control.  He has been on therapeutic Lovenox which can be transitioned back to Eliquis now that he is extubated.    Acute hypoxemic respiratory failure (CMS/HCC)  Assessment & Plan  He had to be reintubated following A-fib ablation on 2/8/2023 in the setting of chronically elevated hemidiaphragm.  He was extubated this morning.  Appreciate pulmonary input/recommendations.            Thank you for allowing me to participate in the care of your patient, please feel free to contact me with any questions or concerns.     Evelyn Barger MD  2/12/2023

## 2023-02-13 LAB
ALBUMIN SERPL-MCNC: 2.7 G/DL (ref 3.4–5)
ALP SERPL-CCNC: 34 IU/L (ref 35–126)
ALT SERPL-CCNC: 15 IU/L (ref 16–63)
ANION GAP SERPL CALC-SCNC: 9 MEQ/L (ref 3–15)
AST SERPL-CCNC: 14 IU/L (ref 15–41)
BASOPHILS # BLD: 0.04 K/UL (ref 0.01–0.1)
BASOPHILS NFR BLD: 0.4 %
BILIRUB SERPL-MCNC: 1.5 MG/DL (ref 0.3–1.2)
BUN SERPL-MCNC: 32 MG/DL (ref 8–20)
CALCIUM SERPL-MCNC: 8.5 MG/DL (ref 8.9–10.3)
CHLORIDE SERPL-SCNC: 104 MEQ/L (ref 98–109)
CO2 SERPL-SCNC: 26 MEQ/L (ref 22–32)
CREAT SERPL-MCNC: 1 MG/DL (ref 0.8–1.3)
DIFFERENTIAL METHOD BLD: ABNORMAL
DIGOXIN SERPL-MCNC: 1 NG/ML (ref 0.5–2)
EOSINOPHIL # BLD: 0.2 K/UL (ref 0.04–0.54)
EOSINOPHIL NFR BLD: 2.2 %
ERYTHROCYTE [DISTWIDTH] IN BLOOD BY AUTOMATED COUNT: 13.4 % (ref 11.6–14.4)
GFR SERPL CREATININE-BSD FRML MDRD: >60 ML/MIN/1.73M*2
GLUCOSE SERPL-MCNC: 90 MG/DL (ref 70–99)
HCT VFR BLDCO AUTO: 44.2 % (ref 40.1–51)
HGB BLD-MCNC: 14.5 G/DL (ref 13.7–17.5)
IMM GRANULOCYTES # BLD AUTO: 0.02 K/UL (ref 0–0.08)
IMM GRANULOCYTES NFR BLD AUTO: 0.2 %
LYMPHOCYTES # BLD: 1.46 K/UL (ref 1.2–3.5)
LYMPHOCYTES NFR BLD: 15.9 %
MAGNESIUM SERPL-MCNC: 2.1 MG/DL (ref 1.8–2.5)
MCH RBC QN AUTO: 29.7 PG (ref 28–33.2)
MCHC RBC AUTO-ENTMCNC: 32.8 G/DL (ref 32.2–36.5)
MCV RBC AUTO: 90.4 FL (ref 83–98)
MONOCYTES # BLD: 1.09 K/UL (ref 0.3–1)
MONOCYTES NFR BLD: 11.8 %
NEUTROPHILS # BLD: 6.39 K/UL (ref 1.7–7)
NEUTS SEG NFR BLD: 69.5 %
NRBC BLD-RTO: 0 %
PDW BLD AUTO: 12.8 FL (ref 9.4–12.4)
PLATELET # BLD AUTO: 162 K/UL (ref 150–350)
POTASSIUM SERPL-SCNC: 3.5 MEQ/L (ref 3.6–5.1)
PROT SERPL-MCNC: 4.9 G/DL (ref 6–8.2)
RBC # BLD AUTO: 4.89 M/UL (ref 4.5–5.8)
SODIUM SERPL-SCNC: 139 MEQ/L (ref 136–144)
WBC # BLD AUTO: 9.2 K/UL (ref 3.8–10.5)

## 2023-02-13 PROCEDURE — 85025 COMPLETE CBC W/AUTO DIFF WBC: CPT | Performed by: STUDENT IN AN ORGANIZED HEALTH CARE EDUCATION/TRAINING PROGRAM

## 2023-02-13 PROCEDURE — 63700000 HC SELF-ADMINISTRABLE DRUG: Performed by: PHYSICIAN ASSISTANT

## 2023-02-13 PROCEDURE — 36415 COLL VENOUS BLD VENIPUNCTURE: CPT | Performed by: STUDENT IN AN ORGANIZED HEALTH CARE EDUCATION/TRAINING PROGRAM

## 2023-02-13 PROCEDURE — 63700000 HC SELF-ADMINISTRABLE DRUG: Performed by: STUDENT IN AN ORGANIZED HEALTH CARE EDUCATION/TRAINING PROGRAM

## 2023-02-13 PROCEDURE — 80053 COMPREHEN METABOLIC PANEL: CPT | Performed by: STUDENT IN AN ORGANIZED HEALTH CARE EDUCATION/TRAINING PROGRAM

## 2023-02-13 PROCEDURE — 99232 SBSQ HOSP IP/OBS MODERATE 35: CPT | Performed by: INTERNAL MEDICINE

## 2023-02-13 PROCEDURE — 20000000 HC ROOM AND CARE ICU

## 2023-02-13 PROCEDURE — 25800000 HC PHARMACY IV SOLUTIONS: Performed by: STUDENT IN AN ORGANIZED HEALTH CARE EDUCATION/TRAINING PROGRAM

## 2023-02-13 PROCEDURE — 99233 SBSQ HOSP IP/OBS HIGH 50: CPT | Performed by: INTERNAL MEDICINE

## 2023-02-13 PROCEDURE — 83735 ASSAY OF MAGNESIUM: CPT | Performed by: STUDENT IN AN ORGANIZED HEALTH CARE EDUCATION/TRAINING PROGRAM

## 2023-02-13 PROCEDURE — 63600000 HC DRUGS/DETAIL CODE

## 2023-02-13 PROCEDURE — 63600000 HC DRUGS/DETAIL CODE: Performed by: STUDENT IN AN ORGANIZED HEALTH CARE EDUCATION/TRAINING PROGRAM

## 2023-02-13 PROCEDURE — 80162 ASSAY OF DIGOXIN TOTAL: CPT | Performed by: STUDENT IN AN ORGANIZED HEALTH CARE EDUCATION/TRAINING PROGRAM

## 2023-02-13 RX ORDER — IBUPROFEN/PSEUDOEPHEDRINE HCL 200MG-30MG
3 TABLET ORAL NIGHTLY
Status: DISCONTINUED | OUTPATIENT
Start: 2023-02-13 | End: 2023-02-14 | Stop reason: HOSPADM

## 2023-02-13 RX ORDER — FUROSEMIDE 10 MG/ML
40 INJECTION INTRAMUSCULAR; INTRAVENOUS ONCE
Status: COMPLETED | OUTPATIENT
Start: 2023-02-13 | End: 2023-02-13

## 2023-02-13 RX ORDER — METOPROLOL SUCCINATE 25 MG/1
50 TABLET, EXTENDED RELEASE ORAL DAILY
Status: DISCONTINUED | OUTPATIENT
Start: 2023-02-14 | End: 2023-02-13

## 2023-02-13 RX ORDER — POTASSIUM CHLORIDE 750 MG/1
20 TABLET, EXTENDED RELEASE ORAL ONCE
Status: COMPLETED | OUTPATIENT
Start: 2023-02-13 | End: 2023-02-13

## 2023-02-13 RX ORDER — LORAZEPAM 0.5 MG/1
0.5 TABLET ORAL ONCE
Status: COMPLETED | OUTPATIENT
Start: 2023-02-13 | End: 2023-02-13

## 2023-02-13 RX ADMIN — ATORVASTATIN CALCIUM 20 MG: 20 TABLET, FILM COATED ORAL at 18:11

## 2023-02-13 RX ADMIN — ENOXAPARIN SODIUM 120 MG: 120 INJECTION SUBCUTANEOUS at 06:26

## 2023-02-13 RX ADMIN — APIXABAN 5 MG: 5 TABLET, FILM COATED ORAL at 20:31

## 2023-02-13 RX ADMIN — AMIODARONE HYDROCHLORIDE 400 MG: 200 TABLET ORAL at 20:31

## 2023-02-13 RX ADMIN — DIGOXIN 125 MCG: 0.25 INJECTION INTRAMUSCULAR; INTRAVENOUS at 06:29

## 2023-02-13 RX ADMIN — PANTOPRAZOLE SODIUM 40 MG: 40 TABLET, DELAYED RELEASE ORAL at 08:07

## 2023-02-13 RX ADMIN — CEFTRIAXONE SODIUM 1 G: 1 INJECTION, POWDER, FOR SOLUTION INTRAMUSCULAR; INTRAVENOUS at 08:07

## 2023-02-13 RX ADMIN — Medication 3 MG: at 00:22

## 2023-02-13 RX ADMIN — AMIODARONE HYDROCHLORIDE 400 MG: 200 TABLET ORAL at 14:37

## 2023-02-13 RX ADMIN — POTASSIUM CHLORIDE 20 MEQ: 750 TABLET, EXTENDED RELEASE ORAL at 11:26

## 2023-02-13 RX ADMIN — AMIODARONE HYDROCHLORIDE 400 MG: 200 TABLET ORAL at 06:28

## 2023-02-13 RX ADMIN — METOPROLOL SUCCINATE 37.5 MG: 25 TABLET, EXTENDED RELEASE ORAL at 06:29

## 2023-02-13 RX ADMIN — LORAZEPAM 0.5 MG: 0.5 TABLET ORAL at 22:00

## 2023-02-13 RX ADMIN — FUROSEMIDE 40 MG: 10 INJECTION, SOLUTION INTRAMUSCULAR; INTRAVENOUS at 11:25

## 2023-02-13 NOTE — PROGRESS NOTES
Internal Medicine  ICU/CCU Daily Progress Note        SUBJECTIVE   This is a 72 y.o. year-old male admitted on 2/8/2023 with Atrial fibrillation, unspecified type (CMS/HCC) [I48.91]  Atrial fibrillation (CMS/HCC) [I48.91].    Interval History: No acute events overnight. Patient extubated yesterday. Digoxin level 1.0 this AM. He is doing well this AM. Without complaint.      OBJECTIVE   Vital signs in last 24 hours:  Temp:  [36 °C (96.8 °F)-36.1 °C (97 °F)] 36.1 °C (97 °F)  Heart Rate:  [103-143] 135  Resp:  [17-29] 28  BP: ()/() 107/73  SpO2:  [88 %-99 %] 98 %  Oxygen Therapy: Supplemental oxygen  O2 Delivery Method: Nasal cannula  O2 Flow Rate (L/min):  [5 L/min-6 L/min] 5 L/min  FiO2 (%) (Set):  [40 %] 40 %  MAP: 78    Weight & I/Os:  Weights (last 5 days)     Date/Time Weight    02/12/23 0358 109 kg (240 lb 4.8 oz)    02/11/23 1700 110 kg (242 lb 1 oz)    02/10/23 0514 119 kg (262 lb 5.6 oz)    02/09/23 1037 119 kg (263 lb)    02/09/23 0342 119 kg (263 lb 0.1 oz)    02/08/23 1510 115 kg (253 lb 1.4 oz)    02/08/23 0900 120 kg (264 lb)          Intake/Output Summary (Last 24 hours) at 2/13/2023 0659  Last data filed at 2/13/2023 0600  24 Hour Net Input/Output from 7AM Yesterday   Intake 399.51 ml   Output 1700 ml   Net -1300.49 ml         Respiratory:  FiO2 (%) (Set):  [40 %] 40 %  S RR:  [15] 15  S VT:  [0 mL] 0 mL  PEEP/CPAP (Set, cmH2O):  [8 cm H20] 8 cm H20  PIP (Set, cmH2O):  [8 cm H2O] 8 cm H2O  Results from last 7 days   Lab Units 02/12/23  0534 02/11/23  0454   PH ART pH 7.49* 7.44   PCO2 ART mm Hg 40 42   PO2 ART mm Hg 70* 70*   HCO3 ART mEQ/L 30* 28   BASE EXC ART mEQ/L 6.6 3.9       Telemetry/EKG:  I have independently reviewed the telemetry. Significant findings include afib.     PHYSICAL EXAMINATION   Physical Exam  Constitutional:       General: He is not in acute distress.     Appearance: Normal appearance.   HENT:      Head: Normocephalic and atraumatic.      Mouth/Throat:       Mouth: Mucous membranes are moist.      Pharynx: Oropharynx is clear.   Eyes:      Conjunctiva/sclera: Conjunctivae normal.   Cardiovascular:      Rate and Rhythm: Tachycardia present. Rhythm irregular.   Pulmonary:      Effort: Pulmonary effort is normal.      Breath sounds: Normal breath sounds.   Abdominal:      General: There is no distension.      Palpations: Abdomen is soft.      Tenderness: There is no abdominal tenderness.   Musculoskeletal:         General: Normal range of motion.      Cervical back: Normal range of motion.   Skin:     General: Skin is warm and dry.   Neurological:      Mental Status: He is alert and oriented to person, place, and time.          LINES, CATHETERS, DRAINS, AIRWAYS, & WOUNDS   Lines, drains, airways, & wounds:  Peripheral IV (Adult) 02/08/23 Left Hand (Active)   Number of days: 5       Peripheral IV (Adult) 02/09/23 Posterior;Right Hand (Active)   Number of days: 4       Peripheral IV (Adult) 02/11/23 Anterior;Proximal;Right Forearm (Active)   Number of days: 2       Peripheral IV (Adult) 02/11/23 Anterior;Right;Upper Arm (Active)   Number of days: 2       Urethral Catheter (Active)   Number of days: 5       Arterial Line 02/08/23 Right Radial (Active)   Number of days: 5       Catheterization Site - Venous Left Femoral 7 Fr.;Other (Comment) (Active)   Number of days: 5       Catheterization Site - Venous Right Femoral 8 Fr. (Active)   Number of days: 5        LABS & IMAGING   Labs:  I have reviewed the patient's labs to the time of note. No new clinical concern.    Results from last 7 days   Lab Units 02/13/23  0514 02/12/23  0532 02/11/23  0450   WBC K/uL 9.20 8.08 7.73   HEMOGLOBIN g/dL 14.5 15.6 13.9   HEMATOCRIT % 44.2 46.9 42.9   PLATELETS K/uL 162 153 125*       Results from last 7 days   Lab Units 02/13/23  0514 02/12/23  0532 02/11/23  0450   SODIUM mEQ/L 139 144 142   POTASSIUM mEQ/L 3.5* 3.9 4.1   CHLORIDE mEQ/L 104 108 108   CO2 mEQ/L 26 25 25   BUN mg/dL 32* 34*  33*   CREATININE mg/dL 1.0 1.1 1.2   CALCIUM mg/dL 8.5* 8.5* 8.0*   ALBUMIN g/dL 2.7* 2.9* 2.8*   BILIRUBIN TOTAL mg/dL 1.5* 2.0* 2.0*   ALK PHOS IU/L 34* 36 34*   ALT IU/L 15* 16 15*   AST IU/L 14* 13* 15   GLUCOSE mg/dL 90 125* 132*     Lab Results   Component Value Date    MG 2.1 02/13/2023     No results found for: PT, INR, PTT    Microbiology Results (last 3 days)     ** No results found for the last 72 hours. **          Imaging personally reviewed (does not include unread studies):  X-RAY CHEST 1 VIEW    Result Date: 2/12/2023  CLINICAL HISTORY: Intubated COMMENT: A single AP radiograph of the chest is obtained . Comparison is made to prior exam of February 11, 2023. Heart size is moderately prominent. Interstitial edema is improved. Mild basilar atelectasis and/or edema present. Elevated right hemidiaphragm. Mildly prominent loops of small bowel and colon noted in the left abdomen. Endotracheal tube above haley. Nasogastric tube extending below the diaphragm. Remainder study stable     IMPRESSION: Cardiomegaly. Mild Interstitial edema is improved. Mild basilar edema versus atelectasis.     MEDS/DRIPS   Scheduled Meds:  • amiodarone  400 mg oral TID   • apixaban  5 mg oral BID   • [Provider Managed Hold] aspirin  81 mg oral Daily   • atorvastatin  20 mg oral q PM   • cefTRIAXone  1 g intravenous q24h   • chlorhexidine  15 mL Mouth/Throat 2 times per day   • digoxin  125 mcg intravenous Daily   • [Provider Managed Hold] lisinopriL  5 mg oral Daily   • melatonin  3 mg oral Nightly   • [START ON 2/14/2023] metoprolol succinate XL  50 mg oral Daily   • pantoprazole  40 mg oral Daily    Or   • pantoprazole  40 mg intravenous Daily   • polyethylene glycol  17 g oral Daily   • potassium chloride  20 mEq oral Once     Continuous Infusions:  • dexmedetomidine in 0.9 % NaCl  0.2-1.5 mcg/kg/hr Stopped (02/12/23 50)   • phenylephrine   mcg/min Stopped (02/12/23 3675)     PRN Meds:.•  acetaminophen  •  alum-mag  hydroxide-simeth  •  bisacodyL  •  glucose **OR** dextrose **OR** glucagon **OR** dextrose 50 % in water (D50)  •  diphenhydrAMINE  •  fentaNYL  •  [Provider Managed Hold] metoprolol tartrate     ASSESSMENT & PLAN   Acute kidney injury (CMS/HCC)  Assessment & Plan  Creatinine 1.3 --> improved. Cr 1.0     Acute hypoxemic respiratory failure (CMS/HCC)  Assessment & Plan  Failed extubation in the setting of excessive anesthesia, EULALIA, paralyzed R hemidiaphragm. Currently intubated and sedated. Follows with Dr. Damian.  CT Chest 2/9: Small bilateral pleural effusions, consolidation in the adjacent lower lobes in part due to compressive atelectasis. Persistent elevation the right hemidiaphragm with associated compressive   atelectasis.   Sputum positive for e coli 2/9, however no wbc, no fever  Extubated 2/12, transitioned to BIPAP. Now nasal cannula.     -Pulm following  -Ceftriaxone for E coli    * Atrial fibrillation (CMS/HCC)  Assessment & Plan  History of A fib on dofelitide. 2/8 failed extubation due to hemodynamic & respiratory compromise most likely in the setting of anesthesia.  S/p successful radiofrequency ablation 2/8.   Appears to be in sinus rhythm on tele, rates in 60s  TTE with bubble study 2/9: normal LV size and thickness, low-normal systolic function EF 50-55%, moderately dilated LA, no visualized interatrial shunt.   2/9: Patient converted to Afib and RVR, has been in Afib since failing cardioversion    - Oral amiodarone 400mg tid, digoxin 125mcg , metoprolol 37.5 mg daily. Consider up titration to 50 mg qd.  - Monitor on Tele, monitor Qt  - On therapeutic lovenox. Will transition back to Eliquis 5 BID        VTE Assessment: Padua    VTE Prophylaxis: Current anticoagulants:  apixaban (ELIQUIS) tablet 5 mg, oral, BID      Code Status: Full Code  Estimated discharge date: 3/30/2023     ICU CHECKLIST   Lines:   Peripheral IV (Adult) 02/08/23 Left Hand (Active)   Number of days: 5       Peripheral IV  (Adult) 02/09/23 Posterior;Right Hand (Active)   Number of days: 4       Peripheral IV (Adult) 02/11/23 Anterior;Proximal;Right Forearm (Active)   Number of days: 2       Peripheral IV (Adult) 02/11/23 Anterior;Right;Upper Arm (Active)   Number of days: 2       Urethral Catheter (Active)   Number of days: 5       Arterial Line 02/08/23 Right Radial (Active)   Number of days: 5       Catheterization Site - Venous Left Femoral 7 Fr.;Other (Comment) (Active)   Number of days: 5       Catheterization Site - Venous Right Femoral 8 Fr. (Active)   Number of days: 5     Kurtz: No     Sedation Vacation: Yes     Physical Therapy: No     Nutrition: Regular    DVT Prophylaxis: Lovenox    GI Prophylaxis: Yes        ATTENDING DOCUMENTATION  ALSO SEE ATTENDING ATTESTATION SECTION OF NOTE

## 2023-02-13 NOTE — PLAN OF CARE
Extubated and diet advanced 2/12  Problem: Adult Inpatient Plan of Care  Goal: Plan of Care Review  Outcome: Progressing

## 2023-02-13 NOTE — PROGRESS NOTES
"Brief Nutrition Note    Recommendations   1. Continue regular diet       Clinical Course: Patient is a 72 y.o. male who was admitted on 2/8/2023 with a diagnosis of Atrial fibrillation, unspecified type (CMS/HCC) [I48.91]  Atrial fibrillation (CMS/HCC) [I48.91].     Past Medical History:   Diagnosis Date   • A-fib (CMS/HCC)    • A-fib (CMS/HCC) 7/25/2018   • Chronic anticoagulation 7/25/2018   • HTN (hypertension) 6/24/2020   • Lipid disorder    • Visit for monitoring Tikosyn therapy 7/25/2019     Past Surgical History:   Procedure Laterality Date   • ABLATION OF DYSRHYTHMIC FOCUS     • CARDIAC CATHETERIZATION     • TOTAL SHOULDER REPLACEMENT Right        Reason for Assessment  Reason For Assessment: identified at risk by screening criteria  Patient Already Seen On: 02/09/23     [facility] Nutrition Screen Tool  Has patient lost weight without trying?: 2-->Unsure (GAETANO)  Has patient been eating poorly due to decreased appetite?: 0-->No  [facility] Nutrition Screen Score: 2     Nutrition/Diet History  Intake (%): 100%    Physical Findings  Overall Physical Appearance:  (extubated 2/12)  Last Bowel Movement: 02/13/23  Tubes:  (removed)  Skin: intact, edema (1-2+ RLE edema)     RETS18 Physical Appearance  Overall Physical Appearance:  (extubated 2/12)  Last Bowel Movement: 02/13/23  Tubes:  (removed)  Skin: intact, edema (1-2+ RLE edema)     Nutrition Order  Nutrition Order: meets nutritional requirements  Nutrition Order Comments: regular     Anthropometrics  Height: 180.3 cm (5' 11\")     Current Weight  Weight Method: Bed scale  Weight: 109 kg (240 lb 4.8 oz)     Ideal Body Weight (IBW)  Ideal Body Weight (IBW) (kg): 79.27  % Ideal Body Weight: 150.49     Body Mass Index (BMI)  BMI (Calculated): 33.5     Labs/Procedures/Meds  Lab Results Reviewed: reviewed, pertinent     RETS18 Lab Results  Lab Results Reviewed: reviewed, pertinent   BMP Results       02/13/23 02/12/23 02/11/23     0514 0532 0450     144 142    K 3.5 3.9 4.1 "    Cl 104 108 108    CO2 26 25 25    Glucose 90 125 132    BUN 32 34 33    Creatinine 1.0 1.1 1.2    Calcium 8.5 8.5 8.0    Anion Gap 9 11 9    EGFR >60.0 >60.0 59.7           Medications  Pertinent Medications Reviewed: reviewed, pertinent   Scheduled Meds:  • amiodarone  400 mg oral TID   • apixaban  5 mg oral BID   • [Provider Managed Hold] aspirin  81 mg oral Daily   • atorvastatin  20 mg oral q PM   • cefTRIAXone  1 g intravenous q24h   • chlorhexidine  15 mL Mouth/Throat 2 times per day   • digoxin  125 mcg intravenous Daily   • furosemide  40 mg intravenous Once   • [Provider Managed Hold] lisinopriL  5 mg oral Daily   • melatonin  3 mg oral Nightly   • [START ON 2/14/2023] metoprolol succinate XL  50 mg oral Daily   • pantoprazole  40 mg oral Daily    Or   • pantoprazole  40 mg intravenous Daily   • polyethylene glycol  17 g oral Daily   • potassium chloride  20 mEq oral Once     Continuous Infusions:  • dexmedetomidine in 0.9 % NaCl  0.2-1.5 mcg/kg/hr Stopped (02/12/23 0908)   • phenylephrine   mcg/min Stopped (02/12/23 6792)     Clinical comments: Pt seen for follow up. Extubated 2/12, diet advanced. Pt reports he ate < 50% of breakfast this morning, did not really care for meal but also endorses his appetite is not very strong today. Denies wt loss or lack of appetite PTA. NKFA. Regular diet at baseline. Declines supplement at this time, feel appetite will improve quickly.    Goals: meet > 75% needs via PO intakes  Monitor: PO intakes, skin, labs, plan of care    Recommendations: See above       Date: 02/13/23  Signature: JANESSA Scruggs

## 2023-02-13 NOTE — PROGRESS NOTES
Family updated at bedside. Code status discussed with patient at bedside. He would like to be DNR/DNI. Code status change reflected in epic.     Dara Ray MD

## 2023-02-13 NOTE — NURSING NOTE
02/13/23 0333   NIV Settings   Mode of Delivery (S)  Other (see comment)  (machine on standby)

## 2023-02-13 NOTE — NURSING NOTE
02/12/23 8700   NIV Settings   Mode of Delivery (S)  Other (see comment)  (machine on standby)

## 2023-02-13 NOTE — PROGRESS NOTES
Electrophysiology Progress Note      Subjective   Feels much better today; comfortable on room air. He denies chest pain, shortness of breath or palpitations.    Inpatient medications:  •  acetaminophen, 975 mg, oral, q8h PRN  •  alum-mag hydroxide-simeth, 30 mL, oral, q4h PRN  •  amiodarone, 400 mg, oral, TID  •  apixaban, 5 mg, oral, BID  •  [Provider Managed Hold] aspirin, 81 mg, oral, Daily  •  atorvastatin, 20 mg, oral, q PM  •  bisacodyL, 10 mg, rectal, Daily PRN  •  cefTRIAXone, 1 g, intravenous, q24h  •  chlorhexidine, 15 mL, Mouth/Throat, 2 times per day  •  dexmedetomidine in 0.9 % NaCl, 0.2-1.5 mcg/kg/hr, intravenous, Titrated  •  glucose, 16-32 g of dextrose, oral, PRN **OR** dextrose, 15-30 g of dextrose, oral, PRN **OR** glucagon, 1 mg, intramuscular, PRN **OR** dextrose 50 % in water (D50), 25 mL, intravenous, PRN  •  digoxin, 125 mcg, intravenous, Daily  •  diphenhydrAMINE, 50 mg, oral, q8h PRN  •  fentaNYL, 50 mcg, intravenous, q1h PRN  •  [Provider Managed Hold] lisinopriL, 5 mg, oral, Daily  •  melatonin, 3 mg, oral, Nightly  •  [START ON 2/14/2023] metoprolol succinate XL, 50 mg, oral, Daily  •  [Provider Managed Hold] metoprolol tartrate, 25 mg, oral, q6h PRN  •  pantoprazole, 40 mg, oral, Daily **OR** pantoprazole, 40 mg, intravenous, Daily  •  phenylephrine,  mcg/min, intravenous, Titrated  •  polyethylene glycol, 17 g, oral, Daily    Objective    Vitals:    02/13/23 1200   BP: (!) 145/71   Pulse: 71   Resp: 20   Temp: 36.2 °C (97.2 °F)   SpO2: 97%     Physical Exam  General: No acute distress.  HEENT: Anicteric.  Moist mucous membranes.  Neck: Supple, no JVD.  Lungs: Clear to auscultation bilaterally.  Cardiac: regular rhythm.  No murmurs, rubs or gallops.  Abdomen: Soft, nontender.  Extremities: No lower extremity edema.  Skin: Warm, dry.  Neurologic: Grossly intact.  Psychiatric: Behavior is appropriate and cooperative.       Laboratory results:  Results from last 7 days   Lab Units  02/13/23  0514   SODIUM mEQ/L 139   POTASSIUM mEQ/L 3.5*   CHLORIDE mEQ/L 104   CO2 mEQ/L 26   BUN mg/dL 32*   CREATININE mg/dL 1.0   CALCIUM mg/dL 8.5*   ALBUMIN g/dL 2.7*   BILIRUBIN TOTAL mg/dL 1.5*   ALK PHOS IU/L 34*   ALT IU/L 15*   AST IU/L 14*   GLUCOSE mg/dL 90     Results from last 7 days   Lab Units 02/13/23  0514   WBC K/uL 9.20   HEMOGLOBIN g/dL 14.5   HEMATOCRIT % 44.2   PLATELETS K/uL 162               Imaging  Cardiac Imaging    TRANSTHORACIC ECHO (TTE) COMPLETE 02/09/2023    Interpretation Summary  Technically difficult study. Definity used for endocardial enhancement. Rhythm is sinus rhythm.    Left ventricle is normal in size with normal wall thickness. Low normal systolic function, estimated ejection fraction 50-55%. Wall motion difficult to assess even with echocontrast. Indeterminate diastolic function.  Aortic valve is tricuspid. Aortic valve and root sclerosis. No aortic stenosis. No aortic regurgitation.  Aortic root normal in size. Visualized portion of ascending aorta dilated up to 4.0 cm.  Mitral valve mildly thickened. Trace mitral regurgitation.  Left atrium is moderately dilated. Agitated saline injected with good opacification of the right heart chambers, no visualized interatrial shunt.  Right ventricle is normal in size with preserved systolic function.  Right atrium is normal in size.  Tricuspid valve with trace tricuspid regurgitation, estimated right ventricular systolic pressure 26 mmHg.  Pulmonic valve not well visualized.  Inferior vena cava <2.1cm with <50% respiratory variation, consistent with positive pressure mechanical ventilation.  Trace pericardial effusion.    Compared to previous echo on 5/28/2020, no significant change, ascending aorta is measured to be 4.0 cm.      Telemetry  Atrial fibrillation, ventricular rates  bpm.     Acute hypoxemic respiratory failure (CMS/HCC)  Assessment & Plan  He had to be reintubated following A-fib ablation on 2/8/2023 in the  setting of chronically elevated hemidiaphragm.  He was extubated this morning.  Appreciate pulmonary input/recommendations.    * Atrial fibrillation (CMS/HCC)  Assessment & Plan  He underwent PVI on 2/8/2023.  Post procedurally, he had to be reintubated due to hypoxia in the setting of chronically elevated right hemidiaphragm.  Preprocedurally he was on dofetilide and Eliquis which were on hold given intubation.  He has was initially in and out of atrial fibrillation but then persistent since 2/10/2023.  He was transitioned from IV amiodarone to oral amiodarone 400 mg 3 times daily.  Now back in sinus.     Continue loading dose of amodarone 400 mg TID - transition to 200 mg daily on discharge.  Lovenox which can be transitioned back to Eliquis.            Thank you for allowing me to participate in the care of your patient, please feel free to contact me with any questions or concerns.     Sincere García MD  2/13/2023

## 2023-02-14 VITALS
DIASTOLIC BLOOD PRESSURE: 62 MMHG | OXYGEN SATURATION: 94 % | SYSTOLIC BLOOD PRESSURE: 133 MMHG | WEIGHT: 240.3 LBS | BODY MASS INDEX: 33.64 KG/M2 | HEIGHT: 71 IN | HEART RATE: 62 BPM | TEMPERATURE: 97.8 F | RESPIRATION RATE: 16 BRPM

## 2023-02-14 LAB
ALBUMIN SERPL-MCNC: 3.2 G/DL (ref 3.4–5)
ALP SERPL-CCNC: 40 IU/L (ref 35–126)
ALT SERPL-CCNC: 20 IU/L (ref 16–63)
ANION GAP SERPL CALC-SCNC: 10 MEQ/L (ref 3–15)
AST SERPL-CCNC: 20 IU/L (ref 15–41)
BASOPHILS # BLD: 0.03 K/UL (ref 0.01–0.1)
BASOPHILS NFR BLD: 0.4 %
BILIRUB SERPL-MCNC: 1.4 MG/DL (ref 0.3–1.2)
BUN SERPL-MCNC: 25 MG/DL (ref 8–20)
CALCIUM SERPL-MCNC: 8.9 MG/DL (ref 8.9–10.3)
CHLORIDE SERPL-SCNC: 106 MEQ/L (ref 98–109)
CO2 SERPL-SCNC: 27 MEQ/L (ref 22–32)
CREAT SERPL-MCNC: 1.1 MG/DL (ref 0.8–1.3)
DIFFERENTIAL METHOD BLD: ABNORMAL
EOSINOPHIL # BLD: 0.21 K/UL (ref 0.04–0.54)
EOSINOPHIL NFR BLD: 2.5 %
ERYTHROCYTE [DISTWIDTH] IN BLOOD BY AUTOMATED COUNT: 13.5 % (ref 11.6–14.4)
GFR SERPL CREATININE-BSD FRML MDRD: >60 ML/MIN/1.73M*2
GLUCOSE SERPL-MCNC: 108 MG/DL (ref 70–99)
HCT VFR BLDCO AUTO: 44.6 % (ref 40.1–51)
HGB BLD-MCNC: 14.9 G/DL (ref 13.7–17.5)
IMM GRANULOCYTES # BLD AUTO: 0.01 K/UL (ref 0–0.08)
IMM GRANULOCYTES NFR BLD AUTO: 0.1 %
LYMPHOCYTES # BLD: 1.28 K/UL (ref 1.2–3.5)
LYMPHOCYTES NFR BLD: 15.3 %
MAGNESIUM SERPL-MCNC: 2.2 MG/DL (ref 1.8–2.5)
MCH RBC QN AUTO: 30.2 PG (ref 28–33.2)
MCHC RBC AUTO-ENTMCNC: 33.4 G/DL (ref 32.2–36.5)
MCV RBC AUTO: 90.3 FL (ref 83–98)
MONOCYTES # BLD: 1.29 K/UL (ref 0.3–1)
MONOCYTES NFR BLD: 15.4 %
NEUTROPHILS # BLD: 5.57 K/UL (ref 1.7–7)
NEUTS SEG NFR BLD: 66.3 %
NRBC BLD-RTO: 0 %
PDW BLD AUTO: 12.7 FL (ref 9.4–12.4)
PLATELET # BLD AUTO: 183 K/UL (ref 150–350)
POTASSIUM SERPL-SCNC: 3.4 MEQ/L (ref 3.6–5.1)
PROT SERPL-MCNC: 5.6 G/DL (ref 6–8.2)
RBC # BLD AUTO: 4.94 M/UL (ref 4.5–5.8)
SODIUM SERPL-SCNC: 143 MEQ/L (ref 136–144)
WBC # BLD AUTO: 8.39 K/UL (ref 3.8–10.5)

## 2023-02-14 PROCEDURE — 63600000 HC DRUGS/DETAIL CODE

## 2023-02-14 PROCEDURE — 99238 HOSP IP/OBS DSCHRG MGMT 30/<: CPT | Performed by: INTERNAL MEDICINE

## 2023-02-14 PROCEDURE — 85025 COMPLETE CBC W/AUTO DIFF WBC: CPT | Performed by: STUDENT IN AN ORGANIZED HEALTH CARE EDUCATION/TRAINING PROGRAM

## 2023-02-14 PROCEDURE — 83735 ASSAY OF MAGNESIUM: CPT | Performed by: STUDENT IN AN ORGANIZED HEALTH CARE EDUCATION/TRAINING PROGRAM

## 2023-02-14 PROCEDURE — 200200 PR NO CHARGE: Performed by: INTERNAL MEDICINE

## 2023-02-14 PROCEDURE — 36415 COLL VENOUS BLD VENIPUNCTURE: CPT | Performed by: STUDENT IN AN ORGANIZED HEALTH CARE EDUCATION/TRAINING PROGRAM

## 2023-02-14 PROCEDURE — 25800000 HC PHARMACY IV SOLUTIONS

## 2023-02-14 PROCEDURE — 80053 COMPREHEN METABOLIC PANEL: CPT | Performed by: STUDENT IN AN ORGANIZED HEALTH CARE EDUCATION/TRAINING PROGRAM

## 2023-02-14 PROCEDURE — 97161 PT EVAL LOW COMPLEX 20 MIN: CPT | Mod: GP

## 2023-02-14 PROCEDURE — 63700000 HC SELF-ADMINISTRABLE DRUG: Performed by: STUDENT IN AN ORGANIZED HEALTH CARE EDUCATION/TRAINING PROGRAM

## 2023-02-14 PROCEDURE — 25000000 HC PHARMACY GENERAL: Performed by: PHYSICIAN ASSISTANT

## 2023-02-14 RX ORDER — METOPROLOL SUCCINATE 25 MG/1
50 TABLET, EXTENDED RELEASE ORAL DAILY
Status: DISCONTINUED | OUTPATIENT
Start: 2023-02-14 | End: 2023-02-14 | Stop reason: HOSPADM

## 2023-02-14 RX ORDER — METOPROLOL SUCCINATE 50 MG/1
50 TABLET, EXTENDED RELEASE ORAL DAILY
Qty: 30 TABLET | Refills: 0 | Status: SHIPPED | OUTPATIENT
Start: 2023-02-15 | End: 2023-03-13 | Stop reason: SDUPTHER

## 2023-02-14 RX ORDER — AMIODARONE HYDROCHLORIDE 200 MG/1
200 TABLET ORAL DAILY
Status: DISCONTINUED | OUTPATIENT
Start: 2023-02-15 | End: 2023-02-14 | Stop reason: HOSPADM

## 2023-02-14 RX ORDER — AMIODARONE HYDROCHLORIDE 200 MG/1
200 TABLET ORAL DAILY
Qty: 30 TABLET | Refills: 0 | Status: SHIPPED | OUTPATIENT
Start: 2023-02-15 | End: 2023-03-13 | Stop reason: SDUPTHER

## 2023-02-14 RX ORDER — PANTOPRAZOLE SODIUM 40 MG/1
40 TABLET, DELAYED RELEASE ORAL DAILY
Qty: 30 TABLET | Refills: 0 | Status: SHIPPED | OUTPATIENT
Start: 2023-02-15 | End: 2023-03-13 | Stop reason: ALTCHOICE

## 2023-02-14 RX ORDER — POTASSIUM CHLORIDE 750 MG/1
40 TABLET, EXTENDED RELEASE ORAL ONCE
Status: COMPLETED | OUTPATIENT
Start: 2023-02-14 | End: 2023-02-14

## 2023-02-14 RX ADMIN — PANTOPRAZOLE SODIUM 40 MG: 40 INJECTION, POWDER, LYOPHILIZED, FOR SOLUTION INTRAVENOUS at 09:43

## 2023-02-14 RX ADMIN — CEFTRIAXONE SODIUM 1 G: 1 INJECTION, POWDER, FOR SOLUTION INTRAMUSCULAR; INTRAVENOUS at 10:02

## 2023-02-14 RX ADMIN — Medication 81 MG: at 09:42

## 2023-02-14 RX ADMIN — POTASSIUM CHLORIDE 40 MEQ: 750 TABLET, EXTENDED RELEASE ORAL at 14:54

## 2023-02-14 RX ADMIN — AMIODARONE HYDROCHLORIDE 400 MG: 200 TABLET ORAL at 09:42

## 2023-02-14 RX ADMIN — METOPROLOL SUCCINATE 50 MG: 25 TABLET, EXTENDED RELEASE ORAL at 09:42

## 2023-02-14 RX ADMIN — APIXABAN 5 MG: 5 TABLET, FILM COATED ORAL at 09:43

## 2023-02-14 RX ADMIN — LISINOPRIL 5 MG: 5 TABLET ORAL at 09:43

## 2023-02-14 ASSESSMENT — COGNITIVE AND FUNCTIONAL STATUS - GENERAL
MOVING TO AND FROM BED TO CHAIR: 4 - NONE
WALKING IN HOSPITAL ROOM: 4 - NONE
STANDING UP FROM CHAIR USING ARMS: 4 - NONE
AFFECT: WNL
CLIMB 3 TO 5 STEPS WITH RAILING: 4 - NONE

## 2023-02-14 NOTE — HOSPITAL COURSE
Humberto is a 72 y.o. male admitted on 2/8/2023 with Atrial fibrillation, unspecified type (CMS/HCC) [I48.91]  Atrial fibrillation (CMS/HCC) [I48.91]. Principal problem is Atrial fibrillation (CMS/HCC).    Past Medical History  Humberto has a past medical history of A-fib (CMS/HCC), A-fib (CMS/HCC) (7/25/2018), Chronic anticoagulation (7/25/2018), HTN (hypertension) (6/24/2020), Lipid disorder, and Visit for monitoring Tikosyn therapy (7/25/2019).    History of Present Illness   2/8: outpatient ablation for afib. Post procedurally,  had to be reintubated due to hypoxia in the setting of chronically elevated right hemidiaphragm. Admitted to CICU, Brief norepinephrine and phenylephrine for pressor support  2/9: Patient converted to Afib and RVR, has been in Afib since failing cardioversion  2/12: extubated   2/13: back in sinus rhythm

## 2023-02-14 NOTE — PROGRESS NOTES
Internal Medicine  ICU/CCU Daily Progress Note        SUBJECTIVE   This is a 72 y.o. year-old male admitted on 2/8/2023 with Atrial fibrillation, unspecified type (CMS/HCC) [I48.91]  Atrial fibrillation (CMS/HCC) [I48.91].    Interval History: No acute events overnight. Patient converted to normal sinus rhythm yesterday. He is doing well this morning. Denies chest pain, SOB, palpitations. No complaints.      OBJECTIVE   Vital signs in last 24 hours:  Temp:  [36.2 °C (97.2 °F)-36.8 °C (98.2 °F)] 36.8 °C (98.2 °F)  Heart Rate:  [] 76  Resp:  [13-27] 18  BP: (116-161)/(60-84) 160/77  SpO2:  [91 %-100 %] 95 %  Oxygen Therapy: None (Room air)  O2 Delivery Method: Nasal cannula  O2 Flow Rate (L/min):  [2 L/min-4 L/min] 2 L/min  MAP: 98    Weight & I/Os:  Weights (last 5 days)     Date/Time Weight    02/12/23 0358 109 kg (240 lb 4.8 oz)    02/11/23 1700 110 kg (242 lb 1 oz)    02/10/23 0514 119 kg (262 lb 5.6 oz)    02/09/23 1037 119 kg (263 lb)    02/09/23 0342 119 kg (263 lb 0.1 oz)          Intake/Output Summary (Last 24 hours) at 2/14/2023 0659  Last data filed at 2/14/2023 0600  24 Hour Net Input/Output from 7AM Yesterday   Intake 820 ml   Output 1275 ml   Net -455 ml         Respiratory:     Results from last 7 days   Lab Units 02/12/23  0534 02/11/23  0454   PH ART pH 7.49* 7.44   PCO2 ART mm Hg 40 42   PO2 ART mm Hg 70* 70*   HCO3 ART mEQ/L 30* 28   BASE EXC ART mEQ/L 6.6 3.9       Telemetry/EKG:  I have independently reviewed the telemetry. No events for the last 24 hours.     PHYSICAL EXAMINATION   Physical Exam  Constitutional:       Appearance: Normal appearance.   HENT:      Head: Normocephalic and atraumatic.      Nose: Nose normal.      Mouth/Throat:      Pharynx: Oropharynx is clear.   Eyes:      Conjunctiva/sclera: Conjunctivae normal.   Cardiovascular:      Rate and Rhythm: Normal rate and regular rhythm.      Heart sounds: Normal heart sounds.   Pulmonary:      Breath sounds: Normal breath  sounds.   Abdominal:      General: Bowel sounds are normal. There is distension.      Palpations: Abdomen is soft.      Tenderness: There is abdominal tenderness.   Skin:     General: Skin is warm and dry.   Neurological:      General: No focal deficit present.      Mental Status: He is alert and oriented to person, place, and time. Mental status is at baseline.          LINES, CATHETERS, DRAINS, AIRWAYS, & WOUNDS   Lines, drains, airways, & wounds:  Peripheral IV (Adult) 02/08/23 Left Hand (Active)   Number of days: 6       Peripheral IV (Adult) 02/09/23 Posterior;Right Hand (Active)   Number of days: 5       Peripheral IV (Adult) 02/11/23 Anterior;Proximal;Right Forearm (Active)   Number of days: 3       Peripheral IV (Adult) 02/11/23 Anterior;Right;Upper Arm (Active)   Number of days: 3       Catheterization Site - Venous Left Femoral 7 Fr.;Other (Comment) (Active)   Number of days: 6       Catheterization Site - Venous Right Femoral 8 Fr. (Active)   Number of days: 6        LABS & IMAGING   Labs:  I have reviewed the patient's labs to the time of note. No new clinical concern.    Results from last 7 days   Lab Units 02/14/23  0559 02/13/23  0514 02/12/23  0532   WBC K/uL 8.39 9.20 8.08   HEMOGLOBIN g/dL 14.9 14.5 15.6   HEMATOCRIT % 44.6 44.2 46.9   PLATELETS K/uL 183 162 153       Results from last 7 days   Lab Units 02/14/23  0559 02/13/23  0514 02/12/23  0532   SODIUM mEQ/L 143 139 144   POTASSIUM mEQ/L 3.4* 3.5* 3.9   CHLORIDE mEQ/L 106 104 108   CO2 mEQ/L 27 26 25   BUN mg/dL 25* 32* 34*   CREATININE mg/dL 1.1 1.0 1.1   CALCIUM mg/dL 8.9 8.5* 8.5*   ALBUMIN g/dL 3.2* 2.7* 2.9*   BILIRUBIN TOTAL mg/dL 1.4* 1.5* 2.0*   ALK PHOS IU/L 40 34* 36   ALT IU/L 20 15* 16   AST IU/L 20 14* 13*   GLUCOSE mg/dL 108* 90 125*     Lab Results   Component Value Date    MG 2.2 02/14/2023     No results found for: PT, INR, PTT    Microbiology Results (last 3 days)     ** No results found for the last 72 hours. **           Imaging personally reviewed (does not include unread studies):  No results found.   MEDS/DRIPS   Scheduled Meds:  • amiodarone  400 mg oral TID   • apixaban  5 mg oral BID   • aspirin  81 mg oral Daily   • atorvastatin  20 mg oral q PM   • cefTRIAXone  1 g intravenous q24h   • chlorhexidine  15 mL Mouth/Throat 2 times per day   • [Provider Managed Hold] digoxin  125 mcg intravenous Daily   • lisinopriL  5 mg oral Daily   • melatonin  3 mg oral Nightly   • metoprolol succinate XL  50 mg oral Daily   • pantoprazole  40 mg oral Daily    Or   • pantoprazole  40 mg intravenous Daily   • polyethylene glycol  17 g oral Daily     Continuous Infusions:  PRN Meds:.•  acetaminophen  •  alum-mag hydroxide-simeth  •  bisacodyL  •  glucose **OR** dextrose **OR** glucagon **OR** dextrose 50 % in water (D50)  •  diphenhydrAMINE  •  fentaNYL  •  [Provider Managed Hold] metoprolol tartrate     ASSESSMENT & PLAN   Acute kidney injury (CMS/HCC)  Assessment & Plan  Creatinine 1.3 --> improved. Cr 1.0     Acute hypoxemic respiratory failure (CMS/HCC)  Assessment & Plan  Failed extubation in the setting of excessive anesthesia, EULALIA, paralyzed R hemidiaphragm. Currently intubated and sedated. Follows with Dr. Damian.  CT Chest 2/9: Small bilateral pleural effusions, consolidation in the adjacent lower lobes in part due to compressive atelectasis. Persistent elevation the right hemidiaphragm with associated compressive   atelectasis.   Sputum positive for e coli 2/9, however no wbc, no fever  Extubated 2/12, transitioned to BIPAP. Now nasal cannula.     -Pulm following  -Ceftriaxone for E coli. Day 4. Will d/c after dose today.     * Atrial fibrillation (CMS/HCC)  Assessment & Plan  History of A fib on dofelitide. 2/8 failed extubation due to hemodynamic & respiratory compromise most likely in the setting of anesthesia.  S/p successful radiofrequency ablation 2/8.   Appears to be in sinus rhythm on tele, rates in 60s  TTE with  bubble study 2/9: normal LV size and thickness, low-normal systolic function EF 50-55%, moderately dilated LA, no visualized interatrial shunt.   2/9: Patient converted to Afib and RVR, has been in Afib since failing cardioversion    - Oral amiodarone 400mg tid, metoprolol 37.5 mg daily.   - Transition to 200 mg amiodarone daily on discharge from hospital   - Monitor on Tele, monitor Qt  - Continue Eliquis 5 BID        VTE Assessment: [place]    VTE Prophylaxis: Current anticoagulants:  apixaban (ELIQUIS) tablet 5 mg, oral, BID      Code Status: DNR (A.N.D.)  Estimated discharge date: 3/30/2023     ICU CHECKLIST   Lines:   Peripheral IV (Adult) 02/08/23 Left Hand (Active)   Number of days: 6       Peripheral IV (Adult) 02/09/23 Posterior;Right Hand (Active)   Number of days: 5       Peripheral IV (Adult) 02/11/23 Anterior;Proximal;Right Forearm (Active)   Number of days: 3       Peripheral IV (Adult) 02/11/23 Anterior;Right;Upper Arm (Active)   Number of days: 3       Catheterization Site - Venous Left Femoral 7 Fr.;Other (Comment) (Active)   Number of days: 6       Catheterization Site - Venous Right Femoral 8 Fr. (Active)   Number of days: 6     Kurtz: No     Sedation Vacation: Yes     Physical Therapy: No     Nutrition: regular    DVT Prophylaxis: Eliquis BID     GI Prophylaxis: Yes        ATTENDING DOCUMENTATION  ALSO SEE ATTENDING ATTESTATION SECTION OF NOTE

## 2023-02-14 NOTE — PLAN OF CARE
Problem: Adult Inpatient Plan of Care  Goal: Plan of Care Review  Flowsheets (Taken 2/14/2023 1223)  Plan of Care Reviewed With: patient  Outcome Summary:   PT eval complete   pt independent, no further PT needs, rec home at d/c

## 2023-02-14 NOTE — PROGRESS NOTES
Patient: Humberto Chung  Location:  [facility]  MRN:  [id number]  Today's date:  2/14/2023    Humberto is a 72 y.o. male admitted on 2/8/2023 with Atrial fibrillation, unspecified type (CMS/HCC) [I48.91]  Atrial fibrillation (CMS/HCC) [I48.91]. Principal problem is Atrial fibrillation (CMS/HCC).    Past Medical History  Humberto has a past medical history of A-fib (CMS/HCC), A-fib (CMS/HCC) (7/25/2018), Chronic anticoagulation (7/25/2018), HTN (hypertension) (6/24/2020), Lipid disorder, and Visit for monitoring Tikosyn therapy (7/25/2019).    History of Present Illness   2/8: outpatient ablation for afib. Post procedurally,  had to be reintubated due to hypoxia in the setting of chronically elevated right hemidiaphragm. Admitted to CICU, Brief norepinephrine and phenylephrine for pressor support  2/9: Patient converted to Afib and RVR, has been in Afib since failing cardioversion  2/12: extubated   2/13: back in sinus rhythm           Prior Living Environment    Flowsheet Row Most Recent Value   People in Home spouse   Current Living Arrangements home   Living Environment Comment lives with wife in 1SH with 2STE        Prior Level of Function    Flowsheet Row Most Recent Value   Ambulation independent   Transferring independent   Toileting independent   Bathing independent   Dressing independent   Prior Level of Function Comment independent w/o AD at baseline   Assistive Device Currently Used at Home none           PT Evaluation and Treatment - 02/14/23 1204        PT Time Calculation    Start Time 1204     Stop Time 1213     Time Calculation (min) 9 min        General Information    Document Type initial evaluation     Mode of Treatment physical therapy     Position at Start of Session seated in chair     Status at Start of Session agreeable to therapy;no issues or concerns identified by nurse prior to session        Precautions/Limitations/Impairments    Existing  Precautions/Restrictions no known precautions/restrictions        Cognition/Psychosocial    Affect/Mental Status (Cognition) WNL     Orientation Status (Cognition) oriented x 4     Follows Commands (Cognition) WNL     Comment, Cognition receptive to PT education, motivated to get home        Sensory Assessment (Somatosensory)    Sensory Assessment (Somatosensory) LE sensation intact;bilateral LE        Range of Motion (ROM)    Range of Motion ROM is WFL;bilateral lower extremities        Strength (Manual Muscle Testing)    Strength (Manual Muscle Testing) strength is WNL;bilateral lower extremities     Hip, Left (Strength) at least 3+     Knee, Left (Strength) 5     Ankle, Left (Strength) 5     Hip, Right (Strength) at least 3+     Knee, Right (Strength) 5     Ankle, Right (Strength) 5        Bed Mobility    Comment (Bed Mobility) OOB throughout        Sit to Stand Transfer    [place], Sit to Stand Transfer independent     Assistive Device none     Comment steady from recliner chair        Stand to Sit Transfer    [place], Stand to Sit Transfer independent     Assistive Device none     Comment good eccentric control to chair        Gait Training    [place], Gait independent     Assistive Device none     Distance in Feet 140 feet     Pattern (Gait) step-through     Comment (Gait/Stairs) steady gait w/ moderate alcides ambulating in halls, no LOB, no shortness of breath        Stairs Training    [place], Stairs modified independence     Assistive Device railing     Handrail Location (Stairs) right side (ascending)     Number of Stairs 2     Ascending Stairs Technique step-to-step     Descending Stairs Technique step-to-step     Comment no safety concerns w/ step stool negotiation in room        Balance    Static Sitting Balance WNL;sitting in chair     Dynamic Sitting Balance WNL;sitting in chair     Static Standing Balance WNL;unsupported     Dynamic Standing Balance WNL;unsupported     Comment,  Balance steady w/o AD for all mobility        AM-PAC (TM) - Mobility (Current Function)    Turning from your back to your side while in a flat bed without using bedrails? 4 - None     Moving from lying on your back to sitting on the side of a flat bed without using bedrails? 4 - None     Moving to and from a bed to a chair? 4 - None     Standing up from a chair using your arms? 4 - None     To walk in a hospital room? 4 - None     Climbing 3-5 steps with a railing? 4 - None     AM-PAC (TM) Mobility Score 24        Session Outcome    Daily Outcome Statement PT eval complete; pt independent w/ all mobility, steady w/ STS txfs, ambulation in halls w/o AD, and step stool negotiation; no further acute PT needs/goals at this time as pt is at functional baseline; rec home at d/c once medically cleared     Position at End of Session seated in chair   pt then walking to bathroom upon therapist exit, RN aware, and cleared pt to be in bathroom alone    Status at End of Session all needs met;personal items in reach        Plan    Therapy Frequency evaluation only               PT Discharge Recommendations    Flowsheet Row Most Recent Value   PT Recommended Discharge Disposition home at 02/14/2023 1204   Anticipated Equipment Needs at Discharge (PT) none at 02/14/2023 1204   Patient/Family Therapy Goals Statement to go home at 02/14/2023 1204                  Education Documentation  Treatment Plan, taught by Jeny Marcial, PT at 2/14/2023 12:23 PM.  Learner: Patient  Readiness: Acceptance  Method: Explanation  Response: Verbalizes Understanding  Comment: role of PT, importance of OOB and ambulation

## 2023-02-14 NOTE — DISCHARGE SUMMARY
Internal Medicine  Inpatient Discharge Summary        BRIEF OVERVIEW   Admitting Provider: Blanco Toussaint DO  Attending Provider: Omega Agustin DO Attending phys phone: [phone]    PCP: Balbir Tellez [phone]    Admission Date: 2/8/2023  Discharge Date: 2/14/2023     DISCHARGE DIAGNOSES      Primary Discharge Diagnosis  Atrial fibrillation (CMS/HCC)    Secondary Discharge Diagnoses  Active Hospital Problems    Diagnosis Date Noted   • Acute hypoxemic respiratory failure (CMS/HCC) 02/09/2023   • Acute kidney injury (CMS/HCC) 02/09/2023   • Atrial fibrillation, unspecified type (CMS/HCC) 02/08/2023   • Atrial fibrillation (CMS/HCC) 07/25/2018      Resolved Hospital Problems   No resolved problems to display.       Active Problem List on Day of Discharge  Patient Active Problem List   Diagnosis   • Atrial fibrillation (CMS/HCC)   • Chronic anticoagulation   • Visit for monitoring Tikosyn therapy   • Dyspnea   • Family history of coronary artery disease   • HTN (hypertension)   • Obstructive sleep apnea syndrome   • Disorder of diaphragm   • Lipid disorder   • Atrial fibrillation, unspecified type (CMS/HCC)   • Acute hypoxemic respiratory failure (CMS/HCC)   • Acute kidney injury (CMS/HCC)     SUMMARY OF HOSPITALIZATION      Presenting Problem/History of Present Illness  This is a 72 y.o. year-old male admitted on 2/8/2023 with Atrial fibrillation, unspecified type (CMS/HCC) [I48.91]  Atrial fibrillation (CMS/HCC) [I48.91].    Hospital Course    Patient presented to the hospital after atrial fibrillation ablation complicated by failure to extubate and acute hypoxic hypercapnic respiratory failure & hypotension requiring reintubation in the EP lab and levophed/phenylephrine. The patient was re-intubated in the EP lab, sedated with precedex. Requiring phenylephrine and levophed for blood pressure support. Subsequently transferred to the ICU.    Acute hypoxemic respiratory failure  (CMS/HCC)  Failed extubation in the setting of excessive anesthesia, EULALIA, paralyzed R hemidiaphragm. Currently intubated and sedated. Follows with Dr. Damian.  CT Chest 2/9: Small bilateral pleural effusions, consolidation in the adjacent lower lobes in part due to compressive atelectasis. Persistent elevation the right hemidiaphragm with associated compressive   atelectasis.   Sputum positive for e coli 2/9, and was started on CTX with 4 doses completed.  Diuresed with IV lasix and extubated 2/12 to BIPAP and eventually RA.    Atrial fibrillation (CMS/HCC)  History of A fib on dofelitide. 2/8 failed extubation due to hemodynamic & respiratory compromise most likely in the setting of anesthesia.  TTE with bubble study 2/9: normal LV size and thickness, low-normal systolic function EF 50-55%, moderately dilated LA, no visualized interatrial shunt.   S/p unsuccessful radiofrequency ablation 2/8 and cardioversion 2/9 with success.  Metoprolol uptitrated to 50mg daily   He was started on amiodarone 400mg daily and discharged on 200mg daily.   Started on protonix 40mg daily for 1 month    -Continue Eliquis 5mg BID  -Amiodarone 200mg daily  -Metoprolol 50mg daily.    -Protonix 40mg daily for 1 month.   -Dofetilide discontinued   -Follow up with Dr. García, EP      Exam on Day of Discharge  Physical Exam  Constitutional:       Appearance: Normal appearance.   HENT:      Head: Normocephalic and atraumatic.      Nose: Nose normal.      Mouth/Throat:      Pharynx: Oropharynx is clear.   Eyes:      Conjunctiva/sclera: Conjunctivae normal.   Cardiovascular:      Rate and Rhythm: Normal rate and regular rhythm.      Heart sounds: Normal heart sounds.   Pulmonary:      Effort: Pulmonary effort is normal.      Breath sounds: Normal breath sounds.   Abdominal:      General: Bowel sounds are normal. There is no distension.      Palpations: Abdomen is soft.      Tenderness: There is no abdominal tenderness.   Musculoskeletal:       Right lower leg: No edema.      Left lower leg: No edema.   Skin:     General: Skin is warm and dry.      Capillary Refill: Capillary refill takes less than 2 seconds.   Neurological:      Mental Status: He is alert and oriented to person, place, and time. Mental status is at baseline.         Consults During Admission  IP CONSULT TO PULMONOLOGY/SLEEP MEDICINE    DISCHARGE MEDICATIONS   New, changed, or stopped medications from this admission:       Medication List      START taking these medications    amiodarone 200 mg tablet  Commonly known as: PACERONE  Start taking on: February 15, 2023  Take 1 tablet (200 mg total) by mouth daily.  Dose: 200 mg     metoprolol succinate XL 50 mg 24 hr tablet  Commonly known as: TOPROL-XL  Start taking on: February 15, 2023  Take 1 tablet (50 mg total) by mouth daily.  Dose: 50 mg     pantoprazole 40 mg EC tablet  Commonly known as: PROTONIX  Start taking on: February 15, 2023  Take 1 tablet (40 mg total) by mouth daily Indications: stress ulcer prevention.  Dose: 40 mg        CONTINUE taking these medications    apixaban 5 mg tablet  Commonly known as: ELIQUIS  Take 1 tablet (5 mg total) by mouth 2 (two) times a day.  Dose: 5 mg     aspirin 81 mg enteric coated tablet  Take 81 mg by mouth daily.  Dose: 81 mg     atorvastatin 20 mg tablet  Commonly known as: LIPITOR  TAKE ONE TABLET BY MOUTH DAILY     lisinopriL 5 mg tablet  Commonly known as: PRINIVIL  Take 1 tablet (5 mg total) by mouth daily.  Dose: 5 mg        STOP taking these medications    dofetilide 500 mcg capsule  Commonly known as: TIKOSYN     metoprolol tartrate 25 mg tablet  Commonly known as: LOPRESSOR            Non-Medication orders at discharge:   Instructions for after discharge     Call provider for:  difficulty breathing, headache or visual disturbances      Call provider for:  extreme fatigue      Call provider for:  persistent dizziness or light-headedness      Call provider for:  persistent nausea or  vomiting      Call provider for:  redness, tenderness, or signs of infection (pain, swelling, redness, odor or green/yellow discharge around incision site)      Call provider for:  severe uncontrolled pain      Call provider for:  temperature >100.4      Discharge diet      Diet Type / Texture: Regular    Follow-up with Provider:      Follow up with Dr. García with scheduled appointment on March 9th, 2023.    Sincere García MD   [phone]    [address]  [facility]  [place] [facility] [place] [address]       Follow-up with primary physician (PCP)      Follow up with PCP in 3 to 5 days for follow up regarding recent hospitalization    Post-Discharge Activity: Normal activity as tolerated.      Normal activity as tolerated.                    PROCEDURES / LABS / IMAGING            Pertinent Imaging  X-RAY ABDOMEN 1 VIEW    Result Date: 2/8/2023  IMPRESSION: Endotracheal and enteric tubes in satisfactory position, as below. No pneumothorax. The focal airspace opacities throughout both lungs which may reflect atelectasis, aspiration and/or pneumonia. Suggestion of a small left pleural effusion. Nonobstructive bowel gas pattern. COMMENT: Comparison: Chest x-ray 8/6/2020. Technique: Supine portable frontal AP projection of the chest and abdomen were obtained. FINDINGS: CHEST: The tip of endotracheal tube terminates 3.7 cm above the haley. The lungs are hypoexpanded with redemonstrated elevation of the right hemidiaphragm. There are airspace opacities seen within the bilateral mid to lower lungs. There is suggestion of a small left pleural effusion. There is no pneumothorax. There is limited assessment of the cardiac silhouette on this AP expiratory projection. The mediastinal contours are unremarkable. There is no acute osseous abnormality. There is advanced left glenohumeral joint osteoarthritis. ABDOMEN: The tip of the enteric tube terminates over the left upper quadrant of the abdomen. Air is noted  within the normal caliber transverse and descending colon as well as within nondistended loops of small bowel within the right mid to upper abdomen. No distended air-filled loops of bowel are seen. There is no evidence of pneumoperitoneum on this limited single supine projection. The visualized osseous structures are unremarkable.     CT CHEST WITHOUT IV CONTRAST    Result Date: 2/9/2023  IMPRESSION: 1. Persistent elevation the right hemidiaphragm with associated compressive atelectasis. 2. Small bilateral pleural effusions. Consolidation in the adjacent lower lobes is in part due to compressive atelectasis. Superimposed pneumonia and/or aspiration is not excluded. 3. Minimal stranding in the right retroperitoneum, nonspecific. This approaches the partially imaged pancreatic head. Pancreatitis is felt be unlikely. Nevertheless, correlation with amylase and lipase should be made. 4. Other findings as detailed above.    X-RAY CHEST 1 VIEW    Result Date: 2/12/2023  IMPRESSION: Cardiomegaly. Mild Interstitial edema is improved. Mild basilar edema versus atelectasis.    X-RAY CHEST 1 VIEW    Result Date: 2/11/2023  IMPRESSION: Slight increase in bilateral perihilar airspace consolidation..     X-RAY CHEST 1 VIEW    Result Date: 2/10/2023  IMPRESSION: 1.  Low inspiratory volumes. Bibasilar opacities likely a combination of pleural effusions with underlying atelectasis or airspace disease. 2.  Vascular congestion and mild diffuse interstitial opacities suggestive of mild pulmonary edema. 3.  Support devices in place as described below COMMENT: Support lines, tubes, devices, and surgical hardware, material: NG/OG tube is seen coursing into the stomach. Monitoring leads/wires overly the patient. ET tube with tip approximately 4.2 cm above the haley. Lungs and Pleura: See Impression. No pneumothorax. Cardiovascular and Mediastinum: The cardiomediastinal silhouette is stable noting cardiomegaly and aortic atherosclerosis.  Other: Osseous structures appear unchanged. CLINICAL HISTORY:   Intubation PROCEDURE: Single frontal view of the chest. COMPARISON:   2/9/2023     X-RAY CHEST 1 VIEW    Result Date: 2/9/2023  IMPRESSION: See above.     X-RAY CHEST 1 VIEW    Result Date: 2/8/2023  IMPRESSION: Endotracheal and enteric tubes in satisfactory position, as below. No pneumothorax. The focal airspace opacities throughout both lungs which may reflect atelectasis, aspiration and/or pneumonia. Suggestion of a small left pleural effusion. Nonobstructive bowel gas pattern. COMMENT: Comparison: Chest x-ray 8/6/2020. Technique: Supine portable frontal AP projection of the chest and abdomen were obtained. FINDINGS: CHEST: The tip of endotracheal tube terminates 3.7 cm above the haley. The lungs are hypoexpanded with redemonstrated elevation of the right hemidiaphragm. There are airspace opacities seen within the bilateral mid to lower lungs. There is suggestion of a small left pleural effusion. There is no pneumothorax. There is limited assessment of the cardiac silhouette on this AP expiratory projection. The mediastinal contours are unremarkable. There is no acute osseous abnormality. There is advanced left glenohumeral joint osteoarthritis. ABDOMEN: The tip of the enteric tube terminates over the left upper quadrant of the abdomen. Air is noted within the normal caliber transverse and descending colon as well as within nondistended loops of small bowel within the right mid to upper abdomen. No distended air-filled loops of bowel are seen. There is no evidence of pneumoperitoneum on this limited single supine projection. The visualized osseous structures are unremarkable.       OUTPATIENT  FOLLOW-UP / REFERRALS / PENDING TESTS      Outpatient Follow-Up Appointments            In 3 weeks Sincere García MD [facility] in [place]          Referrals  No orders of the defined types were placed in this encounter.      Test Results  Pending at Discharge  Unresulted Labs (From admission, onward)     Start     Ordered    02/09/23 0600  Comprehensive metabolic panel  Daily      Question:  Release to patient  Answer:  Immediate   Start Status   02/15/23 0600 Scheduled   02/16/23 0600 Scheduled   02/17/23 0600 Scheduled   02/18/23 0600 Scheduled       02/08/23 1525    02/09/23 0600  CBC and Differential  Daily      Question:  Release to patient  Answer:  Immediate   Start Status   02/15/23 0600 Scheduled   02/16/23 0600 Scheduled   02/17/23 0600 Scheduled   02/18/23 0600 Scheduled       02/08/23 1525    02/09/23 0600  Magnesium  Daily      Question:  Release to patient  Answer:  Immediate   Start Status   02/15/23 0600 Scheduled   02/16/23 0600 Scheduled   02/17/23 0600 Scheduled   02/18/23 0600 Scheduled       02/08/23 1525                Important Issues to Address in Follow-Up    DISCHARGE DISPOSITION      Disposition: Home     Code Status At Discharge: DNR (A.N.D.)    Physician Order for Life-Sustaining Treatment Document Status      No documents found                 ATTENDING DOCUMENTATION  ALSO SEE ATTENDING ATTESTATION SECTION OF NOTE

## 2023-02-21 LAB
POCT ACT-LR: 133 SEC (ref 113–149)
POCT ACT-LR: 249 SEC (ref 113–149)
POCT ACT-LR: 268 SEC (ref 113–149)
POCT ACT-LR: 333 SEC (ref 113–149)
POCT ACT-LR: 373 SEC (ref 113–149)
POCT TEST: ABNORMAL
POCT TEST: NORMAL

## 2023-02-23 ENCOUNTER — OFFICE VISIT (OUTPATIENT)
Dept: FAMILY MEDICINE CLINIC | Facility: CLINIC | Age: 73
End: 2023-02-23

## 2023-02-23 VITALS — HEART RATE: 62 BPM | OXYGEN SATURATION: 96 % | SYSTOLIC BLOOD PRESSURE: 128 MMHG | DIASTOLIC BLOOD PRESSURE: 82 MMHG

## 2023-02-23 DIAGNOSIS — J96.01 ACUTE HYPOXEMIC RESPIRATORY FAILURE (HCC): ICD-10-CM

## 2023-02-23 DIAGNOSIS — I48.20 CHRONIC ATRIAL FIBRILLATION (HCC): Primary | ICD-10-CM

## 2023-02-23 DIAGNOSIS — R53.83 OTHER FATIGUE: ICD-10-CM

## 2023-02-23 RX ORDER — LISINOPRIL 5 MG/1
5 TABLET ORAL DAILY
COMMUNITY

## 2023-02-23 RX ORDER — PANTOPRAZOLE SODIUM 40 MG/1
40 TABLET, DELAYED RELEASE ORAL DAILY
COMMUNITY
Start: 2023-02-14

## 2023-02-23 RX ORDER — AMIODARONE HYDROCHLORIDE 200 MG/1
200 TABLET ORAL DAILY
COMMUNITY
Start: 2023-02-14

## 2023-02-23 RX ORDER — METOPROLOL SUCCINATE 50 MG/1
50 TABLET, EXTENDED RELEASE ORAL DAILY
COMMUNITY
Start: 2023-02-14

## 2023-02-23 NOTE — PROGRESS NOTES
Assessment/Plan:    No problem-specific Assessment & Plan notes found for this encounter  Diagnoses and all orders for this visit:    Chronic atrial fibrillation (HCC)    Other fatigue  -     CBC and differential; Future  -     Comprehensive metabolic panel; Future  -     TSH, 3rd generation; Future  -     T4, free; Future  -     Testosterone; Future  -     C-reactive protein; Future  -     CBC and differential  -     Comprehensive metabolic panel  -     TSH, 3rd generation  -     T4, free  -     Testosterone  -     C-reactive protein    Acute hypoxemic respiratory failure (HCC)    Other orders  -     pantoprazole (PROTONIX) 40 mg tablet; Take 40 mg by mouth daily  -     metoprolol succinate (TOPROL-XL) 50 mg 24 hr tablet; Take 50 mg by mouth daily  -     amiodarone 200 mg tablet; Take 200 mg by mouth daily  -     lisinopril (ZESTRIL) 5 mg tablet; Take 5 mg by mouth daily          Subjective:   Chief Complaint   Patient presents with   • Hospital Follow-up     Discharged 2/14/23  from 6 days at [facility] with A Fib        Patient ID: Vaishali Allison is a 71 y o  male  HPI    The following portions of the patient's history were reviewed and updated as appropriate: allergies, current medications, past family history, past medical history, past social history, past surgical history and problem list     Review of Systems   Constitutional: Negative for fatigue, fever and unexpected weight change  HENT: Negative for congestion, sinus pain and sore throat  Eyes: Negative for visual disturbance  Respiratory: Negative for shortness of breath and wheezing  Cardiovascular: Negative for chest pain and palpitations  Gastrointestinal: Negative for abdominal pain, nausea and vomiting  Musculoskeletal: Negative  Negative for arthralgias and myalgias  Neurological: Negative for syncope, weakness and numbness  Psychiatric/Behavioral: Negative    Negative for confusion, dysphoric mood and suicidal ideas          Objective:  Vitals:    02/23/23 1038   BP: 128/82   Pulse: 62   SpO2: 96%      Physical Exam  Constitutional:       Appearance: He is well-developed  HENT:      Right Ear: Ear canal normal  Tympanic membrane is not injected  Left Ear: Ear canal normal  Tympanic membrane is not injected  Nose: Nose normal    Eyes:      General:         Right eye: No discharge  Left eye: No discharge  Conjunctiva/sclera: Conjunctivae normal       Pupils: Pupils are equal, round, and reactive to light  Neck:      Thyroid: No thyromegaly  Cardiovascular:      Rate and Rhythm: Normal rate and regular rhythm  Heart sounds: Normal heart sounds  No murmur heard  Pulmonary:      Effort: Pulmonary effort is normal  No respiratory distress  Breath sounds: Normal breath sounds  No wheezing  Abdominal:      General: Bowel sounds are normal  There is no distension  Palpations: Abdomen is soft  Tenderness: There is no abdominal tenderness  Musculoskeletal:         General: Normal range of motion  Cervical back: Normal range of motion and neck supple  Lymphadenopathy:      Cervical: No cervical adenopathy  Skin:     General: Skin is warm and dry  Neurological:      Mental Status: He is alert and oriented to person, place, and time  He is not disoriented  Sensory: No sensory deficit  Gait: Gait normal       Deep Tendon Reflexes: Reflexes are normal and symmetric  Psychiatric:         Speech: Speech normal          Behavior: Behavior normal          Thought Content:  Thought content normal          Judgment: Judgment normal

## 2023-03-02 LAB
ALBUMIN SERPL-MCNC: 4.1 G/DL (ref 3.6–5.1)
ALBUMIN/GLOB SERPL: 1.8 (CALC) (ref 1–2.5)
ALP SERPL-CCNC: 54 U/L (ref 35–144)
ALT SERPL-CCNC: 23 U/L (ref 9–46)
AST SERPL-CCNC: 14 U/L (ref 10–35)
BASOPHILS # BLD AUTO: 38 CELLS/UL (ref 0–200)
BASOPHILS NFR BLD AUTO: 0.7 %
BILIRUB SERPL-MCNC: 0.9 MG/DL (ref 0.2–1.2)
BUN SERPL-MCNC: 14 MG/DL (ref 7–25)
BUN/CREAT SERPL: NORMAL (CALC) (ref 6–22)
CALCIUM SERPL-MCNC: 9.3 MG/DL (ref 8.6–10.3)
CHLORIDE SERPL-SCNC: 103 MMOL/L (ref 98–110)
CO2 SERPL-SCNC: 31 MMOL/L (ref 20–32)
CREAT SERPL-MCNC: 0.91 MG/DL (ref 0.7–1.28)
CRP SERPL-MCNC: 0.9 MG/L
EOSINOPHIL # BLD AUTO: 119 CELLS/UL (ref 15–500)
EOSINOPHIL NFR BLD AUTO: 2.2 %
ERYTHROCYTE [DISTWIDTH] IN BLOOD BY AUTOMATED COUNT: 13.5 % (ref 11–15)
GFR/BSA.PRED SERPLBLD CYS-BASED-ARV: 90 ML/MIN/1.73M2
GLOBULIN SER CALC-MCNC: 2.3 G/DL (CALC) (ref 1.9–3.7)
GLUCOSE SERPL-MCNC: 105 MG/DL (ref 65–139)
HCT VFR BLD AUTO: 46.2 % (ref 38.5–50)
HGB BLD-MCNC: 15.3 G/DL (ref 13.2–17.1)
LYMPHOCYTES # BLD AUTO: 1712 CELLS/UL (ref 850–3900)
LYMPHOCYTES NFR BLD AUTO: 31.7 %
MCH RBC QN AUTO: 29.9 PG (ref 27–33)
MCHC RBC AUTO-ENTMCNC: 33.1 G/DL (ref 32–36)
MCV RBC AUTO: 90.4 FL (ref 80–100)
MONOCYTES # BLD AUTO: 535 CELLS/UL (ref 200–950)
MONOCYTES NFR BLD AUTO: 9.9 %
NEUTROPHILS # BLD AUTO: 2997 CELLS/UL (ref 1500–7800)
NEUTROPHILS NFR BLD AUTO: 55.5 %
PLATELET # BLD AUTO: 202 THOUSAND/UL (ref 140–400)
PMV BLD REES-ECKER: 12.5 FL (ref 7.5–12.5)
POTASSIUM SERPL-SCNC: 4.4 MMOL/L (ref 3.5–5.3)
PROT SERPL-MCNC: 6.4 G/DL (ref 6.1–8.1)
RBC # BLD AUTO: 5.11 MILLION/UL (ref 4.2–5.8)
SODIUM SERPL-SCNC: 140 MMOL/L (ref 135–146)
T4 FREE SERPL-MCNC: 1.3 NG/DL (ref 0.8–1.8)
TESTOST SERPL-MCNC: 321 NG/DL (ref 250–827)
TSH SERPL-ACNC: 3.88 MIU/L (ref 0.4–4.5)
WBC # BLD AUTO: 5.4 THOUSAND/UL (ref 3.8–10.8)

## 2023-03-03 ENCOUNTER — TELEPHONE (OUTPATIENT)
Dept: FAMILY MEDICINE CLINIC | Facility: CLINIC | Age: 73
End: 2023-03-03

## 2023-03-03 NOTE — TELEPHONE ENCOUNTER
----- Message from Jolinda Klinefelter, MD sent at 3/3/2023  8:00 AM EST -----  Labs ok--testos lower end of NL but not low enuf for Rx  ----- Message -----  From: Greer Sharp Lab Results In  Sent: 3/3/2023  12:00 AM EST  To: Jolinda Klinefelter, MD

## 2023-03-07 ENCOUNTER — HOSPITAL ENCOUNTER (EMERGENCY)
Facility: HOSPITAL | Age: 73
Discharge: HOME | End: 2023-03-07
Attending: EMERGENCY MEDICINE | Admitting: EMERGENCY MEDICINE

## 2023-03-07 ENCOUNTER — APPOINTMENT (EMERGENCY)
Dept: CT IMAGING | Facility: HOSPITAL | Age: 73
End: 2023-03-07

## 2023-03-07 VITALS
HEART RATE: 63 BPM | WEIGHT: 242 LBS | TEMPERATURE: 97.5 F | HEIGHT: 71 IN | SYSTOLIC BLOOD PRESSURE: 155 MMHG | BODY MASS INDEX: 33.88 KG/M2 | DIASTOLIC BLOOD PRESSURE: 86 MMHG | OXYGEN SATURATION: 96 % | RESPIRATION RATE: 20 BRPM

## 2023-03-07 DIAGNOSIS — K80.50 BILIARY COLIC: Primary | ICD-10-CM

## 2023-03-07 LAB
ALBUMIN SERPL BCP-MCNC: 4.6 G/DL (ref 3.5–5)
ALP SERPL-CCNC: 65 U/L (ref 34–104)
ALT SERPL W P-5'-P-CCNC: 21 U/L (ref 7–52)
ANION GAP SERPL CALCULATED.3IONS-SCNC: 8 MMOL/L (ref 4–13)
AST SERPL W P-5'-P-CCNC: 19 U/L (ref 13–39)
ATRIAL RATE: 70 BPM
BASOPHILS # BLD AUTO: 0.04 THOUSANDS/ÂΜL (ref 0–0.1)
BASOPHILS NFR BLD AUTO: 1 % (ref 0–1)
BILIRUB SERPL-MCNC: 0.69 MG/DL (ref 0.2–1)
BILIRUB UR QL STRIP: NEGATIVE
BNP SERPL-MCNC: 59 PG/ML (ref 0–100)
BUN SERPL-MCNC: 14 MG/DL (ref 5–25)
CALCIUM SERPL-MCNC: 9.5 MG/DL (ref 8.4–10.2)
CARDIAC TROPONIN I PNL SERPL HS: 4 NG/L
CHLORIDE SERPL-SCNC: 101 MMOL/L (ref 96–108)
CLARITY UR: CLEAR
CO2 SERPL-SCNC: 29 MMOL/L (ref 21–32)
COLOR UR: YELLOW
CREAT SERPL-MCNC: 0.9 MG/DL (ref 0.6–1.3)
EOSINOPHIL # BLD AUTO: 0.13 THOUSAND/ÂΜL (ref 0–0.61)
EOSINOPHIL NFR BLD AUTO: 2 % (ref 0–6)
ERYTHROCYTE [DISTWIDTH] IN BLOOD BY AUTOMATED COUNT: 13.8 % (ref 11.6–15.1)
GFR SERPL CREATININE-BSD FRML MDRD: 85 ML/MIN/1.73SQ M
GLUCOSE SERPL-MCNC: 88 MG/DL (ref 65–140)
GLUCOSE UR STRIP-MCNC: NEGATIVE MG/DL
HCT VFR BLD AUTO: 49.6 % (ref 36.5–49.3)
HGB BLD-MCNC: 15.6 G/DL (ref 12–17)
HGB UR QL STRIP.AUTO: NEGATIVE
IMM GRANULOCYTES # BLD AUTO: 0.02 THOUSAND/UL (ref 0–0.2)
IMM GRANULOCYTES NFR BLD AUTO: 0 % (ref 0–2)
KETONES UR STRIP-MCNC: NEGATIVE MG/DL
LEUKOCYTE ESTERASE UR QL STRIP: NEGATIVE
LIPASE SERPL-CCNC: 11 U/L (ref 11–82)
LYMPHOCYTES # BLD AUTO: 1.64 THOUSANDS/ÂΜL (ref 0.6–4.47)
LYMPHOCYTES NFR BLD AUTO: 22 % (ref 14–44)
MCH RBC QN AUTO: 29.4 PG (ref 26.8–34.3)
MCHC RBC AUTO-ENTMCNC: 31.5 G/DL (ref 31.4–37.4)
MCV RBC AUTO: 94 FL (ref 82–98)
MONOCYTES # BLD AUTO: 0.66 THOUSAND/ÂΜL (ref 0.17–1.22)
MONOCYTES NFR BLD AUTO: 9 % (ref 4–12)
NEUTROPHILS # BLD AUTO: 5.15 THOUSANDS/ÂΜL (ref 1.85–7.62)
NEUTS SEG NFR BLD AUTO: 66 % (ref 43–75)
NITRITE UR QL STRIP: NEGATIVE
NRBC BLD AUTO-RTO: 0 /100 WBCS
P AXIS: 55 DEGREES
PH UR STRIP.AUTO: 6 [PH]
PLATELET # BLD AUTO: 183 THOUSANDS/UL (ref 149–390)
PMV BLD AUTO: 12.1 FL (ref 8.9–12.7)
POTASSIUM SERPL-SCNC: 3.8 MMOL/L (ref 3.5–5.3)
PR INTERVAL: 188 MS
PROT SERPL-MCNC: 7.7 G/DL (ref 6.4–8.4)
PROT UR STRIP-MCNC: NEGATIVE MG/DL
QRS AXIS: 26 DEGREES
QRSD INTERVAL: 90 MS
QT INTERVAL: 442 MS
QTC INTERVAL: 477 MS
RBC # BLD AUTO: 5.3 MILLION/UL (ref 3.88–5.62)
SODIUM SERPL-SCNC: 138 MMOL/L (ref 135–147)
SP GR UR STRIP.AUTO: 1.02 (ref 1–1.03)
T WAVE AXIS: 60 DEGREES
UROBILINOGEN UR STRIP-ACNC: <2 MG/DL
VENTRICULAR RATE: 70 BPM
WBC # BLD AUTO: 7.64 THOUSAND/UL (ref 4.31–10.16)

## 2023-03-07 RX ORDER — PANTOPRAZOLE SODIUM 40 MG/10ML
40 INJECTION, POWDER, LYOPHILIZED, FOR SOLUTION INTRAVENOUS ONCE
Status: COMPLETED | OUTPATIENT
Start: 2023-03-07 | End: 2023-03-07

## 2023-03-07 RX ORDER — ONDANSETRON 2 MG/ML
4 INJECTION INTRAMUSCULAR; INTRAVENOUS ONCE
Status: COMPLETED | OUTPATIENT
Start: 2023-03-07 | End: 2023-03-07

## 2023-03-07 RX ORDER — FENTANYL CITRATE 50 UG/ML
50 INJECTION, SOLUTION INTRAMUSCULAR; INTRAVENOUS ONCE
Status: COMPLETED | OUTPATIENT
Start: 2023-03-07 | End: 2023-03-07

## 2023-03-07 RX ADMIN — FENTANYL CITRATE 50 MCG: 50 INJECTION INTRAMUSCULAR; INTRAVENOUS at 19:39

## 2023-03-07 RX ADMIN — IOHEXOL 100 ML: 350 INJECTION, SOLUTION INTRAVENOUS at 21:17

## 2023-03-07 RX ADMIN — PANTOPRAZOLE SODIUM 40 MG: 40 INJECTION, POWDER, FOR SOLUTION INTRAVENOUS at 19:39

## 2023-03-07 RX ADMIN — SODIUM CHLORIDE 1000 ML: 0.9 INJECTION, SOLUTION INTRAVENOUS at 19:39

## 2023-03-07 RX ADMIN — ONDANSETRON 4 MG: 2 INJECTION INTRAMUSCULAR; INTRAVENOUS at 19:39

## 2023-03-08 NOTE — ED PROVIDER NOTES
History  Chief Complaint   Patient presents with   • Abdominal Pain     This is a 71-year-old female who presents for evaluation of epigastric pain nausea and vomiting that started around 5:00 this evening  Prior abdominal surgery includes pyloric stenosis repair  Patient did have radioablation for A-fib approximately 1 week ago  Denies any fevers or chills  No urinary symptoms  Does have a history of peptic ulcer disease and is currently on a proton pump inhibitor  No melena or hematochezia  History provided by:  Patient  Medical Problem  Location:  Epigastric  Quality:  Sharp pain  Severity:  Severe  Onset quality:  Gradual  Duration:  2 hours  Timing:  Constant  Progression:  Waxing and waning  Chronicity:  New  Context:  Epigastric pain and nausea and vomiting  Worsened by:  Palpation  Associated symptoms: abdominal pain, nausea and vomiting        Prior to Admission Medications   Prescriptions Last Dose Informant Patient Reported?  Taking?   amiodarone 200 mg tablet   Yes No   Sig: Take 200 mg by mouth daily   apixaban (Eliquis) 5 mg   No No   Sig: Take 1 tablet (5 mg total) by mouth 2 (two) times a day   aspirin (ECOTRIN LOW STRENGTH) 81 mg EC tablet   Yes No   Sig: Take by mouth   atorvastatin (LIPITOR) 20 mg tablet   Yes No   Sig: Take 1 tablet by mouth daily   lisinopril (ZESTRIL) 2 5 mg tablet   Yes No   Sig: Take 2 5 mg by mouth daily   lisinopril (ZESTRIL) 5 mg tablet   Yes No   Sig: Take 5 mg by mouth daily   metoprolol succinate (TOPROL-XL) 50 mg 24 hr tablet   Yes No   Sig: Take 50 mg by mouth daily   pantoprazole (PROTONIX) 40 mg tablet   Yes No   Sig: Take 40 mg by mouth daily      Facility-Administered Medications: None       Past Medical History:   Diagnosis Date   • A-fib (HCC)    • History of stomach ulcers    • Hyperlipidemia    • Hypertension    • Pyloric stenosis        Past Surgical History:   Procedure Laterality Date   • AV NODE ABLATION     • NH AMP MTCRPL W/FINGER/THUMB W/WO INTEROSS TRANSFER Left 3/27/2019    Procedure: INDEX FINGER AMPUTATION;  Surgeon: Zoe Mesa MD;  Location: [facility];  Service: Plastics   • SHOULDER SURGERY     • SPLIT THICKNESS SKIN GRAFT Left 3/27/2019    Procedure: MID AND RING FINGER SPLIT THICKNESS SKIN GRAFT;  Surgeon: Zoe Mesa MD;  Location[facility];  Service: Plastics       Family History   Problem Relation Age of Onset   • No Known Problems Mother    • No Known Problems Father    • Stroke Maternal Grandmother    • Stroke Paternal Grandmother    • Substance Abuse Son    • Alcohol abuse Son    • Mental illness Neg Hx      I have reviewed and agree with the history as documented  E-Cigarette/Vaping   • E-Cigarette Use Never User      E-Cigarette/Vaping Substances     Social History     Tobacco Use   • Smoking status: Former     Types: Pipe   • Smokeless tobacco: Never   Vaping Use   • Vaping Use: Never used   Substance Use Topics   • Alcohol use: Yes     Alcohol/week: 6 0 standard drinks     Types: 6 Cans of beer per week   • Drug use: No       Review of Systems   Gastrointestinal: Positive for abdominal pain, nausea and vomiting  All other systems reviewed and are negative  Physical Exam  Physical Exam  Vitals and nursing note reviewed  Constitutional:       Appearance: He is not toxic-appearing or diaphoretic  HENT:      Head: Normocephalic and atraumatic  Right Ear: External ear normal       Left Ear: External ear normal    Eyes:      General: No scleral icterus  Right eye: No discharge  Left eye: No discharge  Extraocular Movements: Extraocular movements intact  Pupils: Pupils are equal, round, and reactive to light  Cardiovascular:      Rate and Rhythm: Normal rate and regular rhythm  Pulses: Normal pulses  Heart sounds: No murmur heard  No friction rub  No gallop  Pulmonary:      Effort: Pulmonary effort is normal  No respiratory distress  Breath sounds: No stridor   No wheezing, rhonchi or rales  Abdominal:      Tenderness: There is abdominal tenderness  There is no guarding or rebound  Comments: Epigastric tenderness   Musculoskeletal:         General: No swelling, tenderness, deformity or signs of injury  Normal range of motion  Cervical back: Normal range of motion and neck supple  No rigidity or tenderness  Right lower leg: No edema  Left lower leg: No edema  Skin:     General: Skin is warm and dry  Coloration: Skin is not jaundiced  Findings: No bruising, erythema or rash  Neurological:      General: No focal deficit present  Mental Status: He is alert and oriented to person, place, and time  Cranial Nerves: No cranial nerve deficit  Sensory: No sensory deficit  Motor: No weakness  Coordination: Coordination normal    Psychiatric:         Mood and Affect: Mood normal          Behavior: Behavior normal          Thought Content:  Thought content normal          Vital Signs  ED Triage Vitals [03/07/23 1919]   Temperature Pulse Respirations Blood Pressure SpO2   97 5 °F (36 4 °C) 82 16 (!) 173/84 96 %      Temp Source Heart Rate Source Patient Position - Orthostatic VS BP Location FiO2 (%)   Oral Monitor Sitting Left arm --      Pain Score       10 - Worst Possible Pain           Vitals:    03/07/23 1919 03/07/23 2045 03/07/23 2130 03/07/23 2200   BP: (!) 173/84 163/89 148/73 155/86   Pulse: 82 64 67 63   Patient Position - Orthostatic VS: Sitting Sitting Sitting Sitting         Visual Acuity      ED Medications  Medications   sodium chloride 0 9 % bolus 1,000 mL (0 mL Intravenous Stopped 3/7/23 2111)   ondansetron (ZOFRAN) injection 4 mg (4 mg Intravenous Given 3/7/23 1939)   fentanyl citrate (PF) 100 MCG/2ML 50 mcg (50 mcg Intravenous Given 3/7/23 1939)   pantoprazole (PROTONIX) injection 40 mg (40 mg Intravenous Given 3/7/23 1939)   iohexol (OMNIPAQUE) 350 MG/ML injection (SINGLE-DOSE) 100 mL (100 mL Intravenous Given 3/7/23 2117)       Diagnostic Studies  Results Reviewed     Procedure Component Value Units Date/Time    UA w Reflex to Microscopic w Reflex to Culture [[id number]] Collected: 03/07/23 2122    Lab Status: Final result Specimen: Urine, Clean Catch Updated: 03/07/23 2151     Color, UA Yellow     Clarity, UA Clear     Specific [place], UA 1 020     pH, UA 6 0     Leukocytes, UA Negative     Nitrite, UA Negative     Protein, UA Negative mg/dl      Glucose, UA Negative mg/dl      Ketones, UA Negative mg/dl      Urobilinogen, UA <2 0 mg/dl      Bilirubin, UA Negative     Occult Blood, UA Negative    Lipase [[id number]]  (Normal) Collected: 03/07/23 1938    Lab Status: Final result Specimen: Blood from Arm, Right Updated: 03/07/23 2121     Lipase 11 u/L     B-Type Natriuretic Peptide(BNP) [[id number]]  (Normal) Collected: 03/07/23 1938    Lab Status: Final result Specimen: Blood from Arm, Right Updated: 03/07/23 2038     BNP 59 pg/mL     Comprehensive metabolic panel [[id number]] Collected: 03/07/23 1938    Lab Status: Final result Specimen: Blood from Arm, Right Updated: 03/07/23 2025     Sodium 138 mmol/L      Potassium 3 8 mmol/L      Chloride 101 mmol/L      CO2 29 mmol/L      ANION GAP 8 mmol/L      BUN 14 mg/dL      Creatinine 0 90 mg/dL      Glucose 88 mg/dL      Calcium 9 5 mg/dL      AST 19 U/L      ALT 21 U/L      Alkaline Phosphatase 65 U/L      Total Protein 7 7 g/dL      Albumin 4 6 g/dL      Total Bilirubin 0 69 mg/dL      eGFR 85 ml/min/1 73sq m     Narrative:      Raman guidelines for Chronic Kidney Disease (CKD):   •  Stage 1 with normal or high GFR (GFR > 90 mL/min/1 73 square meters)  •  Stage 2 Mild CKD (GFR = 60-89 mL/min/1 73 square meters)  •  Stage 3A Moderate CKD (GFR = 45-59 mL/min/1 73 square meters)  •  Stage 3B Moderate CKD (GFR = 30-44 mL/min/1 73 square meters)  •  Stage 4 Severe CKD (GFR = 15-29 mL/min/1 73 square meters)  •  Stage 5 End Stage CKD (GFR <15 mL/min/1 73 square meters)  Note: GFR calculation is accurate only with a steady state creatinine    HS Troponin 0hr (reflex protocol) [[id number]]  (Normal) Collected: 03/07/23 1938    Lab Status: Final result Specimen: Blood from Arm, Right Updated: 03/07/23 2020     hs TnI 0hr 4 ng/L     CBC and differential [[id number]]  (Abnormal) Collected: 03/07/23 1938    Lab Status: Final result Specimen: Blood from Arm, Right Updated: 03/07/23 1957     WBC 7 64 Thousand/uL      RBC 5 30 Million/uL      Hemoglobin 15 6 g/dL      Hematocrit 49 6 %      MCV 94 fL      MCH 29 4 pg      MCHC 31 5 g/dL      RDW 13 8 %      MPV 12 1 fL      Platelets 701 Thousands/uL      nRBC 0 /100 WBCs      Neutrophils Relative 66 %      Immat GRANS % 0 %      Lymphocytes Relative 22 %      Monocytes Relative 9 %      Eosinophils Relative 2 %      Basophils Relative 1 %      Neutrophils Absolute 5 15 Thousands/µL      Immature Grans Absolute 0 02 Thousand/uL      Lymphocytes Absolute 1 64 Thousands/µL      Monocytes Absolute 0 66 Thousand/µL      Eosinophils Absolute 0 13 Thousand/µL      Basophils Absolute 0 04 Thousands/µL                  CT abdomen pelvis with contrast   Final Result by Alen Figueroa MD (03/07 2154)      Mild pericholecystic inflammatory change which may reflect acute cholecystitis   Gallbladder ultrasound recommended            Workstation performed: [id number]                    Procedures  ECG 12 Lead Documentation Only    Date/Time: 3/7/2023 9:36 PM  Performed by: Ilana Escalante DO  Authorized by: Ilana Escalante DO     ECG reviewed by me, the ED Provider: yes    Patient location:  ED  Rate:     ECG rate:  70  Rhythm:     Rhythm: sinus rhythm    Conduction:     Conduction: normal    T waves:     T waves: normal               ED Course  ED Course as of 03/07/23 2219   Tue Mar 07, 2023   2214 Patient feeling better no acute distress no peritoneal findings on examination okay for outpatient follow-up with surgery I do not feel that patient requires inpatient admission at this time             HEART Risk Score    Flowsheet Row Most Recent Value   Heart Score Risk Calculator    History 0 Filed at: 03/07/2023 2137   ECG 0 Filed at: 03/07/2023 2137   Age 2 Filed at: 03/07/2023 2137   Risk Factors 1 Filed at: 03/07/2023 2137   Troponin 0 Filed at: 03/07/2023 2137   HEART Score 3 Filed at: 03/07/2023 2137                        SBIRT 22yo+    Flowsheet Row Most Recent Value   SBIRT (25 yo +)    In order to provide better care to our patients, we are screening all of our patients for alcohol and drug use  Would it be okay to ask you these screening questions? Yes Filed at: 03/07/2023 1922   Initial Alcohol Screen: US AUDIT-C     1  How often do you have a drink containing alcohol? 0 Filed at: 03/07/2023 1922   2  How many drinks containing alcohol do you have on a typical day you are drinking? 0 Filed at: 03/07/2023 1922   3a  Male UNDER 65: How often do you have five or more drinks on one occasion? 0 Filed at: 03/07/2023 1922   3b  FEMALE Any Age, or MALE 65+: How often do you have 4 or more drinks on one occassion? 0 Filed at: 03/07/2023 1922   Audit-C Score 0 Filed at: 03/07/2023 1922   SHIRA: How many times in the past year have you    Used an illegal drug or used a prescription medication for non-medical reasons? Never Filed at: 03/07/2023 1922                    Medical Decision Making  Epigastric pain differential includes peptic ulcer disease versus cholecystitis gastritis or pancreatitis work-up in progress including lab work and CAT scan    Amount and/or Complexity of Data Reviewed  Labs: ordered  Radiology: ordered  Risk  Prescription drug management            Disposition  Final diagnoses:   Biliary colic     Time reflects when diagnosis was documented in both MDM as applicable and the Disposition within this note     Time User Action Codes Description Comment    3/7/2023 10:16 PM Lenny Hanna Add [Z97 [id number]] Biliary colic       ED Disposition     ED Disposition   Discharge    Condition   Stable    Date/Time   Tue Mar 7, 2023 10:16 PM    Comment   Katy Marx discharge to home/self care  Follow-up Information     Follow up With Specialties Details Why Contact Info Additional Terence Watts [address] Emergency Medicine  As needed, If symptoms worsen [address] [id number]  [address] [facility] Emergency Department, [address] [facility], [facility], [address]    Doni Mendoza MD General Surgery In 1 week Further evaluation of gallbladder [address] [facility]  [phone]             Patient's Medications   Discharge Prescriptions    No medications on file       No discharge procedures on file      PDMP Review     None          ED Provider  Electronically Signed by           Eugene Desir DO  03/07/23 3528

## 2023-03-08 NOTE — ED TRIAGE NOTES
Pt arrives ambulatory to the ED from home with c/o epigastric abdominal pain and vomiting that started at 1700 tonight  Reports taking Aleeve PTA  Denies fevers and diarrhea

## 2023-03-08 NOTE — ED NOTES
Patient transported to [facility], [address] [place] [address]  03/07/23 7848

## 2023-03-08 NOTE — ED NOTES
Patient made aware of needing a urine specimen, pt currently unable to provide one at this time       Adin Davis RN  03/07/23 1930

## 2023-03-13 ENCOUNTER — TELEPHONE (OUTPATIENT)
Dept: SCHEDULING | Facility: CLINIC | Age: 73
End: 2023-03-13
Payer: MEDICARE

## 2023-03-13 RX ORDER — AMIODARONE HYDROCHLORIDE 200 MG/1
200 TABLET ORAL DAILY
Qty: 30 TABLET | Refills: 6 | Status: SHIPPED | OUTPATIENT
Start: 2023-03-13 | End: 2023-04-06 | Stop reason: SDUPTHER

## 2023-03-13 RX ORDER — METOPROLOL SUCCINATE 50 MG/1
50 TABLET, EXTENDED RELEASE ORAL DAILY
Qty: 30 TABLET | Refills: 0 | Status: SHIPPED | OUTPATIENT
Start: 2023-03-13 | End: 2023-04-06 | Stop reason: SDUPTHER

## 2023-03-13 NOTE — TELEPHONE ENCOUNTER
Pt requesting to schedule 3 week post surgery follow up    Requesting pm or [facility] location    No avail apts    Pt [phone]

## 2023-03-16 ENCOUNTER — CONSULT (OUTPATIENT)
Dept: SURGERY | Facility: HOSPITAL | Age: 73
End: 2023-03-16

## 2023-03-16 VITALS
DIASTOLIC BLOOD PRESSURE: 92 MMHG | BODY MASS INDEX: 34.83 KG/M2 | SYSTOLIC BLOOD PRESSURE: 144 MMHG | WEIGHT: 248.8 LBS | TEMPERATURE: 96.3 F | HEART RATE: 64 BPM | HEIGHT: 71 IN | RESPIRATION RATE: 16 BRPM

## 2023-03-16 DIAGNOSIS — R10.13 EPIGASTRIC PAIN: Primary | ICD-10-CM

## 2023-03-20 ASSESSMENT — ENCOUNTER SYMPTOMS
RESPIRATORY NEGATIVE: 1
GASTROINTESTINAL NEGATIVE: 1
EYES NEGATIVE: 1
NEUROLOGICAL NEGATIVE: 1
CARDIOVASCULAR NEGATIVE: 1
ENDOCRINE NEGATIVE: 1
HEMATOLOGIC/LYMPHATIC NEGATIVE: 1
MUSCULOSKELETAL NEGATIVE: 1
CONSTITUTIONAL NEGATIVE: 1
PSYCHIATRIC NEGATIVE: 1

## 2023-03-20 NOTE — PROGRESS NOTES
Electrophysiology  Outpatient Progress Note       Reason for visit:   Chief Complaint   Patient presents with   • Atrial Fibrillation       HPI   Humberto Chung is a 72 y.o. male who is seen today for follow up of his atrial fibrillation and hypertension.     He underwent PVI on 2/8/23. Post procedurally, he was reintubated due to hypoxia in the setting of a chronically elevated right hemidiaphragm. Dofetilide and Eliquis were on hold given intubation. Initially, he was in and out of atrial fibrillation but then it became persistent on 2/10. He was transitioned from IV amiodarone to oral amiodarone. He was discharged from the hospital on 2/14.    He presents today for follow up and reports he is feeling well. His breathing has greatly improved. He was seen in the ER 3/7 with abdominal pain. He is scheduled for an ultrasound tomorrow but this is felt to be a stomach ulcer.     Past Medical History:   Diagnosis Date   • A-fib (CMS/HCC)    • A-fib (CMS/HCC) 7/25/2018   • Chronic anticoagulation 7/25/2018   • HTN (hypertension) 6/24/2020   • Lipid disorder    • Visit for monitoring Tikosyn therapy 7/25/2019     Past Surgical History:   Procedure Laterality Date   • ABLATION OF DYSRHYTHMIC FOCUS     • CARDIAC CATHETERIZATION     • TOTAL SHOULDER REPLACEMENT Right        Social History     Tobacco Use   • Smoking status: Some Days   • Smokeless tobacco: Never   Vaping Use   • Vaping status: Never Used   Substance Use Topics   • Alcohol use: Yes   • Drug use: Defer     Family History   Adopted: Yes   Family history unknown: Yes       ALLERGY:  Aspirin and Penicillins    Current Outpatient Medications   Medication Sig Dispense Refill   • apixaban (ELIQUIS) 5 mg tablet Take 1 tablet (5 mg total) by mouth 2 (two) times a day. 180 tablet 1   • atorvastatin (LIPITOR) 20 mg tablet TAKE ONE TABLET BY MOUTH DAILY 90 tablet 3   • lisinopriL (PRINIVIL) 5 mg tablet Take 1 tablet (5 mg total) by mouth daily. 90 tablet 3   •  amiodarone (PACERONE) 200 mg tablet Take 1 tablet (200 mg total) by mouth daily. 90 tablet 1   • metoprolol succinate XL (TOPROL-XL) 50 mg 24 hr tablet Take 1 tablet (50 mg total) by mouth daily. 90 tablet 1     No current facility-administered medications for this visit.     Review of Systems   Constitutional: Negative.   HENT: Negative.    Eyes: Negative.    Cardiovascular: Negative.    Respiratory: Negative.    Endocrine: Negative.    Hematologic/Lymphatic: Negative.    Skin: Negative.    Musculoskeletal: Negative.    Gastrointestinal: Negative.    Genitourinary: Negative.    Neurological: Negative.    Psychiatric/Behavioral: Negative.      Objective   Vitals:    03/21/23 1400   BP: 140/82   Pulse: 67   Resp: 16     BP Readings from Last 3 Encounters:   03/21/23 140/82   02/14/23 133/62   01/20/23 140/82       Physical Exam  Constitutional:       Appearance: He is well-developed.   HENT:      Head: Normocephalic.   Eyes:      Conjunctiva/sclera: Conjunctivae normal.      Pupils: Pupils are equal, round, and reactive to light.   Cardiovascular:      Rate and Rhythm: Normal rate and regular rhythm.      Heart sounds: Normal heart sounds. No murmur heard.     No friction rub. No gallop.   Pulmonary:      Effort: Pulmonary effort is normal.      Breath sounds: Normal breath sounds.   Abdominal:      General: Bowel sounds are normal.      Palpations: Abdomen is soft.   Musculoskeletal:         General: Normal range of motion.      Cervical back: Normal range of motion.      Comments: Status post left index finger amputation.   Skin:     General: Skin is warm and dry.   Neurological:      Mental Status: He is alert and oriented to person, place, and time.   Psychiatric:         Behavior: Behavior normal.         Thought Content: Thought content normal.         Judgment: Judgment normal.         Lab Results   Component Value Date    WBC 8.39 02/14/2023    HGB 14.9 02/14/2023     02/14/2023    CHOL 148 07/22/2022     TRIG 73 07/22/2022    HDL 43 07/22/2022    ALT 20 02/14/2023    AST 20 02/14/2023     02/14/2023    K 3.4 (L) 02/14/2023    CREATININE 1.1 02/14/2023    TSH 1.89 06/14/2021     Cardiovascular Procedures:    CATH LAB:  Cath (Normal Coronaries) - 2008    ELECTROPHYSIOLOGY:  2/8/23 Catheter Ablation   Conclusion  Abnormal atrial function with history of recurrent paroxysmal atrial fibrillation and remote previous pulmonary vein isolation.  Reisolation of the pulmonary veins utilizing wide circumferential lesion sets to include the ipsilateral veins along with the haley was performed along with SVC isolation.    EPS (PV ablation afib during study) - 12/11/2009  Holter (apc one 6 beat cst no aifb) - 5/13/2015    STRESS TESTS:    ECHO 5/28/2020  MPI (EF 0.60 (60%), lateral wal defect) - 6/9/2008  · Mean gradient = 5.00 mmHg. Peak velocity = 1.50 m/s. Calculated area = 3.15 cm2.  · Normal-sized LV. Normal LV wall thickness.  · Preserved LV systolic function. Estimated EF 65%.  · Normal-sized RV. Normal RV systolic function.  · Normal-sized LA.  · Normal-sized RA.  · Aortic root normal. Sinuses of Valsalva normal-sized. Ascending aorta normal-sized. Aortic arch normal-sized. Descending aorta normal-sized.  · Aortic valve structure is normal. Tricuspid aortic valve. No aortic valve regurgitation. No aortic valve stenosis.  · Normal leaflet structure and normal leaflet motion of the mitral valve.  · No significant mitral valve regurgitation.  · No significant mitral valve stenosis.  · Tricuspid valve structure is normal. Mild tricuspid valve regurgitation.  · No significant tricuspid valve stenosis.  · Normal pulmonic valve structure. No pulmonic valve regurgitation. No pulmonic valve stenosis.  · IVC not well visualized.  · Normal pericardial structure. No evidence of pericardial effusion. No cardiac tamponade.  · Normal 2D echocardiogram color-flow Doppler study     Stress Echo 7/15/20  No evidence of ischemia  with stress echocardiogram  The patient completed 7 mets of exercise achieved target heart rate  Baseline EKG normal  No changes diagnostic of ischemia with exercise frequent single VPCs noted with exercise  Baseline echocardiogram mildly dilated aortic root 4.0 cm  Mildly dilated left atrium  Mild left ventricular hypertrophy  No regional wall motion abnormality preserved ejection fraction 65%  No structural valvular disease  Trace mitral regurgitation  Mild tricuspid regurgitation estimated pulmonary systolic pressure 35 mmHg  All walls became hyperdynamic with exercise       Other (Admitted for Tikosyn loading) - 6/10/2015     ECG sinus rhythm with occasional PVCs.     Assessment   Problem List Items Addressed This Visit        Circulatory    Atrial fibrillation (CMS/HCC)     He has a history of paroxysmal atrial fibrillation is status post PVI in 2/8/2023.  Post procedurally, he was reintubated due to hypoxia in the setting of  a chronically elevated right hemidiaphragm.  Preprocedurally, he was maintained on dofetilide and Eliquis which had to be held given intubation.  While intubated he was in and out of atrial fibrillation which then became persistent on 2/10 and he was transitioned to IV amiodarone.  He was eventually transitioned to PO Amiodarone. He was placed back on Eliquis 5 mg BID.     He remains in sinus rhythm today and is tolerating his medications without side effects.  He will continue on amiodarone 200 mg daily and Eliquis 5 mg twice daily.         Relevant Orders    ECG 12 LEAD-OFFICE PERFORMED (Completed)    Atrial fibrillation, unspecified type (CMS/HCC) - Primary    Relevant Orders    ECG 12 LEAD-OFFICE PERFORMED (Completed)       Other    Chronic anticoagulation     He is maintained on Eliquis 5 mg BID and is tolerating it well without side effects.                  Sincere García MD  03/21/2023

## 2023-03-21 ENCOUNTER — OFFICE VISIT (OUTPATIENT)
Dept: CARDIOLOGY | Facility: CLINIC | Age: 73
End: 2023-03-21
Payer: MEDICARE

## 2023-03-21 VITALS
BODY MASS INDEX: 35 KG/M2 | DIASTOLIC BLOOD PRESSURE: 82 MMHG | WEIGHT: 250 LBS | SYSTOLIC BLOOD PRESSURE: 140 MMHG | RESPIRATION RATE: 16 BRPM | HEART RATE: 67 BPM | HEIGHT: 71 IN

## 2023-03-21 DIAGNOSIS — I48.0 PAROXYSMAL ATRIAL FIBRILLATION (CMS/HCC): ICD-10-CM

## 2023-03-21 DIAGNOSIS — Z79.01 CHRONIC ANTICOAGULATION: ICD-10-CM

## 2023-03-21 DIAGNOSIS — I48.91 ATRIAL FIBRILLATION, UNSPECIFIED TYPE (CMS/HCC): Primary | ICD-10-CM

## 2023-03-21 PROBLEM — Z79.899 VISIT FOR MONITORING TIKOSYN THERAPY: Status: RESOLVED | Noted: 2019-07-25 | Resolved: 2023-03-21

## 2023-03-21 PROBLEM — Z51.81 VISIT FOR MONITORING TIKOSYN THERAPY: Status: RESOLVED | Noted: 2019-07-25 | Resolved: 2023-03-21

## 2023-03-21 PROCEDURE — G8754 DIAS BP LESS 90: HCPCS | Performed by: INTERNAL MEDICINE

## 2023-03-21 PROCEDURE — 93000 ELECTROCARDIOGRAM COMPLETE: CPT | Performed by: INTERNAL MEDICINE

## 2023-03-21 PROCEDURE — 99214 OFFICE O/P EST MOD 30 MIN: CPT | Performed by: INTERNAL MEDICINE

## 2023-03-21 PROCEDURE — G8753 SYS BP > OR = 140: HCPCS | Performed by: INTERNAL MEDICINE

## 2023-03-21 NOTE — ASSESSMENT & PLAN NOTE
He has a history of paroxysmal atrial fibrillation is status post PVI in 2/8/2023.  Post procedurally, he was reintubated due to hypoxia in the setting of  a chronically elevated right hemidiaphragm.  Preprocedurally, he was maintained on dofetilide and Eliquis which had to be held given intubation.  While intubated he was in and out of atrial fibrillation which then became persistent on 2/10 and he was transitioned to IV amiodarone.  He was eventually transitioned to PO Amiodarone. He was placed back on Eliquis 5 mg BID.     He remains in sinus rhythm today and is tolerating his medications without side effects.  He will continue on amiodarone 200 mg daily and Eliquis 5 mg twice daily.

## 2023-03-22 ENCOUNTER — HOSPITAL ENCOUNTER (OUTPATIENT)
Dept: ULTRASOUND IMAGING | Facility: HOSPITAL | Age: 73
Discharge: HOME | End: 2023-03-22
Attending: SURGERY

## 2023-03-22 DIAGNOSIS — R10.13 EPIGASTRIC PAIN: ICD-10-CM

## 2023-03-27 NOTE — PROGRESS NOTES
Assessment/Plan:   Kristy Dong is a 71 y o male who is here for Epigastric Pain (New patient evaluated at [facility] ER on 3/7/23 for evaluation of epigastric pain with n/v  US noted inflammatory changes but no gallstones  Is following a low fat diet presently  Has had no symptoms since  First time for experiencing these symptoms  History of pyloromyotomy as a child  Cardiac history includes A-fib-on Eliquis- CAD-Recent cardiac ablation done at [facility] )    Plan: Abdominal pain r/o cholecystitis - currently no pain, no sxs previously or since, difficultness to assess biliary etiology given only one episode, findings on CT non specific, will obtain RUQ u/s and patient will keep abdominal pain log and f/u in few weeks to discuss results     Preoperative Clearance: None    HPI:  Kristy Dong is a 71 y o male who was referred for evaluation of Epigastric Pain (New patient evaluated at [address] [place] [address] ER on 3/7/23 for evaluation of epigastric pain with n/v  US noted inflammatory changes but no gallstones  Is following a low fat diet presently  Has had no symptoms since  First time for experiencing these symptoms  History of pyloromyotomy as a child  Cardiac history includes A-fib-on Eliquis- CAD-Recent cardiac ablation done at [facility] )    Currently no pain       ROS:  General ROS: negative  negative for - chills, fatigue, fever or night sweats, weight loss  Respiratory ROS: no cough, shortness of breath, or wheezing  Cardiovascular ROS: no chest pain or dyspnea on exertion  Genito-Urinary ROS: no dysuria, trouble voiding, or hematuria  Musculoskeletal ROS: negative for - gait disturbance, joint pain or muscle pain  Neurological ROS: no TIA or stroke symptoms  No abd sxs    [unfilled]  Penicillins    Current Outpatient Medications:   •  amiodarone 200 mg tablet, Take 200 mg by mouth daily, Disp: , Rfl:   •  apixaban (Eliquis) 5 mg, Take 1 tablet (5 mg total) by mouth 2 (two) times a day, Disp: 180 tablet, Rfl: 3  •  aspirin (ECOTRIN LOW STRENGTH) 81 mg EC tablet, Take by mouth, Disp: , Rfl:   •  atorvastatin (LIPITOR) 20 mg tablet, Take 1 tablet by mouth daily, Disp: , Rfl:   •  lisinopril (ZESTRIL) 5 mg tablet, Take 5 mg by mouth daily, Disp: , Rfl:   •  metoprolol succinate (TOPROL-XL) 50 mg 24 hr tablet, Take 50 mg by mouth daily, Disp: , Rfl:   •  pantoprazole (PROTONIX) 40 mg tablet, Take 40 mg by mouth daily, Disp: , Rfl:   •  lisinopril (ZESTRIL) 2 5 mg tablet, Take 2 5 mg by mouth daily, Disp: , Rfl:   Past Medical History:   Diagnosis Date   • [facility] )    • History of stomach ulcers    • Hyperlipidemia    • Hypertension    • Pyloric stenosis      Past Surgical History:   Procedure Laterality Date   • AV NODE ABLATION     • VA AMP MTCRPL W/FINGER/THUMB W/WO INTEROSS TRANSFER Left 3/27/2019    Procedure: INDEX FINGER AMPUTATION;  Surgeon: Jeb Bearden MD;  [facility];  Service: Plastics   • SHOULDER SURGERY     • SPLIT THICKNESS SKIN GRAFT Left 3/27/2019    Procedure: MID AND RING FINGER SPLIT THICKNESS SKIN GRAFT;  Surgeon: Jeb Bearden MD;  [facility];  Service: Plastics     Family History   Problem Relation Age of Onset   • No Known Problems Mother    • No Known Problems Father    • Stroke Maternal Grandmother    • Stroke Paternal Grandmother    • Substance Abuse Son    • Alcohol abuse Son    • Mental illness Neg Hx       reports that he has quit smoking  His smoking use included pipe  He has never used smokeless tobacco  He reports current alcohol use of about 6 0 standard drinks per week  He reports that he does not use drugs      Labs:   Lab Results   Component Value Date    WBC 7 64 03/07/2023    WBC 5 4 03/02/2023    HGB 15 6 03/07/2023    HGB 15 3 03/02/2023     03/07/2023     03/02/2023     Lab Results   Component Value Date    ALT 21 03/07/2023    ALT 23 03/02/2023    ALT 23 03/05/2019    AST 19 03/07/2023    AST 14 03/02/2023    AST 22 03/05/2019     This SmartLink has "not been configured with any valid records  PHYSICAL EXAM  General Appearance:    Alert, cooperative, no distress,    Head:    Normocephalic without obvious abnormality   Eyes:    PERRL, conjunctiva/corneas clear, EOM's intact        Neck:   Supple, no adenopathy, no JVD   Back:     Symmetric, no spinal or CVA tenderness   Lungs:     Clear to auscultation bilaterally, no wheezing or rhonchi   Heart:    Regular rate and rhythm, S1 and S2 normal, no murmur   Abdomen:     Soft +BS ND NT   Extremities:   Extremities normal  No clubbing, cyanosis or edema   Psych:   Normal Affect, AOx3  Neurologic:  Skin:   CNII-XII intact  Strength symmetric, speech intact    Warm, dry, intact, no visible rashes or lesions         Physical Exam              Some portions of this record may have been generated with voice recognition software  There may be translation, syntax,  or grammatical errors  Occasional wrong word or \"sound-a-like\" substitutions may have occurred due to the inherent limitations of the voice recognition software  Read the chart carefully and recognize, using context, where substitutions may have occurred  If you have any questions, please contact the dictating provider for clarification or correction, as needed  This encounter has been coded by a non-certified coder     "

## 2023-04-27 ENCOUNTER — OFFICE VISIT (OUTPATIENT)
Dept: GASTROENTEROLOGY | Facility: CLINIC | Age: 73
End: 2023-04-27

## 2023-04-27 ENCOUNTER — TELEPHONE (OUTPATIENT)
Dept: GASTROENTEROLOGY | Facility: CLINIC | Age: 73
End: 2023-04-27

## 2023-04-27 VITALS
SYSTOLIC BLOOD PRESSURE: 124 MMHG | TEMPERATURE: 98.4 F | WEIGHT: 251.6 LBS | BODY MASS INDEX: 35.22 KG/M2 | DIASTOLIC BLOOD PRESSURE: 70 MMHG | HEIGHT: 71 IN

## 2023-04-27 DIAGNOSIS — R93.2 ABNORMAL ULTRASOUND OF LIVER: Primary | ICD-10-CM

## 2023-04-27 DIAGNOSIS — R10.13 EPIGASTRIC PAIN: ICD-10-CM

## 2023-04-27 NOTE — PROGRESS NOTES
[facility] - Outpatient Consultation  Tio Barnett 71 y o  male MRN: [id number]  Encounter: [id number]          ASSESSMENT AND PLAN:      1  Epigastric pain  - Ambulatory Referral to Gastroenterology  - EGD; Future  2  Abnormal ultrasound of liver  - MRI abdomen w wo contrast; Future    71-year-old male with atrial fibrillation, ANANDA presenting for evaluation of transient upper abdominal pain  Differential includes biliary etiology versus peptic ulcer disease versus functional dyspepsia, less likely malignancy  Discussed that an upper endoscopy would be reasonable given the degree of his pain and history of ulcer disease in the past   He is open to this however he reports that if any event would require intubation he completely will decline this  Discussed that intubation is not anticipated however it is often in the consent of anesthesia that if an emergency were to occur this would be required  He will discuss this with anesthesia on the day of procedure, and is comfortable with procedure being canceled if this cannot be accommodated  In the meantime I recommended that he continue Protonix  We will follow-up after EGD  Regarding question of 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 hepatic venous fistula seen on ultrasound, will plan for MRI of the abdomen to further evaluate   ______________________________________________________________________    HPI: 71-year-old male with atrial fibrillation on amiodarone and Eliquis, ANANDA, presenting for evaluation of severe transient upper abdominal pain  Roughly 6 weeks ago he had severe epigastric pain that would not go away thus he went to the ED for evaluation  Right upper quadrant ultrasound demonstrated a mild diffuse gallbladder without cholelithiasis or other evidence of cholecystitis    There was also notable hypodense vascular focus with communication to adjacent portal vein 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, MRI of the abdomen was recommended  CT abdomen pelvis demonstrated mild paracolic cholecystic inflammatory change, otherwise unremarkable  He has a reported distant history of both esophageal and gastric ulcers, with last EGD a few decades ago  He has been off and on PPI, most recently on Protonix  He denies significant GERD symptoms  His weight has been stable  He expresses a concern about intubation as he had a poor experience recently with prolonged intubation after attempt of A-fib ablation  Regarding the concern for possible cholecystitis he recently saw general surgery Dr Lucy Altman with plans to monitor clinically due to an isolated episode  Surgical history includes pyloromyotomy for pyloric stenosis at age 7 weeks  REVIEW OF SYSTEMS:    CONSTITUTIONAL: Denies any fever, chills, rigors, and weight loss  HEENT: Denies odynophagia, tinnitus  CARDIOVASCULAR: No chest pain or palpitations  RESPIRATORY: Denies any cough, hemoptysis, shortness of breath or dyspnea on exertion  GASTROINTESTINAL: As noted in the History of Present Illness  GENITOURINARY: No problems with urination  Denies any hematuria or dysuria  NEUROLOGIC: No dizziness or vertigo, denies headaches  MUSCULOSKELETAL: Denies any muscle or joint pain  SKIN: Denies skin rashes or itching  ENDOCRINE:  Denies intolerance to heat or cold  PSYCHOSOCIAL: Denies depression or anxiety  Denies any recent memory loss         Historical Information   Past Medical History:   Diagnosis Date   • A-fib [facility])    • History of stomach ulcers    • Hyperlipidemia    • Hypertension    • Pyloric stenosis      Past Surgical History:   Procedure Laterality Date   • AV NODE ABLATION     • COLONOSCOPY     • [place] AMP MTCRPL W/FINGER/THUMB W/WO INTEROSS TRANSFER Left 03/27/2019    Procedure: INDEX FINGER AMPUTATION;  Surgeon: Gerard Masterson MD;  [facility];  Service: Plastics   • SHOULDER SURGERY     • SPLIT THICKNESS SKIN GRAFT Left 03/27/2019    Procedure: MID "AND RING FINGER SPLIT THICKNESS SKIN GRAFT;  Surgeon: Jeannette Villarreal MD;  [facility];  Service: Plastics     Social History   Social History     Substance and Sexual Activity   Alcohol Use Yes   • Alcohol/week: 6 0 standard drinks   • Types: 6 Cans of beer per week     Social History     Substance and Sexual Activity   Drug Use No     Social History     Tobacco Use   Smoking Status Former   • Types: Pipe   Smokeless Tobacco Never     Family History   Problem Relation Age of Onset   • No Known Problems Mother    • No Known Problems Father    • Stroke Maternal Grandmother    • Stroke Paternal Grandmother    • Substance Abuse Son    • Alcohol abuse Son    • Mental illness Neg Hx        Meds/Allergies       Current Outpatient Medications:   •  amiodarone 200 mg tablet  •  apixaban (Eliquis) 5 mg  •  aspirin (ECOTRIN LOW STRENGTH) 81 mg EC tablet  •  atorvastatin (LIPITOR) 20 mg tablet  •  lisinopril (ZESTRIL) 5 mg tablet  •  metoprolol succinate (TOPROL-XL) 50 mg 24 hr tablet  •  pantoprazole (PROTONIX) 40 mg tablet  •  lisinopril (ZESTRIL) 2 5 mg tablet    Allergies   Allergen Reactions   • Penicillins Other (See Comments)     REACTION UNKNOWN-OCCURRED AS AN INFANT           Objective     Blood pressure 124/70, temperature 98 4 °F (36 9 °C), temperature source Tympanic, height 5' 11\" (1 803 m), weight 114 kg (251 lb 9 6 oz)  Body mass index is 35 09 kg/m²  PHYSICAL EXAM:      General Appearance:   Alert, cooperative, no distress   HEENT:   Normocephalic, atraumatic, anicteric  Neck:  Supple, symmetrical, trachea midline   Lungs:   Clear to auscultation bilaterally; no rales, rhonchi or wheezing; respirations unlabored    Heart[de-identified]   Regular rate and rhythm; no murmur, rub, or gallop     Abdomen:   Soft, non-tender, non-distended; normal bowel sounds; no masses, no organomegaly    Genitalia:   Deferred    Rectal:   Deferred    Extremities:  No cyanosis, clubbing or edema    Pulses:  2+ and symmetric  " Skin:  No jaundice, rashes, or lesions          Lab Results:   No visits with results within 1 Day(s) from this visit     Latest known visit with results is:   Admission on 03/07/2023, Discharged on 03/07/2023   Component Date Value   • WBC 03/07/2023 7 64    • RBC 03/07/2023 5 30    • Hemoglobin 03/07/2023 15 6    • Hematocrit 03/07/2023 49 6 (H)    • MCV 03/07/2023 94    • MCH 03/07/2023 29 4    • MCHC 03/07/2023 31 5    • RDW 03/07/2023 13 8    • MPV 03/07/2023 12 1    • Platelets 37/47/9795 183    • nRBC 03/07/2023 0    • Neutrophils Relative 03/07/2023 66    • Immat GRANS % 03/07/2023 0    • Lymphocytes Relative 03/07/2023 22    • Monocytes Relative 03/07/2023 9    • Eosinophils Relative 03/07/2023 2    • Basophils Relative 03/07/2023 1    • Neutrophils Absolute 03/07/2023 5 15    • Immature Grans Absolute 03/07/2023 0 02    • Lymphocytes Absolute 03/07/2023 1 64    • Monocytes Absolute 03/07/2023 0 66    • Eosinophils Absolute 03/07/2023 0 13    • Basophils Absolute 03/07/2023 0 04    • Sodium 03/07/2023 138    • Potassium 03/07/2023 3 8    • Chloride 03/07/2023 101    • CO2 03/07/2023 29    • ANION GAP 03/07/2023 8    • BUN 03/07/2023 14    • Creatinine 03/07/2023 0 90    • Glucose 03/07/2023 88    • Calcium 03/07/2023 9 5    • AST 03/07/2023 19    • ALT 03/07/2023 21    • Alkaline Phosphatase 03/07/2023 65    • Total Protein 03/07/2023 7 7    • Albumin 03/07/2023 4 6    • Total Bilirubin 03/07/2023 0 69    • eGFR 03/07/2023 85    • Lipase 03/07/2023 11    • hs TnI 0hr 03/07/2023 4    • BNP 03/07/2023 59    • Color, UA 03/07/2023 Yellow    • Clarity, UA 03/07/2023 Clear    • Specific Gravity, UA 03/07/2023 1 020    • pH, UA 03/07/2023 6 0    • Leukocytes, UA 03/07/2023 Negative    • Nitrite, UA 03/07/2023 Negative    • Protein, UA 03/07/2023 Negative    • Glucose, UA 03/07/2023 Negative    • Ketones, UA 03/07/2023 Negative    • Urobilinogen, UA 03/07/2023 <2 0    • Bilirubin, UA 03/07/2023 Negative    • Occult Blood, UA 03/07/2023 Negative    • Ventricular Rate 03/07/2023 70    • Atrial Rate 03/07/2023 70    • NE Interval 03/07/2023 188    • QRSD Interval 03/07/2023 90    • QT Interval 03/07/2023 442    • QTC Interval 03/07/2023 477    • P Axis 03/07/2023 55    • QRS Axis 03/07/2023 26    • T Wave Axis 03/07/2023 60          Radiology Results:   No results found

## 2023-04-27 NOTE — PATIENT INSTRUCTIONS
Scheduled date of EGD(as of today):05 19 23  Physician performing EGD:DR Gennaro Harrison  Location of EGD:[facility]  Instructions reviewed with patient by:ALTAF  Clearances:  ELIQUIS    MRI scheduled for 05 13 23 @[facility]

## 2023-04-27 NOTE — TELEPHONE ENCOUNTER
Our mutual patient is scheduled for procedure: EGD    On: 05/19/23      With: Dr Arsh Sin is taking the following blood thinner:   Eliquis     Can this be stopped ___2___ days prior to the procedure?       Physician Approving clearance: ________________________

## 2023-05-01 ENCOUNTER — OFFICE VISIT (OUTPATIENT)
Dept: SURGERY | Facility: HOSPITAL | Age: 73
End: 2023-05-01

## 2023-05-01 VITALS
SYSTOLIC BLOOD PRESSURE: 126 MMHG | DIASTOLIC BLOOD PRESSURE: 81 MMHG | RESPIRATION RATE: 16 BRPM | BODY MASS INDEX: 35.36 KG/M2 | TEMPERATURE: 95.9 F | HEIGHT: 71 IN | HEART RATE: 64 BPM | WEIGHT: 252.6 LBS

## 2023-05-01 DIAGNOSIS — R10.9 ABDOMINAL PAIN, UNSPECIFIED ABDOMINAL LOCATION: Primary | ICD-10-CM

## 2023-05-11 NOTE — PROGRESS NOTES
Assessment/Plan:   Jenna Bauman is a 71 y o male who is here for Epigastric Pain (Established patient who returns to discuss results of US done to evaluate his epigastric pain  Seen 3/16/23 in consult for abdominal pain-r/o cholecystitis  CT findings not specific  States he has had one episode of epigastric pain since last office visit  Was also seen by Gastroenterology  To have EGD done )    Plan:  Gallbladder wall thickening/Epigastric pain - patient sxs do not appear biliary in etiology, non specific mild GB wall thickening, no stones or other findings in related to biliary disease, would not recommend cholecystectomy at this time, cont GI workup of pain has MRI scheduled for abnormal finding on ultrasound regarding poss joseph hepatic venous fistula    Preoperative Clearance: None    HPI:  Jenna Bauman is a 71 y o male who was referred for evaluation of Epigastric Pain (Established patient who returns to discuss results of US done to evaluate his epigastric pain  Seen 3/16/23 in consult for abdominal pain-r/o cholecystitis  CT findings not specific  States he has had one episode of epigastric pain since last office visit  Was also seen by Gastroenterology  To have EGD done )    Currently no pain       ROS:  General ROS: negative  negative for - chills, fatigue, fever or night sweats, weight loss  Respiratory ROS: no cough, shortness of breath, or wheezing  Cardiovascular ROS: no chest pain or dyspnea on exertion  Genito-Urinary ROS: no dysuria, trouble voiding, or hematuria  Musculoskeletal ROS: negative for - gait disturbance, joint pain or muscle pain  Neurological ROS: no TIA or stroke symptoms  No abd sxs currently    [unfilled]  Penicillins    Current Outpatient Medications:   •  amiodarone 200 mg tablet, Take 200 mg by mouth daily, Disp: , Rfl:   •  apixaban (Eliquis) 5 mg, Take 1 tablet (5 mg total) by mouth 2 (two) times a day, Disp: 180 tablet, Rfl: 3  •  aspirin (ECOTRIN LOW STRENGTH) 81 mg EC tablet, Take by mouth, Disp: , Rfl:   •  atorvastatin (LIPITOR) 20 mg tablet, Take 1 tablet by mouth daily, Disp: , Rfl:   •  lisinopril (ZESTRIL) 5 mg tablet, Take 5 mg by mouth daily, Disp: , Rfl:   •  metoprolol succinate (TOPROL-XL) 50 mg 24 hr tablet, Take 50 mg by mouth daily, Disp: , Rfl:   •  pantoprazole (PROTONIX) 40 mg tablet, Take 40 mg by mouth daily, Disp: , Rfl:   •  lisinopril (ZESTRIL) 2 5 mg tablet, Take 2 5 mg by mouth daily, Disp: , Rfl:   Past Medical History:   Diagnosis Date   • [facility]    • History of stomach ulcers    • Hyperlipidemia    • Hypertension    • Pyloric stenosis      Past Surgical History:   Procedure Laterality Date   • AV NODE ABLATION     • COLONOSCOPY     • [place] AMP MTCRPL W/FINGER/THUMB W/WO INTEROSS TRANSFER Left 03/27/2019    Procedure: INDEX FINGER AMPUTATION;  Surgeon: Clint Mendoza MD;  [facility];  Service: Plastics   • SHOULDER SURGERY     • SPLIT THICKNESS SKIN GRAFT Left 03/27/2019    Procedure: MID AND RING FINGER SPLIT THICKNESS SKIN GRAFT;  Surgeon: Clint Mendoza MD;  [facility];  Service: Plastics     Family History   Problem Relation Age of Onset   • No Known Problems Mother    • No Known Problems Father    • Stroke Maternal Grandmother    • Stroke Paternal Grandmother    • Substance Abuse Son    • Alcohol abuse Son    • Mental illness Neg Hx       reports that he has quit smoking  His smoking use included pipe  He has never used smokeless tobacco  He reports current alcohol use of about 6 0 standard drinks per week  He reports that he does not use drugs      Labs:   Lab Results   Component Value Date    WBC 7 64 03/07/2023    WBC 5 4 03/02/2023    HGB 15 6 03/07/2023    HGB 15 3 03/02/2023     03/07/2023     03/02/2023     Lab Results   Component Value Date    ALT 21 03/07/2023    ALT 23 03/02/2023    ALT 23 03/05/2019    AST 19 03/07/2023    AST 14 03/02/2023    AST 22 03/05/2019     This SmartLink has not been configured with "any valid records  PHYSICAL EXAM  General Appearance:    Alert, cooperative, no distress,    Head:    Normocephalic without obvious abnormality   Eyes:    PERRL, conjunctiva/corneas clear, EOM's intact        Neck:   Supple, no adenopathy, no JVD   Back:     Symmetric, no spinal or CVA tenderness   Lungs:     Clear to auscultation bilaterally, no wheezing or rhonchi   Heart:    Regular rate and rhythm, S1 and S2 normal, no murmur   Abdomen:     Soft +BS ND NT   Extremities:   Extremities normal  No clubbing, cyanosis or edema   Psych:   Normal Affect, AOx3  Neurologic:  Skin:   CNII-XII intact  Strength symmetric, speech intact    Warm, dry, intact, no visible rashes or lesions         Physical Exam              Some portions of this record may have been generated with voice recognition software  There may be translation, syntax,  or grammatical errors  Occasional wrong word or \"sound-a-like\" substitutions may have occurred due to the inherent limitations of the voice recognition software  Read the chart carefully and recognize, using context, where substitutions may have occurred  If you have any questions, please contact the dictating provider for clarification or correction, as needed  This encounter has been coded by a non-certified coder     "

## 2023-05-13 ENCOUNTER — HOSPITAL ENCOUNTER (OUTPATIENT)
Dept: MRI IMAGING | Facility: HOSPITAL | Age: 73
Discharge: HOME | End: 2023-05-13
Attending: INTERNAL MEDICINE

## 2023-05-13 DIAGNOSIS — R93.2 ABNORMAL ULTRASOUND OF LIVER: ICD-10-CM

## 2023-05-13 RX ADMIN — GADOBUTROL 11 ML: 604.72 INJECTION INTRAVENOUS at 07:16

## 2023-05-19 ENCOUNTER — HOSPITAL ENCOUNTER (OUTPATIENT)
Dept: GASTROENTEROLOGY | Facility: HOSPITAL | Age: 73
Setting detail: OUTPATIENT SURGERY
Discharge: HOME | End: 2023-05-19
Attending: INTERNAL MEDICINE

## 2023-05-19 DIAGNOSIS — R10.13 EPIGASTRIC PAIN: ICD-10-CM

## 2023-06-26 ASSESSMENT — ENCOUNTER SYMPTOMS
GASTROINTESTINAL NEGATIVE: 1
EYES NEGATIVE: 1
CARDIOVASCULAR NEGATIVE: 1
HEMATOLOGIC/LYMPHATIC NEGATIVE: 1
CONSTITUTIONAL NEGATIVE: 1
RESPIRATORY NEGATIVE: 1
NEUROLOGICAL NEGATIVE: 1
MUSCULOSKELETAL NEGATIVE: 1
PSYCHIATRIC NEGATIVE: 1
ENDOCRINE NEGATIVE: 1

## 2023-06-26 NOTE — PROGRESS NOTES
Electrophysiology  Outpatient Progress Note       Reason for visit:   Chief Complaint   Patient presents with   • Atrial fibrillation, unspecified type        HPI   Humberto Chung is a 72 y.o. male who is seen today for follow up of his atrial fibrillation and hypertension.     He underwent PVI on 2/8/23. Post procedurally, he was reintubated due to hypoxia in the setting of a chronically elevated right hemidiaphragm. Dofetilide and Eliquis were on hold given intubation. Initially, he was in and out of atrial fibrillation but then it became persistent on 2/10. He was transitioned from IV amiodarone to oral amiodarone. He was discharged from the hospital on 2/14/23.     He presents today and reports that he has been feeling well.  He denies chest pain, shortness of breath, lightheadedness, dizziness, palpitations, or syncope. Overall his breathing has never been better than it is now. He is unaware of any AF.    Past Medical History:   Diagnosis Date   • A-fib (CMS/[facility])    • A-fib (CMS/HCC) 7/25/2018   • Chronic anticoagulation 7/25/2018   • HTN (hypertension) 6/24/2020   • Lipid disorder    • Visit for monitoring Tikosyn therapy 7/25/2019     Past Surgical History:   Procedure Laterality Date   • ABLATION OF DYSRHYTHMIC FOCUS     • CARDIAC CATHETERIZATION     • TOTAL SHOULDER REPLACEMENT Right        Social History     Tobacco Use   • Smoking status: Some Days   • Smokeless tobacco: Never   Vaping Use   • Vaping Use: Never used   Substance Use Topics   • Alcohol use: Yes   • Drug use: Defer     Family History   Adopted: Yes   Family history unknown: Yes       ALLERGY:  Aspirin and Penicillins    Current Outpatient Medications   Medication Sig Dispense Refill   • amiodarone (PACERONE) 200 mg tablet Take 1 tablet (200 mg total) by mouth daily. 90 tablet 3   • apixaban (ELIQUIS) 5 mg tablet Take 1 tablet (5 mg total) by mouth 2 (two) times a day. 180 tablet 3   • atorvastatin (LIPITOR) 20 mg tablet Take 1 tablet  (20 mg total) by mouth daily. 90 tablet 3   • lisinopriL (PRINIVIL) 5 mg tablet Take 1 tablet (5 mg total) by mouth daily. 90 tablet 3   • metoprolol succinate XL (TOPROL-XL) 50 mg 24 hr tablet Take 1 tablet (50 mg total) by mouth daily. 90 tablet 3     No current facility-administered medications for this visit.     Review of Systems   Constitutional: Negative.   HENT: Negative.    Eyes: Negative.    Cardiovascular: Negative.    Respiratory: Negative.    Endocrine: Negative.    Hematologic/Lymphatic: Negative.    Skin: Negative.    Musculoskeletal: Negative.    Gastrointestinal: Negative.    Genitourinary: Negative.    Neurological: Negative.    Psychiatric/Behavioral: Negative.      Objective   Vitals:    06/27/23 1419   BP: (!) 142/98   Pulse: 67     BP Readings from Last 3 Encounters:   06/27/23 (!) 142/98   03/21/23 140/82   02/14/23 133/62       Physical Exam  Constitutional:       Appearance: He is well-developed.   HENT:      Head: Normocephalic.   Eyes:      Conjunctiva/sclera: Conjunctivae normal.      Pupils: Pupils are equal, round, and reactive to light.   Cardiovascular:      Rate and Rhythm: Normal rate and regular rhythm.      Heart sounds: Normal heart sounds. No murmur heard.     No friction rub. No gallop.   Pulmonary:      Effort: Pulmonary effort is normal.      Breath sounds: Normal breath sounds.   Abdominal:      General: Bowel sounds are normal.      Palpations: Abdomen is soft.   Musculoskeletal:         General: Normal range of motion.      Cervical back: Normal range of motion.      Comments: Status post left index finger amputation.   Skin:     General: Skin is warm and dry.   Neurological:      Mental Status: He is alert and oriented to person, place, and time.   Psychiatric:         Behavior: Behavior normal.         Thought Content: Thought content normal.         Judgment: Judgment normal.         Lab Results   Component Value Date    WBC 8.39 02/14/2023    HGB 14.9 02/14/2023    PLT  183 02/14/2023    CHOL 148 07/22/2022    TRIG 73 07/22/2022    HDL 43 07/22/2022    ALT 20 02/14/2023    AST 20 02/14/2023     02/14/2023    K 3.4 (L) 02/14/2023    CREATININE 1.1 02/14/2023    TSH 1.89 06/14/2021     Cardiovascular Procedures:    CATH LAB:  Cath (Normal Coronaries) - 2008    ELECTROPHYSIOLOGY:  2/8/23 Catheter Ablation   Conclusion  Abnormal atrial function with history of recurrent paroxysmal atrial fibrillation and remote previous pulmonary vein isolation.  Reisolation of the pulmonary veins utilizing wide circumferential lesion sets to include the ipsilateral veins along with the haley was performed along with SVC isolation.    EPS (PV ablation afib during study) - 12/11/2009  Holter (apc one 6 beat cst no aifb) - 5/13/2015    STRESS TESTS:    ECHO 5/28/2020  MPI (EF 0.60 (60%), lateral wal defect) - 6/9/2008  · Mean gradient = 5.00 mmHg. Peak velocity = 1.50 m/s. Calculated area = 3.15 cm2.  · Normal-sized LV. Normal LV wall thickness.  · Preserved LV systolic function. Estimated EF 65%.  · Normal-sized RV. Normal RV systolic function.  · Normal-sized LA.  · Normal-sized RA.  · Aortic root normal. Sinuses of Valsalva normal-sized. Ascending aorta normal-sized. Aortic arch normal-sized. Descending aorta normal-sized.  · Aortic valve structure is normal. Tricuspid aortic valve. No aortic valve regurgitation. No aortic valve stenosis.  · Normal leaflet structure and normal leaflet motion of the mitral valve.  · No significant mitral valve regurgitation.  · No significant mitral valve stenosis.  · Tricuspid valve structure is normal. Mild tricuspid valve regurgitation.  · No significant tricuspid valve stenosis.  · Normal pulmonic valve structure. No pulmonic valve regurgitation. No pulmonic valve stenosis.  · IVC not well visualized.  · Normal pericardial structure. No evidence of pericardial effusion. No cardiac tamponade.  · Normal 2D echocardiogram color-flow Doppler study     Stress  Echo 7/15/20  No evidence of ischemia with stress echocardiogram  The patient completed 7 mets of exercise achieved target heart rate  Baseline EKG normal  No changes diagnostic of ischemia with exercise frequent single VPCs noted with exercise  Baseline echocardiogram mildly dilated aortic root 4.0 cm  Mildly dilated left atrium  Mild left ventricular hypertrophy  No regional wall motion abnormality preserved ejection fraction 65%  No structural valvular disease  Trace mitral regurgitation  Mild tricuspid regurgitation estimated pulmonary systolic pressure 35 mmHg  All walls became hyperdynamic with exercise       Other (Admitted for Tikosyn loading) - 6/10/2015     ECG  Sinus rhythm     Assessment   Problem List Items Addressed This Visit        Circulatory    Atrial fibrillation (CMS/HCC)     He has a history of paroxysmal atrial fibrillation is status post PVI in 2/8/2023.  Post procedurally, he was reintubated due to hypoxia in the setting of  a chronically elevated right hemidiaphragm.  Preprocedurally, he was maintained on dofetilide and Eliquis which had to be held given intubation.  While intubated he was in and out of atrial fibrillation which then became persistent on 2/10 and he was transitioned to IV amiodarone.  He was eventually transitioned to PO Amiodarone. He was placed back on Eliquis 5 mg BID.     He continues to maintain sinus rhythm.  He is on amiodarone 200 mg daily and Eliquis 5 mg twice daily.  We will plan to begin weaning amiodarone at his next visit.         Relevant Medications    metoprolol succinate XL (TOPROL-XL) 50 mg 24 hr tablet    lisinopriL (PRINIVIL) 5 mg tablet    atorvastatin (LIPITOR) 20 mg tablet    amiodarone (PACERONE) 200 mg tablet    apixaban (ELIQUIS) 5 mg tablet    Other Relevant Orders    ECG 12 LEAD-OFFICE PERFORMED (Completed)    Atrial fibrillation, unspecified type (CMS/HCC) - Primary    Relevant Medications    metoprolol succinate XL (TOPROL-XL) 50 mg 24 hr  tablet    lisinopriL (PRINIVIL) 5 mg tablet    atorvastatin (LIPITOR) 20 mg tablet    amiodarone (PACERONE) 200 mg tablet    apixaban (ELIQUIS) 5 mg tablet    Other Relevant Orders    ECG 12 LEAD-OFFICE PERFORMED (Completed)       Other    Chronic anticoagulation     He is anticoagulated on Eliquis 5 mg twice daily.  He has no signs or symptoms of bleeding.                 Sincere García MD  06/27/2023

## 2023-06-27 ENCOUNTER — OFFICE VISIT (OUTPATIENT)
Dept: CARDIOLOGY | Facility: CLINIC | Age: 73
End: 2023-06-27
Payer: MEDICARE

## 2023-06-27 VITALS
SYSTOLIC BLOOD PRESSURE: 142 MMHG | HEART RATE: 67 BPM | DIASTOLIC BLOOD PRESSURE: 98 MMHG | HEIGHT: 71 IN | BODY MASS INDEX: 34.58 KG/M2 | WEIGHT: 247 LBS

## 2023-06-27 DIAGNOSIS — Z79.01 CHRONIC ANTICOAGULATION: ICD-10-CM

## 2023-06-27 DIAGNOSIS — I48.91 ATRIAL FIBRILLATION, UNSPECIFIED TYPE (CMS/HCC): Primary | ICD-10-CM

## 2023-06-27 DIAGNOSIS — I48.0 PAROXYSMAL ATRIAL FIBRILLATION (CMS/HCC): ICD-10-CM

## 2023-06-27 PROCEDURE — G8753 SYS BP > OR = 140: HCPCS | Performed by: INTERNAL MEDICINE

## 2023-06-27 PROCEDURE — 93000 ELECTROCARDIOGRAM COMPLETE: CPT | Performed by: INTERNAL MEDICINE

## 2023-06-27 PROCEDURE — 99214 OFFICE O/P EST MOD 30 MIN: CPT | Performed by: INTERNAL MEDICINE

## 2023-06-27 PROCEDURE — G8755 DIAS BP > OR = 90: HCPCS | Performed by: INTERNAL MEDICINE

## 2023-06-27 RX ORDER — ATORVASTATIN CALCIUM 20 MG/1
20 TABLET, FILM COATED ORAL DAILY
Qty: 90 TABLET | Refills: 3 | Status: SHIPPED | OUTPATIENT
Start: 2023-06-27 | End: 2023-12-15 | Stop reason: SDUPTHER

## 2023-06-27 RX ORDER — METOPROLOL SUCCINATE 50 MG/1
50 TABLET, EXTENDED RELEASE ORAL DAILY
Qty: 90 TABLET | Refills: 3 | Status: SHIPPED | OUTPATIENT
Start: 2023-06-27 | End: 2023-09-19 | Stop reason: SDUPTHER

## 2023-06-27 RX ORDER — AMIODARONE HYDROCHLORIDE 200 MG/1
200 TABLET ORAL DAILY
Qty: 90 TABLET | Refills: 3 | Status: SHIPPED | OUTPATIENT
Start: 2023-06-27 | End: 2023-09-19 | Stop reason: SDUPTHER

## 2023-06-27 RX ORDER — LISINOPRIL 5 MG/1
5 TABLET ORAL DAILY
Qty: 90 TABLET | Refills: 3 | Status: SHIPPED | OUTPATIENT
Start: 2023-06-27 | End: 2023-12-15 | Stop reason: SDUPTHER

## 2023-06-28 NOTE — ASSESSMENT & PLAN NOTE
He has a history of paroxysmal atrial fibrillation is status post PVI in 2/8/2023.  Post procedurally, he was reintubated due to hypoxia in the setting of  a chronically elevated right hemidiaphragm.  Preprocedurally, he was maintained on dofetilide and Eliquis which had to be held given intubation.  While intubated he was in and out of atrial fibrillation which then became persistent on 2/10 and he was transitioned to IV amiodarone.  He was eventually transitioned to PO Amiodarone. He was placed back on Eliquis 5 mg BID.     He continues to maintain sinus rhythm.  He is on amiodarone 200 mg daily and Eliquis 5 mg twice daily.  We will plan to begin weaning amiodarone at his next visit.

## 2023-09-19 DIAGNOSIS — I48.0 PAROXYSMAL ATRIAL FIBRILLATION (CMS/HCC): ICD-10-CM

## 2023-09-19 RX ORDER — AMIODARONE HYDROCHLORIDE 200 MG/1
200 TABLET ORAL DAILY
Qty: 90 TABLET | Refills: 1 | Status: SHIPPED | OUTPATIENT
Start: 2023-09-19 | End: 2023-12-15

## 2023-09-19 RX ORDER — METOPROLOL SUCCINATE 50 MG/1
50 TABLET, EXTENDED RELEASE ORAL DAILY
Qty: 90 TABLET | Refills: 1 | Status: SHIPPED | OUTPATIENT
Start: 2023-09-19 | End: 2023-12-15 | Stop reason: SDUPTHER

## 2023-09-19 NOTE — TELEPHONE ENCOUNTER
From: Humberto Chung  To: Balbir Tellez  Sent: 9/17/2023 8:38 AM EDT  Subject: Medication Renewal Request    Refills have been requested for the following medications:   Other - not sure if Raquel wants me to continue these or go back to dofetilide    Preferred pharmacy: [facility] [address] [place], [place] [address]CHASITY GUARDADO [facility].  Delivery method: Pickup      Medication renewals requested in this message routed separately:     metoprolol succinate XL (TOPROL-XL) 50 mg 24 hr tablet [Mahogany Andrade]     amiodarone (PACERONE) 200 mg tablet [Mahogany Andrade]

## 2023-10-05 ENCOUNTER — HOSPITAL ENCOUNTER (OUTPATIENT)
Dept: SLEEP MEDICINE | Facility: HOSPITAL | Age: 73
Discharge: HOME | End: 2023-10-05
Attending: INTERNAL MEDICINE
Payer: MEDICARE

## 2023-10-05 VITALS — WEIGHT: 247 LBS | HEIGHT: 71 IN | BODY MASS INDEX: 34.58 KG/M2

## 2023-10-05 DIAGNOSIS — G47.33 OBSTRUCTIVE SLEEP APNEA (ADULT) (PEDIATRIC): ICD-10-CM

## 2023-10-05 PROCEDURE — 95811 POLYSOM 6/>YRS CPAP 4/> PARM: CPT

## 2023-10-23 ENCOUNTER — OFFICE VISIT (OUTPATIENT)
Dept: UROLOGY | Facility: MEDICAL CENTER | Age: 73
End: 2023-10-23
Payer: MEDICARE

## 2023-10-23 VITALS
BODY MASS INDEX: 34.44 KG/M2 | OXYGEN SATURATION: 94 % | HEART RATE: 65 BPM | SYSTOLIC BLOOD PRESSURE: 120 MMHG | WEIGHT: 246 LBS | DIASTOLIC BLOOD PRESSURE: 70 MMHG | HEIGHT: 71 IN

## 2023-10-23 DIAGNOSIS — R35.0 URINARY FREQUENCY: ICD-10-CM

## 2023-10-23 DIAGNOSIS — N40.1 BPH WITH URINARY OBSTRUCTION: Primary | ICD-10-CM

## 2023-10-23 DIAGNOSIS — N13.8 BPH WITH URINARY OBSTRUCTION: Primary | ICD-10-CM

## 2023-10-23 PROCEDURE — 99204 OFFICE O/P NEW MOD 45 MIN: CPT | Performed by: UROLOGY

## 2023-10-23 RX ORDER — ALFUZOSIN HYDROCHLORIDE 10 MG/1
10 TABLET, EXTENDED RELEASE ORAL DAILY
Qty: 90 TABLET | Refills: 3 | Status: SHIPPED | OUTPATIENT
Start: 2023-10-23

## 2023-10-23 NOTE — PROGRESS NOTES
HISTORY:    Years of increasing problems with frequency, daytime every 1 to 1.5 hours, nocturia x2-3. Urgency with near urge incontinence. Sense of poor emptying    No hematuria infection stones    Some degree of ED, but not sexually active for a long time because his wife was ill and recently . Testosterone 2023 was normal at 321. CT scan earlier this year shows a significant large prostate         ASSESSMENT / PLAN:    BPH with significant urgency symptoms    Start alfuzosin warned about side effects    If no help with that medication, he will call here and we will schedule cystoscopy    If does help, follow-up 6 months and will discussed ED further if that is an issue for him    The following portions of the patient's history were reviewed and updated as appropriate: allergies, current medications, past family history, past medical history, past social history, past surgical history, and problem list.    Review of Systems      Objective:     Physical Exam  Genitourinary:     Comments: Penis testes normal    Prostate moderately enlarged no nodules          0   Lab Value Date/Time    PSA 2.2 2019 0924   ]  BUN   Date Value Ref Range Status   2023 14 5 - 25 mg/dL Final   2023 14 7 - 25 mg/dL Final     Creatinine   Date Value Ref Range Status   2023 0.90 0.60 - 1.30 mg/dL Final     Comment:     Standardized to IDMS reference method     No components found for: "CBC"      Patient Active Problem List   Diagnosis    Hyperlipidemia    A-fib ([address])    Obesity, morbid ([address])    Acute hypoxemic respiratory failure ([address])        Diagnoses and all orders for this visit:    BPH with urinary obstruction  -     PSA Total, Diagnostic; Future  -     alfuzosin (UROXATRAL) 10 mg 24 hr tablet; Take 1 tablet (10 mg total) by mouth daily  -     PSA Total, Diagnostic; Future    Urinary frequency           Patient ID: Tamanna Dyer is a 71 y.o. male.       Current Outpatient Medications: alfuzosin (UROXATRAL) 10 mg 24 hr tablet, Take 1 tablet (10 mg total) by mouth daily, Disp: 90 tablet, Rfl: 3    amiodarone 200 mg tablet, Take 200 mg by mouth daily, Disp: , Rfl:     apixaban (Eliquis) 5 mg, Take 1 tablet (5 mg total) by mouth 2 (two) times a day, Disp: 180 tablet, Rfl: 3    atorvastatin (LIPITOR) 20 mg tablet, Take 1 tablet by mouth daily, Disp: , Rfl:     lisinopril (ZESTRIL) 5 mg tablet, Take 5 mg by mouth daily, Disp: , Rfl:     metoprolol succinate (TOPROL-XL) 50 mg 24 hr tablet, Take 50 mg by mouth daily, Disp: , Rfl:     Past Medical History:   Diagnosis Date    A-fib ([address])     History of stomach ulcers     Hyperlipidemia     Hypertension     Pyloric stenosis        Past Surgical History:   Procedure Laterality Date    AV NODE ABLATION      COLONOSCOPY      NH AMP MTCRPL W/FINGER/THUMB W/WO INTEROSS TRANSFER Left 03/27/2019    Procedure: INDEX FINGER AMPUTATION;  Surgeon: Carmelina Flaherty MD;  Location: [facility];  Service: Plastics    SHOULDER SURGERY      SPLIT THICKNESS SKIN GRAFT Left 03/27/2019    Procedure: MID AND RING FINGER SPLIT THICKNESS SKIN GRAFT;  Surgeon: Carmelina Flaherty MD;  Location: [facility];  Service: Plastics       Social History

## 2023-10-31 NOTE — ASSESSMENT & PLAN NOTE
He has had increasing episodes recently.  He uses Eliquis on an as needed basis, and is reluctant to take this daily. As his symptoms have increased a bit we did discuss catheter ablation and he will consider this for better control of his arrhythmias.  He will contact us if his burden warrants such before his next visit.  
He is a CHADS-Vasc 1 (age; on lisinopril for renal purposes). Discussed perhaps taking Eliquis daily, but he remains reluctant.   
.

## 2023-12-15 ENCOUNTER — OFFICE VISIT (OUTPATIENT)
Dept: CARDIOLOGY | Facility: CLINIC | Age: 73
End: 2023-12-15
Payer: MEDICARE

## 2023-12-15 ENCOUNTER — OFFICE VISIT (OUTPATIENT)
Dept: FAMILY MEDICINE CLINIC | Facility: CLINIC | Age: 73
End: 2023-12-15
Payer: MEDICARE

## 2023-12-15 VITALS
DIASTOLIC BLOOD PRESSURE: 82 MMHG | WEIGHT: 258 LBS | BODY MASS INDEX: 36.12 KG/M2 | HEART RATE: 65 BPM | RESPIRATION RATE: 16 BRPM | HEIGHT: 71 IN | SYSTOLIC BLOOD PRESSURE: 134 MMHG

## 2023-12-15 VITALS
DIASTOLIC BLOOD PRESSURE: 88 MMHG | BODY MASS INDEX: 38.06 KG/M2 | WEIGHT: 257 LBS | OXYGEN SATURATION: 96 % | RESPIRATION RATE: 18 BRPM | TEMPERATURE: 96.1 F | HEIGHT: 69 IN | HEART RATE: 64 BPM | SYSTOLIC BLOOD PRESSURE: 136 MMHG

## 2023-12-15 DIAGNOSIS — I48.91 ATRIAL FIBRILLATION, UNSPECIFIED TYPE (CMS/HCC): Primary | ICD-10-CM

## 2023-12-15 DIAGNOSIS — I48.0 PAROXYSMAL ATRIAL FIBRILLATION (CMS/HCC): ICD-10-CM

## 2023-12-15 DIAGNOSIS — Z79.01 CHRONIC ANTICOAGULATION: ICD-10-CM

## 2023-12-15 DIAGNOSIS — L03.115 CELLULITIS OF RIGHT LOWER EXTREMITY: Primary | ICD-10-CM

## 2023-12-15 DIAGNOSIS — E78.9 LIPID DISORDER: ICD-10-CM

## 2023-12-15 PROCEDURE — G8754 DIAS BP LESS 90: HCPCS | Performed by: INTERNAL MEDICINE

## 2023-12-15 PROCEDURE — 99214 OFFICE O/P EST MOD 30 MIN: CPT | Performed by: INTERNAL MEDICINE

## 2023-12-15 PROCEDURE — 93000 ELECTROCARDIOGRAM COMPLETE: CPT | Performed by: INTERNAL MEDICINE

## 2023-12-15 PROCEDURE — G8752 SYS BP LESS 140: HCPCS | Performed by: INTERNAL MEDICINE

## 2023-12-15 PROCEDURE — 99214 OFFICE O/P EST MOD 30 MIN: CPT

## 2023-12-15 RX ORDER — LISINOPRIL 5 MG/1
5 TABLET ORAL DAILY
Qty: 90 TABLET | Refills: 3 | Status: SHIPPED | OUTPATIENT
Start: 2023-12-15 | End: 2024-11-14 | Stop reason: SDUPTHER

## 2023-12-15 RX ORDER — AMIODARONE HYDROCHLORIDE 200 MG/1
200 TABLET ORAL 3 TIMES WEEKLY
Qty: 90 TABLET | Refills: 1 | COMMUNITY
Start: 2023-12-15 | End: 2024-01-10 | Stop reason: SDUPTHER

## 2023-12-15 RX ORDER — SULFAMETHOXAZOLE AND TRIMETHOPRIM 800; 160 MG/1; MG/1
1 TABLET ORAL EVERY 12 HOURS SCHEDULED
Qty: 20 TABLET | Refills: 0 | Status: SHIPPED | OUTPATIENT
Start: 2023-12-15 | End: 2023-12-25

## 2023-12-15 RX ORDER — METOPROLOL SUCCINATE 50 MG/1
50 TABLET, EXTENDED RELEASE ORAL DAILY
Qty: 90 TABLET | Refills: 3 | Status: SHIPPED | OUTPATIENT
Start: 2023-12-15

## 2023-12-15 RX ORDER — ALFUZOSIN HYDROCHLORIDE 10 MG/1
10 TABLET, EXTENDED RELEASE ORAL DAILY
COMMUNITY
Start: 2023-10-24

## 2023-12-15 RX ORDER — ATORVASTATIN CALCIUM 20 MG/1
20 TABLET, FILM COATED ORAL DAILY
Qty: 90 TABLET | Refills: 3 | Status: SHIPPED | OUTPATIENT
Start: 2023-12-15 | End: 2024-03-20 | Stop reason: SDUPTHER

## 2023-12-15 NOTE — PATIENT INSTRUCTIONS
Take prescribed abx as directed. Tyelnol as needed for pain. Cleanse with mild soap and water. Pat dry. May leave open to air. Follow up if symptoms fail to improve or worsen.

## 2023-12-15 NOTE — PROGRESS NOTES
Name: Jasvir Lee      : 1950      MRN: [id number]  Encounter Provider: MICHAEL Girffin  Encounter Date: 12/15/2023   Encounter department: [address] [place] [address]. Cellulitis of right lower extremity  -     sulfamethoxazole-trimethoprim (BACTRIM DS) 800-160 mg per tablet; Take 1 tablet by mouth every 12 (twelve) hours for 10 days      BMI Counseling: Body mass index is 38.5 kg/m². The BMI is above normal. Nutrition recommendations include decreasing portion sizes. Exercise recommendations include exercising 3-5 times per week. Rationale for BMI follow-up plan is due to patient being overweight or obese. Depression Screening and Follow-up Plan: Patient was screened for depression during today's encounter. They screened negative with a PHQ-2 score of 0. Subjective      Leg Pain   The incident occurred 3 to 5 days ago. Incident location: unknown. The injury mechanism is unknown. The pain is present in the right leg. The quality of the pain is described as aching. The pain is at a severity of 2/10. The pain is mild. The pain has been Intermittent since onset. Pertinent negatives include no inability to bear weight, loss of motion, loss of sensation, muscle weakness, numbness or tingling. He reports no foreign bodies present. Nothing aggravates the symptoms. He has tried nothing for the symptoms. Review of Systems   Constitutional:  Negative for activity change. HENT:  Negative for congestion, ear pain, rhinorrhea and sore throat. Eyes:  Negative for pain. Respiratory:  Negative for cough, shortness of breath and wheezing. Cardiovascular:  Positive for leg swelling. Negative for chest pain and palpitations. Gastrointestinal:  Negative for abdominal pain, diarrhea, nausea and vomiting. Musculoskeletal:  Negative for arthralgias and myalgias. Skin:  Positive for color change and wound. Negative for rash.    Neurological: Negative for dizziness, tingling, weakness and numbness. All other systems reviewed and are negative. Current Outpatient Medications on File Prior to Visit   Medication Sig    alfuzosin (UROXATRAL) 10 mg 24 hr tablet Take 1 tablet (10 mg total) by mouth daily    amiodarone 200 mg tablet Take 200 mg by mouth daily    apixaban (Eliquis) 5 mg Take 1 tablet (5 mg total) by mouth 2 (two) times a day    atorvastatin (LIPITOR) 20 mg tablet Take 1 tablet by mouth daily    lisinopril (ZESTRIL) 5 mg tablet Take 5 mg by mouth daily    metoprolol succinate (TOPROL-XL) 50 mg 24 hr tablet Take 50 mg by mouth daily       Objective     /88 (BP Location: Left arm, Patient Position: Sitting, Cuff Size: Adult)   Pulse 64   Temp (!) 96.1 °F (35.6 °C) (Tympanic)   Resp 18   Ht 5' 8.5" (1.74 m)   Wt 117 kg (257 lb)   SpO2 96%   BMI 38.50 kg/m²     Physical Exam  Vitals and nursing note reviewed. Constitutional:       Appearance: Normal appearance. HENT:      Head: Normocephalic. Cardiovascular:      Rate and Rhythm: Normal rate and regular rhythm. Heart sounds: Normal heart sounds. Pulmonary:      Effort: Pulmonary effort is normal.      Breath sounds: Normal breath sounds. Abdominal:      General: Bowel sounds are normal.   Skin:     General: Skin is warm and dry. Findings: Wound present. Comments: Scabbed wound-- periwound erythematous, swollen, and warm to the touch. No active drainage   Neurological:      General: No focal deficit present. Mental Status: He is alert and oriented to person, place, and time. Mental status is at baseline.    Psychiatric:         Behavior: Behavior normal.       MICHAEL Alatorre

## 2023-12-18 ASSESSMENT — ENCOUNTER SYMPTOMS
HEMATOLOGIC/LYMPHATIC NEGATIVE: 1
ENDOCRINE NEGATIVE: 1
EYES NEGATIVE: 1
NEUROLOGICAL NEGATIVE: 1
RESPIRATORY NEGATIVE: 1
CONSTITUTIONAL NEGATIVE: 1
CARDIOVASCULAR NEGATIVE: 1
GASTROINTESTINAL NEGATIVE: 1
MUSCULOSKELETAL NEGATIVE: 1
PSYCHIATRIC NEGATIVE: 1

## 2023-12-18 NOTE — PROGRESS NOTES
Electrophysiology  Outpatient Progress Note       Reason for visit:   Chief Complaint   Patient presents with   • Atrial Fibrillation       HPI   Humberto Chung is a 73 y.o. male who is seen today for follow up of his atrial fibrillation and hypertension.  Overall has been doing well, and has not had any sustained episodes of atrial fibrillation that he is aware of.  He remains on a low-dose of amiodarone.  He continues on oral anticoagulation.    He underwent PVI on 2/8/23. Post procedurally, he was reintubated due to hypoxia in the setting of a chronically elevated right hemidiaphragm. Dofetilide and Eliquis were on hold given intubation. Initially, he was in and out of atrial fibrillation but then it became persistent on 2/10. He was transitioned from IV amiodarone to oral amiodarone. He was discharged from the hospital on 2/14/23.       Past Medical History:   Diagnosis Date   • A-fib (CMS/HCC)    • A-fib (CMS/HCC) 7/25/2018   • Chronic anticoagulation 7/25/2018   • HTN (hypertension) 6/24/2020   • Lipid disorder    • Visit for monitoring Tikosyn therapy 7/25/2019     Past Surgical History:   Procedure Laterality Date   • ABLATION OF DYSRHYTHMIC FOCUS     • CARDIAC CATHETERIZATION     • TOTAL SHOULDER REPLACEMENT Right        Social History     Tobacco Use   • Smoking status: Some Days   • Smokeless tobacco: Never   Vaping Use   • Vaping Use: Never used   Substance Use Topics   • Alcohol use: Yes   • Drug use: Defer     Family History   Adopted: Yes   Family history unknown: Yes       ALLERGY:  Aspirin and Penicillins    Current Outpatient Medications   Medication Sig Dispense Refill   • alfuzosin (UROXATRAL) 10 mg 24 hr tablet Take 10 mg by mouth daily.     • amiodarone (PACERONE) 200 mg tablet Take 1 tablet (200 mg total) by mouth 3 (three) times a week (Mon, Wed, Fri). 90 tablet 1   • apixaban (ELIQUIS) 5 mg tablet Take 1 tablet (5 mg total) by mouth 2 (two) times a day. 180 tablet 3   • atorvastatin  (LIPITOR) 20 mg tablet Take 1 tablet (20 mg total) by mouth daily. 90 tablet 3   • lisinopriL (PRINIVIL) 5 mg tablet Take 1 tablet (5 mg total) by mouth daily. 90 tablet 3   • metoprolol succinate XL (TOPROL-XL) 50 mg 24 hr tablet Take 1 tablet (50 mg total) by mouth daily. 90 tablet 3     No current facility-administered medications for this visit.     Review of Systems   Constitutional: Negative.   HENT: Negative.    Eyes: Negative.    Cardiovascular: Negative.    Respiratory: Negative.    Endocrine: Negative.    Hematologic/Lymphatic: Negative.    Skin: Negative.    Musculoskeletal: Negative.    Gastrointestinal: Negative.    Genitourinary: Negative.    Neurological: Negative.    Psychiatric/Behavioral: Negative.      Objective   Vitals:    12/15/23 1002   BP: 134/82   Pulse: 65   Resp: 16     BP Readings from Last 3 Encounters:   12/15/23 134/82   06/27/23 (!) 142/98   03/21/23 140/82       Physical Exam  Constitutional:       Appearance: He is well-developed.   HENT:      Head: Normocephalic.   Eyes:      Conjunctiva/sclera: Conjunctivae normal.      Pupils: Pupils are equal, round, and reactive to light.   Cardiovascular:      Rate and Rhythm: Normal rate and regular rhythm.      Heart sounds: Normal heart sounds. No murmur heard.     No friction rub. No gallop.   Pulmonary:      Effort: Pulmonary effort is normal.      Breath sounds: Normal breath sounds.   Abdominal:      General: Bowel sounds are normal.      Palpations: Abdomen is soft.   Musculoskeletal:         General: Normal range of motion.      Cervical back: Normal range of motion.      Comments: Status post left index finger amputation.   Skin:     General: Skin is warm and dry.   Neurological:      Mental Status: He is alert and oriented to person, place, and time.   Psychiatric:         Behavior: Behavior normal.         Thought Content: Thought content normal.         Judgment: Judgment normal.         Lab Results   Component Value Date    WBC  8.39 02/14/2023    HGB 14.9 02/14/2023     02/14/2023    CHOL 148 07/22/2022    TRIG 73 07/22/2022    HDL 43 07/22/2022    ALT 20 02/14/2023    AST 20 02/14/2023     02/14/2023    K 3.4 (L) 02/14/2023    CREATININE 1.1 02/14/2023    TSH 1.89 06/14/2021     Cardiovascular Procedures:    CATH LAB:  Cath (Normal Coronaries) - 2008    ELECTROPHYSIOLOGY:  2/8/23 Catheter Ablation   Conclusion  Abnormal atrial function with history of recurrent paroxysmal atrial fibrillation and remote previous pulmonary vein isolation.  Reisolation of the pulmonary veins utilizing wide circumferential lesion sets to include the ipsilateral veins along with the haley was performed along with SVC isolation.    EPS (PV ablation afib during study) - 12/11/2009  Holter (apc one 6 beat cst no aifb) - 5/13/2015    STRESS TESTS:    ECHO 5/28/2020  MPI (EF 0.60 (60%), lateral wal defect) - 6/9/2008  · Mean gradient = 5.00 mmHg. Peak velocity = 1.50 m/s. Calculated area = 3.15 cm2.  · Normal-sized LV. Normal LV wall thickness.  · Preserved LV systolic function. Estimated EF 65%.  · Normal-sized RV. Normal RV systolic function.  · Normal-sized LA.  · Normal-sized RA.  · Aortic root normal. Sinuses of Valsalva normal-sized. Ascending aorta normal-sized. Aortic arch normal-sized. Descending aorta normal-sized.  · Aortic valve structure is normal. Tricuspid aortic valve. No aortic valve regurgitation. No aortic valve stenosis.  · Normal leaflet structure and normal leaflet motion of the mitral valve.  · No significant mitral valve regurgitation.  · No significant mitral valve stenosis.  · Tricuspid valve structure is normal. Mild tricuspid valve regurgitation.  · No significant tricuspid valve stenosis.  · Normal pulmonic valve structure. No pulmonic valve regurgitation. No pulmonic valve stenosis.  · IVC not well visualized.  · Normal pericardial structure. No evidence of pericardial effusion. No cardiac tamponade.  · Normal 2D  echocardiogram color-flow Doppler study     Stress Echo 7/15/20  No evidence of ischemia with stress echocardiogram  The patient completed 7 mets of exercise achieved target heart rate  Baseline EKG normal  No changes diagnostic of ischemia with exercise frequent single VPCs noted with exercise  Baseline echocardiogram mildly dilated aortic root 4.0 cm  Mildly dilated left atrium  Mild left ventricular hypertrophy  No regional wall motion abnormality preserved ejection fraction 65%  No structural valvular disease  Trace mitral regurgitation  Mild tricuspid regurgitation estimated pulmonary systolic pressure 35 mmHg  All walls became hyperdynamic with exercise       Other (Admitted for Tikosyn loading) - 6/10/2015     ECG  Sinus rhythm with poor R wave progression.     Assessment   Problem List Items Addressed This Visit        Circulatory    Atrial fibrillation (CMS/HCC)     He continues to do well following his catheter ablation procedure.  Will wean his amiodarone to 200 mg 3 times weekly.  Oral anticoagulation will be continued.         Relevant Medications    amiodarone (PACERONE) 200 mg tablet    metoprolol succinate XL (TOPROL-XL) 50 mg 24 hr tablet    atorvastatin (LIPITOR) 20 mg tablet    lisinopriL (PRINIVIL) 5 mg tablet    Other Relevant Orders    ECG 12 LEAD-OFFICE PERFORMED (Completed)    CBC and Differential    Comprehensive metabolic panel    TSH w reflex FT4    Atrial fibrillation, unspecified type (CMS/HCC) - Primary    Relevant Medications    amiodarone (PACERONE) 200 mg tablet    metoprolol succinate XL (TOPROL-XL) 50 mg 24 hr tablet    atorvastatin (LIPITOR) 20 mg tablet    lisinopriL (PRINIVIL) 5 mg tablet    Other Relevant Orders    ECG 12 LEAD-OFFICE PERFORMED (Completed)    CBC and Differential    Comprehensive metabolic panel    TSH w reflex FT4       Other    Chronic anticoagulation     He is anticoagulated on Eliquis 5 mg twice daily.  He has no signs or symptoms of bleeding.          Relevant Orders    CBC and Differential    Lipid disorder     On atorvastatin; will check lipids.         Relevant Orders    Comprehensive metabolic panel    Lipid panel            Sincere García MD  12/15/2023

## 2023-12-18 NOTE — ASSESSMENT & PLAN NOTE
He continues to do well following his catheter ablation procedure.  Will wean his amiodarone to 200 mg 3 times weekly.  Oral anticoagulation will be continued.  
He is anticoagulated on Eliquis 5 mg twice daily.  He has no signs or symptoms of bleeding.  
On atorvastatin; will check lipids.  
81

## 2024-01-09 LAB
ALBUMIN SERPL-MCNC: 4.1 G/DL (ref 3.6–5.1)
ALBUMIN/GLOB SERPL: 1.9 (CALC) (ref 1–2.5)
ALP SERPL-CCNC: 46 U/L (ref 35–144)
ALT SERPL-CCNC: 16 U/L (ref 9–46)
AST SERPL-CCNC: 16 U/L (ref 10–35)
BASOPHILS # BLD AUTO: 58 CELLS/UL (ref 0–200)
BASOPHILS NFR BLD AUTO: 1 %
BILIRUB SERPL-MCNC: 0.9 MG/DL (ref 0.2–1.2)
BUN SERPL-MCNC: 18 MG/DL (ref 7–25)
BUN/CREAT SERPL: NORMAL (CALC) (ref 6–22)
CALCIUM SERPL-MCNC: 9.3 MG/DL (ref 8.6–10.3)
CHLORIDE SERPL-SCNC: 106 MMOL/L (ref 98–110)
CHOLEST SERPL-MCNC: 157 MG/DL
CHOLEST/HDLC SERPL: 3.4 (CALC)
CO2 SERPL-SCNC: 31 MMOL/L (ref 20–32)
CREAT SERPL-MCNC: 1.05 MG/DL (ref 0.7–1.28)
EGFRCR SERPLBLD CKD-EPI 2021: 75 ML/MIN/1.73M2
EOSINOPHIL # BLD AUTO: 238 CELLS/UL (ref 15–500)
EOSINOPHIL NFR BLD AUTO: 4.1 %
ERYTHROCYTE [DISTWIDTH] IN BLOOD BY AUTOMATED COUNT: 12.6 % (ref 11–15)
GLOBULIN SER CALC-MCNC: 2.2 G/DL (CALC) (ref 1.9–3.7)
GLUCOSE SERPL-MCNC: 98 MG/DL (ref 65–99)
HCT VFR BLD AUTO: 44.2 % (ref 38.5–50)
HDLC SERPL-MCNC: 46 MG/DL
HGB BLD-MCNC: 14.7 G/DL (ref 13.2–17.1)
LDLC SERPL CALC-MCNC: 95 MG/DL (CALC)
LYMPHOCYTES # BLD AUTO: 1589 CELLS/UL (ref 850–3900)
LYMPHOCYTES NFR BLD AUTO: 27.4 %
MCH RBC QN AUTO: 31.1 PG (ref 27–33)
MCHC RBC AUTO-ENTMCNC: 33.3 G/DL (ref 32–36)
MCV RBC AUTO: 93.4 FL (ref 80–100)
MONOCYTES # BLD AUTO: 505 CELLS/UL (ref 200–950)
MONOCYTES NFR BLD AUTO: 8.7 %
NEUTROPHILS # BLD AUTO: 3410 CELLS/UL (ref 1500–7800)
NEUTROPHILS NFR BLD AUTO: 58.8 %
NONHDLC SERPL-MCNC: 111 MG/DL (CALC)
PLATELET # BLD AUTO: 167 THOUSAND/UL (ref 140–400)
PMV BLD REES-ECKER: 12.3 FL (ref 7.5–12.5)
POTASSIUM SERPL-SCNC: 4.5 MMOL/L (ref 3.5–5.3)
PROT SERPL-MCNC: 6.3 G/DL (ref 6.1–8.1)
RBC # BLD AUTO: 4.73 MILLION/UL (ref 4.2–5.8)
SODIUM SERPL-SCNC: 142 MMOL/L (ref 135–146)
TRIGL SERPL-MCNC: 73 MG/DL
TSH SERPL-ACNC: 3.27 MIU/L (ref 0.4–4.5)
WBC # BLD AUTO: 5.8 THOUSAND/UL (ref 3.8–10.8)

## 2024-01-10 DIAGNOSIS — I48.0 PAROXYSMAL ATRIAL FIBRILLATION (CMS/HCC): ICD-10-CM

## 2024-01-10 RX ORDER — AMIODARONE HYDROCHLORIDE 200 MG/1
200 TABLET ORAL 3 TIMES WEEKLY
Qty: 39 TABLET | Refills: 3 | Status: SHIPPED | OUTPATIENT
Start: 2024-01-10 | End: 2025-01-23

## 2024-01-10 NOTE — TELEPHONE ENCOUNTER
"Medicine Refill Request    Last Office Visit: Visit date not found   Last Consult Visit: Visit date not found  Last Telemedicine Visit: Visit date not found  amiodarone 200 mg  script changed from everyday to 3 times a week,    Please escribe this to [facility] for 3 mos supply      Next Appointment: Visit date not found      Current Outpatient Medications:   •  alfuzosin (UROXATRAL) 10 mg 24 hr tablet, Take 10 mg by mouth daily., Disp: , Rfl:   •  amiodarone (PACERONE) 200 mg tablet, Take 1 tablet (200 mg total) by mouth 3 (three) times a week (Mon, Wed, Fri)., Disp: 90 tablet, Rfl: 1  •  apixaban (ELIQUIS) 5 mg tablet, Take 1 tablet (5 mg total) by mouth 2 (two) times a day., Disp: 180 tablet, Rfl: 3  •  atorvastatin (LIPITOR) 20 mg tablet, Take 1 tablet (20 mg total) by mouth daily., Disp: 90 tablet, Rfl: 3  •  lisinopriL (PRINIVIL) 5 mg tablet, Take 1 tablet (5 mg total) by mouth daily., Disp: 90 tablet, Rfl: 3  •  metoprolol succinate XL (TOPROL-XL) 50 mg 24 hr tablet, Take 1 tablet (50 mg total) by mouth daily., Disp: 90 tablet, Rfl: 3      BP Readings from Last 3 Encounters:   12/15/23 134/82   06/27/23 (!) 142/98   03/21/23 140/82       Recent Lab results:  Lab Results   Component Value Date    CHOL 157 01/09/2024   ,   Lab Results   Component Value Date    HDL 46 01/09/2024   ,   Lab Results   Component Value Date    LDLCALC 95 01/09/2024   ,   Lab Results   Component Value Date    TRIG 73 01/09/2024        Lab Results   Component Value Date    GLUCOSE 98 01/09/2024   , No results found for: \"HGBA1C\"      Lab Results   Component Value Date    CREATININE 1.05 01/09/2024       Lab Results   Component Value Date    TSH 3.27 01/09/2024         No results found for: \"HGBA1C\"  "

## 2024-01-23 NOTE — Clinical Note
"Paulina Diana is a 42 year old female who presents for:  Chief Complaint   Patient presents with    Headache     Headache / Ref by YAJAIRA CLEMENTE  Duration: 1x week, Duration: currently 6hrs. Medication: IBU or tylenol helps it get resolved but did not take it this time. No prescription based medications for headache. Symptoms: feels like fogginess at L temple to R temple across the forehead. Cheese sabina, slight heat sensation. Denies nausea, vomiting or visual changes. Stroke diagnosis 10/2020. 3 blood clots in brain.         Initial Vitals:  Pulse 73   Ht 1.651 m (5' 5\")   Wt 117.9 kg (260 lb)   SpO2 100%   BMI 43.27 kg/m   Estimated body mass index is 43.27 kg/m  as calculated from the following:    Height as of this encounter: 1.651 m (5' 5\").    Weight as of this encounter: 117.9 kg (260 lb).. Body surface area is 2.33 meters squared. BP completed using cuff size: wallace Norman CMA    " SVC

## 2024-03-15 ENCOUNTER — RA CDI HCC (OUTPATIENT)
Dept: OTHER | Facility: HOSPITAL | Age: 74
End: 2024-03-15

## 2024-03-20 RX ORDER — ATORVASTATIN CALCIUM 20 MG/1
20 TABLET, FILM COATED ORAL DAILY
Qty: 90 TABLET | Refills: 3 | Status: SHIPPED | OUTPATIENT
Start: 2024-03-20 | End: 2024-09-13 | Stop reason: SDUPTHER

## 2024-03-21 ENCOUNTER — OFFICE VISIT (OUTPATIENT)
Dept: FAMILY MEDICINE CLINIC | Facility: CLINIC | Age: 74
End: 2024-03-21
Payer: MEDICARE

## 2024-03-21 VITALS
BODY MASS INDEX: 38.22 KG/M2 | SYSTOLIC BLOOD PRESSURE: 120 MMHG | WEIGHT: 267 LBS | HEART RATE: 62 BPM | OXYGEN SATURATION: 97 % | DIASTOLIC BLOOD PRESSURE: 78 MMHG | HEIGHT: 70 IN

## 2024-03-21 DIAGNOSIS — N40.1 BPH WITH URINARY OBSTRUCTION: ICD-10-CM

## 2024-03-21 DIAGNOSIS — L30.8 OTHER ECZEMA: ICD-10-CM

## 2024-03-21 DIAGNOSIS — N40.1 BENIGN PROSTATIC HYPERPLASIA WITH LOWER URINARY TRACT SYMPTOMS, SYMPTOM DETAILS UNSPECIFIED: ICD-10-CM

## 2024-03-21 DIAGNOSIS — E78.2 MIXED HYPERLIPIDEMIA: ICD-10-CM

## 2024-03-21 DIAGNOSIS — I48.0 PAROXYSMAL ATRIAL FIBRILLATION (HCC): ICD-10-CM

## 2024-03-21 DIAGNOSIS — F32.0 CURRENT MILD EPISODE OF MAJOR DEPRESSIVE DISORDER WITHOUT PRIOR EPISODE (HCC): ICD-10-CM

## 2024-03-21 DIAGNOSIS — N13.8 BPH WITH URINARY OBSTRUCTION: ICD-10-CM

## 2024-03-21 DIAGNOSIS — Z00.00 MEDICARE ANNUAL WELLNESS VISIT, SUBSEQUENT: Primary | ICD-10-CM

## 2024-03-21 DIAGNOSIS — E66.01 OBESITY, MORBID (HCC): ICD-10-CM

## 2024-03-21 PROBLEM — J96.01 ACUTE HYPOXEMIC RESPIRATORY FAILURE (HCC): Status: RESOLVED | Noted: 2023-02-23 | Resolved: 2024-03-21

## 2024-03-21 PROCEDURE — 99214 OFFICE O/P EST MOD 30 MIN: CPT | Performed by: FAMILY MEDICINE

## 2024-03-21 PROCEDURE — G0439 PPPS, SUBSEQ VISIT: HCPCS | Performed by: FAMILY MEDICINE

## 2024-03-21 RX ORDER — ALFUZOSIN HYDROCHLORIDE 10 MG/1
10 TABLET, EXTENDED RELEASE ORAL DAILY
Qty: 90 TABLET | Refills: 3 | Status: SHIPPED | OUTPATIENT
Start: 2024-03-21

## 2024-03-21 RX ORDER — AMIODARONE HYDROCHLORIDE 200 MG/1
200 TABLET ORAL DAILY
Qty: 90 TABLET | Refills: 3 | Status: SHIPPED | OUTPATIENT
Start: 2024-03-21

## 2024-03-21 RX ORDER — TRIAMCINOLONE ACETONIDE 5 MG/G
OINTMENT TOPICAL 2 TIMES DAILY
Qty: 30 G | Refills: 2 | Status: SHIPPED | OUTPATIENT
Start: 2024-03-21

## 2024-03-21 RX ORDER — LISINOPRIL 5 MG/1
5 TABLET ORAL DAILY
Qty: 90 TABLET | Refills: 3 | Status: SHIPPED | OUTPATIENT
Start: 2024-03-21

## 2024-03-21 RX ORDER — ATORVASTATIN CALCIUM 20 MG/1
20 TABLET, FILM COATED ORAL DAILY
Qty: 90 TABLET | Refills: 3 | Status: SHIPPED | OUTPATIENT
Start: 2024-03-21

## 2024-03-21 RX ORDER — FLUOXETINE 10 MG/1
10 TABLET, FILM COATED ORAL DAILY
Qty: 90 TABLET | Refills: 3 | Status: SHIPPED | OUTPATIENT
Start: 2024-03-21

## 2024-03-21 RX ORDER — METOPROLOL SUCCINATE 50 MG/1
50 TABLET, EXTENDED RELEASE ORAL DAILY
Qty: 90 TABLET | Refills: 3 | Status: SHIPPED | OUTPATIENT
Start: 2024-03-21

## 2024-03-21 NOTE — PATIENT INSTRUCTIONS
Medicare Preventive Visit Patient Instructions  Thank you for completing your Welcome to Medicare Visit or Medicare Annual Wellness Visit today. Your next wellness visit will be due in one year (3/22/2025).  The screening/preventive services that you may require over the next 5-10 years are detailed below. Some tests may not apply to you based off risk factors and/or age. Screening tests ordered at today's visit but not completed yet may show as past due. Also, please note that scanned in results may not display below.  Preventive Screenings:  Service Recommendations Previous Testing/Comments   Colorectal Cancer Screening  Colonoscopy    Fecal Occult Blood Test (FOBT)/Fecal Immunochemical Test (FIT)  Fecal DNA/Cologuard Test  Flexible Sigmoidoscopy Age: 45-75 years old   Colonoscopy: every 10 years (May be performed more frequently if at higher risk)  OR  FOBT/FIT: every 1 year  OR  Cologuard: every 3 years  OR  Sigmoidoscopy: every 5 years  Screening may be recommended earlier than age 45 if at higher risk for colorectal cancer. Also, an individualized decision between you and your healthcare provider will decide whether screening between the ages of 76-85 would be appropriate. Colonoscopy: Not on file  FOBT/FIT: Not on file  Cologuard: Not on file  Sigmoidoscopy: Not on file          Prostate Cancer Screening Individualized decision between patient and health care provider in men between ages of 55-69   Medicare will cover every 12 months beginning on the day after your 50th birthday PSA: No results in last 5 years           Hepatitis C Screening Once for adults born between 1945 and 1965  More frequently in patients at high risk for Hepatitis C Hep C Antibody: Not on file        Diabetes Screening 1-2 times per year if you're at risk for diabetes or have pre-diabetes Fasting glucose: No results in last 5 years (No results in last 5 years)  A1C: 5.9 % (7/11/2021)      Cholesterol Screening Once every 5 years if  you don't have a lipid disorder. May order more often based on risk factors. Lipid panel: 07/11/2021  Screening Not Indicated  History Lipid Disorder      Other Preventive Screenings Covered by Medicare:  Abdominal Aortic Aneurysm (AAA) Screening: covered once if your at risk. You're considered to be at risk if you have a family history of AAA or a male between the age of 65-75 who smoking at least 100 cigarettes in your lifetime.  Lung Cancer Screening: covers low dose CT scan once per year if you meet all of the following conditions: (1) Age 55-77; (2) No signs or symptoms of lung cancer; (3) Current smoker or have quit smoking within the last 15 years; (4) You have a tobacco smoking history of at least 20 pack years (packs per day x number of years you smoked); (5) You get a written order from a healthcare provider.  Glaucoma Screening: covered annually if you're considered high risk: (1) You have diabetes OR (2) Family history of glaucoma OR (3)  aged 50 and older OR (4)  American aged 65 and older  Osteoporosis Screening: covered every 2 years if you meet one of the following conditions: (1) Have a vertebral abnormality; (2) On glucocorticoid therapy for more than 3 months; (3) Have primary hyperparathyroidism; (4) On osteoporosis medications and need to assess response to drug therapy.  HIV Screening: covered annually if you're between the age of 15-65. Also covered annually if you are younger than 15 and older than 65 with risk factors for HIV infection. For pregnant patients, it is covered up to 3 times per pregnancy.    Immunizations:  Immunization Recommendations   Influenza Vaccine Annual influenza vaccination during flu season is recommended for all persons aged >= 6 months who do not have contraindications   Pneumococcal Vaccine   * Pneumococcal conjugate vaccine = PCV13 (Prevnar 13), PCV15 (Vaxneuvance), PCV20 (Prevnar 20)  * Pneumococcal polysaccharide vaccine = PPSV23  (Pneumovax) Adults 19-65 yo with certain risk factors or if 65+ yo  If never received any pneumonia vaccine: recommend Prevnar 20 (PCV20)  Give PCV20 if previously received 1 dose of PCV13 or PPSV23   Hepatitis B Vaccine 3 dose series if at intermediate or high risk (ex: diabetes, end stage renal disease, liver disease)   Respiratory syncytial virus (RSV) Vaccine - COVERED BY MEDICARE PART D  * RSVPreF3 (Arexvy) CDC recommends that adults 60 years of age and older may receive a single dose of RSV vaccine using shared clinical decision-making (SCDM)   Tetanus (Td) Vaccine - COST NOT COVERED BY MEDICARE PART B Following completion of primary series, a booster dose should be given every 10 years to maintain immunity against tetanus. Td may also be given as tetanus wound prophylaxis.   Tdap Vaccine - COST NOT COVERED BY MEDICARE PART B Recommended at least once for all adults. For pregnant patients, recommended with each pregnancy.   Shingles Vaccine (Shingrix) - COST NOT COVERED BY MEDICARE PART B  2 shot series recommended in those 19 years and older who have or will have weakened immune systems or those 50 years and older     Health Maintenance Due:      Topic Date Due   • Colorectal Cancer Screening  Never done   • Hepatitis C Screening  03/21/2025 (Originally 1950)     Immunizations Due:  There are no preventive care reminders to display for this patient.  Advance Directives   What are advance directives?  Advance directives are legal documents that state your wishes and plans for medical care. These plans are made ahead of time in case you lose your ability to make decisions for yourself. Advance directives can apply to any medical decision, such as the treatments you want, and if you want to donate organs.   What are the types of advance directives?  There are many types of advance directives, and each state has rules about how to use them. You may choose a combination of any of the following:  Living will:   This is a written record of the treatment you want. You can also choose which treatments you do not want, which to limit, and which to stop at a certain time. This includes surgery, medicine, IV fluid, and tube feedings.   Durable power of  for healthcare (DPAHC):  This is a written record that states who you want to make healthcare choices for you when you are unable to make them for yourself. This person, called a proxy, is usually a family member or a friend. You may choose more than 1 proxy.  Do not resuscitate (DNR) order:  A DNR order is used in case your heart stops beating or you stop breathing. It is a request not to have certain forms of treatment, such as CPR. A DNR order may be included in other types of advance directives.  Medical directive:  This covers the care that you want if you are in a coma, near death, or unable to make decisions for yourself. You can list the treatments you want for each condition. Treatment may include pain medicine, surgery, blood transfusions, dialysis, IV or tube feedings, and a ventilator (breathing machine).  Values history:  This document has questions about your views, beliefs, and how you feel and think about life. This information can help others choose the care that you would choose.  Why are advance directives important?  An advance directive helps you control your care. Although spoken wishes may be used, it is better to have your wishes written down. Spoken wishes can be misunderstood, or not followed. Treatments may be given even if you do not want them. An advance directive may make it easier for your family to make difficult choices about your care.   Weight Management   Why it is important to manage your weight:  Being overweight increases your risk of health conditions such as heart disease, high blood pressure, type 2 diabetes, and certain types of cancer. It can also increase your risk for osteoarthritis, sleep apnea, and other respiratory  problems. Aim for a slow, steady weight loss. Even a small amount of weight loss can lower your risk of health problems.  How to lose weight safely:  A safe and healthy way to lose weight is to eat fewer calories and get regular exercise. You can lose up about 1 pound a week by decreasing the number of calories you eat by 500 calories each day.   Healthy meal plan for weight management:  A healthy meal plan includes a variety of foods, contains fewer calories, and helps you stay healthy. A healthy meal plan includes the following:  Eat whole-grain foods more often.  A healthy meal plan should contain fiber. Fiber is the part of grains, fruits, and vegetables that is not broken down by your body. Whole-grain foods are healthy and provide extra fiber in your diet. Some examples of whole-grain foods are whole-wheat breads and pastas, oatmeal, brown rice, and bulgur.  Eat a variety of vegetables every day.  Include dark, leafy greens such as spinach, kale, everton greens, and mustard greens. Eat yellow and orange vegetables such as carrots, sweet potatoes, and winter squash.   Eat a variety of fruits every day.  Choose fresh or canned fruit (canned in its own juice or light syrup) instead of juice. Fruit juice has very little or no fiber.  Eat low-fat dairy foods.  Drink fat-free (skim) milk or 1% milk. Eat fat-free yogurt and low-fat cottage cheese. Try low-fat cheeses such as mozzarella and other reduced-fat cheeses.  Choose meat and other protein foods that are low in fat.  Choose beans or other legumes such as split peas or lentils. Choose fish, skinless poultry (chicken or turkey), or lean cuts of red meat (beef or pork). Before you cook meat or poultry, cut off any visible fat.   Use less fat and oil.  Try baking foods instead of frying them. Add less fat, such as margarine, sour cream, regular salad dressing and mayonnaise to foods. Eat fewer high-fat foods. Some examples of high-fat foods include french fries,  doughnuts, ice cream, and cakes.  Eat fewer sweets.  Limit foods and drinks that are high in sugar. This includes candy, cookies, regular soda, and sweetened drinks.  Exercise:  Exercise at least 30 minutes per day on most days of the week. Some examples of exercise include walking, biking, dancing, and swimming. You can also fit in more physical activity by taking the stairs instead of the elevator or parking farther away from stores. Ask your healthcare provider about the best exercise plan for you.      © Copyright [employer] 2018 Information is for End User's use only and may not be sold, redistributed or otherwise used for commercial purposes. All illustrations and images included in CareNotes® are the copyrighted property of [employer] or [employer]

## 2024-03-21 NOTE — PROGRESS NOTES
Assessment and Plan:     Problem List Items Addressed This Visit       Hyperlipidemia    Relevant Medications    amiodarone 200 mg tablet    atorvastatin (LIPITOR) 20 mg tablet    lisinopril (ZESTRIL) 5 mg tablet    metoprolol succinate (TOPROL-XL) 50 mg 24 hr tablet    triamcinolone (KENALOG) 0.5 % ointment    FLUoxetine (PROzac) 10 MG tablet    A-fib (HCC)    Relevant Medications    amiodarone 200 mg tablet    apixaban (Eliquis) 5 mg    atorvastatin (LIPITOR) 20 mg tablet    lisinopril (ZESTRIL) 5 mg tablet    metoprolol succinate (TOPROL-XL) 50 mg 24 hr tablet    triamcinolone (KENALOG) 0.5 % ointment    FLUoxetine (PROzac) 10 MG tablet    Obesity, morbid (HCC)    Benign prostatic hyperplasia with lower urinary tract symptoms     Other Visit Diagnoses       Medicare annual wellness visit, subsequent    -  Primary    BPH with urinary obstruction        Relevant Medications    alfuzosin (UROXATRAL) 10 mg 24 hr tablet    Other eczema        Relevant Medications    triamcinolone (KENALOG) 0.5 % ointment    Current mild episode of major depressive disorder without prior episode (HCC)        Relevant Medications    FLUoxetine (PROzac) 10 MG tablet            Depression Screening and Follow-up Plan: Patient was screened for depression during today's encounter. They screened negative with a PHQ-2 score of 0.      Preventive health issues were discussed with patient, and age appropriate screening tests were ordered as noted in patient's After Visit Summary.  Personalized health advice and appropriate referrals for health education or preventive services given if needed, as noted in patient's After Visit Summary.     History of Present Illness:     Patient presents for a Medicare Wellness Visit    HPI   Patient Care Team:  Sung Patino MD as PCP - General     Review of Systems:     Review of Systems   Constitutional:  Negative for fatigue, fever and unexpected weight change.   HENT:  Negative for congestion, sinus pain  and sore throat.    Eyes:  Negative for visual disturbance.   Respiratory:  Negative for shortness of breath and wheezing.    Cardiovascular:  Negative for chest pain and palpitations.   Gastrointestinal:  Negative for abdominal pain, nausea and vomiting.   Musculoskeletal: Negative.  Negative for arthralgias and myalgias.   Neurological:  Negative for syncope, weakness and numbness.   Psychiatric/Behavioral: Negative.  Negative for confusion, dysphoric mood and suicidal ideas.         Problem List:     Patient Active Problem List   Diagnosis    Hyperlipidemia    A-fib (HCC)    Obesity, morbid (HCC)    Benign prostatic hyperplasia with lower urinary tract symptoms      Past Medical and Surgical History:     Past Medical History:   Diagnosis Date    A-fib (HCC)     History of stomach ulcers     Hyperlipidemia     Hypertension     Pyloric stenosis      Past Surgical History:   Procedure Laterality Date    AV NODE ABLATION      COLONOSCOPY      NE AMP MTCRPL W/FINGER/THUMB W/WO INTEROSS TRANSFER Left 03/27/2019    Procedure: INDEX FINGER AMPUTATION;  Surgeon: Ru Ortega MD;  [facility];  Service: Plastics    SHOULDER SURGERY      SPLIT THICKNESS SKIN GRAFT Left 03/27/2019    Procedure: MID AND RING FINGER SPLIT THICKNESS SKIN GRAFT;  Surgeon: Ru Ortega MD;  [facility];  Service: Plastics      Family History:     Family History   Problem Relation Age of Onset    No Known Problems Mother     No Known Problems Father     Stroke Maternal Grandmother     Stroke Paternal Grandmother     Substance Abuse Son     Alcohol abuse Son     Mental illness Neg Hx       Social History:     Social History     Socioeconomic History    Marital status: Single     Spouse name: Refused    Number of children: 2    Years of education: None    Highest education level: None   Occupational History    Occupation: Part Time Consultant   Tobacco Use    Smoking status: Former     Types: Pipe    Smokeless tobacco: Never   Vaping  Use    Vaping status: Never Used   Substance and Sexual Activity    Alcohol use: Yes     Alcohol/week: 6.0 standard drinks of alcohol     Types: 6 Cans of beer per week     Comment: ocassionaly    Drug use: No    Sexual activity: Not Currently   Other Topics Concern    None   Social History Narrative    Never a smoker - As per [employer]      Social Determinants of Health     Financial Resource Strain: Low Risk  (2/10/2023)    [facility]    Overall Financial Resource Strain (CARDIA)     Difficulty of Paying Living Expenses: Not hard at all   Food Insecurity: Patient Declined (3/21/2024)    Hunger Vital Sign     Worried About Running Out of Food in the Last Year: Patient declined     Ran Out of Food in the Last Year: Patient declined   Transportation Needs: Patient Declined (3/21/2024)    PRAPARE - Transportation     Lack of Transportation (Medical): Patient declined     Lack of Transportation (Non-Medical): Patient declined   Physical Activity: Not on file   Stress: Not on file   Social Connections: Not on file   Intimate Partner Violence: Not on file   Housing Stability: Patient Declined (3/21/2024)    Housing Stability Vital Sign     Unable to Pay for Housing in the Last Year: Patient declined     Number of Places Lived in the Last Year: Not on file     Unstable Housing in the Last Year: Patient declined      Medications and Allergies:     Current Outpatient Medications   Medication Sig Dispense Refill    alfuzosin (UROXATRAL) 10 mg 24 hr tablet Take 1 tablet (10 mg total) by mouth daily 90 tablet 3    amiodarone 200 mg tablet Take 1 tablet (200 mg total) by mouth daily 90 tablet 3    apixaban (Eliquis) 5 mg Take 1 tablet (5 mg total) by mouth 2 (two) times a day 180 tablet 3    atorvastatin (LIPITOR) 20 mg tablet Take 1 tablet (20 mg total) by mouth daily 90 tablet 3    FLUoxetine (PROzac) 10 MG tablet Take 1 tablet (10 mg total) by mouth daily 90 tablet 3    lisinopril (ZESTRIL) 5 mg tablet  Take 1 tablet (5 mg total) by mouth daily 90 tablet 3    metoprolol succinate (TOPROL-XL) 50 mg 24 hr tablet Take 1 tablet (50 mg total) by mouth daily 90 tablet 3    triamcinolone (KENALOG) 0.5 % ointment Apply topically 2 (two) times a day 30 g 2     No current facility-administered medications for this visit.     Allergies   Allergen Reactions    Penicillins Other (See Comments)     REACTION UNKNOWN-OCCURRED AS AN INFANT      Immunizations:     Immunization History   Administered Date(s) Administered    COVID-19 MODERNA VACC 0.5 ML IM 08/11/2021, 09/08/2021    Tdap 03/23/2019      Health Maintenance:         Topic Date Due    Colorectal Cancer Screening  Never done    Hepatitis C Screening  03/21/2025 (Originally 1950)     There are no preventive care reminders to display for this patient.     Medicare Screening Tests and Risk Assessments:     Chepe is here for his Subsequent Wellness visit. Last Medicare Wellness visit information reviewed, patient interviewed, no change since last AWV.     Health Risk Assessment:   Patient rates overall health as excellent. Patient feels that their physical health rating is same. Eyesight was rated as slightly worse. Hearing was rated as same. Patient feels that their emotional and mental health rating is same. Patients states they are never, rarely angry. Patient states they are never, rarely unusually tired/fatigued. Pain experienced in the last 7 days has been none. Patient states that he has experienced no weight loss or gain in last 6 months.     Depression Screening:   PHQ-2 Score: 0      Fall Risk Screening:   In the past year, patient has experienced: no history of falling in past year      Home Safety:  Patient does not have trouble with stairs inside or outside of their home. Home safety hazards include: none. Patient refuse d to answer smoke alarm /Co2 detector -- not a medical question    Nutrition:   Current diet is Regular.     Medications:   Patient is not  "currently taking any over-the-counter supplements. Patient is able to manage medications.     Activities of Daily Living (ADLs)/Instrumental Activities of Daily Living (IADLs):   Walk and transfer into and out of bed and chair?: Yes  Dress and groom yourself?: Yes    Bathe or shower yourself?: Yes    Feed yourself? Yes  Do your laundry/housekeeping?: Yes  Manage your money, pay your bills and track your expenses?: Yes  Make your own meals?: Yes    Do your own shopping?: Yes    Previous Hospitalizations:   Any hospitalizations or ED visits within the last 12 months?: No      Advance Care Planning:   Living will: Yes    Durable POA for healthcare: Yes    Advanced directive: Yes    Provider agrees with end of life decisions: Yes      Cognitive Screening:   Provider or family/friend/caregiver concerned regarding cognition?: No    PREVENTIVE SCREENINGS      Cardiovascular Screening:    General: Screening Not Indicated and History Lipid Disorder      Colorectal Cancer Screening:     General: Risks and Benefits Discussed      Osteoporosis Screening:    General: Screening Not Indicated      Abdominal Aortic Aneurysm (AAA) Screening:    Risk factors include: age between 65-74 yo and tobacco use        General: Screening Not Indicated      Lung Cancer Screening:     General: Screening Not Indicated      Hepatitis C Screening:    General: Screening Current    Screening, Brief Intervention, and Referral to Treatment (SBIRT)    Screening  Typical number of drinks in a day: 0  Typical number of drinks in a week: 0  Interpretation: Low risk drinking behavior.    No results found.     Physical Exam:     /78 (BP Location: Left arm, Patient Position: Sitting, Cuff Size: Large)   Pulse 62   Ht 5' 9.5\" (1.765 m)   Wt 121 kg (267 lb)   SpO2 97%   BMI 38.86 kg/m²     Physical Exam  Vitals and nursing note reviewed.   Constitutional:       General: He is not in acute distress.     Appearance: He is well-developed.   HENT:      " Head: Normocephalic and atraumatic.   Eyes:      Conjunctiva/sclera: Conjunctivae normal.   Cardiovascular:      Rate and Rhythm: Normal rate and regular rhythm.      Heart sounds: No murmur heard.  Pulmonary:      Effort: Pulmonary effort is normal. No respiratory distress.      Breath sounds: Normal breath sounds.   Abdominal:      Palpations: Abdomen is soft.      Tenderness: There is no abdominal tenderness.   Musculoskeletal:         General: No swelling.      Cervical back: Neck supple.   Skin:     General: Skin is warm and dry.      Capillary Refill: Capillary refill takes less than 2 seconds.   Neurological:      Mental Status: He is alert.   Psychiatric:         Mood and Affect: Mood normal.          Sung Patino MD

## 2024-04-25 ENCOUNTER — OFFICE VISIT (OUTPATIENT)
Dept: UROLOGY | Facility: MEDICAL CENTER | Age: 74
End: 2024-04-25
Payer: MEDICARE

## 2024-04-25 VITALS
RESPIRATION RATE: 21 BRPM | OXYGEN SATURATION: 97 % | DIASTOLIC BLOOD PRESSURE: 80 MMHG | HEART RATE: 60 BPM | HEIGHT: 70 IN | SYSTOLIC BLOOD PRESSURE: 126 MMHG | BODY MASS INDEX: 36.65 KG/M2 | WEIGHT: 256 LBS

## 2024-04-25 DIAGNOSIS — N13.8 BPH WITH URINARY OBSTRUCTION: Primary | ICD-10-CM

## 2024-04-25 DIAGNOSIS — N40.1 BPH WITH URINARY OBSTRUCTION: Primary | ICD-10-CM

## 2024-04-25 DIAGNOSIS — R33.9 URINARY RETENTION WITH INCOMPLETE BLADDER EMPTYING: ICD-10-CM

## 2024-04-25 LAB — POST-VOID RESIDUAL VOLUME, ML POC: 37 ML

## 2024-04-25 PROCEDURE — 51798 US URINE CAPACITY MEASURE: CPT | Performed by: UROLOGY

## 2024-04-25 PROCEDURE — 99213 OFFICE O/P EST LOW 20 MIN: CPT | Performed by: UROLOGY

## 2024-04-25 NOTE — PROGRESS NOTES
"   HISTORY:    Follow-up BPH and frequency.  We started alfuzosin in October 2023.    Symptoms are about 80% improved, less frequency daytime but still some urgency.  Nocturia x 1-2.    Recent PSA 1.9 in November 2023    PVR only 29 mL today     CT scan earlier this year shows a significant large prostate            ASSESSMENT / PLAN:    Doing well.    Patient asked if something else being done to keep improving symptoms.    I discussed finasteride as possibly helping, but it can take 3 to 6 months to make any impact.  He will wait on that.    Follow-up 1 year with PSA    The following portions of the patient's history were reviewed and updated as appropriate: allergies, current medications, past family history, past medical history, past social history, past surgical history, and problem list.    Review of Systems      Objective:     Physical Exam      0   Lab Value Date/Time    PSA 2.2 03/05/2019 0924   ]  BUN   Date Value Ref Range Status   03/07/2023 14 5 - 25 mg/dL Final   03/02/2023 14 7 - 25 mg/dL Final   07/11/2021 13 7 - 28 mg/dL Final     Comment:     Specimen slightly hemolyzed, results may be affected.     Creatinine   Date Value Ref Range Status   03/07/2023 0.90 0.60 - 1.30 mg/dL Final     Comment:     Standardized to IDMS reference method   07/11/2021 0.78 0.53 - 1.30 mg/dL Final     Comment:     Specimen slightly hemolyzed, results may be affected.     No components found for: \"CBC\"      Patient Active Problem List   Diagnosis    Hyperlipidemia    A-fib (HCC)    Obesity, morbid (HCC)    BPH with urinary obstruction        Diagnoses and all orders for this visit:    BPH with urinary obstruction  -     POCT Measure PVR  -     PSA Total, Diagnostic; Future    Urinary retention with incomplete bladder emptying  -     POCT Measure PVR           Patient ID: Chepe Cotto is a 73 y.o. male.      Current Outpatient Medications:     alfuzosin (UROXATRAL) 10 mg 24 hr tablet, Take 1 tablet (10 mg total) by mouth " daily, Disp: 90 tablet, Rfl: 3    amiodarone 200 mg tablet, Take 1 tablet (200 mg total) by mouth daily, Disp: 90 tablet, Rfl: 3    apixaban (Eliquis) 5 mg, Take 1 tablet (5 mg total) by mouth 2 (two) times a day, Disp: 180 tablet, Rfl: 3    atorvastatin (LIPITOR) 20 mg tablet, Take 1 tablet (20 mg total) by mouth daily, Disp: 90 tablet, Rfl: 3    lisinopril (ZESTRIL) 5 mg tablet, Take 1 tablet (5 mg total) by mouth daily, Disp: 90 tablet, Rfl: 3    metoprolol succinate (TOPROL-XL) 50 mg 24 hr tablet, Take 1 tablet (50 mg total) by mouth daily, Disp: 90 tablet, Rfl: 3    triamcinolone (KENALOG) 0.5 % ointment, Apply topically 2 (two) times a day, Disp: 30 g, Rfl: 2    FLUoxetine (PROzac) 10 MG tablet, Take 1 tablet (10 mg total) by mouth daily (Patient not taking: Reported on 4/25/2024), Disp: 90 tablet, Rfl: 3    Past Medical History:   Diagnosis Date    A-fib (HCC)     History of stomach ulcers     Hyperlipidemia     Hypertension     Pyloric stenosis        Past Surgical History:   Procedure Laterality Date    AV NODE ABLATION      COLONOSCOPY      ME AMP MTCRPL W/FINGER/THUMB W/WO INTEROSS TRANSFER Left 03/27/2019    Procedure: INDEX FINGER AMPUTATION;  Surgeon: Ru Ortega MD;  [facility];  Service: Plastics    SHOULDER SURGERY      SPLIT THICKNESS SKIN GRAFT Left 03/27/2019    Procedure: MID AND RING FINGER SPLIT THICKNESS SKIN GRAFT;  Surgeon: Ru Ortega MD;  [facility];  Service: Plastics       Social History

## 2024-05-16 ENCOUNTER — OFFICE VISIT (OUTPATIENT)
Dept: FAMILY MEDICINE CLINIC | Facility: CLINIC | Age: 74
End: 2024-05-16
Payer: MEDICARE

## 2024-05-16 VITALS
HEART RATE: 63 BPM | WEIGHT: 258 LBS | DIASTOLIC BLOOD PRESSURE: 60 MMHG | SYSTOLIC BLOOD PRESSURE: 136 MMHG | OXYGEN SATURATION: 94 % | RESPIRATION RATE: 18 BRPM | BODY MASS INDEX: 38.21 KG/M2 | TEMPERATURE: 97 F | HEIGHT: 69 IN

## 2024-05-16 DIAGNOSIS — J40 BRONCHITIS: ICD-10-CM

## 2024-05-16 PROCEDURE — 99213 OFFICE O/P EST LOW 20 MIN: CPT

## 2024-05-16 PROCEDURE — G2211 COMPLEX E/M VISIT ADD ON: HCPCS

## 2024-05-16 RX ORDER — CEFUROXIME AXETIL 500 MG/1
500 TABLET ORAL EVERY 12 HOURS SCHEDULED
Qty: 14 TABLET | Refills: 0 | Status: SHIPPED | OUTPATIENT
Start: 2024-05-16 | End: 2024-05-23

## 2024-05-16 RX ORDER — ALBUTEROL SULFATE 90 UG/1
2 AEROSOL, METERED RESPIRATORY (INHALATION) EVERY 6 HOURS PRN
Qty: 6.7 G | Refills: 2 | Status: SHIPPED | OUTPATIENT
Start: 2024-05-16

## 2024-05-16 RX ORDER — BENZONATATE 200 MG/1
200 CAPSULE ORAL 3 TIMES DAILY PRN
Qty: 20 CAPSULE | Refills: 0 | Status: SHIPPED | OUTPATIENT
Start: 2024-05-16

## 2024-05-16 RX ORDER — PREDNISONE 20 MG/1
TABLET ORAL
Qty: 15 TABLET | Refills: 0 | Status: SHIPPED | OUTPATIENT
Start: 2024-05-16

## 2024-05-16 NOTE — PROGRESS NOTES
Ambulatory Visit  Name: Chepe Cotto      : 1950      MRN: [id number]  Encounter Provider: MICHAEL Keith  Encounter Date: 2024   Encounter department: [facility]    Assessment & Plan   1. Bronchitis  -     cefuroxime (CEFTIN) 500 mg tablet; Take 1 tablet (500 mg total) by mouth every 12 (twelve) hours for 7 days  -     predniSONE 20 mg tablet; TAKE 1 TABLET BID X 5 DAYS THEN TAKE 1 TABLET QD FOR 5 DAYS  -     albuterol (ProAir HFA) 90 mcg/act inhaler; Inhale 2 puffs every 6 (six) hours as needed for wheezing or shortness of breath  -     benzonatate (TESSALON) 200 MG capsule; Take 1 capsule (200 mg total) by mouth 3 (three) times a day as needed for cough       History of Present Illness     URI   This is a new problem. The current episode started 1 to 4 weeks ago. The problem has been gradually worsening. There has been no fever. Associated symptoms include congestion, coughing, rhinorrhea and wheezing. Pertinent negatives include no abdominal pain, chest pain, diarrhea, dysuria, ear pain, headaches, joint pain, joint swelling, nausea, neck pain, plugged ear sensation, rash, sinus pain, sneezing, sore throat, swollen glands or vomiting. Treatments tried: DAYQUIL. The treatment provided mild relief.       Review of Systems   Constitutional:  Negative for activity change, fatigue and fever.   HENT:  Positive for congestion and rhinorrhea. Negative for ear pain, sinus pain, sneezing and sore throat.    Eyes:  Negative for pain.   Respiratory:  Positive for cough and wheezing. Negative for chest tightness and shortness of breath.    Cardiovascular:  Negative for chest pain and leg swelling.   Gastrointestinal:  Negative for abdominal pain, diarrhea, nausea and vomiting.   Genitourinary:  Negative for dysuria.   Musculoskeletal:  Negative for arthralgias, joint pain, myalgias and neck pain.   Skin:  Negative for rash.   Neurological:  Negative for  "dizziness, weakness, numbness and headaches.   All other systems reviewed and are negative.    Objective     /60 (BP Location: Left arm, Patient Position: Sitting, Cuff Size: Standard)   Pulse 63   Temp (!) 97 °F (36.1 °C) (Tympanic)   Resp 18   Ht 5' 9\" (1.753 m)   Wt 117 kg (258 lb)   SpO2 94%   BMI 38.10 kg/m²     Physical Exam  Vitals and nursing note reviewed.   Constitutional:       General: He is not in acute distress.     Appearance: Normal appearance. He is well-developed. He is not ill-appearing.   HENT:      Head: Normocephalic and atraumatic.      Right Ear: Tympanic membrane, ear canal and external ear normal.      Left Ear: Tympanic membrane, ear canal and external ear normal.      Nose: Rhinorrhea present. No congestion.      Mouth/Throat:      Mouth: Mucous membranes are moist.      Pharynx: No oropharyngeal exudate or posterior oropharyngeal erythema.   Eyes:      Conjunctiva/sclera: Conjunctivae normal.   Cardiovascular:      Rate and Rhythm: Normal rate and regular rhythm.      Heart sounds: Normal heart sounds. No murmur heard.  Pulmonary:      Effort: Pulmonary effort is normal. No respiratory distress.      Breath sounds: Normal breath sounds. No wheezing.   Abdominal:      General: Bowel sounds are normal. There is no distension.      Palpations: Abdomen is soft.      Tenderness: There is no abdominal tenderness.   Musculoskeletal:         General: No swelling.      Cervical back: Neck supple.   Skin:     General: Skin is warm and dry.      Capillary Refill: Capillary refill takes less than 2 seconds.   Neurological:      Mental Status: He is alert and oriented to person, place, and time. Mental status is at baseline.   Psychiatric:         Mood and Affect: Mood normal.       Administrative Statements   I have spent a total time of 15 mins minutes on 05/16/24 In caring for this patient including Risks and benefits of tx options, Instructions for management, Patient and family " education, and Documenting in the medical record.

## 2024-05-16 NOTE — PATIENT INSTRUCTIONS
Discussed viral vs bacterial etiology.  Take entire course of ceftin.  Prednisone for inflammation.  Tessalon perles as needed for cough.  Albuterol inhaler as needed for SOB/wheezing.

## 2024-06-24 ASSESSMENT — ENCOUNTER SYMPTOMS
CONSTITUTIONAL NEGATIVE: 1
NEUROLOGICAL NEGATIVE: 1
PSYCHIATRIC NEGATIVE: 1
GASTROINTESTINAL NEGATIVE: 1
ENDOCRINE NEGATIVE: 1
RESPIRATORY NEGATIVE: 1
HEMATOLOGIC/LYMPHATIC NEGATIVE: 1
CARDIOVASCULAR NEGATIVE: 1
MUSCULOSKELETAL NEGATIVE: 1
EYES NEGATIVE: 1

## 2024-06-24 NOTE — PROGRESS NOTES
Electrophysiology  Outpatient Progress Note       Reason for visit:   Chief Complaint   Patient presents with    Follow-up       HPI   Humberto Chung is a 73 y.o. male who is seen today for follow up of his atrial fibrillation and hypertension. He remains on low-dose of amiodarone.  He continues on oral anticoagulation.    He underwent PVI on 2/8/23. Post procedurally, he was reintubated due to hypoxia in the setting of a chronically elevated right hemidiaphragm. Dofetilide and Eliquis were on hold given intubation. Initially, he was in and out of atrial fibrillation but then it became persistent on 2/10. He was transitioned from IV amiodarone to oral amiodarone. He was discharged from the hospital on 2/14/23.     In the office today he reports he has been doing well, and has not had any episodes of atrial fibrillation that he is aware of. His January lab work is within normal limits.     Past Medical History:   Diagnosis Date    A-fib (CMS/HCC)     A-fib (CMS/HCC) 7/25/2018    Chronic anticoagulation 7/25/2018    HTN (hypertension) 6/24/2020    Lipid disorder     Visit for monitoring Tikosyn therapy 7/25/2019     Past Surgical History:   Procedure Laterality Date    ABLATION OF DYSRHYTHMIC FOCUS      CARDIAC CATHETERIZATION      TOTAL SHOULDER REPLACEMENT Right      Social History     Tobacco Use    Smoking status: Some Days    Smokeless tobacco: Never   Vaping Use    Vaping Use: Never used   Substance Use Topics    Alcohol use: Yes    Drug use: Defer     Family History   Adopted: Yes   Family history unknown: Yes       ALLERGY:  Aspirin and Penicillins    Current Outpatient Medications   Medication Sig Dispense Refill    alfuzosin (UROXATRAL) 10 mg 24 hr tablet Take 10 mg by mouth daily.      amiodarone (PACERONE) 200 mg tablet Take 1 tablet (200 mg total) by mouth 3 (three) times a week (Mon, Wed, Fri). 39 tablet 3    apixaban (ELIQUIS) 5 mg tablet Take 1 tablet (5 mg total) by mouth 2 (two) times a day. 180  tablet 3    atorvastatin (LIPITOR) 20 mg tablet Take 1 tablet (20 mg total) by mouth daily. 90 tablet 3    lisinopriL (PRINIVIL) 5 mg tablet Take 1 tablet (5 mg total) by mouth daily. 90 tablet 3    metoprolol succinate XL (TOPROL-XL) 50 mg 24 hr tablet Take 1 tablet (50 mg total) by mouth daily. 90 tablet 3     No current facility-administered medications for this visit.     Review of Systems   Constitutional: Negative.   HENT: Negative.     Eyes: Negative.    Cardiovascular: Negative.    Respiratory: Negative.     Endocrine: Negative.    Hematologic/Lymphatic: Negative.    Skin: Negative.    Musculoskeletal: Negative.    Gastrointestinal: Negative.    Genitourinary: Negative.    Neurological: Negative.    Psychiatric/Behavioral: Negative.       Objective   Vitals:    06/27/24 1045   BP: 120/72       BP Readings from Last 3 Encounters:   06/27/24 120/72   12/15/23 134/82   06/27/23 (!) 142/98       Physical Exam  Constitutional:       Appearance: He is well-developed.   HENT:      Head: Normocephalic.   Eyes:      Conjunctiva/sclera: Conjunctivae normal.      Pupils: Pupils are equal, round, and reactive to light.   Cardiovascular:      Rate and Rhythm: Normal rate and regular rhythm.      Heart sounds: Normal heart sounds. No murmur heard.     No friction rub. No gallop.   Pulmonary:      Effort: Pulmonary effort is normal.      Breath sounds: Normal breath sounds.   Abdominal:      General: Bowel sounds are normal.      Palpations: Abdomen is soft.   Musculoskeletal:         General: Normal range of motion.      Cervical back: Normal range of motion.      Comments: Status post left index finger amputation.   Skin:     General: Skin is warm and dry.   Neurological:      Mental Status: He is alert and oriented to person, place, and time.   Psychiatric:         Behavior: Behavior normal.         Thought Content: Thought content normal.         Judgment: Judgment normal.         Lab Results   Component Value Date     WBC 5.8 01/09/2024    HGB 14.7 01/09/2024     01/09/2024    CHOL 157 01/09/2024    TRIG 73 01/09/2024    HDL 46 01/09/2024    ALT 16 01/09/2024    AST 16 01/09/2024     01/09/2024    K 4.5 01/09/2024    CREATININE 1.05 01/09/2024    TSH 3.27 01/09/2024     Cardiovascular Procedures:    CATH LAB:  Cath (Normal Coronaries) - 2008    ELECTROPHYSIOLOGY:  EPS (PV ablation afib during study) - 12/11/2009  Holter (apc one 6 beat cst no aifb) - 5/13/2015 2/8/23 Catheter Ablation   Abnormal atrial function with history of recurrent paroxysmal atrial fibrillation and remote previous pulmonary vein isolation.  Reisolation of the pulmonary veins utilizing wide circumferential lesion sets to include the ipsilateral veins along with the haley was performed along with SVC isolation.    STRESS TESTS:  ECHO 5/28/2020  MPI (EF 0.60 (60%), lateral wal defect) - 6/9/2008  Mean gradient = 5.00 mmHg. Peak velocity = 1.50 m/s. Calculated area = 3.15 cm2.  Normal-sized LV. Normal LV wall thickness.  Preserved LV systolic function. Estimated EF 65%.  Normal-sized RV. Normal RV systolic function.  Normal-sized LA.  Normal-sized RA.  Aortic root normal. Sinuses of Valsalva normal-sized. Ascending aorta normal-sized. Aortic arch normal-sized. Descending aorta normal-sized.  Aortic valve structure is normal. Tricuspid aortic valve. No aortic valve regurgitation. No aortic valve stenosis.  Normal leaflet structure and normal leaflet motion of the mitral valve.  No significant mitral valve regurgitation.  No significant mitral valve stenosis.  Tricuspid valve structure is normal. Mild tricuspid valve regurgitation.  No significant tricuspid valve stenosis.  Normal pulmonic valve structure. No pulmonic valve regurgitation. No pulmonic valve stenosis.  IVC not well visualized.  Normal pericardial structure. No evidence of pericardial effusion. No cardiac tamponade.  Normal 2D echocardiogram color-flow Doppler study     Stress Echo  7/15/20  No evidence of ischemia with stress echocardiogram  The patient completed 7 mets of exercise achieved target heart rate  Baseline EKG normal  No changes diagnostic of ischemia with exercise frequent single VPCs noted with exercise  Baseline echocardiogram mildly dilated aortic root 4.0 cm  Mildly dilated left atrium  Mild left ventricular hypertrophy  No regional wall motion abnormality preserved ejection fraction 65%  No structural valvular disease  Trace mitral regurgitation  Mild tricuspid regurgitation estimated pulmonary systolic pressure 35 mmHg  All walls became hyperdynamic with exercise   ECHO 2/9/2023  Compared to previous echo on 5/28/2020, no significant change, ascending aorta is measured to be 4.0 cm.     Other (Admitted for Tikosyn loading) - 6/10/2015     ECG Sinus Rhythm       Assessment   Problem List Items Addressed This Visit          Circulatory    Atrial fibrillation (CMS/HCC)     He continues to do well following his catheter ablation procedure.  He is  on low dose amiodarone 200 mg 3 times weekly.  Oral anticoagulation will be continued.         Relevant Orders    Comprehensive metabolic panel    TSH w reflex FT4    CBC and Differential    [facility] MUSE ECG 12 lead (clinic performed) (Completed)    HTN (hypertension)     Well controlled on current medications.          Relevant Orders    Comprehensive metabolic panel    Atrial fibrillation, unspecified type (CMS/HCC) - Primary    Relevant Orders    [facility] MUSE ECG 12 lead (clinic performed) (Completed)       Other    Chronic anticoagulation    Relevant Orders    CBC and Differential    Lipid disorder     On atorvastatin; lipids stable January 2024.          Relevant Orders    Comprehensive metabolic panel    Lipid panel              Sincere García MD  06/27/2024

## 2024-06-27 ENCOUNTER — OFFICE VISIT (OUTPATIENT)
Dept: CARDIOLOGY | Facility: CLINIC | Age: 74
End: 2024-06-27
Payer: MEDICARE

## 2024-06-27 VITALS
SYSTOLIC BLOOD PRESSURE: 120 MMHG | BODY MASS INDEX: 36.79 KG/M2 | WEIGHT: 257 LBS | HEIGHT: 70 IN | DIASTOLIC BLOOD PRESSURE: 72 MMHG

## 2024-06-27 DIAGNOSIS — I48.0 PAROXYSMAL ATRIAL FIBRILLATION (CMS/HCC): ICD-10-CM

## 2024-06-27 DIAGNOSIS — I10 HYPERTENSION, UNSPECIFIED TYPE: ICD-10-CM

## 2024-06-27 DIAGNOSIS — I48.91 ATRIAL FIBRILLATION, UNSPECIFIED TYPE (CMS/HCC): Primary | ICD-10-CM

## 2024-06-27 DIAGNOSIS — E78.9 LIPID DISORDER: ICD-10-CM

## 2024-06-27 DIAGNOSIS — Z79.01 CHRONIC ANTICOAGULATION: ICD-10-CM

## 2024-06-27 PROCEDURE — 99214 OFFICE O/P EST MOD 30 MIN: CPT | Performed by: INTERNAL MEDICINE

## 2024-06-27 PROCEDURE — G8752 SYS BP LESS 140: HCPCS | Performed by: INTERNAL MEDICINE

## 2024-06-27 PROCEDURE — 93000 ELECTROCARDIOGRAM COMPLETE: CPT | Performed by: INTERNAL MEDICINE

## 2024-06-27 PROCEDURE — G8754 DIAS BP LESS 90: HCPCS | Performed by: INTERNAL MEDICINE

## 2024-06-27 NOTE — ASSESSMENT & PLAN NOTE
He continues to do well following his catheter ablation procedure.  He is  on low dose amiodarone 200 mg 3 times weekly.  Oral anticoagulation will be continued.

## 2024-07-08 LAB
ATRIAL RATE: 55
P AXIS: 87
PR INTERVAL: 204
QRS DURATION: 98
QT INTERVAL: 456
QTC CALCULATION(BAZETT): 436
R AXIS: 8
T WAVE AXIS: 18
VENTRICULAR RATE: 55

## 2024-07-19 LAB
ALBUMIN SERPL-MCNC: 4 G/DL (ref 3.6–5.1)
ALBUMIN/GLOB SERPL: 1.8 (CALC) (ref 1–2.5)
ALP SERPL-CCNC: 56 U/L (ref 35–144)
ALT SERPL-CCNC: 16 U/L (ref 9–46)
AST SERPL-CCNC: 17 U/L (ref 10–35)
BASOPHILS # BLD AUTO: 72 CELLS/UL (ref 0–200)
BASOPHILS NFR BLD AUTO: 1.5 %
BILIRUB SERPL-MCNC: 0.6 MG/DL (ref 0.2–1.2)
BUN SERPL-MCNC: 15 MG/DL (ref 7–25)
BUN/CREAT SERPL: NORMAL (CALC) (ref 6–22)
CALCIUM SERPL-MCNC: 9.1 MG/DL (ref 8.6–10.3)
CHLORIDE SERPL-SCNC: 105 MMOL/L (ref 98–110)
CHOLEST SERPL-MCNC: 132 MG/DL
CHOLEST/HDLC SERPL: 2.9 (CALC)
CO2 SERPL-SCNC: 30 MMOL/L (ref 20–32)
CREAT SERPL-MCNC: 1.06 MG/DL (ref 0.7–1.28)
EGFRCR SERPLBLD CKD-EPI 2021: 75 ML/MIN/1.73M2
EOSINOPHIL # BLD AUTO: 158 CELLS/UL (ref 15–500)
EOSINOPHIL NFR BLD AUTO: 3.3 %
ERYTHROCYTE [DISTWIDTH] IN BLOOD BY AUTOMATED COUNT: 13.1 % (ref 11–15)
GLOBULIN SER CALC-MCNC: 2.2 G/DL (CALC) (ref 1.9–3.7)
GLUCOSE SERPL-MCNC: 95 MG/DL (ref 65–99)
HCT VFR BLD AUTO: 45.2 % (ref 38.5–50)
HDLC SERPL-MCNC: 45 MG/DL
HGB BLD-MCNC: 14.9 G/DL (ref 13.2–17.1)
LDLC SERPL CALC-MCNC: 75 MG/DL (CALC)
LYMPHOCYTES # BLD AUTO: 1522 CELLS/UL (ref 850–3900)
LYMPHOCYTES NFR BLD AUTO: 31.7 %
MCH RBC QN AUTO: 30.7 PG (ref 27–33)
MCHC RBC AUTO-ENTMCNC: 33 G/DL (ref 32–36)
MCV RBC AUTO: 93.2 FL (ref 80–100)
MONOCYTES # BLD AUTO: 557 CELLS/UL (ref 200–950)
MONOCYTES NFR BLD AUTO: 11.6 %
NEUTROPHILS # BLD AUTO: 2491 CELLS/UL (ref 1500–7800)
NEUTROPHILS NFR BLD AUTO: 51.9 %
NONHDLC SERPL-MCNC: 87 MG/DL (CALC)
PLATELET # BLD AUTO: 159 THOUSAND/UL (ref 140–400)
PMV BLD REES-ECKER: 11.9 FL (ref 7.5–12.5)
POTASSIUM SERPL-SCNC: 4.8 MMOL/L (ref 3.5–5.3)
PROT SERPL-MCNC: 6.2 G/DL (ref 6.1–8.1)
RBC # BLD AUTO: 4.85 MILLION/UL (ref 4.2–5.8)
SODIUM SERPL-SCNC: 140 MMOL/L (ref 135–146)
TRIGL SERPL-MCNC: 43 MG/DL
TSH SERPL-ACNC: 2.26 MIU/L (ref 0.4–4.5)
WBC # BLD AUTO: 4.8 THOUSAND/UL (ref 3.8–10.8)

## 2024-08-28 ENCOUNTER — OFFICE VISIT (OUTPATIENT)
Dept: FAMILY MEDICINE CLINIC | Facility: CLINIC | Age: 74
End: 2024-08-28
Payer: MEDICARE

## 2024-08-28 VITALS — DIASTOLIC BLOOD PRESSURE: 84 MMHG | HEART RATE: 58 BPM | SYSTOLIC BLOOD PRESSURE: 136 MMHG | OXYGEN SATURATION: 94 %

## 2024-08-28 DIAGNOSIS — L91.8 SKIN TAGS, MULTIPLE ACQUIRED: Primary | ICD-10-CM

## 2024-08-28 PROCEDURE — 17110 DESTRUCTION B9 LES UP TO 14: CPT | Performed by: NURSE PRACTITIONER

## 2024-08-28 PROCEDURE — 99213 OFFICE O/P EST LOW 20 MIN: CPT | Performed by: NURSE PRACTITIONER

## 2024-08-28 NOTE — PROGRESS NOTES
Ambulatory Visit  Name: Chepe Cotto      : 1950      MRN: [id number]  Encounter Provider: MICHAEL Mohr  Encounter Date: 2024   Encounter department: [facility]    Assessment & Plan   1. Skin tags, multiple acquired  Assessment & Plan:  Liquid nitrogen was applied for 10-12 seconds to the skin lesions and the expected blistering or scabbing reaction explained. Do not pick at the areas. Patient reminded to expect hypopigmented scars from the procedure. Return if lesions fail to fully resolve.   Orders:  -     Lesion Destruction       History of Present Illness     Here for skin tag removal.        Review of Systems   Constitutional:  Negative for activity change and fever.   Respiratory:  Negative for shortness of breath.    Cardiovascular:  Negative for chest pain.   Skin:  Negative for color change, pallor, rash and wound.        Multiple skin tags present on neck   Neurological:  Negative for dizziness and headaches.       Objective     /84   Pulse 58   SpO2 94%     Physical Exam  Vitals and nursing note reviewed.   Constitutional:       General: He is not in acute distress.     Appearance: Normal appearance. He is not ill-appearing.   HENT:      Head: Normocephalic.   Eyes:      Extraocular Movements: Extraocular movements intact.   Cardiovascular:      Rate and Rhythm: Normal rate and regular rhythm.      Heart sounds: Normal heart sounds.   Pulmonary:      Effort: Pulmonary effort is normal. No respiratory distress.      Breath sounds: Normal breath sounds.   Skin:     General: Skin is warm and dry.      Coloration: Skin is not pale.      Findings: No erythema.      Comments: Multiple skin tags noted on left side of neck.   Neurological:      Mental Status: He is alert and oriented to person, place, and time.   Psychiatric:         Mood and Affect: Mood normal.         Behavior: Behavior normal.       Administrative Statements       Lesion  "Destruction    Date/Time: 8/28/2024 9:30 AM    Performed by: MICHAEL Mohr  Authorized by: MICHAEL Mohr  Universal Protocol:  Consent: Verbal consent obtained.  Risks and benefits: risks, benefits and alternatives were discussed  Consent given by: patient  Time out: Immediately prior to procedure a \"time out\" was called to verify the correct patient, procedure, equipment, support staff and site/side marked as required.  Patient understanding: patient states understanding of the procedure being performed  Patient consent: the patient's understanding of the procedure matches consent given  Patient identity confirmed: verbally with patient    Procedure Details - Lesion Destruction:     Number of Lesions:  4  Lesion 1:     Body area:  Head/neck    Head/neck location:  Neck (left neck)    Malignancy: benign lesion      Destruction method: cryotherapy    Lesion 2:     Body area:  Head/neck (left neck)    Head/neck location:  Neck    Malignancy: benign lesion      Destruction method: cryotherapy    Lesion 3:     Body area:  Head/neck (left neck)    Head/neck location:  Neck    Malignancy: benign lesion    Lesion 4:     Body area:  Head/neck (left neck)    Head/neck location:  Neck    Malignancy: benign lesion      Destruction method: cryotherapy         "

## 2024-08-28 NOTE — ASSESSMENT & PLAN NOTE
Liquid nitrogen was applied for 10-12 seconds to the skin lesions and the expected blistering or scabbing reaction explained. Do not pick at the areas. Patient reminded to expect hypopigmented scars from the procedure. Return if lesions fail to fully resolve.

## 2024-09-13 RX ORDER — ATORVASTATIN CALCIUM 20 MG/1
20 TABLET, FILM COATED ORAL DAILY
Qty: 90 TABLET | Refills: 3 | Status: SHIPPED | OUTPATIENT
Start: 2024-09-13

## 2024-10-18 DIAGNOSIS — N13.8 BPH WITH URINARY OBSTRUCTION: ICD-10-CM

## 2024-10-18 DIAGNOSIS — N40.1 BPH WITH URINARY OBSTRUCTION: ICD-10-CM

## 2024-10-18 RX ORDER — ALFUZOSIN HYDROCHLORIDE 10 MG/1
10 TABLET, EXTENDED RELEASE ORAL DAILY
Qty: 90 TABLET | Refills: 1 | Status: SHIPPED | OUTPATIENT
Start: 2024-10-18

## 2024-11-14 DIAGNOSIS — I10 HYPERTENSION, UNSPECIFIED TYPE: Primary | ICD-10-CM

## 2024-11-14 RX ORDER — LISINOPRIL 5 MG/1
5 TABLET ORAL DAILY
Qty: 90 TABLET | Refills: 3 | Status: SHIPPED | OUTPATIENT
Start: 2024-11-14 | End: 2025-01-23 | Stop reason: SDUPTHER

## 2025-01-07 ENCOUNTER — TELEPHONE (OUTPATIENT)
Dept: SCHEDULING | Facility: CLINIC | Age: 75
End: 2025-01-07

## 2025-01-07 DIAGNOSIS — I48.91 ATRIAL FIBRILLATION, UNSPECIFIED TYPE (CMS/HCC): Primary | ICD-10-CM

## 2025-01-07 DIAGNOSIS — E78.9 LIPID DISORDER: ICD-10-CM

## 2025-01-07 DIAGNOSIS — Z79.01 CHRONIC ANTICOAGULATION: ICD-10-CM

## 2025-01-07 NOTE — TELEPHONE ENCOUNTER
Called and spoke to patient.  I placed new lab orders for him to get done before his upcoming visit.  His last labs were from 6 months ago.

## 2025-01-07 NOTE — TELEPHONE ENCOUNTER
Pt wants to know if he needs to complete labs prior to his next appt, please advise    P:[phone]

## 2025-01-18 LAB
ALBUMIN SERPL-MCNC: 4.2 G/DL (ref 3.6–5.1)
ALBUMIN/GLOB SERPL: 1.8 (CALC) (ref 1–2.5)
ALP SERPL-CCNC: 50 U/L (ref 35–144)
ALT SERPL-CCNC: 19 U/L (ref 9–46)
AST SERPL-CCNC: 18 U/L (ref 10–35)
BASOPHILS # BLD AUTO: 50 CELLS/UL (ref 0–200)
BASOPHILS NFR BLD AUTO: 0.9 %
BILIRUB SERPL-MCNC: 1.2 MG/DL (ref 0.2–1.2)
BUN SERPL-MCNC: 20 MG/DL (ref 7–25)
BUN/CREAT SERPL: NORMAL (CALC) (ref 6–22)
CALCIUM SERPL-MCNC: 9.2 MG/DL (ref 8.6–10.3)
CHLORIDE SERPL-SCNC: 103 MMOL/L (ref 98–110)
CHOLEST SERPL-MCNC: 153 MG/DL
CHOLEST/HDLC SERPL: 3.5 (CALC)
CO2 SERPL-SCNC: 29 MMOL/L (ref 20–32)
CREAT SERPL-MCNC: 0.96 MG/DL (ref 0.7–1.28)
EGFRCR SERPLBLD CKD-EPI 2021: 83 ML/MIN/1.73M2
EOSINOPHIL # BLD AUTO: 132 CELLS/UL (ref 15–500)
EOSINOPHIL NFR BLD AUTO: 2.4 %
ERYTHROCYTE [DISTWIDTH] IN BLOOD BY AUTOMATED COUNT: 12.7 % (ref 11–15)
GLOBULIN SER CALC-MCNC: 2.3 G/DL (CALC) (ref 1.9–3.7)
GLUCOSE SERPL-MCNC: 90 MG/DL (ref 65–99)
HCT VFR BLD AUTO: 46.3 % (ref 38.5–50)
HDLC SERPL-MCNC: 44 MG/DL
HGB BLD-MCNC: 15.1 G/DL (ref 13.2–17.1)
LDLC SERPL CALC-MCNC: 91 MG/DL (CALC)
LYMPHOCYTES # BLD AUTO: 1738 CELLS/UL (ref 850–3900)
LYMPHOCYTES NFR BLD AUTO: 31.6 %
MCH RBC QN AUTO: 30.2 PG (ref 27–33)
MCHC RBC AUTO-ENTMCNC: 32.6 G/DL (ref 32–36)
MCV RBC AUTO: 92.6 FL (ref 80–100)
MONOCYTES # BLD AUTO: 594 CELLS/UL (ref 200–950)
MONOCYTES NFR BLD AUTO: 10.8 %
NEUTROPHILS # BLD AUTO: 2987 CELLS/UL (ref 1500–7800)
NEUTROPHILS NFR BLD AUTO: 54.3 %
NONHDLC SERPL-MCNC: 109 MG/DL (CALC)
PLATELET # BLD AUTO: 169 THOUSAND/UL (ref 140–400)
PMV BLD REES-ECKER: 12.4 FL (ref 7.5–12.5)
POTASSIUM SERPL-SCNC: 4.5 MMOL/L (ref 3.5–5.3)
PROT SERPL-MCNC: 6.5 G/DL (ref 6.1–8.1)
RBC # BLD AUTO: 5 MILLION/UL (ref 4.2–5.8)
SODIUM SERPL-SCNC: 139 MMOL/L (ref 135–146)
TRIGL SERPL-MCNC: 85 MG/DL
TSH SERPL-ACNC: 2.18 MIU/L (ref 0.4–4.5)
WBC # BLD AUTO: 5.5 THOUSAND/UL (ref 3.8–10.8)

## 2025-01-23 ENCOUNTER — OFFICE VISIT (OUTPATIENT)
Dept: CARDIOLOGY | Facility: CLINIC | Age: 75
End: 2025-01-23
Payer: MEDICARE

## 2025-01-23 VITALS
BODY MASS INDEX: 35.7 KG/M2 | DIASTOLIC BLOOD PRESSURE: 84 MMHG | WEIGHT: 255 LBS | SYSTOLIC BLOOD PRESSURE: 122 MMHG | HEIGHT: 71 IN | OXYGEN SATURATION: 96 % | HEART RATE: 60 BPM

## 2025-01-23 DIAGNOSIS — E78.9 LIPID DISORDER: ICD-10-CM

## 2025-01-23 DIAGNOSIS — I10 HYPERTENSION, UNSPECIFIED TYPE: Primary | ICD-10-CM

## 2025-01-23 DIAGNOSIS — I48.91 ATRIAL FIBRILLATION, UNSPECIFIED TYPE (CMS/HCC): ICD-10-CM

## 2025-01-23 DIAGNOSIS — Z79.01 CHRONIC ANTICOAGULATION: ICD-10-CM

## 2025-01-23 PROCEDURE — 93000 ELECTROCARDIOGRAM COMPLETE: CPT | Performed by: INTERNAL MEDICINE

## 2025-01-23 PROCEDURE — G8754 DIAS BP LESS 90: HCPCS | Performed by: INTERNAL MEDICINE

## 2025-01-23 PROCEDURE — 99214 OFFICE O/P EST MOD 30 MIN: CPT | Performed by: INTERNAL MEDICINE

## 2025-01-23 PROCEDURE — G8752 SYS BP LESS 140: HCPCS | Performed by: INTERNAL MEDICINE

## 2025-01-23 RX ORDER — LISINOPRIL 5 MG/1
5 TABLET ORAL DAILY
Qty: 90 TABLET | Refills: 3 | Status: SHIPPED | OUTPATIENT
Start: 2025-01-23 | End: 2025-02-12

## 2025-01-23 NOTE — PROGRESS NOTES
Electrophysiology  Outpatient Progress Note       Reason for visit:   Chief Complaint   Patient presents with    Follow-up       HPI   Humberto Chung is a 74 y.o. male who is seen today for follow up of his atrial fibrillation and hypertension. He remains on low-dose of amiodarone.  He continues on oral anticoagulation. In the office today he reports he has been doing well, and has not had any episodes of atrial fibrillation that he is aware of.  Is unclear if he is on metoprolol or not.    He underwent PVI on 2/8/23. Post procedurally, he was reintubated due to hypoxia in the setting of a chronically elevated right hemidiaphragm. Dofetilide and Eliquis were on hold given intubation. Initially, he was in and out of atrial fibrillation but then it became persistent on 2/10. He was transitioned from IV amiodarone to oral amiodarone. He was discharged from the hospital on 2/14/23.     Past Medical History:   Diagnosis Date    A-fib (CMS/HCC)     A-fib (CMS/HCC) 7/25/2018    Chronic anticoagulation 7/25/2018    HTN (hypertension) 6/24/2020    Lipid disorder     Visit for monitoring Tikosyn therapy 7/25/2019     Past Surgical History   Procedure Laterality Date    Ablation atrial fibrillation N/A 2/8/2023    [place] Sincere [place] [facility] [place]    Ablation of dysrhythmic focus      Cardiac catheterization      Total shoulder replacement Right      Social History     Tobacco Use    Smoking status: Some Days    Smokeless tobacco: Never   Vaping Use    Vaping status: Never Used   Substance Use Topics    Alcohol use: Yes    Drug use: Defer     Family History   Adopted: Yes   Family history unknown: Yes       ALLERGY:  Aspirin and Penicillins    Current Outpatient Medications   Medication Sig Dispense Refill    alfuzosin (UROXATRAL) 10 mg 24 hr tablet Take 10 mg by mouth daily.      apixaban (ELIQUIS) 5 mg tablet Take 1 tablet (5 mg total) by mouth 2 (two) times a day. 180 tablet 3    atorvastatin  (LIPITOR) 20 mg tablet Take 1 tablet (20 mg total) by mouth daily. 90 tablet 3    lisinopriL (PRINIVIL) 5 mg tablet Take 1 tablet (5 mg total) by mouth daily. 90 tablet 3    metoprolol succinate XL (TOPROL-XL) 50 mg 24 hr tablet Take 1 tablet (50 mg total) by mouth daily. 90 tablet 3     No current facility-administered medications for this visit.     Review of Systems   Constitutional: Negative.   HENT: Negative.     Eyes: Negative.    Cardiovascular: Negative.    Respiratory: Negative.     Endocrine: Negative.    Hematologic/Lymphatic: Negative.    Skin: Negative.    Musculoskeletal: Negative.    Gastrointestinal: Negative.    Genitourinary: Negative.    Neurological: Negative.    Psychiatric/Behavioral: Negative.       Objective   Vitals:    01/23/25 1400   BP: 122/84   Pulse: 60   SpO2: 96%       BP Readings from Last 3 Encounters:   01/23/25 122/84   06/27/24 120/72   12/15/23 134/82       Physical Exam  Constitutional:       Appearance: He is well-developed.   HENT:      Head: Normocephalic.   Eyes:      Conjunctiva/sclera: Conjunctivae normal.      Pupils: Pupils are equal, round, and reactive to light.   Cardiovascular:      Rate and Rhythm: Normal rate and regular rhythm.      Heart sounds: Normal heart sounds. No murmur heard.     No friction rub. No gallop.   Pulmonary:      Effort: Pulmonary effort is normal.      Breath sounds: Normal breath sounds.   Abdominal:      General: Bowel sounds are normal.      Palpations: Abdomen is soft.   Musculoskeletal:         General: Normal range of motion.      Cervical back: Normal range of motion.      Comments: Status post left index finger amputation.   Skin:     General: Skin is warm and dry.   Neurological:      Mental Status: He is alert and oriented to person, place, and time.   Psychiatric:         Behavior: Behavior normal.         Thought Content: Thought content normal.         Judgment: Judgment normal.         Lab Results   Component Value Date    WBC  5.5 01/17/2025    HGB 15.1 01/17/2025     01/17/2025    CHOL 153 01/17/2025    TRIG 85 01/17/2025    HDL 44 01/17/2025    ALT 19 01/17/2025    AST 18 01/17/2025     01/17/2025    K 4.5 01/17/2025    CREATININE 0.96 01/17/2025    TSH 2.18 01/17/2025     Cardiovascular Procedures:    CATH LAB:  Cath (Normal Coronaries) - 2008    ELECTROPHYSIOLOGY:  EPS (PV ablation afib during study) - 12/11/2009  Holter (apc one 6 beat cst no aifb) - 5/13/2015 2/8/23 Catheter Ablation   Abnormal atrial function with history of recurrent paroxysmal atrial fibrillation and remote previous pulmonary vein isolation.  Reisolation of the pulmonary veins utilizing wide circumferential lesion sets to include the ipsilateral veins along with the haley was performed along with SVC isolation.    STRESS TESTS:  ECHO 5/28/2020  MPI (EF 0.60 (60%), lateral wal defect) - 6/9/2008  Mean gradient = 5.00 mmHg. Peak velocity = 1.50 m/s. Calculated area = 3.15 cm2.  Normal-sized LV. Normal LV wall thickness.  Preserved LV systolic function. Estimated EF 65%.  Normal-sized RV. Normal RV systolic function.  Normal-sized LA.  Normal-sized RA.  Aortic root normal. Sinuses of Valsalva normal-sized. Ascending aorta normal-sized. Aortic arch normal-sized. Descending aorta normal-sized.  Aortic valve structure is normal. Tricuspid aortic valve. No aortic valve regurgitation. No aortic valve stenosis.  Normal leaflet structure and normal leaflet motion of the mitral valve.  No significant mitral valve regurgitation.  No significant mitral valve stenosis.  Tricuspid valve structure is normal. Mild tricuspid valve regurgitation.  No significant tricuspid valve stenosis.  Normal pulmonic valve structure. No pulmonic valve regurgitation. No pulmonic valve stenosis.  IVC not well visualized.  Normal pericardial structure. No evidence of pericardial effusion. No cardiac tamponade.  Normal 2D echocardiogram color-flow Doppler study     Stress Echo  7/15/20  No evidence of ischemia with stress echocardiogram  The patient completed 7 mets of exercise achieved target heart rate  Baseline EKG normal  No changes diagnostic of ischemia with exercise frequent single VPCs noted with exercise  Baseline echocardiogram mildly dilated aortic root 4.0 cm  Mildly dilated left atrium  Mild left ventricular hypertrophy  No regional wall motion abnormality preserved ejection fraction 65%  No structural valvular disease  Trace mitral regurgitation  Mild tricuspid regurgitation estimated pulmonary systolic pressure 35 mmHg  All walls became hyperdynamic with exercise   ECHO 2/9/2023  Compared to previous echo on 5/28/2020, no significant change, ascending aorta is measured to be 4.0 cm.     Other (Admitted for Tikosyn loading) - 6/10/2015     ECG Sinus Rhythm       Assessment   Problem List Items Addressed This Visit          Circulatory    Atrial fibrillation (CMS/HCC)     He continues to do well following his catheter ablation procedure.  He is on low dose amiodarone 200 mg 3 times weekly which will be discontinued today.  Oral anticoagulation will be continued.  It is unclear if he is taking metoprolol.  If he is, this will be continued.  If not, we will not reinitiate at this time.         Relevant Medications    lisinopriL (PRINIVIL) 5 mg tablet    HTN (hypertension) - Primary     Well controlled on current medications.  Will continue metoprolol on it, but if not we hold off on reinitiating metoprolol unless his blood pressure begins to increase off the amiodarone.         Relevant Medications    lisinopriL (PRINIVIL) 5 mg tablet    Other Relevant Orders    [facility] MUSE ECG 12 lead (clinic performed) (Completed)       Other    Chronic anticoagulation     BID Eliquis. Tolerating.          Relevant Orders    [facility] MUSE ECG 12 lead (clinic performed) (Completed)    Lipid disorder     On atorvastatin; lipids stable January 2025.         Relevant Orders    [facility] MUSE ECG  12 lead (clinic performed) (Completed)              Sincere García MD  01/23/2025

## 2025-01-23 NOTE — LETTER
January 29, 2025     Balbir Tellez MD  [address] [address]    Patient: Humberto Chung  YOB: 1950  Date of Visit: 1/23/2025      Dear Dr. Tellez:    Thank you for referring Humberto Chung to me for evaluation. Below are my notes for this consultation.    If you have questions, please do not hesitate to call me. I look forward to following your patient along with you.         Sincerely,        Sincere García MD        CC: No Recipients    Sincere García MD  1/29/2025  1:36 PM  Sign when Signing Visit       Electrophysiology  Outpatient Progress Note       Reason for visit:   Chief Complaint   Patient presents with   • Follow-up       HPI  Humberto Chung is a 74 y.o. male who is seen today for follow up of his atrial fibrillation and hypertension. He remains on low-dose of amiodarone.  He continues on oral anticoagulation. In the office today he reports he has been doing well, and has not had any episodes of atrial fibrillation that he is aware of.  Is unclear if he is on metoprolol or not.    He underwent PVI on 2/8/23. Post procedurally, he was reintubated due to hypoxia in the setting of a chronically elevated right hemidiaphragm. Dofetilide and Eliquis were on hold given intubation. Initially, he was in and out of atrial fibrillation but then it became persistent on 2/10. He was transitioned from IV amiodarone to oral amiodarone. He was discharged from the hospital on 2/14/23.     Past Medical History:   Diagnosis Date   • A-fib (CMS/HCC)    • A-fib (CMS/HCC) 7/25/2018   • Chronic anticoagulation 7/25/2018   • HTN (hypertension) 6/24/2020   • Lipid disorder    • Visit for monitoring Tikosyn therapy 7/25/2019     Past Surgical History   Procedure Laterality Date   • Ablation atrial fibrillation N/A 2/8/2023    Performed by Sincere García MD at [facility] CARDIAC CATH/EP   • Ablation of dysrhythmic focus     • Cardiac catheterization     • Total shoulder replacement  Right      Social History     Tobacco Use   • Smoking status: Some Days   • Smokeless tobacco: Never   Vaping Use   • Vaping status: Never Used   Substance Use Topics   • Alcohol use: Yes   • Drug use: Defer     Family History   Adopted: Yes   Family history unknown: Yes       ALLERGY:  Aspirin and Penicillins    Current Outpatient Medications   Medication Sig Dispense Refill   • alfuzosin (UROXATRAL) 10 mg 24 hr tablet Take 10 mg by mouth daily.     • apixaban (ELIQUIS) 5 mg tablet Take 1 tablet (5 mg total) by mouth 2 (two) times a day. 180 tablet 3   • atorvastatin (LIPITOR) 20 mg tablet Take 1 tablet (20 mg total) by mouth daily. 90 tablet 3   • lisinopriL (PRINIVIL) 5 mg tablet Take 1 tablet (5 mg total) by mouth daily. 90 tablet 3   • metoprolol succinate XL (TOPROL-XL) 50 mg 24 hr tablet Take 1 tablet (50 mg total) by mouth daily. 90 tablet 3     No current facility-administered medications for this visit.     Review of Systems   Constitutional: Negative.   HENT: Negative.     Eyes: Negative.    Cardiovascular: Negative.    Respiratory: Negative.     Endocrine: Negative.    Hematologic/Lymphatic: Negative.    Skin: Negative.    Musculoskeletal: Negative.    Gastrointestinal: Negative.    Genitourinary: Negative.    Neurological: Negative.    Psychiatric/Behavioral: Negative.       Objective  Vitals:    01/23/25 1400   BP: 122/84   Pulse: 60   SpO2: 96%       BP Readings from Last 3 Encounters:   01/23/25 122/84   06/27/24 120/72   12/15/23 134/82       Physical Exam  Constitutional:       Appearance: He is well-developed.   HENT:      Head: Normocephalic.   Eyes:      Conjunctiva/sclera: Conjunctivae normal.      Pupils: Pupils are equal, round, and reactive to light.   Cardiovascular:      Rate and Rhythm: Normal rate and regular rhythm.      Heart sounds: Normal heart sounds. No murmur heard.     No friction rub. No gallop.   Pulmonary:      Effort: Pulmonary effort is normal.      Breath sounds: Normal  breath sounds.   Abdominal:      General: Bowel sounds are normal.      Palpations: Abdomen is soft.   Musculoskeletal:         General: Normal range of motion.      Cervical back: Normal range of motion.      Comments: Status post left index finger amputation.   Skin:     General: Skin is warm and dry.   Neurological:      Mental Status: He is alert and oriented to person, place, and time.   Psychiatric:         Behavior: Behavior normal.         Thought Content: Thought content normal.         Judgment: Judgment normal.         Lab Results   Component Value Date    WBC 5.5 01/17/2025    HGB 15.1 01/17/2025     01/17/2025    CHOL 153 01/17/2025    TRIG 85 01/17/2025    HDL 44 01/17/2025    ALT 19 01/17/2025    AST 18 01/17/2025     01/17/2025    K 4.5 01/17/2025    CREATININE 0.96 01/17/2025    TSH 2.18 01/17/2025     Cardiovascular Procedures:    CATH LAB:  Cath (Normal Coronaries) - 2008    ELECTROPHYSIOLOGY:  EPS (PV ablation afib during study) - 12/11/2009  Holter (apc one 6 beat cst no aifb) - 5/13/2015 2/8/23 Catheter Ablation   Abnormal atrial function with history of recurrent paroxysmal atrial fibrillation and remote previous pulmonary vein isolation.  Reisolation of the pulmonary veins utilizing wide circumferential lesion sets to include the ipsilateral veins along with the haley was performed along with SVC isolation.    STRESS TESTS:  ECHO 5/28/2020  MPI (EF 0.60 (60%), lateral wal defect) - 6/9/2008  Mean gradient = 5.00 mmHg. Peak velocity = 1.50 m/s. Calculated area = 3.15 cm2.  Normal-sized LV. Normal LV wall thickness.  Preserved LV systolic function. Estimated EF 65%.  Normal-sized RV. Normal RV systolic function.  Normal-sized LA.  Normal-sized RA.  Aortic root normal. Sinuses of Valsalva normal-sized. Ascending aorta normal-sized. Aortic arch normal-sized. Descending aorta normal-sized.  Aortic valve structure is normal. Tricuspid aortic valve. No aortic valve regurgitation. No  aortic valve stenosis.  Normal leaflet structure and normal leaflet motion of the mitral valve.  No significant mitral valve regurgitation.  No significant mitral valve stenosis.  Tricuspid valve structure is normal. Mild tricuspid valve regurgitation.  No significant tricuspid valve stenosis.  Normal pulmonic valve structure. No pulmonic valve regurgitation. No pulmonic valve stenosis.  IVC not well visualized.  Normal pericardial structure. No evidence of pericardial effusion. No cardiac tamponade.  Normal 2D echocardiogram color-flow Doppler study     Stress Echo 7/15/20  No evidence of ischemia with stress echocardiogram  The patient completed 7 mets of exercise achieved target heart rate  Baseline EKG normal  No changes diagnostic of ischemia with exercise frequent single VPCs noted with exercise  Baseline echocardiogram mildly dilated aortic root 4.0 cm  Mildly dilated left atrium  Mild left ventricular hypertrophy  No regional wall motion abnormality preserved ejection fraction 65%  No structural valvular disease  Trace mitral regurgitation  Mild tricuspid regurgitation estimated pulmonary systolic pressure 35 mmHg  All walls became hyperdynamic with exercise   ECHO 2/9/2023  Compared to previous echo on 5/28/2020, no significant change, ascending aorta is measured to be 4.0 cm.     Other (Admitted for Tikosyn loading) - 6/10/2015     ECG Sinus Rhythm       Assessment  Problem List Items Addressed This Visit          Circulatory    Atrial fibrillation (CMS/HCC)     He continues to do well following his catheter ablation procedure.  He is on low dose amiodarone 200 mg 3 times weekly which will be discontinued today.  Oral anticoagulation will be continued.  It is unclear if he is taking metoprolol.  If he is, this will be continued.  If not, we will not reinitiate at this time.         Relevant Medications    lisinopriL (PRINIVIL) 5 mg tablet    HTN (hypertension) - Primary     Well controlled on current  medications.  Will continue metoprolol on it, but if not we hold off on reinitiating metoprolol unless his blood pressure begins to increase off the amiodarone.         Relevant Medications    lisinopriL (PRINIVIL) 5 mg tablet    Other Relevant Orders    [facility] (Completed)       Other    Chronic anticoagulation     BID Eliquis. Tolerating.          Relevant Orders    [facility] MUSE ECG 12 lead (clinic performed) (Completed)    Lipid disorder     On atorvastatin; lipids stable January 2025.         Relevant Orders    [facility] (Completed)              Sincere García MD  01/23/2025

## 2025-01-27 ASSESSMENT — ENCOUNTER SYMPTOMS
PALPITATIONS: 0
NEUROLOGICAL NEGATIVE: 1
SYNCOPE: 0
IRREGULAR HEARTBEAT: 0
RESPIRATORY NEGATIVE: 1
DYSPNEA ON EXERTION: 0

## 2025-01-27 NOTE — PROGRESS NOTES
Electrophysiology  Outpatient Progress Note       Reason for visit:   Chief Complaint   Patient presents with    Follow-up       HPI   Humberto Chung is a 74 y.o. male who is seen today for follow up of his atrial fibrillation and hypertension. He remains on low-dose of amiodarone.  He continues on oral anticoagulation.     He underwent PVI on 2/8/23. Post procedurally, he was reintubated due to hypoxia in the setting of a chronically elevated right hemidiaphragm. Dofetilide and Eliquis were on hold given intubation. Initially, he was in and out of atrial fibrillation but then it became persistent on 2/10. He was transitioned from IV amiodarone to oral amiodarone. He was discharged from the hospital on 2/14/23.      In the office today he reports feeling well. He is unaware of any atrial fibrillation episodes. All lab work including CBC, BMP, lipids and TSH were acceptable.     Past Medical History:   Diagnosis Date    A-fib (CMS/HCC)     A-fib (CMS/HCC) 7/25/2018    Chronic anticoagulation 7/25/2018    HTN (hypertension) 6/24/2020    Lipid disorder     Visit for monitoring Tikosyn therapy 7/25/2019     Past Surgical History   Procedure Laterality Date    Ablation atrial fibrillation N/A 2/8/2023    Performed by Sincere García MD at [facility]ARDIAC CATH/EP    Ablation of dysrhythmic focus      Cardiac catheterization      Total shoulder replacement Right        Social History     Tobacco Use    Smoking status: Some Days    Smokeless tobacco: Never   Vaping Use    Vaping status: Never Used   Substance Use Topics    Alcohol use: Yes    Drug use: Defer     Family History   Adopted: Yes   Family history unknown: Yes       ALLERGY:  Aspirin and Penicillins    Current Outpatient Medications   Medication Sig Dispense Refill    alfuzosin (UROXATRAL) 10 mg 24 hr tablet Take 10 mg by mouth daily.      apixaban (ELIQUIS) 5 mg tablet Take 1 tablet (5 mg total) by mouth 2 (two) times a day. 180 tablet 3    atorvastatin  (LIPITOR) 20 mg tablet Take 1 tablet (20 mg total) by mouth daily. 90 tablet 3    lisinopriL (PRINIVIL) 5 mg tablet Take 1 tablet (5 mg total) by mouth daily. 90 tablet 3    metoprolol succinate XL (TOPROL-XL) 50 mg 24 hr tablet Take 1 tablet (50 mg total) by mouth daily. 90 tablet 3     No current facility-administered medications for this visit.       Review of Systems   Cardiovascular:  Negative for chest pain, dyspnea on exertion, irregular heartbeat, palpitations and syncope.   Respiratory: Negative.     Neurological: Negative.    All other systems reviewed and are negative.      Objective   Vitals:    01/23/25 1400   BP: 122/84   Pulse: 60   SpO2: 96%       Physical Exam  Constitutional:       Appearance: He is well-developed.   HENT:      Head: Normocephalic.   Eyes:      Conjunctiva/sclera: Conjunctivae normal.      Pupils: Pupils are equal, round, and reactive to light.   Cardiovascular:      Rate and Rhythm: Normal rate and regular rhythm.      Heart sounds: Normal heart sounds. No murmur heard.     No friction rub. No gallop.   Pulmonary:      Effort: Pulmonary effort is normal.      Breath sounds: Normal breath sounds.   Abdominal:      General: Bowel sounds are normal.      Palpations: Abdomen is soft.   Musculoskeletal:         General: Normal range of motion.      Cervical back: Normal range of motion.      Comments: Status post left index finger amputation.   Skin:     General: Skin is warm and dry.   Neurological:      Mental Status: He is alert and oriented to person, place, and time.   Psychiatric:         Behavior: Behavior normal.         Thought Content: Thought content normal.         Judgment: Judgment normal.         Lab Results   Component Value Date    WBC 5.5 01/17/2025    HGB 15.1 01/17/2025     01/17/2025    CHOL 153 01/17/2025    TRIG 85 01/17/2025    HDL 44 01/17/2025    ALT 19 01/17/2025    AST 18 01/17/2025     01/17/2025    K 4.5 01/17/2025    CREATININE 0.96  01/17/2025    TSH 2.18 01/17/2025       Cardiac Imaging    TRANSTHORACIC ECHO (TTE) COMPLETE 02/09/2023  Interpretation Summary  Technically difficult study. Definity used for endocardial enhancement. Rhythm is sinus rhythm.  Left ventricle is normal in size with normal wall thickness. Low normal systolic function, estimated ejection fraction 50-55%. Wall motion difficult to assess even with echocontrast. Indeterminate diastolic function.  Aortic valve is tricuspid. Aortic valve and root sclerosis. No aortic stenosis. No aortic regurgitation.  Aortic root normal in size. Visualized portion of ascending aorta dilated up to 4.0 cm.  Mitral valve mildly thickened. Trace mitral regurgitation.  Left atrium is moderately dilated. Agitated saline injected with good opacification of the right heart chambers, no visualized interatrial shunt.  Right ventricle is normal in size with preserved systolic function.  Right atrium is normal in size.  Tricuspid valve with trace tricuspid regurgitation, estimated right ventricular systolic pressure 26 mmHg.  Pulmonic valve not well visualized.  Inferior vena cava <2.1cm with <50% respiratory variation, consistent with positive pressure mechanical ventilation.  Trace pericardial effusion.  Compared to previous echo on 5/28/2020, no significant change, ascending aorta is measured to be 4.0 cm.    TRANSTHORACIC ECHO (TTE) COMPLETE 02/09/2023  Interpretation Summary  Technically difficult study. Definity used for endocardial enhancement. Rhythm is sinus rhythm.  Left ventricle is normal in size with normal wall thickness. Low normal systolic function, estimated ejection fraction 50-55%. Wall motion difficult to assess even with echocontrast. Indeterminate diastolic function.  Aortic valve is tricuspid. Aortic valve and root sclerosis. No aortic stenosis. No aortic regurgitation.  Aortic root normal in size. Visualized portion of ascending aorta dilated up to 4.0 cm.  Mitral valve mildly  thickened. Trace mitral regurgitation.  Left atrium is moderately dilated. Agitated saline injected with good opacification of the right heart chambers, no visualized interatrial shunt.  Right ventricle is normal in size with preserved systolic function.  Right atrium is normal in size.  Tricuspid valve with trace tricuspid regurgitation, estimated right ventricular systolic pressure 26 mmHg.  Pulmonic valve not well visualized.  Inferior vena cava <2.1cm with <50% respiratory variation, consistent with positive pressure mechanical ventilation.  Trace pericardial effusion.    Compared to previous echo on 5/28/2020, no significant change, ascending aorta is measured to be 4.0 cm.    ECG Sinus bradycardia  ***    Assessment   Problem List Items Addressed This Visit          Circulatory    Atrial fibrillation (CMS/HCC)     He continues to do well following his catheter ablation procedure.  He is on low dose amiodarone 200 mg 3 times weekly which will be discontinued today.  Oral anticoagulation will be continued.  It is unclear if he is taking metoprolol.  If he is, this will be continued.  If not, we will not reinitiate at this time.         Relevant Medications    lisinopriL (PRINIVIL) 5 mg tablet    HTN (hypertension) - Primary     Well controlled on current medications.  Will continue metoprolol on it, but if not we hold off on reinitiating metoprolol unless his blood pressure begins to increase off the amiodarone.         Relevant Medications    lisinopriL (PRINIVIL) 5 mg tablet    Other Relevant Orders    [facility] MUSE ECG 12 lead (clinic performed) (Completed)       Other    Chronic anticoagulation     BID Eliquis. Tolerating.          Relevant Orders    [facility]    Lipid disorder     On atorvastatin; lipids stable January 2025.         Relevant Orders    [facility] MUSE ECG 12 lead (clinic performed) (Completed)            Sincere García MD  1/29/2025

## 2025-01-27 NOTE — ASSESSMENT & PLAN NOTE
Well controlled on current medications.  Will continue metoprolol on it, but if not we hold off on reinitiating metoprolol unless his blood pressure begins to increase off the amiodarone.

## 2025-01-27 NOTE — ASSESSMENT & PLAN NOTE
He continues to do well following his catheter ablation procedure.  He is on low dose amiodarone 200 mg 3 times weekly which will be discontinued today.  Oral anticoagulation will be continued.  It is unclear if he is taking metoprolol.  If he is, this will be continued.  If not, we will not reinitiate at this time.

## 2025-01-28 LAB
ATRIAL RATE: 54
P AXIS: 61
PR INTERVAL: 218
QRS DURATION: 92
QT INTERVAL: 450
QTC CALCULATION(BAZETT): 426
R AXIS: 73
T WAVE AXIS: 16
VENTRICULAR RATE: 54

## 2025-01-29 ASSESSMENT — ENCOUNTER SYMPTOMS
PSYCHIATRIC NEGATIVE: 1
CONSTITUTIONAL NEGATIVE: 1
CARDIOVASCULAR NEGATIVE: 1
ENDOCRINE NEGATIVE: 1
GASTROINTESTINAL NEGATIVE: 1
RESPIRATORY NEGATIVE: 1
NEUROLOGICAL NEGATIVE: 1
EYES NEGATIVE: 1
HEMATOLOGIC/LYMPHATIC NEGATIVE: 1
MUSCULOSKELETAL NEGATIVE: 1

## 2025-02-03 ENCOUNTER — OFFICE VISIT (OUTPATIENT)
Dept: FAMILY MEDICINE CLINIC | Facility: CLINIC | Age: 75
End: 2025-02-03
Payer: MEDICARE

## 2025-02-03 ENCOUNTER — HOSPITAL ENCOUNTER (OUTPATIENT)
Dept: RADIOLOGY | Facility: HOSPITAL | Age: 75
Discharge: HOME | End: 2025-02-03
Payer: MEDICARE

## 2025-02-03 VITALS
WEIGHT: 257 LBS | TEMPERATURE: 97.4 F | OXYGEN SATURATION: 94 % | SYSTOLIC BLOOD PRESSURE: 136 MMHG | HEIGHT: 69 IN | HEART RATE: 62 BPM | DIASTOLIC BLOOD PRESSURE: 83 MMHG | BODY MASS INDEX: 38.06 KG/M2

## 2025-02-03 DIAGNOSIS — I48.0 PAROXYSMAL ATRIAL FIBRILLATION (HCC): ICD-10-CM

## 2025-02-03 DIAGNOSIS — F32.0 CURRENT MILD EPISODE OF MAJOR DEPRESSIVE DISORDER WITHOUT PRIOR EPISODE (HCC): ICD-10-CM

## 2025-02-03 DIAGNOSIS — M54.6 ACUTE RIGHT-SIDED THORACIC BACK PAIN: ICD-10-CM

## 2025-02-03 DIAGNOSIS — E66.01 OBESITY, MORBID (HCC): ICD-10-CM

## 2025-02-03 DIAGNOSIS — M54.6 ACUTE RIGHT-SIDED THORACIC BACK PAIN: Primary | ICD-10-CM

## 2025-02-03 DIAGNOSIS — R07.81 RIB PAIN ON RIGHT SIDE: ICD-10-CM

## 2025-02-03 PROCEDURE — 99214 OFFICE O/P EST MOD 30 MIN: CPT | Performed by: NURSE PRACTITIONER

## 2025-02-03 PROCEDURE — 71046 X-RAY EXAM CHEST 2 VIEWS: CPT

## 2025-02-03 PROCEDURE — G2211 COMPLEX E/M VISIT ADD ON: HCPCS | Performed by: NURSE PRACTITIONER

## 2025-02-03 PROCEDURE — 72072 X-RAY EXAM THORAC SPINE 3VWS: CPT

## 2025-02-03 RX ORDER — OXYCODONE HYDROCHLORIDE 5 MG/1
5 TABLET ORAL EVERY 6 HOURS PRN
Qty: 20 TABLET | Refills: 0 | Status: SHIPPED | OUTPATIENT
Start: 2025-02-03 | End: 2025-02-08

## 2025-02-03 NOTE — PATIENT INSTRUCTIONS
Xray as ordered.   Apply ice as directed.  Oxycodone as prescribed prn for moderate to severe pain.  Tylenol as directed for mild pain.  Call with any problems/concerns.

## 2025-02-03 NOTE — ASSESSMENT & PLAN NOTE
Patient fell back after slipping on the ice. C/o pain right flank/upper back that radiates to right chest wall. He has been taking Tylenol with mild relief.  Having difficulty bending over/lying down. He has been sleeping in a recliner. Pain at times 10/10 with certain movements. There is mild bruising present right flank/thoracic spine. No shortness of breath but has discomfort when taking a deep breath.  Xray ordered to r/o rib fracture. Oxycodone ordered prn for moderate to severe pain. Instructed on deep breathing, applying ice to affected area.Office will call with xray results.    Orders:    XR spine thoracic 3 vw; Future    oxyCODONE (Roxicodone) 5 immediate release tablet; Take 1 tablet (5 mg total) by mouth every 6 (six) hours as needed for moderate pain for up to 5 days Max Daily Amount: 20 mg    XR chest pa and lateral; Future

## 2025-02-03 NOTE — ASSESSMENT & PLAN NOTE
Patient fell back after slipping on the ice. C/o pain right flank/upper back that radiates to right chest wall. He has been taking Tylenol with mild relief.  Having difficulty bending over/lying down. He has been sleeping in a recliner. Pain at times 10/10 with certain movements. There is mild bruising present right flank/thoracic spine. No shortness of breath but has discomfort when taking a deep breath.  Xray ordered to r/o rib fracture. Oxycodone ordered prn for moderate to severe pain. Instructed on deep breathing, applying ice to affected area. Office will call with xray results.

## 2025-02-03 NOTE — PROGRESS NOTES
Name: Chepe Cotto      : 1950      MRN: [id number]  Encounter Provider: MICHAEL Mohr  Encounter Date: 2/3/2025   Encounter department: [facility]  :  Assessment & Plan  Acute right-sided thoracic back pain  Patient fell back after slipping on the ice. C/o pain right flank/upper back that radiates to right chest wall. He has been taking Tylenol with mild relief.  Having difficulty bending over/lying down. He has been sleeping in a recliner. Pain at times 10/10 with certain movements. There is mild bruising present right flank/thoracic spine. No shortness of breath but has discomfort when taking a deep breath.  Xray ordered to r/o rib fracture. Oxycodone ordered prn for moderate to severe pain. Instructed on deep breathing, applying ice to affected area.Office will call with xray results.    Orders:    XR spine thoracic 3 vw; Future    oxyCODONE (Roxicodone) 5 immediate release tablet; Take 1 tablet (5 mg total) by mouth every 6 (six) hours as needed for moderate pain for up to 5 days Max Daily Amount: 20 mg    XR chest pa and lateral; Future    Rib pain on right side    Orders:    XR chest pa and lateral; Future    Current mild episode of major depressive disorder without prior episode (HCC)  Condition stable. No medications. Depression screening is negative.         Paroxysmal atrial fibrillation (HCC)  Condition controlled with medication. Continue Eliquis and Metoprolol.         Obesity, morbid (HCC)                  History of Present Illness   Patient had a fall last week slipped on the ice-c/o right upper back pain that radiates around right chest wall.       Review of Systems   Constitutional:  Positive for activity change.   Respiratory:  Negative for chest tightness, shortness of breath and wheezing.    Cardiovascular:  Negative for chest pain, palpitations and leg swelling.   Gastrointestinal:  Negative for abdominal pain.   Genitourinary:  Negative for  "difficulty urinating.   Musculoskeletal:  Positive for back pain.   Skin:  Negative for color change and pallor.   Neurological:  Negative for dizziness and weakness.   Hematological:  Bruises/bleeds easily.        Patient on chronic coagulapathy.     Psychiatric/Behavioral:  Negative for dysphoric mood.        Objective   /83 (BP Location: Left arm, Patient Position: Sitting, Cuff Size: Standard)   Pulse 62   Temp (!) 97.4 °F (36.3 °C) (Tympanic)   Ht 5' 9\" (1.753 m)   Wt 117 kg (257 lb)   SpO2 94%   BMI 37.95 kg/m²      Physical Exam  Vitals and nursing note reviewed.   Constitutional:       General: He is not in acute distress.     Appearance: Normal appearance. He is not ill-appearing or diaphoretic.   HENT:      Head: Normocephalic.   Eyes:      Extraocular Movements: Extraocular movements intact.   Cardiovascular:      Rate and Rhythm: Normal rate and regular rhythm.      Heart sounds: Normal heart sounds.   Pulmonary:      Effort: Pulmonary effort is normal. No respiratory distress.      Breath sounds: Normal breath sounds. No stridor. No wheezing or rhonchi.   Chest:      Chest wall: Tenderness (s/p fall-chest wall tenderness right side) present.   Musculoskeletal:         General: Tenderness (right thoracic spine radiates to right chest wall) present. Normal range of motion.      Cervical back: Normal range of motion and neck supple.   Skin:     General: Skin is warm and dry.      Coloration: Skin is not pale.      Findings: Bruising (s/p fall right flank/thoracic spine) present. No erythema.   Neurological:      Mental Status: He is alert and oriented to person, place, and time.   Psychiatric:         Mood and Affect: Mood normal.         Behavior: Behavior normal.         "

## 2025-02-04 ENCOUNTER — RESULTS FOLLOW-UP (OUTPATIENT)
Dept: FAMILY MEDICINE CLINIC | Facility: CLINIC | Age: 75
End: 2025-02-04

## 2025-03-04 DIAGNOSIS — N40.1 BPH WITH URINARY OBSTRUCTION: Primary | ICD-10-CM

## 2025-03-04 DIAGNOSIS — N13.8 BPH WITH URINARY OBSTRUCTION: Primary | ICD-10-CM

## 2025-03-12 ENCOUNTER — TELEPHONE (OUTPATIENT)
Age: 75
End: 2025-03-12

## 2025-03-12 NOTE — TELEPHONE ENCOUNTER
Pt calling in regards to PSA order. Pt requesting order be faxed to [facility] in [place].     Order faxed to [phone] as requested.

## 2025-04-03 ENCOUNTER — OFFICE VISIT (OUTPATIENT)
Dept: FAMILY MEDICINE CLINIC | Facility: CLINIC | Age: 75
End: 2025-04-03

## 2025-04-03 VITALS
HEART RATE: 72 BPM | OXYGEN SATURATION: 95 % | DIASTOLIC BLOOD PRESSURE: 78 MMHG | WEIGHT: 253 LBS | BODY MASS INDEX: 37.36 KG/M2 | SYSTOLIC BLOOD PRESSURE: 126 MMHG

## 2025-04-03 DIAGNOSIS — Z00.00 MEDICARE ANNUAL WELLNESS VISIT, SUBSEQUENT: Primary | ICD-10-CM

## 2025-04-03 DIAGNOSIS — R42 VERTIGO: ICD-10-CM

## 2025-04-03 DIAGNOSIS — I48.0 PAROXYSMAL ATRIAL FIBRILLATION (HCC): ICD-10-CM

## 2025-04-03 DIAGNOSIS — E78.2 MIXED HYPERLIPIDEMIA: ICD-10-CM

## 2025-04-03 DIAGNOSIS — E66.813 CLASS 3 OBESITY: ICD-10-CM

## 2025-04-03 LAB — PSA SERPL-MCNC: 1.75 NG/ML

## 2025-04-03 RX ORDER — MECLIZINE HYDROCHLORIDE 25 MG/1
25 TABLET ORAL 3 TIMES DAILY PRN
Qty: 30 TABLET | Refills: 0 | Status: SHIPPED | OUTPATIENT
Start: 2025-04-03

## 2025-04-03 RX ORDER — TIRZEPATIDE 2.5 MG/.5ML
2.5 INJECTION, SOLUTION SUBCUTANEOUS WEEKLY
Qty: 2 ML | Refills: 5 | Status: SHIPPED | OUTPATIENT
Start: 2025-04-03 | End: 2025-05-01

## 2025-04-03 NOTE — PROGRESS NOTES
Name: Chepe Cotto      : 1950      MRN: [id number]  Encounter Provider: Sung Patino MD  Encounter Date: 4/3/2025   Encounter department: [facility]  :  Assessment & Plan  Medicare annual wellness visit, subsequent         Vertigo         Paroxysmal atrial fibrillation (HCC)  resolved         Mixed hyperlipidemia  Stable on current medication regimen. Patient to continue prescribed medication.            Class 3 obesity  Prior Authorization Clinical Questions for Weight Management Pharmacotherapy    1. Does the patient have a contrainidcation to medication prescribed for weight management?: No  2. Does the patient have a diagnosis of obesity, confirmed by a BMI greater than or equal to 30 kg/m^2?: Yes  3. Does the patient have a BMI of greater than or equal to 27 kg/m^2 with at least one weight-related comorbidity/risk factor/complication (e.g. diabetes, dyslipidemia, coronary artery disease)?: Yes  4. Weight-related co-morbidities/risk factors: prediabetes, dyslipidemia, hypertension, cardiovascular disease  7. Has the patient been on a weight loss regimen of low-calorie diet, increased physical activity, and lifestyle modifications for a minimum of 6 months?: Yes  8. Has the patient completed a comprehensive weight loss program (ie, Weight Watchers, Noom, Bariatrics, other idris on phone)? If so, what?: Yes   -- Q8 Further Explanation: idris   9. Does the patient have a history of type 2 diabetes?: No  10. Has the member tried and failed other weight loss medication within the past 12 months?: Yes   -- Q10 Further explanation: topamax   11. Will the member use requested medication in combination with another GLP agonist or weight loss drug?: No  12. Is the medication a controlled substance?: No  For renewals: Has the patient had a positive outcome with current weight management medication (i.e., change in body weight of at least 4-5% after 12-16 weeks on maximally  tolerated dose)?: No     Baseline weight (in pounds): 253 lbs         Orders:    tirzepatide (Zepbound) 2.5 mg/0.5 mL auto-injector; Inject 0.5 mL (2.5 mg total) under the skin once a week for 28 days       Preventive health issues were discussed with patient, and age appropriate screening tests were ordered as noted in patient's After Visit Summary. Personalized health advice and appropriate referrals for health education or preventive services given if needed, as noted in patient's After Visit Summary.    History of Present Illness     HPI   Patient Care Team:  Sung Patino MD as PCP - General    Review of Systems   Constitutional:  Negative for fatigue, fever and unexpected weight change.   HENT:  Negative for congestion, sinus pain and sore throat.    Eyes:  Negative for visual disturbance.   Respiratory:  Negative for shortness of breath and wheezing.    Cardiovascular:  Negative for chest pain and palpitations.   Gastrointestinal:  Negative for abdominal pain, nausea and vomiting.   Musculoskeletal: Negative.  Negative for arthralgias and myalgias.   Neurological:  Negative for syncope, weakness and numbness.   Psychiatric/Behavioral: Negative.  Negative for confusion, dysphoric mood and suicidal ideas.      Medical History Reviewed by provider this encounter:  Tobacco  Allergies  Meds  Problems  Med Hx  Surg Hx  Fam Hx       Annual Wellness Visit Questionnaire   Last Medicare Wellness visit information reviewed, patient interviewed and updates made to the record today.      Health Risk Assessment:   Patient rates overall health as good. Patient feels that their physical health rating is same. Patient is satisfied with their life. Eyesight was rated as same. Hearing was rated as same. Patient feels that their emotional and mental health rating is same. Patients states they are never, rarely angry. Patient states they are never, rarely unusually tired/fatigued. Pain experienced in the last 7 days has  been none. Patient states that he has experienced no weight loss or gain in last 6 months.     Fall Risk Screening:   In the past year, patient has experienced: no history of falling in past year      Home Safety:  Patient does not have trouble with stairs inside or outside of their home. Patient has working smoke alarms and has working carbon monoxide detector. Home safety hazards include: none.     Nutrition:   Current diet is Regular.     Medications:   Patient is not currently taking any over-the-counter supplements. Patient is able to manage medications.     Activities of Daily Living (ADLs)/Instrumental Activities of Daily Living (IADLs):   Walk and transfer into and out of bed and chair?: Yes  Dress and groom yourself?: Yes    Bathe or shower yourself?: Yes    Feed yourself? Yes  Do your laundry/housekeeping?: Yes  Manage your money, pay your bills and track your expenses?: Yes  Make your own meals?: Yes    Do your own shopping?: Yes    Previous Hospitalizations:   Any hospitalizations or ED visits within the last 12 months?: No      Advance Care Planning:   Living will: Yes    Durable POA for healthcare: Yes    Advanced directive: Yes    Provider agrees with end of life decisions: Yes      Cognitive Screening:   Provider or family/friend/caregiver concerned regarding cognition?: No    PREVENTIVE SCREENINGS      Cardiovascular Screening:    General: Screening Not Indicated and History Lipid Disorder      Diabetes Screening:     General: Screening Current      Colorectal Cancer Screening:     General: Screening Not Indicated      Prostate Cancer Screening:    General: Screening Current      Osteoporosis Screening:    General: Screening Not Indicated      Abdominal Aortic Aneurysm (AAA) Screening:    Risk factors include: age between 65-74 yo and tobacco use        General: Screening Not Indicated      Lung Cancer Screening:     General: Screening Not Indicated      Hepatitis C Screening:    General: Screening  Not Indicated    Screening, Brief Intervention, and Referral to Treatment (SBIRT)     Screening      Single Item Drug Screening:  How often have you used an illegal drug (including marijuana) or a prescription medication for non-medical reasons in the past year? never    Single Item Drug Screen Score: 0  Interpretation: Negative screen for possible drug use disorder    Social Drivers of Health     Financial Resource Strain: Low Risk  (2/10/2023)    Received from [facility]    Overall Financial Resource Strain (CARDIA)     Difficulty of Paying Living Expenses: Not hard at all   Food Insecurity: No Food Insecurity (4/3/2025)    Hunger Vital Sign     Worried About Running Out of Food in the Last Year: Never true     Ran Out of Food in the Last Year: Never true   Transportation Needs: No Transportation Needs (4/3/2025)    PRAPARE - Transportation     Lack of Transportation (Medical): No     Lack of Transportation (Non-Medical): No   Housing Stability: Unknown (4/3/2025)    Housing Stability Vital Sign     Unable to Pay for Housing in the Last Year: No     Homeless in the Last Year: No   Utilities: Not At Risk (4/3/2025)    [facility] Util[facility]     Threatened with loss of utilities: No     No results found.    Objective   /78 (BP Location: Left arm, Patient Position: Sitting, Cuff Size: Standard)   Pulse 72   Wt 115 kg (253 lb)   SpO2 95%   BMI 37.36 kg/m²     Physical Exam  Vitals and nursing note reviewed.   Constitutional:       General: He is not in acute distress.     Appearance: He is well-developed.   HENT:      Head: Normocephalic and atraumatic.   Eyes:      Conjunctiva/sclera: Conjunctivae normal.   Cardiovascular:      Rate and Rhythm: Normal rate and regular rhythm.      Heart sounds: No murmur heard.  Pulmonary:      Effort: Pulmonary effort is normal. No respiratory distress.      Breath sounds: Normal breath sounds.   Abdominal:      Palpations: Abdomen is soft.      Tenderness:  There is no abdominal tenderness.   Musculoskeletal:         General: No swelling.      Cervical back: Neck supple.   Skin:     General: Skin is warm and dry.      Capillary Refill: Capillary refill takes less than 2 seconds.   Neurological:      Mental Status: He is alert.   Psychiatric:         Mood and Affect: Mood normal.

## 2025-04-03 NOTE — ASSESSMENT & PLAN NOTE
Prior Authorization Clinical Questions for Weight Management Pharmacotherapy    1. Does the patient have a contrainidcation to medication prescribed for weight management?: No  2. Does the patient have a diagnosis of obesity, confirmed by a BMI greater than or equal to 30 kg/m^2?: Yes  3. Does the patient have a BMI of greater than or equal to 27 kg/m^2 with at least one weight-related comorbidity/risk factor/complication (e.g. diabetes, dyslipidemia, coronary artery disease)?: Yes  4. Weight-related co-morbidities/risk factors: prediabetes, dyslipidemia, hypertension, cardiovascular disease  7. Has the patient been on a weight loss regimen of low-calorie diet, increased physical activity, and lifestyle modifications for a minimum of 6 months?: Yes  8. Has the patient completed a comprehensive weight loss program (ie, Weight Watchers, Noom, Bariatrics, other idris on phone)? If so, what?: Yes   -- Q8 Further Explanation: idris   9. Does the patient have a history of type 2 diabetes?: No  10. Has the member tried and failed other weight loss medication within the past 12 months?: Yes   -- Q10 Further explanation: topamax   11. Will the member use requested medication in combination with another GLP agonist or weight loss drug?: No  12. Is the medication a controlled substance?: No  For renewals: Has the patient had a positive outcome with current weight management medication (i.e., change in body weight of at least 4-5% after 12-16 weeks on maximally tolerated dose)?: No     Baseline weight (in pounds): 253 lbs         Orders:    tirzepatide (Zepbound) 2.5 mg/0.5 mL auto-injector; Inject 0.5 mL (2.5 mg total) under the skin once a week for 28 days     UnKnown Lawson score 0.03

## 2025-04-03 NOTE — PATIENT INSTRUCTIONS
Medicare Preventive Visit Patient Instructions  Thank you for completing your Welcome to Medicare Visit or Medicare Annual Wellness Visit today. Your next wellness visit will be due in one year (4/4/2026).  The screening/preventive services that you may require over the next 5-10 years are detailed below. Some tests may not apply to you based off risk factors and/or age. Screening tests ordered at today's visit but not completed yet may show as past due. Also, please note that scanned in results may not display below.  Preventive Screenings:  Service Recommendations Previous Testing/Comments   Colorectal Cancer Screening  Colonoscopy    Fecal Occult Blood Test (FOBT)/Fecal Immunochemical Test (FIT)  Fecal DNA/Cologuard Test  Flexible Sigmoidoscopy Age: 45-75 years old   Colonoscopy: every 10 years (May be performed more frequently if at higher risk)  OR  FOBT/FIT: every 1 year  OR  Cologuard: every 3 years  OR  Sigmoidoscopy: every 5 years  Screening may be recommended earlier than age 45 if at higher risk for colorectal cancer. Also, an individualized decision between you and your healthcare provider will decide whether screening between the ages of 76-85 would be appropriate. Colonoscopy: Not on file  FOBT/FIT: Not on file  Cologuard: Not on file  Sigmoidoscopy: Not on file          Prostate Cancer Screening Individualized decision between patient and health care provider in men between ages of 55-69   Medicare will cover every 12 months beginning on the day after your 50th birthday PSA: 1.75 ng/mL           Hepatitis C Screening Once for adults born between 1945 and 1965  More frequently in patients at high risk for Hepatitis C Hep C Antibody: Not on file        Diabetes Screening 1-2 times per year if you're at risk for diabetes or have pre-diabetes Fasting glucose: No results in last 5 years (No results in last 5 years)  A1C: 5.9 % (7/11/2021)      Cholesterol Screening Once every 5 years if you don't have a  lipid disorder. May order more often based on risk factors. Lipid panel: 07/11/2021         Other Preventive Screenings Covered by Medicare:  Abdominal Aortic Aneurysm (AAA) Screening: covered once if your at risk. You're considered to be at risk if you have a family history of AAA or a male between the age of 65-75 who smoking at least 100 cigarettes in your lifetime.  Lung Cancer Screening: covers low dose CT scan once per year if you meet all of the following conditions: (1) Age 55-77; (2) No signs or symptoms of lung cancer; (3) Current smoker or have quit smoking within the last 15 years; (4) You have a tobacco smoking history of at least 20 pack years (packs per day x number of years you smoked); (5) You get a written order from a healthcare provider.  Glaucoma Screening: covered annually if you're considered high risk: (1) You have diabetes OR (2) Family history of glaucoma OR (3)  aged 50 and older OR (4)  American aged 65 and older  Osteoporosis Screening: covered every 2 years if you meet one of the following conditions: (1) Have a vertebral abnormality; (2) On glucocorticoid therapy for more than 3 months; (3) Have primary hyperparathyroidism; (4) On osteoporosis medications and need to assess response to drug therapy.  HIV Screening: covered annually if you're between the age of 15-65. Also covered annually if you are younger than 15 and older than 65 with risk factors for HIV infection. For pregnant patients, it is covered up to 3 times per pregnancy.    Immunizations:  Immunization Recommendations   Influenza Vaccine Annual influenza vaccination during flu season is recommended for all persons aged >= 6 months who do not have contraindications   Pneumococcal Vaccine   * Pneumococcal conjugate vaccine = PCV13 (Prevnar 13), PCV15 (Vaxneuvance), PCV20 (Prevnar 20)  * Pneumococcal polysaccharide vaccine = PPSV23 (Pneumovax) Adults 19-65 yo with certain risk factors or if 65+ yo  If  never received any pneumonia vaccine: recommend Prevnar 20 (PCV20)  Give PCV20 if previously received 1 dose of PCV13 or PPSV23   Hepatitis B Vaccine 3 dose series if at intermediate or high risk (ex: diabetes, end stage renal disease, liver disease)   Respiratory syncytial virus (RSV) Vaccine - COVERED BY MEDICARE PART D  * RSVPreF3 (Arexvy) CDC recommends that adults 60 years of age and older may receive a single dose of RSV vaccine using shared clinical decision-making (SCDM)   Tetanus (Td) Vaccine - COST NOT COVERED BY MEDICARE PART B Following completion of primary series, a booster dose should be given every 10 years to maintain immunity against tetanus. Td may also be given as tetanus wound prophylaxis.   Tdap Vaccine - COST NOT COVERED BY MEDICARE PART B Recommended at least once for all adults. For pregnant patients, recommended with each pregnancy.   Shingles Vaccine (Shingrix) - COST NOT COVERED BY MEDICARE PART B  2 shot series recommended in those 19 years and older who have or will have weakened immune systems or those 50 years and older     Health Maintenance Due:      Topic Date Due   • Hepatitis C Screening  Never done   • Colorectal Cancer Screening  Never done     Immunizations Due:      Topic Date Due   • Pneumococcal Vaccine: 65+ Years (1 of 1 - PCV) Never done   • Influenza Vaccine (1) Never done   • COVID-19 Vaccine (3 - 2024-25 season) 09/01/2024     Advance Directives   What are advance directives?  Advance directives are legal documents that state your wishes and plans for medical care. These plans are made ahead of time in case you lose your ability to make decisions for yourself. Advance directives can apply to any medical decision, such as the treatments you want, and if you want to donate organs.   What are the types of advance directives?  There are many types of advance directives, and each state has rules about how to use them. You may choose a combination of any of the  following:  Living will:  This is a written record of the treatment you want. You can also choose which treatments you do not want, which to limit, and which to stop at a certain time. This includes surgery, medicine, IV fluid, and tube feedings.   Durable power of  for healthcare (DPAHC):  This is a written record that states who you want to make healthcare choices for you when you are unable to make them for yourself. This person, called a proxy, is usually a family member or a friend. You may choose more than 1 proxy.  Do not resuscitate (DNR) order:  A DNR order is used in case your heart stops beating or you stop breathing. It is a request not to have certain forms of treatment, such as CPR. A DNR order may be included in other types of advance directives.  Medical directive:  This covers the care that you want if you are in a coma, near death, or unable to make decisions for yourself. You can list the treatments you want for each condition. Treatment may include pain medicine, surgery, blood transfusions, dialysis, IV or tube feedings, and a ventilator (breathing machine).  Values history:  This document has questions about your views, beliefs, and how you feel and think about life. This information can help others choose the care that you would choose.  Why are advance directives important?  An advance directive helps you control your care. Although spoken wishes may be used, it is better to have your wishes written down. Spoken wishes can be misunderstood, or not followed. Treatments may be given even if you do not want them. An advance directive may make it easier for your family to make difficult choices about your care.   Weight Management   Why it is important to manage your weight:  Being overweight increases your risk of health conditions such as heart disease, high blood pressure, type 2 diabetes, and certain types of cancer. It can also increase your risk for osteoarthritis, sleep apnea, and  other respiratory problems. Aim for a slow, steady weight loss. Even a small amount of weight loss can lower your risk of health problems.  How to lose weight safely:  A safe and healthy way to lose weight is to eat fewer calories and get regular exercise. You can lose up about 1 pound a week by decreasing the number of calories you eat by 500 calories each day.   Healthy meal plan for weight management:  A healthy meal plan includes a variety of foods, contains fewer calories, and helps you stay healthy. A healthy meal plan includes the following:  Eat whole-grain foods more often.  A healthy meal plan should contain fiber. Fiber is the part of grains, fruits, and vegetables that is not broken down by your body. Whole-grain foods are healthy and provide extra fiber in your diet. Some examples of whole-grain foods are whole-wheat breads and pastas, oatmeal, brown rice, and bulgur.  Eat a variety of vegetables every day.  Include dark, leafy greens such as spinach, kale, everton greens, and mustard greens. Eat yellow and orange vegetables such as carrots, sweet potatoes, and winter squash.   Eat a variety of fruits every day.  Choose fresh or canned fruit (canned in its own juice or light syrup) instead of juice. Fruit juice has very little or no fiber.  Eat low-fat dairy foods.  Drink fat-free (skim) milk or 1% milk. Eat fat-free yogurt and low-fat cottage cheese. Try low-fat cheeses such as mozzarella and other reduced-fat cheeses.  Choose meat and other protein foods that are low in fat.  Choose beans or other legumes such as split peas or lentils. Choose fish, skinless poultry (chicken or turkey), or lean cuts of red meat (beef or pork). Before you cook meat or poultry, cut off any visible fat.   Use less fat and oil.  Try baking foods instead of frying them. Add less fat, such as margarine, sour cream, regular salad dressing and mayonnaise to foods. Eat fewer high-fat foods. Some examples of high-fat foods  include french fries, doughnuts, ice cream, and cakes.  Eat fewer sweets.  Limit foods and drinks that are high in sugar. This includes candy, cookies, regular soda, and sweetened drinks.  Exercise:  Exercise at least 30 minutes per day on most days of the week. Some examples of exercise include walking, biking, dancing, and swimming. You can also fit in more physical activity by taking the stairs instead of the elevator or parking farther away from stores. Ask your healthcare provider about the best exercise plan for you.      © Copyright [employer] 2018 Information is for End User's use only and may not be sold, redistributed or otherwise used for commercial purposes. All illustrations and images included in CareNotes® are the copyrighted property of [employer].D.A.M., Inc. or [employer]

## 2025-04-09 ENCOUNTER — TELEPHONE (OUTPATIENT)
Dept: FAMILY MEDICINE CLINIC | Facility: CLINIC | Age: 75
End: 2025-04-09

## 2025-04-09 DIAGNOSIS — N13.8 BPH WITH URINARY OBSTRUCTION: ICD-10-CM

## 2025-04-09 DIAGNOSIS — N40.1 BPH WITH URINARY OBSTRUCTION: ICD-10-CM

## 2025-04-09 RX ORDER — ALFUZOSIN HYDROCHLORIDE 10 MG/1
10 TABLET, EXTENDED RELEASE ORAL DAILY
Qty: 90 TABLET | Refills: 1 | Status: SHIPPED | OUTPATIENT
Start: 2025-04-09

## 2025-04-09 NOTE — TELEPHONE ENCOUNTER
PA for (Zepbound) 2.5 mg/0.5 mL auto-injector SUBMITTED to [facility]    via    []CMM-KEY:  [x][employer]-Case ID # [id number]   []Availity-Auth ID # NDC #   []Faxed to plan   []Other website   []Phone call Case ID #     [x]PA sent as URGENT    All office notes, labs and other pertaining documents and studies sent. Clinical questions answered. Awaiting determination from insurance company.     Turnaround time for your insurance to make a decision on your Prior Authorization can take 7-21 business days.

## 2025-04-09 NOTE — TELEPHONE ENCOUNTER
PA needed for patient's Zepbound 2.5 MG,    CMM Key: [id number]    Please initiate and update pharmacy and patient when able.    Thank you.

## 2025-04-10 ENCOUNTER — PATIENT MESSAGE (OUTPATIENT)
Dept: FAMILY MEDICINE CLINIC | Facility: CLINIC | Age: 75
End: 2025-04-10

## 2025-04-10 NOTE — TELEPHONE ENCOUNTER
Can we please initiate an appeal or exception to mention that patient is being treated for ANANDA with a CPAP.    Please advise if further information is needed.     Thank you.

## 2025-04-11 NOTE — TELEPHONE ENCOUNTER
PA for (Zepbound) 2.5 mg/0.5 mL auto-injector APPEALED via     []CMM  []SS  [x]Letter sent to insurance via fax [phone]  []Other site or means     All necessary records sent. Will await response from insurance company    Turnaround time for a decision to be made on an appeal could take up to 30 business days

## 2025-04-28 NOTE — PROGRESS NOTES
"Name: Chepe Cotto      : 1950      MRN: [id number]  Encounter Provider: MICHAEL Phillips  Encounter Date: 2025   [facility]  :  Assessment & Plan  BPH with urinary obstruction  PVR: 82 mL  Orders:    POCT Measure PVR    finasteride (PROSCAR) 5 mg tablet; Take 1 tablet (5 mg total) by mouth daily    Urinary frequency  Will initiate finasteride 5 mg follow-up.  Discussed side effects of medication.  Orders:    POCT Measure PVR    finasteride (PROSCAR) 5 mg tablet; Take 1 tablet (5 mg total) by mouth daily    Screening for prostate cancer  PSA 1.75 (2025), 2.2 (3/5/2019)  Declined JUNE  PSA ordered for next year  Orders:    PSA, Total Screen; Future        History of Present Illness   Chepe Cotto is a 74 y.o. male who presents for follow-up for BPH and frequency.  Was started on alfuzosin in 2023.  Patient reports nocturia 1-2 times a day.  CT scan showed enlarged prostate.  It was discussed by Dr. Martinez to initiate finasteride to see if this helps.  Patient reports urinary frequency sometimes every hour.  Patient denies hematuria, dysuria,.    Review of Systems   Constitutional:  Negative for chills and fever.   HENT:  Negative for ear pain and sore throat.    Eyes:  Negative for pain and visual disturbance.   Respiratory:  Negative for cough and shortness of breath.    Cardiovascular:  Negative for chest pain and palpitations.   Gastrointestinal:  Negative for abdominal pain and vomiting.   Genitourinary:  Positive for frequency. Negative for dysuria and hematuria.   Musculoskeletal:  Negative for arthralgias and back pain.   Skin:  Negative for color change and rash.   Neurological:  Negative for seizures and syncope.   All other systems reviewed and are negative.         Objective   /78 (BP Location: Left arm, Patient Position: Sitting, Cuff Size: Large)   Pulse 65   Ht 5' 10\" (1.778 m)   Wt 115 kg (253 lb)   SpO2 93%  "  BMI 36.30 kg/m²     Physical Exam  Vitals reviewed.   Constitutional:       Appearance: Normal appearance.   Cardiovascular:      Rate and Rhythm: Normal rate.   Skin:     General: Skin is warm.   Neurological:      General: No focal deficit present.      Mental Status: He is oriented to person, place, and time.   Psychiatric:         Mood and Affect: Mood normal.         Behavior: Behavior normal.           Results   Lab Results   Component Value Date    PSA 1.75 04/02/2025    PSA 2.2 03/05/2019     Lab Results   Component Value Date    CALCIUM 9.5 03/07/2023    K 3.8 03/07/2023    CO2 29 03/07/2023     03/07/2023    BUN 14 03/07/2023    CREATININE 0.90 03/07/2023     Lab Results   Component Value Date    WBC 7.64 03/07/2023    HGB 15.6 03/07/2023    HCT 49.6 (H) 03/07/2023    MCV 94 03/07/2023     03/07/2023       Office Urine Dip  Recent Results (from the past hour)   POCT Measure PVR    Collection Time: 04/29/25 10:10 AM   Result Value Ref Range    POST-VOID RESIDUAL VOLUME, ML POC 82 mL

## 2025-04-29 ENCOUNTER — OFFICE VISIT (OUTPATIENT)
Dept: UROLOGY | Facility: MEDICAL CENTER | Age: 75
End: 2025-04-29

## 2025-04-29 VITALS
OXYGEN SATURATION: 93 % | HEART RATE: 65 BPM | BODY MASS INDEX: 36.22 KG/M2 | SYSTOLIC BLOOD PRESSURE: 120 MMHG | DIASTOLIC BLOOD PRESSURE: 78 MMHG | WEIGHT: 253 LBS | HEIGHT: 70 IN

## 2025-04-29 DIAGNOSIS — R35.0 URINARY FREQUENCY: ICD-10-CM

## 2025-04-29 DIAGNOSIS — Z12.5 SCREENING FOR PROSTATE CANCER: ICD-10-CM

## 2025-04-29 DIAGNOSIS — N13.8 BPH WITH URINARY OBSTRUCTION: Primary | ICD-10-CM

## 2025-04-29 DIAGNOSIS — N40.1 BPH WITH URINARY OBSTRUCTION: Primary | ICD-10-CM

## 2025-04-29 LAB — POST-VOID RESIDUAL VOLUME, ML POC: 82 ML

## 2025-04-29 RX ORDER — FINASTERIDE 5 MG/1
5 TABLET, FILM COATED ORAL DAILY
Qty: 90 TABLET | Refills: 0 | Status: SHIPPED | OUTPATIENT
Start: 2025-04-29

## 2025-04-29 NOTE — ASSESSMENT & PLAN NOTE
PVR: 82 mL  Orders:    POCT Measure PVR    finasteride (PROSCAR) 5 mg tablet; Take 1 tablet (5 mg total) by mouth daily

## 2025-05-22 DIAGNOSIS — I10 HYPERTENSION, UNSPECIFIED TYPE: ICD-10-CM

## 2025-05-22 RX ORDER — LISINOPRIL 5 MG/1
5 TABLET ORAL DAILY
Qty: 90 TABLET | Refills: 3 | Status: SHIPPED | OUTPATIENT
Start: 2025-05-22

## 2025-05-27 DIAGNOSIS — I48.91 ATRIAL FIBRILLATION, UNSPECIFIED TYPE (CMS/HCC): ICD-10-CM

## 2025-06-03 DIAGNOSIS — N13.8 BPH WITH URINARY OBSTRUCTION: ICD-10-CM

## 2025-06-03 DIAGNOSIS — R35.0 URINARY FREQUENCY: ICD-10-CM

## 2025-06-03 DIAGNOSIS — N40.1 BPH WITH URINARY OBSTRUCTION: ICD-10-CM

## 2025-06-04 RX ORDER — FINASTERIDE 5 MG/1
5 TABLET, FILM COATED ORAL DAILY
Qty: 90 TABLET | Refills: 0 | Status: SHIPPED | OUTPATIENT
Start: 2025-06-04

## 2025-06-10 DIAGNOSIS — E78.2 MIXED HYPERLIPIDEMIA: ICD-10-CM

## 2025-06-10 DIAGNOSIS — I48.0 PAROXYSMAL ATRIAL FIBRILLATION (HCC): ICD-10-CM

## 2025-06-10 RX ORDER — METOPROLOL SUCCINATE 50 MG/1
50 TABLET, EXTENDED RELEASE ORAL DAILY
Qty: 90 TABLET | Refills: 1 | Status: SHIPPED | OUTPATIENT
Start: 2025-06-10

## 2025-06-15 DIAGNOSIS — E78.2 MIXED HYPERLIPIDEMIA: ICD-10-CM

## 2025-06-15 DIAGNOSIS — I48.0 PAROXYSMAL ATRIAL FIBRILLATION (HCC): ICD-10-CM

## 2025-06-15 RX ORDER — ATORVASTATIN CALCIUM 20 MG/1
20 TABLET, FILM COATED ORAL DAILY
Qty: 90 TABLET | Refills: 1 | Status: SHIPPED | OUTPATIENT
Start: 2025-06-15

## 2025-07-10 ENCOUNTER — OFFICE VISIT (OUTPATIENT)
Dept: CARDIOLOGY | Facility: CLINIC | Age: 75
End: 2025-07-10
Payer: MEDICARE

## 2025-07-10 VITALS
SYSTOLIC BLOOD PRESSURE: 116 MMHG | DIASTOLIC BLOOD PRESSURE: 78 MMHG | HEART RATE: 64 BPM | WEIGHT: 249 LBS | OXYGEN SATURATION: 98 % | RESPIRATION RATE: 18 BRPM | HEIGHT: 71 IN | BODY MASS INDEX: 34.86 KG/M2

## 2025-07-10 DIAGNOSIS — I48.91 ATRIAL FIBRILLATION, UNSPECIFIED TYPE (CMS/HCC): ICD-10-CM

## 2025-07-10 DIAGNOSIS — I48.0 PAROXYSMAL ATRIAL FIBRILLATION (CMS/HCC): Primary | ICD-10-CM

## 2025-07-10 DIAGNOSIS — I10 PRIMARY HYPERTENSION: ICD-10-CM

## 2025-07-10 DIAGNOSIS — E78.9 LIPID DISORDER: ICD-10-CM

## 2025-07-10 PROCEDURE — 93000 ELECTROCARDIOGRAM COMPLETE: CPT | Performed by: INTERNAL MEDICINE

## 2025-07-10 PROCEDURE — 99214 OFFICE O/P EST MOD 30 MIN: CPT | Performed by: INTERNAL MEDICINE

## 2025-07-10 PROCEDURE — G8754 DIAS BP LESS 90: HCPCS | Performed by: INTERNAL MEDICINE

## 2025-07-10 PROCEDURE — G8752 SYS BP LESS 140: HCPCS | Performed by: INTERNAL MEDICINE

## 2025-07-10 NOTE — LETTER
July 12, 2025     Balbir Tellez MD  [address] [place] [id number]    Patient: Humberto Chung  YOB: 1950  Date of Visit: 7/10/2025      Dear Dr. Tellez:    Thank you for referring Humberto Chung to me for evaluation. Below are my notes for this consultation.    If you have questions, please do not hesitate to call me. I look forward to following your patient along with you.         Sincerely,        Sincere García MD        CC: No Recipients    Sincere García MD  7/12/2025  1:05 PM  Sign when Signing Visit       Electrophysiology  Outpatient Progress Note       Reason for visit:   Chief Complaint   Patient presents with   • Follow-up       HPI  Humberto Chung is a 74 y.o. male who is seen today for follow up of his atrial fibrillation and hypertension. He discontinued amiodarone at his last visit.  He continues on oral anticoagulation. In the office today he reports he has been doing well, and has not had any episodes of atrial fibrillation that he is aware of.     He underwent PVI on 2/8/23. Post procedurally, he was reintubated due to hypoxia in the setting of a chronically elevated right hemidiaphragm. Dofetilide and Eliquis were on hold given intubation. Initially, he was in and out of atrial fibrillation but then it became persistent on 2/10. He was transitioned from IV amiodarone to oral amiodarone. He was discharged from the hospital on 2/14/23.     Past Medical History:   Diagnosis Date   • A-fib (CMS/HCC)    • A-fib (CMS/HCC) 7/25/2018   • Chronic anticoagulation 7/25/2018   • HTN (hypertension) 6/24/2020   • Lipid disorder    • Visit for monitoring Tikosyn therapy 7/25/2019     Past Surgical History   Procedure Laterality Date   • Ablation atrial fibrillation N/A 2/8/2023    [place] [facility] [place]   • Ablation of dysrhythmic focus     • Cardiac catheterization     • Total shoulder replacement Right      Social History     Tobacco  Use   • Smoking status: Some Days   • Smokeless tobacco: Never   Vaping Use   • Vaping status: Never Used   Substance Use Topics   • Alcohol use: Yes     Alcohol/week: 3.0 standard drinks of alcohol     Types: 3 Cans of beer per week   • Drug use: Defer     Family History   Adopted: Yes   Family history unknown: Yes       ALLERGY:  Aspirin and Penicillins    Current Outpatient Medications   Medication Sig Dispense Refill   • alfuzosin (UROXATRAL) 10 mg 24 hr tablet Take 10 mg by mouth daily.     • apixaban (ELIQUIS) 5 mg tablet Take 1 tablet (5 mg total) by mouth 2 (two) times a day. 180 tablet 3   • atorvastatin (LIPITOR) 20 mg tablet Take 1 tablet (20 mg total) by mouth daily. 90 tablet 3   • lisinopriL (PRINIVIL) 5 mg tablet Take 1 tablet (5 mg total) by mouth daily. 90 tablet 3   • metoprolol succinate XL (TOPROL-XL) 50 mg 24 hr tablet Take 1 tablet (50 mg total) by mouth daily. 90 tablet 3     No current facility-administered medications for this visit.     Review of Systems   Constitutional: Negative.   HENT: Negative.     Eyes: Negative.    Cardiovascular: Negative.    Respiratory: Negative.     Endocrine: Negative.    Hematologic/Lymphatic: Negative.    Skin: Negative.    Musculoskeletal: Negative.    Gastrointestinal: Negative.    Genitourinary: Negative.    Neurological: Negative.    Psychiatric/Behavioral: Negative.       Objective  Vitals:    07/10/25 1507   BP: 116/78   Pulse: 64   Resp: 18   SpO2: 98%       BP Readings from Last 3 Encounters:   07/10/25 116/78   01/23/25 122/84   06/27/24 120/72       Physical Exam  Constitutional:       Appearance: He is well-developed.   HENT:      Head: Normocephalic.   Eyes:      Conjunctiva/sclera: Conjunctivae normal.      Pupils: Pupils are equal, round, and reactive to light.   Cardiovascular:      Rate and Rhythm: Normal rate and regular rhythm.      Heart sounds: Normal heart sounds. No murmur heard.     No friction rub. No gallop.   Pulmonary:      Effort:  Pulmonary effort is normal.      Breath sounds: Normal breath sounds.   Abdominal:      General: Bowel sounds are normal.      Palpations: Abdomen is soft.   Musculoskeletal:         General: Normal range of motion.      Cervical back: Normal range of motion.      Comments: Status post left index finger amputation.   Skin:     General: Skin is warm and dry.   Neurological:      Mental Status: He is alert and oriented to person, place, and time.   Psychiatric:         Behavior: Behavior normal.         Thought Content: Thought content normal.         Judgment: Judgment normal.         Lab Results   Component Value Date    WBC 5.5 01/17/2025    HGB 15.1 01/17/2025     01/17/2025    CHOL 153 01/17/2025    TRIG 85 01/17/2025    HDL 44 01/17/2025    ALT 19 01/17/2025    AST 18 01/17/2025     01/17/2025    K 4.5 01/17/2025    CREATININE 0.96 01/17/2025    TSH 2.18 01/17/2025     Cardiovascular Procedures:    CATH LAB:  Cath (Normal Coronaries) - 2008    ELECTROPHYSIOLOGY:  EPS (PV ablation afib during study) - 12/11/2009  Holter (apc one 6 beat cst no aifb) - 5/13/2015 2/8/23 Catheter Ablation   Abnormal atrial function with history of recurrent paroxysmal atrial fibrillation and remote previous pulmonary vein isolation.  Reisolation of the pulmonary veins utilizing wide circumferential lesion sets to include the ipsilateral veins along with the haley was performed along with SVC isolation.    STRESS TESTS:  ECHO 5/28/2020  MPI (EF 0.60 (60%), lateral wal defect) - 6/9/2008  Mean gradient = 5.00 mmHg. Peak velocity = 1.50 m/s. Calculated area = 3.15 cm2.  Normal-sized LV. Normal LV wall thickness.  Preserved LV systolic function. Estimated EF 65%.  Normal-sized RV. Normal RV systolic function.  Normal-sized LA.  Normal-sized RA.  Aortic root normal. Sinuses of Valsalva normal-sized. Ascending aorta normal-sized. Aortic arch normal-sized. Descending aorta normal-sized.  Aortic valve structure is normal.  Tricuspid aortic valve. No aortic valve regurgitation. No aortic valve stenosis.  Normal leaflet structure and normal leaflet motion of the mitral valve.  No significant mitral valve regurgitation.  No significant mitral valve stenosis.  Tricuspid valve structure is normal. Mild tricuspid valve regurgitation.  No significant tricuspid valve stenosis.  Normal pulmonic valve structure. No pulmonic valve regurgitation. No pulmonic valve stenosis.  IVC not well visualized.  Normal pericardial structure. No evidence of pericardial effusion. No cardiac tamponade.  Normal 2D echocardiogram color-flow Doppler study     Stress Echo 7/15/20  No evidence of ischemia with stress echocardiogram  The patient completed 7 mets of exercise achieved target heart rate  Baseline EKG normal  No changes diagnostic of ischemia with exercise frequent single VPCs noted with exercise  Baseline echocardiogram mildly dilated aortic root 4.0 cm  Mildly dilated left atrium  Mild left ventricular hypertrophy  No regional wall motion abnormality preserved ejection fraction 65%  No structural valvular disease  Trace mitral regurgitation  Mild tricuspid regurgitation estimated pulmonary systolic pressure 35 mmHg  All walls became hyperdynamic with exercise   ECHO 2/9/2023  Compared to previous echo on 5/28/2020, no significant change, ascending aorta is measured to be 4.0 cm.     Other (Admitted for Tikosyn loading) - 6/10/2015     ECG   Results for orders placed or performed in visit on 07/10/25   [facility] MUSE ECG 12 lead (clinic performed)    Collection Time: 07/10/25  3:14 PM    Narrative    Normal sinus rhythm  Normal ECG  When compared with ECG of 23-JAN-2025 14:07,  No significant change was found  Confirmed by Sincere García ([id number]) on 7/11/2025 12:26:06 PM            Assessment  Problem List Items Addressed This Visit          Circulatory    Atrial fibrillation (CMS/HCC) - Primary    He continues to do well following his catheter ablation  procedure.  He is now off amiodarone, continuing the metoprolol.  There have been no recurrence of atrial fibrillation.  Oral anticoagulation will be continued.         Relevant Orders    Comprehensive metabolic panel    CBC and Differential    Lipid panel    [facility]    HTN (hypertension)    Well-controlled on his current medications.         Relevant Orders    Comprehensive metabolic panel    CBC and Differential    Lipid panel    Atrial fibrillation, unspecified type (CMS/HCC)    Relevant Orders    [facility] MUSE ECG 12 lead (clinic performed) (Completed)       Other    Lipid disorder    Will continue atorvastatin.  Will order lipid profile for next visit.         Relevant Orders    Comprehensive metabolic panel    Lipid panel              Sincere García MD  07/10/2025

## 2025-07-11 LAB
ATRIAL RATE: 61
P AXIS: 34
PR INTERVAL: 194
QRS DURATION: 88
QT INTERVAL: 424
QTC CALCULATION(BAZETT): 426
R AXIS: 70
T WAVE AXIS: 11
VENTRICULAR RATE: 61

## 2025-07-12 ASSESSMENT — ENCOUNTER SYMPTOMS
EYES NEGATIVE: 1
GASTROINTESTINAL NEGATIVE: 1
RESPIRATORY NEGATIVE: 1
PSYCHIATRIC NEGATIVE: 1
CONSTITUTIONAL NEGATIVE: 1
ENDOCRINE NEGATIVE: 1
HEMATOLOGIC/LYMPHATIC NEGATIVE: 1
CARDIOVASCULAR NEGATIVE: 1
NEUROLOGICAL NEGATIVE: 1
MUSCULOSKELETAL NEGATIVE: 1

## 2025-07-12 NOTE — PROGRESS NOTES
Electrophysiology  Outpatient Progress Note       Reason for visit:   Chief Complaint   Patient presents with    Follow-up       HPI   Humberto Chung is a 74 y.o. male who is seen today for follow up of his atrial fibrillation and hypertension. He discontinued amiodarone at his last visit.  He continues on oral anticoagulation. In the office today he reports he has been doing well, and has not had any episodes of atrial fibrillation that he is aware of.     He underwent PVI on 2/8/23. Post procedurally, he was reintubated due to hypoxia in the setting of a chronically elevated right hemidiaphragm. Dofetilide and Eliquis were on hold given intubation. Initially, he was in and out of atrial fibrillation but then it became persistent on 2/10. He was transitioned from IV amiodarone to oral amiodarone. He was discharged from the hospital on 2/14/23.     Past Medical History:   Diagnosis Date    A-fib (CMS/HCC)     A-fib (CMS/[facility]) 7/25/2018    Chronic anticoagulation 7/25/2018    HTN (hypertension) 6/24/2020    Lipid disorder     Visit for monitoring Tikosyn therapy 7/25/2019     Past Surgical History   Procedure Laterality Date    Ablation atrial fibrillation N/A 2/8/2023    [place] [facility] [place]    Ablation of dysrhythmic focus      Cardiac catheterization      Total shoulder replacement Right      Social History     Tobacco Use    Smoking status: Some Days    Smokeless tobacco: Never   Vaping Use    Vaping status: Never Used   Substance Use Topics    Alcohol use: Yes     Alcohol/week: 3.0 standard drinks of alcohol     Types: 3 Cans of beer per week    Drug use: Defer     Family History   Adopted: Yes   Family history unknown: Yes       ALLERGY:  Aspirin and Penicillins    Current Outpatient Medications   Medication Sig Dispense Refill    alfuzosin (UROXATRAL) 10 mg 24 hr tablet Take 10 mg by mouth daily.      apixaban (ELIQUIS) 5 mg tablet Take 1 tablet (5 mg total) by mouth 2  (two) times a day. 180 tablet 3    atorvastatin (LIPITOR) 20 mg tablet Take 1 tablet (20 mg total) by mouth daily. 90 tablet 3    lisinopriL (PRINIVIL) 5 mg tablet Take 1 tablet (5 mg total) by mouth daily. 90 tablet 3    metoprolol succinate XL (TOPROL-XL) 50 mg 24 hr tablet Take 1 tablet (50 mg total) by mouth daily. 90 tablet 3     No current facility-administered medications for this visit.     Review of Systems   Constitutional: Negative.   HENT: Negative.     Eyes: Negative.    Cardiovascular: Negative.    Respiratory: Negative.     Endocrine: Negative.    Hematologic/Lymphatic: Negative.    Skin: Negative.    Musculoskeletal: Negative.    Gastrointestinal: Negative.    Genitourinary: Negative.    Neurological: Negative.    Psychiatric/Behavioral: Negative.       Objective   Vitals:    07/10/25 1507   BP: 116/78   Pulse: 64   Resp: 18   SpO2: 98%       BP Readings from Last 3 Encounters:   07/10/25 116/78   01/23/25 122/84   06/27/24 120/72       Physical Exam  Constitutional:       Appearance: He is well-developed.   HENT:      Head: Normocephalic.   Eyes:      Conjunctiva/sclera: Conjunctivae normal.      Pupils: Pupils are equal, round, and reactive to light.   Cardiovascular:      Rate and Rhythm: Normal rate and regular rhythm.      Heart sounds: Normal heart sounds. No murmur heard.     No friction rub. No gallop.   Pulmonary:      Effort: Pulmonary effort is normal.      Breath sounds: Normal breath sounds.   Abdominal:      General: Bowel sounds are normal.      Palpations: Abdomen is soft.   Musculoskeletal:         General: Normal range of motion.      Cervical back: Normal range of motion.      Comments: Status post left index finger amputation.   Skin:     General: Skin is warm and dry.   Neurological:      Mental Status: He is alert and oriented to person, place, and time.   Psychiatric:         Behavior: Behavior normal.         Thought Content: Thought content normal.         Judgment: Judgment  normal.         Lab Results   Component Value Date    WBC 5.5 01/17/2025    HGB 15.1 01/17/2025     01/17/2025    CHOL 153 01/17/2025    TRIG 85 01/17/2025    HDL 44 01/17/2025    ALT 19 01/17/2025    AST 18 01/17/2025     01/17/2025    K 4.5 01/17/2025    CREATININE 0.96 01/17/2025    TSH 2.18 01/17/2025     Cardiovascular Procedures:    CATH LAB:  Cath (Normal Coronaries) - 2008    ELECTROPHYSIOLOGY:  EPS (PV ablation afib during study) - 12/11/2009  Holter (apc one 6 beat cst no aifb) - 5/13/2015 2/8/23 Catheter Ablation   Abnormal atrial function with history of recurrent paroxysmal atrial fibrillation and remote previous pulmonary vein isolation.  Reisolation of the pulmonary veins utilizing wide circumferential lesion sets to include the ipsilateral veins along with the haley was performed along with SVC isolation.    STRESS TESTS:  ECHO 5/28/2020  MPI (EF 0.60 (60%), lateral wal defect) - 6/9/2008  Mean gradient = 5.00 mmHg. Peak velocity = 1.50 m/s. Calculated area = 3.15 cm2.  Normal-sized LV. Normal LV wall thickness.  Preserved LV systolic function. Estimated EF 65%.  Normal-sized RV. Normal RV systolic function.  Normal-sized LA.  Normal-sized RA.  Aortic root normal. Sinuses of Valsalva normal-sized. Ascending aorta normal-sized. Aortic arch normal-sized. Descending aorta normal-sized.  Aortic valve structure is normal. Tricuspid aortic valve. No aortic valve regurgitation. No aortic valve stenosis.  Normal leaflet structure and normal leaflet motion of the mitral valve.  No significant mitral valve regurgitation.  No significant mitral valve stenosis.  Tricuspid valve structure is normal. Mild tricuspid valve regurgitation.  No significant tricuspid valve stenosis.  Normal pulmonic valve structure. No pulmonic valve regurgitation. No pulmonic valve stenosis.  IVC not well visualized.  Normal pericardial structure. No evidence of pericardial effusion. No cardiac tamponade.  Normal 2D  echocardiogram color-flow Doppler study     Stress Echo 7/15/20  No evidence of ischemia with stress echocardiogram  The patient completed 7 mets of exercise achieved target heart rate  Baseline EKG normal  No changes diagnostic of ischemia with exercise frequent single VPCs noted with exercise  Baseline echocardiogram mildly dilated aortic root 4.0 cm  Mildly dilated left atrium  Mild left ventricular hypertrophy  No regional wall motion abnormality preserved ejection fraction 65%  No structural valvular disease  Trace mitral regurgitation  Mild tricuspid regurgitation estimated pulmonary systolic pressure 35 mmHg  All walls became hyperdynamic with exercise   ECHO 2/9/2023  Compared to previous echo on 5/28/2020, no significant change, ascending aorta is measured to be 4.0 cm.     Other (Admitted for Tikosyn loading) - 6/10/2015     ECG   Results for orders placed or performed in visit on 07/10/25   [facility]    Collection Time: 07/10/25  3:14 PM    Narrative    Normal sinus rhythm  Normal ECG  When compared with ECG of 23-JAN-2025 14:07,  No significant change was found  Confirmed by Sincere García ([id number]) on 7/11/2025 12:26:06 PM            Assessment   Problem List Items Addressed This Visit          Circulatory    Atrial fibrillation (CMS/HCC) - Primary    He continues to do well following his catheter ablation procedure.  He is now off amiodarone, continuing the metoprolol.  There have been no recurrence of atrial fibrillation.  Oral anticoagulation will be continued.         Relevant Orders    Comprehensive metabolic panel    CBC and Differential    Lipid panel    [facility] ([facility])    HTN (hypertension)    Well-controlled on his current medications.         Relevant Orders    Comprehensive metabolic panel    CBC and Differential    Lipid panel    Atrial fibrillation, unspecified type (CMS/HCC)    Relevant Orders    [facility] (Completed)       Other    Lipid disorder    Will continue atorvastatin.  Will order lipid profile for next visit.         Relevant Orders    Comprehensive metabolic panel    Lipid panel              Sincere García MD  07/10/2025

## 2025-07-12 NOTE — ASSESSMENT & PLAN NOTE
He continues to do well following his catheter ablation procedure.  He is now off amiodarone, continuing the metoprolol.  There have been no recurrence of atrial fibrillation.  Oral anticoagulation will be continued.

## 2025-07-13 DIAGNOSIS — R35.0 URINARY FREQUENCY: ICD-10-CM

## 2025-07-13 DIAGNOSIS — N13.8 BPH WITH URINARY OBSTRUCTION: ICD-10-CM

## 2025-07-13 DIAGNOSIS — N40.1 BPH WITH URINARY OBSTRUCTION: ICD-10-CM

## 2025-07-14 RX ORDER — FINASTERIDE 5 MG/1
5 TABLET, FILM COATED ORAL DAILY
Qty: 90 TABLET | Refills: 1 | Status: SHIPPED | OUTPATIENT
Start: 2025-07-14

## (undated) DEVICE — SHEATH ULTIMUM 10FR 12CM 10/BX

## (undated) DEVICE — SPECIMEN CONTAINER STERILE PEEL PACK

## (undated) DEVICE — VIAL DECANTER

## (undated) DEVICE — SYRINGE BULB 2 OZ

## (undated) DEVICE — NRG TRANSSEPTAL NEEDLE EEPROM HIGH FLOW

## (undated) DEVICE — DERMATOME BLADES: Brand: DERMATOME

## (undated) DEVICE — STRETCH BANDAGE: Brand: CURITY

## (undated) DEVICE — COTTON TIP APPLICTOR 2 PK

## (undated) DEVICE — SHEATH ULTIMUM 8FR ACT 12CM 10/BX

## (undated) DEVICE — CATH INQUIRY STEER 6F IBI DEC 110CM REPROCESSED

## (undated) DEVICE — INTENDED FOR TISSUE SEPARATION, AND OTHER PROCEDURES THAT REQUIRE A SHARP SURGICAL BLADE TO PUNCTURE OR CUT.: Brand: BARD-PARKER SAFETY BLADES SIZE 15, STERILE

## (undated) DEVICE — BAG DECANTER

## (undated) DEVICE — ACE WRAP 4 IN STERILE

## (undated) DEVICE — SYRINGE 20ML LL

## (undated) DEVICE — *T*CATHETER VIEWFLEX XTRA ICE

## (undated) DEVICE — BETHLEHEM UNIVERSAL OUTPATIENT: Brand: CARDINAL HEALTH

## (undated) DEVICE — NEEDLE 25G X 1 1/2

## (undated) DEVICE — LIGHT HANDLE COVER SLEEVE DISP BLUE STELLAR

## (undated) DEVICE — *T* CATH ABLATION TACTICATH CONTACT FORCE BI-D DF CURVE

## (undated) DEVICE — DRESSING XEROFORM 5 X 9

## (undated) DEVICE — CATH ADVISOR VL BIDIRECTIONAL DF CURVE DUO DECAPOLAR

## (undated) DEVICE — PAD GROUNDING ADULT

## (undated) DEVICE — SHEATH ULTIMUM 7FR ACT 12CM 10/BX

## (undated) DEVICE — PLUMEPEN PRO 10FT

## (undated) DEVICE — SPONGE STICK WITH PVP-I: Brand: KENDALL

## (undated) DEVICE — Device

## (undated) DEVICE — GLOVE SRG BIOGEL ECLIPSE 7

## (undated) DEVICE — SHEATH TRANSEPTAL

## (undated) DEVICE — GAUZE SPONGES,16 PLY: Brand: CURITY

## (undated) DEVICE — SPONGE LAP 18 X 18 IN STRL RFD

## (undated) DEVICE — GUIDEWIRE DIAGNOSTIC J .32 X 150